# Patient Record
Sex: MALE | Race: WHITE | Employment: OTHER | URBAN - METROPOLITAN AREA
[De-identification: names, ages, dates, MRNs, and addresses within clinical notes are randomized per-mention and may not be internally consistent; named-entity substitution may affect disease eponyms.]

---

## 2017-08-02 LAB
LEFT VENTRICULAR EJECTION FRACTION MODE: NORMAL
LV EF: 35 %

## 2017-11-30 PROBLEM — I48.91 NEW ONSET ATRIAL FIBRILLATION (HCC): Status: ACTIVE | Noted: 2017-11-30

## 2018-03-09 PROBLEM — I07.1 TRICUSPID REGURGITATION: Status: ACTIVE | Noted: 2018-03-09

## 2018-03-09 PROBLEM — N18.9 CHRONIC KIDNEY DISEASE: Status: ACTIVE | Noted: 2018-03-09

## 2018-03-09 PROBLEM — I50.22 CHRONIC SYSTOLIC HEART FAILURE (HCC): Status: ACTIVE | Noted: 2018-03-09

## 2018-03-12 ENCOUNTER — HOSPITAL ENCOUNTER (OUTPATIENT)
Age: 73
Discharge: HOME | End: 2018-03-12
Payer: COMMERCIAL

## 2018-03-12 DIAGNOSIS — I25.5 ISCHEMIC CARDIOMYOPATHY: ICD-10-CM

## 2018-03-12 DIAGNOSIS — Z95.810 DUAL IMPLANTABLE CARDIOVERTER-DEFIBRILLATOR IN SITU: ICD-10-CM

## 2018-03-12 DIAGNOSIS — I34.0 NON-RHEUMATIC MITRAL REGURGITATION: ICD-10-CM

## 2018-03-12 DIAGNOSIS — I50.22 CHRONIC SYSTOLIC HEART FAILURE (HCC): ICD-10-CM

## 2018-03-12 DIAGNOSIS — I48.0 PAF (PAROXYSMAL ATRIAL FIBRILLATION) (HCC): ICD-10-CM

## 2018-03-12 LAB
ALBUMIN SERPL-MCNC: 4.2 G/DL (ref 3.5–5.2)
ALP BLD-CCNC: 99 U/L (ref 40–129)
ALT SERPL-CCNC: 19 U/L (ref 0–40)
ANION GAP SERPL CALCULATED.3IONS-SCNC: 12 MMOL/L (ref 7–16)
ANISOCYTOSIS: ABNORMAL
AST SERPL-CCNC: 32 U/L (ref 0–39)
BASOPHILS ABSOLUTE: 0.1 E9/L (ref 0–0.2)
BASOPHILS RELATIVE PERCENT: 1.2 % (ref 0–2)
BILIRUB SERPL-MCNC: 0.7 MG/DL (ref 0–1.2)
BUN BLDV-MCNC: 16 MG/DL (ref 8–23)
BURR CELLS: ABNORMAL
CALCIUM SERPL-MCNC: 9.3 MG/DL (ref 8.6–10.2)
CHLORIDE BLD-SCNC: 100 MMOL/L (ref 98–107)
CO2: 29 MMOL/L (ref 22–29)
CREAT SERPL-MCNC: 1.5 MG/DL (ref 0.7–1.2)
EOSINOPHILS ABSOLUTE: 0.46 E9/L (ref 0.05–0.5)
EOSINOPHILS RELATIVE PERCENT: 5.4 % (ref 0–6)
FERRITIN: 140 NG/ML
FOLATE: >20 NG/ML (ref 4.8–24.2)
GFR AFRICAN AMERICAN: 56
GFR NON-AFRICAN AMERICAN: 46 ML/MIN/1.73
GLUCOSE BLD-MCNC: 153 MG/DL (ref 74–109)
HBA1C MFR BLD: 6.7 % (ref 4.8–5.9)
HCT VFR BLD CALC: 42.7 % (ref 37–54)
HEMOGLOBIN: 13.1 G/DL (ref 12.5–16.5)
HYPOCHROMIA: ABNORMAL
IMMATURE GRANULOCYTES #: 0.03 E9/L
IMMATURE GRANULOCYTES %: 0.4 % (ref 0–5)
IRON SATURATION: 40 % (ref 20–55)
IRON: 126 MCG/DL (ref 59–158)
LYMPHOCYTES ABSOLUTE: 1.77 E9/L (ref 1.5–4)
LYMPHOCYTES RELATIVE PERCENT: 20.7 % (ref 20–42)
MCH RBC QN AUTO: 25.8 PG (ref 26–35)
MCHC RBC AUTO-ENTMCNC: 30.7 % (ref 32–34.5)
MCV RBC AUTO: 84.2 FL (ref 80–99.9)
MONOCYTES ABSOLUTE: 0.69 E9/L (ref 0.1–0.95)
MONOCYTES RELATIVE PERCENT: 8.1 % (ref 2–12)
NEUTROPHILS ABSOLUTE: 5.5 E9/L (ref 1.8–7.3)
NEUTROPHILS RELATIVE PERCENT: 64.2 % (ref 43–80)
OVALOCYTES: ABNORMAL
PDW BLD-RTO: 21.4 FL (ref 11.5–15)
PLATELET # BLD: 193 E9/L (ref 130–450)
PMV BLD AUTO: 11.3 FL (ref 7–12)
POIKILOCYTES: ABNORMAL
POTASSIUM SERPL-SCNC: 4.2 MMOL/L (ref 3.5–5)
PRO-BNP: 807 PG/ML (ref 0–125)
RBC # BLD: 5.07 E12/L (ref 3.8–5.8)
SODIUM BLD-SCNC: 141 MMOL/L (ref 132–146)
TARGET CELLS: ABNORMAL
TOTAL IRON BINDING CAPACITY: 315 MCG/DL (ref 250–450)
TOTAL PROTEIN: 7.8 G/DL (ref 6.4–8.3)
TSH SERPL DL<=0.05 MIU/L-ACNC: 4.17 UIU/ML (ref 0.27–4.2)
URIC ACID, SERUM: 6 MG/DL (ref 3.4–7)
WBC # BLD: 8.6 E9/L (ref 4.5–11.5)

## 2018-03-12 PROCEDURE — 85025 COMPLETE CBC W/AUTO DIFF WBC: CPT

## 2018-03-12 PROCEDURE — 83550 IRON BINDING TEST: CPT

## 2018-03-12 PROCEDURE — 82746 ASSAY OF FOLIC ACID SERUM: CPT

## 2018-03-12 PROCEDURE — 80053 COMPREHEN METABOLIC PANEL: CPT

## 2018-03-12 PROCEDURE — 84443 ASSAY THYROID STIM HORMONE: CPT

## 2018-03-12 PROCEDURE — 83880 ASSAY OF NATRIURETIC PEPTIDE: CPT

## 2018-03-12 PROCEDURE — 36415 COLL VENOUS BLD VENIPUNCTURE: CPT

## 2018-03-12 PROCEDURE — 83540 ASSAY OF IRON: CPT

## 2018-03-12 PROCEDURE — 82728 ASSAY OF FERRITIN: CPT

## 2018-03-12 PROCEDURE — 83036 HEMOGLOBIN GLYCOSYLATED A1C: CPT

## 2018-03-12 PROCEDURE — 84550 ASSAY OF BLOOD/URIC ACID: CPT

## 2018-03-15 ENCOUNTER — TELEPHONE (OUTPATIENT)
Dept: CARDIOLOGY CLINIC | Age: 73
End: 2018-03-15

## 2018-03-15 NOTE — TELEPHONE ENCOUNTER
Last office visit:  Dr Peck Loose: 1. Discontinue valsartan  2. Decrease furosemide to 20 mg daily  3. Tomorrow start Entresto 49-51 mg for 1 week. If in one week you feel ok, then increase to twice daily. 4. Lab work today or tomorrow and repeat lab work in 2 weeks from now (on the higher dosing of Entesto)  4. Return visit in 2 months    For 3 days take increase lasix 40mg daily, if you are doing well on the 40mg lasix daily, continue to take 40mg daily lasix. If you gain weight or have increase trouble breathing or increase in swelling please call the office Monday for further instructions. Change position slowly, space out entresto from lasix 1-2 hours. Continue daily weights. If you would like your blood pressure checked please call our office, I'll check it for you. He will call me Monday with an update on how he is feeling. He will not increase entresto to twice daily Saturday as he is going to increase his lasix dose to 40mg daily and will advise on Monday if ok to increase entresto to twice daily. Cardiologist in [place] saw today for follow up visit. He says his bp's run on  The low side but he feels ok. Cant recall his bp at cardiologist office today.

## 2018-03-20 ENCOUNTER — TELEPHONE (OUTPATIENT)
Dept: CARDIOLOGY CLINIC | Age: 73
End: 2018-03-20

## 2018-03-20 NOTE — TELEPHONE ENCOUNTER
Noa Hobson 1945 [phone] (home)     left voice message for client to call me back to report how lasix 40mg daily was working for him. To report his weights, any breathing issues and swelling to me . Asked he report if he has been changing position slowly and spacing lasix and entresto out at least 1-2 hours. Looking for an update on how he is feeling. Goal will be if doing ok, per Dr Emma Peralta, to increase Entresto 49-51mg twice daily. He currently is on 49-51 in am. (will need 2 week lab recheck once titrated entresto to twice daily). Pending patient call back.    [address] [facility]     3/20/2018 8:39 AM

## 2018-03-21 ENCOUNTER — TELEPHONE (OUTPATIENT)
Dept: CARDIOLOGY CLINIC | Age: 73
End: 2018-03-21

## 2018-03-21 DIAGNOSIS — I50.22 CHRONIC SYSTOLIC HEART FAILURE (HCC): Primary | ICD-10-CM

## 2018-03-22 ENCOUNTER — TELEPHONE (OUTPATIENT)
Dept: CARDIOLOGY CLINIC | Age: 73
End: 2018-03-22

## 2018-03-22 NOTE — TELEPHONE ENCOUNTER
3/21/18 at 357pm 3 page fax obtained from [address] [place] [address].   Phone [phone]  Member ID [id number]  Clinical prior Toby Jacobo is not required on the drug requested  \"entresto\" for client    Juan Sam 1945 [phone] (home)

## 2018-04-11 ENCOUNTER — HOSPITAL ENCOUNTER (OUTPATIENT)
Age: 73
Discharge: HOME | End: 2018-04-11
Payer: COMMERCIAL

## 2018-04-11 DIAGNOSIS — I25.5 ISCHEMIC CARDIOMYOPATHY: ICD-10-CM

## 2018-04-11 DIAGNOSIS — Z95.810 DUAL IMPLANTABLE CARDIOVERTER-DEFIBRILLATOR IN SITU: ICD-10-CM

## 2018-04-11 LAB
ANION GAP SERPL CALCULATED.3IONS-SCNC: 12 MMOL/L (ref 7–16)
BUN BLDV-MCNC: 21 MG/DL (ref 8–23)
CALCIUM SERPL-MCNC: 9.8 MG/DL (ref 8.6–10.2)
CHLORIDE BLD-SCNC: 98 MMOL/L (ref 98–107)
CO2: 28 MMOL/L (ref 22–29)
CREAT SERPL-MCNC: 1.8 MG/DL (ref 0.7–1.2)
GFR AFRICAN AMERICAN: 45
GFR NON-AFRICAN AMERICAN: 37 ML/MIN/1.73
GLUCOSE BLD-MCNC: 142 MG/DL (ref 74–109)
POTASSIUM SERPL-SCNC: 4.3 MMOL/L (ref 3.5–5)
SODIUM BLD-SCNC: 138 MMOL/L (ref 132–146)

## 2018-04-11 PROCEDURE — 36415 COLL VENOUS BLD VENIPUNCTURE: CPT

## 2018-04-11 PROCEDURE — 80048 BASIC METABOLIC PNL TOTAL CA: CPT

## 2018-04-16 ENCOUNTER — TELEPHONE (OUTPATIENT)
Dept: CARDIOLOGY CLINIC | Age: 73
End: 2018-04-16

## 2018-04-23 ENCOUNTER — TELEPHONE (OUTPATIENT)
Dept: CARDIOLOGY CLINIC | Age: 73
End: 2018-04-23

## 2018-04-24 ENCOUNTER — TELEPHONE (OUTPATIENT)
Dept: CARDIOLOGY CLINIC | Age: 73
End: 2018-04-24

## 2018-04-25 ENCOUNTER — TELEPHONE (OUTPATIENT)
Dept: CARDIOLOGY CLINIC | Age: 73
End: 2018-04-25

## 2018-04-25 DIAGNOSIS — I50.22 CHRONIC SYSTOLIC HEART FAILURE (HCC): Primary | ICD-10-CM

## 2018-04-30 ENCOUNTER — HOSPITAL ENCOUNTER (OUTPATIENT)
Age: 73
Discharge: HOME | End: 2018-04-30
Payer: COMMERCIAL

## 2018-04-30 DIAGNOSIS — I50.22 CHRONIC SYSTOLIC HEART FAILURE (HCC): ICD-10-CM

## 2018-04-30 LAB
ANION GAP SERPL CALCULATED.3IONS-SCNC: 13 MMOL/L (ref 7–16)
BUN BLDV-MCNC: 21 MG/DL (ref 8–23)
CALCIUM SERPL-MCNC: 9.2 MG/DL (ref 8.6–10.2)
CHLORIDE BLD-SCNC: 98 MMOL/L (ref 98–107)
CO2: 28 MMOL/L (ref 22–29)
CREAT SERPL-MCNC: 1.8 MG/DL (ref 0.7–1.2)
GFR AFRICAN AMERICAN: 45
GFR NON-AFRICAN AMERICAN: 37 ML/MIN/1.73
GLUCOSE BLD-MCNC: 164 MG/DL (ref 74–109)
POTASSIUM SERPL-SCNC: 4.3 MMOL/L (ref 3.5–5)
PRO-BNP: 757 PG/ML (ref 0–125)
SODIUM BLD-SCNC: 139 MMOL/L (ref 132–146)

## 2018-04-30 PROCEDURE — 36415 COLL VENOUS BLD VENIPUNCTURE: CPT

## 2018-04-30 PROCEDURE — 83880 ASSAY OF NATRIURETIC PEPTIDE: CPT

## 2018-04-30 PROCEDURE — 80048 BASIC METABOLIC PNL TOTAL CA: CPT

## 2018-05-01 ENCOUNTER — OFFICE VISIT (OUTPATIENT)
Dept: CARDIOLOGY CLINIC | Age: 73
End: 2018-05-01
Payer: COMMERCIAL

## 2018-05-01 VITALS
BODY MASS INDEX: 25.15 KG/M2 | WEIGHT: 196 LBS | HEART RATE: 90 BPM | DIASTOLIC BLOOD PRESSURE: 62 MMHG | SYSTOLIC BLOOD PRESSURE: 100 MMHG | HEIGHT: 74 IN | OXYGEN SATURATION: 93 % | RESPIRATION RATE: 18 BRPM

## 2018-05-01 DIAGNOSIS — I48.0 PAF (PAROXYSMAL ATRIAL FIBRILLATION) (HCC): ICD-10-CM

## 2018-05-01 DIAGNOSIS — I36.1 NON-RHEUMATIC TRICUSPID VALVE INSUFFICIENCY: ICD-10-CM

## 2018-05-01 DIAGNOSIS — E11.9 TYPE 2 DIABETES MELLITUS WITHOUT COMPLICATION, WITH LONG-TERM CURRENT USE OF INSULIN (HCC): ICD-10-CM

## 2018-05-01 DIAGNOSIS — I25.5 ISCHEMIC CARDIOMYOPATHY: ICD-10-CM

## 2018-05-01 DIAGNOSIS — I50.22 CHRONIC SYSTOLIC HEART FAILURE (HCC): Primary | ICD-10-CM

## 2018-05-01 DIAGNOSIS — I35.1 NONRHEUMATIC AORTIC VALVE INSUFFICIENCY: ICD-10-CM

## 2018-05-01 DIAGNOSIS — N18.30 STAGE 3 CHRONIC KIDNEY DISEASE (HCC): ICD-10-CM

## 2018-05-01 DIAGNOSIS — Z79.4 TYPE 2 DIABETES MELLITUS WITHOUT COMPLICATION, WITH LONG-TERM CURRENT USE OF INSULIN (HCC): ICD-10-CM

## 2018-05-01 DIAGNOSIS — I34.0 NON-RHEUMATIC MITRAL REGURGITATION: ICD-10-CM

## 2018-05-01 PROCEDURE — 99214 OFFICE O/P EST MOD 30 MIN: CPT | Performed by: NURSE PRACTITIONER

## 2018-05-07 ENCOUNTER — OFFICE VISIT (OUTPATIENT)
Dept: CARDIOLOGY CLINIC | Age: 73
End: 2018-05-07
Payer: COMMERCIAL

## 2018-05-07 VITALS
OXYGEN SATURATION: 96 % | BODY MASS INDEX: 25.15 KG/M2 | DIASTOLIC BLOOD PRESSURE: 60 MMHG | HEART RATE: 95 BPM | SYSTOLIC BLOOD PRESSURE: 110 MMHG | WEIGHT: 196 LBS | HEIGHT: 74 IN | RESPIRATION RATE: 18 BRPM

## 2018-05-07 DIAGNOSIS — I25.5 ISCHEMIC CARDIOMYOPATHY: ICD-10-CM

## 2018-05-07 DIAGNOSIS — I25.119 ATHEROSCLEROSIS OF NATIVE CORONARY ARTERY OF NATIVE HEART WITH ANGINA PECTORIS (HCC): ICD-10-CM

## 2018-05-07 DIAGNOSIS — I35.1 NONRHEUMATIC AORTIC VALVE INSUFFICIENCY: ICD-10-CM

## 2018-05-07 DIAGNOSIS — I50.22 CHRONIC SYSTOLIC HEART FAILURE (HCC): Primary | ICD-10-CM

## 2018-05-07 DIAGNOSIS — E11.9 TYPE 2 DIABETES MELLITUS WITHOUT COMPLICATION, WITHOUT LONG-TERM CURRENT USE OF INSULIN (HCC): Chronic | ICD-10-CM

## 2018-05-07 DIAGNOSIS — I36.1 NON-RHEUMATIC TRICUSPID VALVE INSUFFICIENCY: ICD-10-CM

## 2018-05-07 DIAGNOSIS — Z95.810 DUAL IMPLANTABLE CARDIOVERTER-DEFIBRILLATOR IN SITU: ICD-10-CM

## 2018-05-07 PROCEDURE — 99215 OFFICE O/P EST HI 40 MIN: CPT | Performed by: INTERNAL MEDICINE

## 2018-05-07 RX ORDER — METOPROLOL SUCCINATE 25 MG
25 TABLET, EXTENDED RELEASE 24 HR ORAL DAILY
Qty: 90 TABLET | Refills: 3
Start: 2018-05-07 | End: 2018-05-07 | Stop reason: SDUPTHER

## 2018-05-08 RX ORDER — METOPROLOL SUCCINATE 25 MG/1
25 TABLET, EXTENDED RELEASE ORAL DAILY
Qty: 30 TABLET | Refills: 11 | Status: ON HOLD | OUTPATIENT
Start: 2018-05-08 | End: 2019-01-13 | Stop reason: HOSPADM

## 2018-05-11 ENCOUNTER — TELEPHONE (OUTPATIENT)
Dept: CARDIOLOGY CLINIC | Age: 73
End: 2018-05-11

## 2018-07-20 ENCOUNTER — TELEPHONE (OUTPATIENT)
Dept: NON INVASIVE DIAGNOSTICS | Age: 73
End: 2018-07-20

## 2018-07-20 NOTE — TELEPHONE ENCOUNTER
Left message to call back to schedule a New pt appt with Dr Bettina Young next week. Patient was seen in the past by Dr Bettina Young but its been over 3 years so it will be a New Patient.    Patient has a [employer] ICD

## 2018-07-23 NOTE — TELEPHONE ENCOUNTER
Sent patient reminder paper of his appt and what he should bring to the appt. New pt paper work is not needed update papers are in the chart.

## 2018-07-23 NOTE — TELEPHONE ENCOUNTER
Pt scheduled with Dr. Aiden Calzada - he is unable to make the early times this week for a new pt apt. He is scheduled for an apt 8/15/18 @ 12:15.

## 2018-08-13 ENCOUNTER — OFFICE VISIT (OUTPATIENT)
Dept: CARDIOLOGY CLINIC | Age: 73
End: 2018-08-13
Payer: COMMERCIAL

## 2018-08-13 VITALS
SYSTOLIC BLOOD PRESSURE: 90 MMHG | OXYGEN SATURATION: 95 % | BODY MASS INDEX: 25.26 KG/M2 | WEIGHT: 196.8 LBS | RESPIRATION RATE: 20 BRPM | HEIGHT: 74 IN | DIASTOLIC BLOOD PRESSURE: 62 MMHG | HEART RATE: 80 BPM

## 2018-08-13 DIAGNOSIS — R53.82 CHRONIC FATIGUE: ICD-10-CM

## 2018-08-13 DIAGNOSIS — I48.0 PAF (PAROXYSMAL ATRIAL FIBRILLATION) (HCC): ICD-10-CM

## 2018-08-13 DIAGNOSIS — I50.22 CHRONIC SYSTOLIC HEART FAILURE (HCC): Primary | ICD-10-CM

## 2018-08-13 PROCEDURE — 93000 ELECTROCARDIOGRAM COMPLETE: CPT | Performed by: INTERNAL MEDICINE

## 2018-08-13 PROCEDURE — 99214 OFFICE O/P EST MOD 30 MIN: CPT | Performed by: INTERNAL MEDICINE

## 2018-08-13 RX ORDER — LANOLIN ALCOHOL/MO/W.PET/CERES
3 CREAM (GRAM) TOPICAL NIGHTLY PRN
COMMUNITY
End: 2019-01-07 | Stop reason: ALTCHOICE

## 2018-08-20 NOTE — PROGRESS NOTES
Active Problem List    Diagnosis Date Noted    AI (aortic insufficiency) 04/12/2013     Priority: High     -Moderate AI  -S/p AVR with size #23 Trifecta valve (11/07/2017, [facility])  -Echo (12/2017, [facility]) There is trivial aortic valve regurgitation. The peak gradient is 9 mmHg, the mean gradient is 5 mmHg and the dimensionless valve index is 0.96. prior gradients were 15/8 mmHg.  Ischemic cardiomyopathy 04/11/2013     Priority: High     Left ventricular systolic dysfunction. Ejection fraction 30-35%. Ejection fraction 30% to 35% on cardiac catheterization, 01/09/04. Ejection fraction 44% on Gated SPECT Study 7/16/04    Echocardiogram(6/2013): LVEF 25 +/- 5%. Severe LV dilatation. EF = 19 ± 5% (2D biplane), 12/18/2017 ([facility])      Coronary atherosclerosis of native coronary artery 04/09/2013     Priority: High     -Acute posterior myocardial infarction (1/25/97). -[facility] (01/27/97) Critical lesion mid left circumflex, significant lesion mid-LAD and first diagonal.    -PTCA (01/29/97) mid left circumflex. -[facility] (11/16/00) Insignificant coronary atherosclerosis. -Acute inferior myocardial infarction (01/08/04). -[facility] (01/08/04) patent angioplasty site of the left circumflex, total occlusion mid right coronary artery with otherwise unremarkable coronary artery disease. -PTCA and stent of right coronary artery (01/08/04). -Repeat [facility] (01/09/04) Good angioplasty result of right coronary artery with minimal residual thrombus. -CABG x 3 (04/2013) LIMA-LAD, SVG-OM, SVG-PAD  -[facility] (10/2017, [facility]) Severe disease of the SVG-PDA. Patent LIMA-LAD and SVG-LCx. Trivial to mild AI with normal sized aorta. -Redo CABG (11/07/2017) SVG- to the PDA concomitant with valvular repairs.             Dual implantable cardioverter-defibrillator in situ 09/24/2013     Priority: Medium     Upgrade of PPM to dual chamber ICD 9/12/13: Evera XT DR model [id number]; SN [id number]        DM (diabetes mellitus) ([facility] [address] 04/09/2013 sternotomy, CABG x1 (SVG - PDA), MVR (#29 Biocor), AVR (#23 Trifecta), TV repair (#30 CE ring)  Airway Difficulty: Grade I - No special instrumentation  OR Course: Coagulopathy/bleeding (2plt, 10cryo, 2FFP, 1pRBC) , Hypotension, Cardiac insuffiencey          Pacing wires: No - PPM/ICD  [facility]: Severe LV/Moderate RV dysfunction. CI 1.8 post CPB, IABP placed at that time. Arrived on Levo, Vaso, Epi, Milrinone and inhaled epoprostenol (RV support) for hemodynamic support. Target maps 65-75. Maintain ETT overnight. Ensure BG and pain control. A/P:  No TPM wires - ICD - Check completed 11/10- underlying rhythm- AFIb PVCs  - s/p CABG - ASA, BB, statin  - s/p MVR, AVR, TVr - ECHO 11/10: EF 28%, RV mod, Triv MR (grad 18/5), 1+TR (mn grad 3), Triv AR (grad16/8); Triv pericardial effusion  - [facility] - Cards on consult- increase Diovan, Torsemide  - h/o Afib - BB and Coumadin. [facility]   - DM2 - Endo following  - COPD/smoker   - Chronic Back pain- sherwin enamorado, heating pad, encourage ambulation. - post op Delirium- memory issues per sister. distrust, family issues/concerns. Mood improved 11/17/2017. declined psych consult. - CKD (preop baseline SCr 1.69- 2.19). SCr 1.59 - improved    Dispo - from [place], [place]. PT recs Home PT.  OPD appt scheduled. [facility] Cards - req. - Plans to stay with sister after dc to recover.  Ulnar nerve neuropathy 06/27/2013    PAF (paroxysmal atrial fibrillation) ([facility] [address] 05/13/2013     Anticoagulated with coumadin   ROBER (2013)      Mitral regurgitation 04/12/2013     -s/p MVrp (2013) 30mm Future ring.   -s/p MVR (10/2017, [facility]) #29 Biocor   - Echo (12/18/2017, [facility]) Biocor prosthetic mitral valve (size #29). There is trivial mitral valve regurgitation. The peak gradient is 22 mmHg and the mean gradient is 8 mmHg. Prior gradients were 22/9 mmHg.       Hyperlipidemia 04/09/2013    GERD (gastroesophageal reflux disease) 04/09/2013    Depression 04/09/2013    Hypothyroid taking differently: Take 1,000 mg by mouth daily ) 30 tablet 3    levothyroxine (SYNTHROID) 75 MCG tablet Take 1 tablet by mouth Daily 30 tablet 3    Multiple Vitamins-Minerals (THERAPEUTIC MULTIVITAMIN-MINERALS) tablet Take 1 tablet by mouth daily 60 tablet 2    insulin glargine (LANTUS) 100 UNIT/ML injection vial Inject 20 Units into the skin every morning      atorvastatin (LIPITOR) 80 MG tablet Take 80 mg by mouth daily      linagliptin (TRADJENTA) 5 MG tablet Take 5 mg by mouth daily      albuterol-ipratropium (COMBIVENT)  MCG/ACT inhaler Inhale 2 puffs into the lungs every 12 hours. (Patient taking differently: Inhale 2 puffs into the lungs every 12 hours To use & bring) 1 Inhaler 1    aspirin 81 MG EC tablet Take 81 mg by mouth daily Prescribed per Dr Ernestina De La Torre      omeprazole (PRILOSEC) 20 MG capsule Take 20 mg by mouth daily Take am dos 08/02           Review of Systems:   Cardiac: As per HPI  General: No fever, chills, rigors  Pulmonary: As per HPI  HEENT: No visual disturbances, difficult swallowing  GI: No nausea, vomiting, abdominal pain  : No dysuria or hematuria  Endocrine: No thyroid disease or diabetes  Musculoskeletal: FUNEZ x 4, no focal motor deficits  Skin: Intact, no rashes  Neuro/Psych: No headache or seizures        Weights: Wt Readings from Last 3 Encounters:   08/13/18 196 lb 12.8 oz (89.3 kg)   05/07/18 196 lb (88.9 kg)   05/01/18 196 lb (88.9 kg)     Physical Examination:   BP 90/62   Pulse 80   Resp 20   Ht 6' 2\" (1.88 m)   Wt 196 lb 12.8 oz (89.3 kg)   SpO2 95%   BMI 25.27 kg/m²   CONSTITUTIONAL: Alert and oriented times 3, no acute distress and cooperative to examination with proper mood and affect. SKIN: Skin color, texture, turgor normal. No rashes or lesions. LYMPH: no cervical nodes, no inguinal nodes  HEENT: Head is normocephalic, atraumatic. EOMI, PERRLA.   NECK: Supple, symmetrical, trachea midline, no adenopathy, thyroid symmetric, not enlarged and no infection. No arm swelling, syncope, pre-syncope or device related pocket stimulation. OTHER DIAGNOSTICS: RV pacing 6%. PROGRAMMING CHANGES MADE TODAY: None        ECG(03/09/2018): NSR, OH prolongation, low voltage, non specific st t wave changes        Assessment:  1. Chronic HFrEF  2. Valvular cardiomyopathy; status post AVR for AI  3. Ischemic heart disease status post CABG 2013, 2017  4. Preserved RV function  5. ACC/AHA stage C  6. NYHA FC 3  7. Clinically euvolemic, warm, well perfused  8. Coronary artery disease, CABG 2013, 2017  9. Fatigue, slowly improving. 10. Systemic hypertension history  11. Insulin dependent diabetes mellitus  12. PAF, currently maintaining sinus rhythm. 13. Elevated chadsvasc, anticoagulated with coumadin. Plan:   1. Continue current medications. Administer Entresto samples. 4. Repeat TTE pending, then +/- CPET based on results. 5. Diet should sodium restricted to 2 grams. Again watch your daily weight trends and if you gain water   weight please follow above instructions. If you gain 3-5 pounds in 2-3 days OR notice that you are retaining fluid in anyway just like you did before then take an extra dose of your water pill (furosemide/Lasix OR bumetanide/Bumex) every day until you lose the weight or feel better. If you notice that you have taken more than 3 extra doses in 1 week then please call and let us know. If at any time you feel that you are retaining fluid, your medications are not working, or you feel ill in anyway, then please call us for either same day appointment or the next day, and for instructions. Our goal is to keep you out of the emergency room and the hospital and we have ways to do it. You just need to call us in a timely manner.      If you become sick for other reasons, and notice that you are not urinating as much, the urine is very dark, you have significant diarrhea or vomiting, then please DO NOT take your water pill and

## 2018-08-24 NOTE — PROGRESS NOTES
Cardiac Electrophysiology Consultation Note    Regina Hays  1945  Date of Service: 8/28/2018  PCP: Jolyn Aschoff, MD  Cardiologist:  Orville Pollack MD  Electrophysiologist: Christiano Moreland DO      SUBJECTIVE: Regina Hays is a 73 yo male who I was asked to see in Cardiac Electrophysiology consultation for re-establishment of EP care. He was last evaluated in our device clinic 7/28/17. His last office visit with Dr Von Fernandez was 2/21/15. He has an extensive medical history as noted below. He now follows with Dr Ulysses Oddi with recent follow-up 8/20/18. He is feeling well and offers no complaints. His OptiVol fluid index is stable at baseline and he denies any heart failure symptoms. He appears euvolemic today. We discussed remote follow-up. A new home monitor will be ordered. He denies angina, syncope, orthopnea and PND. Since his last device follow-up in August 2017 he has had 10 NSVT episodes lasting 1-4 seconds all self-terminating. He denies ICD shock. He remains on coumadin with no reported bleeding issues. Patient Active Problem List    Diagnosis Date Noted    Ischemic cardiomyopathy 04/11/2013     Priority: High     Overview Note:     Left ventricular systolic dysfunction. Ejection fraction 30-35%. Ejection fraction 30% to 35% on cardiac catheterization, 01/09/04. Ejection fraction 44% on Gated SPECT Study 7/16/04    Echocardiogram(6/2013): LVEF 25 +/- 5%. Severe LV dilatation. EF = 19 ± 5% (2D biplane), 12/18/2017 ([facility])      Tricuspid regurgitation 03/09/2018     Overview Note:     -Moderate TR  -s/p tricuspid valve repair with a size #30 CE ring. (11/07/2017, [facility])  - Echo (12/2017, [facility]) There is mild (1+ - 2+) tricuspid valve regurgitation. The peak gradient is 14 mmHg and the mean gradient is 5 mmHg.                 Chronic kidney disease 03/09/2018     Overview Note:     Stage 3   Baseline Cr - 1.7, GFR - 40                Chronic systolic heart failure ([address] 03/09/2018 Overview Note:     RHC (10/2017, [facility]) BP: 122 / 76. BP Mean: 91. HR: 82 min. FiO2: 21.00%. Pulse Ox: 94.00%. CVP: 8 cm. Thermo CO: 3.97 l/min. Hg: 10.8. H2O = 6.2 mmHg. Thermo CI: 1.84 l/min/m2. TP. RA Mean: 8. Assumed Chan CO: 4.21 l/min. PVR: 3.0. RV: 76 / 12. Assumed Chan CI: 1.95 l/min/m2. SVR: 1679.0. PA: 72 / 34. PA Mean: 49.00. PCWP Mean: 36.0. SV: 48.41 cc/stroke. TS. SVI: 22.46 cc/stroke/m2. PCWP V Wave: 50.00. PAO2: 50.40%. LVSWI: 16.90 gm-m/m2/beat. RVSWI: 12.52 gm-m/m2/beat. RVSWI: 920.74 mmHg-ml/m2/beat. Impression: Pulmonary venous hypertension with prominent V waves borderline/mildly subnormal cardiac index       Transition of care performed with sharing of clinical summary 11/15/2017     Overview Note:     Overview:   Indication for Surgery: Severe MR, AI, TR. CAD. LVEF: 29%  RVF: Mod, RVSP 76 mmHg, 2-3+MR/TR, 2+ AR   EKG: ICD - underlying SR with episodes of NSVT and Afib   Cors: Severe CAD  [facility] Cards: Argenis Daugherty   RHC: PA 72/34   PMH/PSH: mod MR s/p MV repair (#30 future ring), moderate AI and moderate TR; CAD s/p CABGX3 (LIMA-LAD, SVG to OM, PDA);  ICM (LVEF 35% by NNEKA) s/p  ICD (); paroxysmal AFib (on Coumadin), COPD, HTN, HLD, DM, hypothyroidism, current smoker, OA, GERD, gout. sys/scott HF; CKD     Preop: 71yo male with PMH of prior CABG and MV repair in  at [facility], ICD placement , atrial fibrillation on Coumadin, gout, DM2, active smoker with increased fatigue and SOB.     Surgery: 2017 Redo sternotomy, CABG x1 (SVG - PDA), MVR (#29 Biocor), AVR (#23 Trifecta), TV repair (#30 CE ring)  Airway Difficulty: Grade I - No special instrumentation  OR Course: Coagulopathy/bleeding (2plt, 10cryo, 2FFP, 1pRBC) , Hypotension, Cardiac insuffiencey          Pacing wires: No - PPM/ICD  CVICU: Severe LV/Moderate RV dysfunction. CI 1.8 post CPB, IABP placed at that time.  Arrived on Levo, Vaso, Epi, Milrinone and inhaled epoprostenol (RV support) for hemodynamic support. Target maps 65-75. Maintain ETT overnight. Ensure BG and pain control. A/P:  No TPM wires - ICD - Check completed 11/10- underlying rhythm- AFIb PVCs  - s/p CABG - ASA, BB, statin  - s/p MVR, AVR, TVr - ECHO 11/10: EF 28%, RV mod, Triv MR (grad 18/5), 1+TR (mn grad 3), Triv AR (grad16/8); Triv pericardial effusion  - [facility] - Cards on consult- increase Diovan, Torsemide  - h/o Afib - BB and Coumadin. [facility]   - DM2 - Endo following  - COPD/smoker   - Chronic Back pain- sherwin enamorado, heating pad, encourage ambulation. - post op Delirium- memory issues per sister. distrust, family issues/concerns. Mood improved 11/17/2017. declined psych consult. - CKD (preop baseline SCr 1.69- 2.19). SCr 1.59 - improved    Dispo - from [facility]. PT recs Home PT.  [facility]D appt scheduled. [facility] Cards - req. - Plans to stay with sister after dc to recover.  Dual implantable cardioverter-defibrillator in situ 09/24/2013     Overview Note:     Upgrade of PPM to dual chamber ICD 9/12/13: Shawn WESLEY DR model [id number];  [id number]        Ulnar nerve neuropathy 06/27/2013    PAF (paroxysmal atrial fibrillation) ([facility] [address] 05/13/2013     Overview Note:     Anticoagulated with coumadin   ROBER (2013)      Mitral regurgitation 04/12/2013     Overview Note:     -s/p MVrp (2013) 30mm Future ring.   -s/p MVR (10/2017, [facility]) #29 Biocor   - Echo (12/18/2017, [facility]) Biocor prosthetic mitral valve (size #29). There is trivial mitral valve regurgitation. The peak gradient is 22 mmHg and the mean gradient is 8 mmHg. Prior gradients were 22/9 mmHg.  AI (aortic insufficiency) 04/12/2013     Overview Note:     -Moderate AI  -S/p AVR with size #23 Trifecta valve (11/07/2017, [facility])  -Echo (12/2017, [facility]) There is trivial aortic valve regurgitation. The peak gradient is 9 mmHg, the mean gradient is 5 mmHg and the dimensionless valve index is 0.96. prior gradients were 15/8 mmHg.               Hyperlipidemia CARDIAC CATH LAB PROCEDURE  01/27/97,11/00    Critical lesion mid left circumflex,significant lesion mid-LAD and first diagonal    ECHO COMPL W DOP COLOR FLOW  4/11/2013         ECHO COMPL W DOP COLOR FLOW  4/21/2013         MITRAL VALVE SURGERY      TRANSESOPHAGEAL ECHOCARDIOGRAM  4/12/2013    Dr. Marcia Juárez TRANSESOPHAGEAL ECHOCARDIOGRAM  08/02/2017       Family History   Problem Relation Age of Onset    Hypertension Mother     Hypertension Father     Heart Surgery Father     High Cholesterol Father        Social History   Substance Use Topics    Smoking status: Former Smoker     Packs/day: 0.50     Years: 50.00     Types: Cigarettes     Quit date: 11/7/2017    Smokeless tobacco: Never Used      Comment: occ cigarette    Alcohol use Yes      Comment: quit since surgery in november. was about a 5 drink a week prior to this       Current Outpatient Prescriptions   Medication Sig Dispense Refill    zolpidem (AMBIEN) 10 MG tablet Take 5 mg by mouth nightly as needed.  Arlin Kruger metoprolol succinate (TOPROL XL) 25 MG extended release tablet Take 1 tablet by mouth daily 30 tablet 11    sacubitril-valsartan (ENTRESTO) 49-51 MG per tablet Take 1 tablet by mouth 2 times daily 60 tablet 11    colchicine (COLCRYS) 0.6 MG tablet Take 0.6 mg by mouth as needed       furosemide (LASIX) 40 MG tablet Take 0.5 tablets by mouth daily (Patient taking differently: Take 40 mg by mouth daily ) 60 tablet 3    warfarin (COUMADIN) 2.5 MG tablet AT 6:00   PM (Patient taking differently: AT 6:00   PM) 30 tablet 3    allopurinol (ZYLOPRIM) 100 MG tablet Take 1 tablet by mouth daily 30 tablet 3    ascorbic acid (VITAMIN C) 500 MG tablet Take 1 tablet by mouth daily (Patient taking differently: Take 1,000 mg by mouth daily ) 30 tablet 3    levothyroxine (SYNTHROID) 75 MCG tablet Take 1 tablet by mouth Daily 30 tablet 3    Multiple Vitamins-Minerals (THERAPEUTIC MULTIVITAMIN-MINERALS) tablet Take 1 tablet by mouth daily 60 tablet 2    insulin glargine (LANTUS) 100 UNIT/ML injection vial Inject 20 Units into the skin every morning      atorvastatin (LIPITOR) 80 MG tablet Take 80 mg by mouth daily      linagliptin (TRADJENTA) 5 MG tablet Take 5 mg by mouth daily      albuterol-ipratropium (COMBIVENT)  MCG/ACT inhaler Inhale 2 puffs into the lungs every 12 hours. (Patient taking differently: Inhale 2 puffs into the lungs every 12 hours To use & bring) 1 Inhaler 1    aspirin 81 MG EC tablet Take 81 mg by mouth daily Prescribed per Dr Abdon Rucker      omeprazole (PRILOSEC) 20 MG capsule Take 20 mg by mouth daily Take am dos 08/02      melatonin 3 MG TABS tablet Take 3 mg by mouth nightly as needed       No current facility-administered medications for this visit. No Known Allergies    Review of Systems   Respiratory: Negative. Cardiovascular: Negative. All other systems reviewed and are negative. PHYSICAL EXAM:  Vitals:    08/28/18 1124   BP: 92/60   Resp: 16   Weight: 198 lb (89.8 kg)   Height: 6' 2\" (1.88 m)     Constitutional: Oriented to person, place, and time. Well-developed and cooperative. Head: Normocephalic and atraumatic. Eyes: Conjunctivae are normal.   Neck: No hepatojugular reflux and no JVD present. Cardiovascular: S1 normal, S2 normal and intact distal pulses. Normal rate and rhythm. PMI is not displaced. Pulmonary/Chest: Effort normal and breath sounds normal. No respiratory distress. Abdominal: Soft. Normal appearance. No tenderness. Musculoskeletal: Normal range of motion of all extremities, no muscle weakness. Neurological: Alert and oriented to person, place, and time. Gait normal.   Skin: Skin is warm and dry. No bruising, no ecchymosis and no rash noted. Extremity: No clubbing or cyanosis. No edema. Psychiatric: Normal mood and affect.  Thought content normal.   ICD site: stable, well healed, no evidence of erosion       TSH:   Lab Results   Component Value Date    TSH 4.170 03/12/2018      Lipid Profile:   Lab Results   Component Value Date    TRIG 87 12/02/2017    HDL 20 12/02/2017    LDLCALC 48 12/02/2017    CHOL 85 12/02/2017            Pertinent Cardiac Testing:     ICD Interrogation:   WILLIAM WESLEY DR, DDD  ppm. DOI: 9/12/2013. P wave: 1 mV  Impedance: 342 ohms   Threshold: 0.75 V @ 0.4 ms  RV R wave: 18 mV  Impedance: 399 ohms   Threshold: 0.75 V @ 0.5 ms  Pacing: A: 61.9%  RV: 1.1%    Battery Voltage/Longevity: 5.6 years  Charge time: 3.8 seconds    Arrhythmias: Since last interrogation 8/28/17                       - 10 NSVT 1-4 seconds  OptiVol: At baseline; elevated 11/11/17-5/2/18  Programming: lowered rated from 80 back to 60 bpm                           carelink alerts turned on        2D echocardiogram 7/20/17  Reading Physician Reading Date Result Priority   Aubrie Veliz MD 7/22/2017    Narrative     Transthoracic Echocardiography Report (TTE)     Demographics      Patient Name       Steven Favre Gender             [facility]      Medical Record     [id number]      Room Number         Yady Goldman      Account #         [de-identified]    Procedure Date      07/20/2017      Corporate ID                     Ordering Physician Giles Modi MD      Accession Number   [id number]     Referring Physician Paddy Woods MD      Date of Birth      1945    Sonographer         Merlinda Jabs                                                        [facility]      Age                71 year(s)    Interpreting       Daniel Stevens MD                                    Physician                                       Any Other     Procedure    Type of Study      TTE procedure:Echo Complete W/ Dop & Color Flow.     Procedure Date  Date: 07/20/2017 Start: 01:54 PM    Study Location: Echo Lab  Technical Quality: Adequate visualization    Indications:Cardiomyopathy and Mitral Valve Repair.     Patient Status: Routine    Height: 74 inches Weight: 198 pounds BSA: 2.16 m^2 BMI: 25.42 kg/m^2    BP: 110/80 mmHg    Allergies    - No known allergies.     Findings      Left Ventricle   Mildly dilated left ventricle   LV diffuse hypokinesis   Ejection fraction Kelly biplane = 38%.      Right Ventricle   Normal right ventricular size and function.   Electrode in right ventricle.      Left Atrium   Normal sized left atrium.   Atrial fibrillation      Right Atrium   Mildly enlarged right atrium size.   Electrode in RA cavity. .      Mitral Valve   Increased E point septal separation noted,suggesting decreased LV cardiac   output   MV leaflets tented   Moderate primarily central directed mitral regurgitation   S/P MVr with ring      Tricuspid Valve   The tricuspid valve appears structurally normal.   Moderate tricuspid regurgitation.    RVSP is 46 mmHg.   TR velocity = 3.27 m/s   Pulmonary hypertension is mild .      Aortic Valve   Structurally normal aortic valve.   Aortic valve opens well.   Moderate aortic regurgitation   AR MPK=067 ms   AR VC=0.7      Pulmonic Valve   Pulmonic valve is structurally normal.   Moderate-to-severe pulmonic regurgitation   S/P MVr with ring      Pericardial Effusion   No pericardial effusion.      Aorta   Aortic root dimension within normal limits.      Conclusions      Summary   Moderate aortic regurgitation   Ejection fraction Kelly biplane = 38%   Atrial fibrillation   S/P MVr with ring   Moderate primarily central directed mitral regurgitation   Moderate aortic regurgitation   AR QDE=440 ms   Pulmonary hypertension is mild .      Signature      ----------------------------------------------------------------   Electronically signed by Inge Conn MD(Interpreting   physician) on 07/22/2017 11:02 PM   ----------------------------------------------------------------     M-Mode/2D Measurements & Calculations      LV Diastolic     LV Systolic Dimension: 5 cm     AV Cusp Separation: 2   Dimension: 5.8   LV Volume Diastolic: 178.9 ml   cmAO Root Dimension: 3.4   cm               LV Volume Systolic: 894.0 ml    cm   LV FS:13.8 %     LV EDV/LV EDV Index: 618.0   LV PW Diastolic: QO/22 CJ/V^5HD ESV/LV ESV   0.9 cm           Index: 115.9 ml/54ml/ m^2   LV PW Systolic:  EF Calculated: 29.8 %           RV Diastolic Dimension:   6.0 cm           LV Mass Index: 115 l/min*m^2    4.4 cm   Septum   Diastolic: 1.2                                   LA/Aorta: 1.44   cm               LVOT: 2.4 cm                    Ascending Aorta: 3.6 cm   Septum Systolic:                                 LA volume/Index: 65.3 ml   1.8 cm                                           /30ml/m^2                                                    RA Area: 17.8 cm^2   LV Mass: 248.48   g     Doppler Measurements & Calculations                          AV Peak Velocity: 1.54 LVOT Peak Velocity: 0.83 m/s   MV Peak Gradient:   m/s                    LVOT Mean Velocity: 0.56 m/s   15.4 mmHg           AV Peak Gradient: 9.45 LVOT Peak Gradient: 2.8   MV Mean Gradient:   mmHg                   mmHgLVOT Mean Gradient: 1.4   5.2 mmHg            AV Mean Velocity: 0.98 mmHg   MV Mean Velocity:   m/s                    Estimated RVSP: 45.69 mmHg   0.97 m/s            AV Mean Gradient: 4.4  Estimated RAP:3 mmHg   MV Deceleration     mmHg   Time: 215.2 msec    AV VTI: 28.6 cm   MV P1/2t: 50 msec   AV Area                TR Velocity:3.27 m/s   MVA by PHT:4.4 cm^2 (Continuity):2.61 cm^2 TR Gradient:42.69 mmHg   MV Area             AV Deceleration Time:  PV Peak Velocity: 1.08 m/s   (continuity): 2.4   1742.9 msec            PV Peak Gradient: 4.7 mmHg   cm^2                LVOT VTI: 16.5 cm      PV Mean Velocity: 0.68 m/s                                              PV Mean Gradient: 2.1 mmHg      MR Velocity: 4.28   Pulm. Vein D           CO ED Velocity: 1.32 m/s   m/s                 Velocity:0.86 m/s   MR VTI: 130.6 cm    Pulm.  Vein S Velocity:                   post mitral annular ring.   Mean mitral valve gradient 5.9 mmHg.   Moderate mitral regurgitation.      Tricuspid Valve   Mild tricuspid regurgitation.   PASP is estimated at 37 mmHg.      Aortic Valve   Moderate aortic regurgitation.   No evidence of hemodynamically significant aortic stenosis.       Pulmonic Valve   Mild pulmonic regurgitation.      Pericardial Effusion   No evidence of a hemodynamically significant pericardial effusion.      Aorta   Aortic root dimension within normal limits.   The inferior vena cava is normal in size with normal respiratory   variation.      Conclusions      Summary   Ejection fraction is visually estimated at 35%.   The inferior and inferolateral walls are severely hypokinetic/akinetic.  Lana Camel dilated right ventricle with normal right ventricular function.   Indeterminate diastolic function.   Moderately dilated left atrium.   Pulmonary vein doppler suggestive of elevated left atrial pressure.   Status post mitral annular ring.   Mean mitral valve gradient 5.9 mmHg.   Moderate mitral regurgitation.   Moderate aortic regurgitation.   Mild tricuspid regurgitation.   PASP is estimated at 37 mmHg.   Mild pulmonic regurgitation.      Signature      ----------------------------------------------------------------   Electronically signed by Erlin Galvez MD(Interpreting   physician) on 07/15/2015 07:56 AM   ----------------------------------------------------------------     M-Mode/2D Measurements & Calculations      LV Diastolic     LV Systolic Dimension: 5.6 cm   AV Cusp Separation: 1.8   Dimension: 6.6   LV Volume Diastolic: 559.9 ml   cmAO Root Dimension: 3.4   cm               LV Volume Systolic: 213.2 ml    cm   LV FS:15.2 %     LV EDV/LV EDV Index: 034.0   LV PW Diastolic: DO/67 UQ/F^0IM ESV/LV ESV   0.5 cm           Index: 114.9 ml/52ml/ m^2   LV PW Systolic:  EF Calculated: 46.5 %           RV Diastolic Dimension:   9.9 cm           LV Mass Index: 115 l/min*m^2    2.6 cm   Septum   Diastolic: 1.3                                   LA/Aorta: 1.47   cm               LVOT: 2.4 cm                    Ascending Aorta: 3.2 cm   Septum Systolic:                                 LA volume/Index: 78 ml   1.6 cm                                           /35ml/m^2   CO: 6.01 l/min                                   RA Area: 19 cm^2   CI: 2.71 l/m*m^2   LV Mass: 254.53                                  IVC Expiration: 1.8 cm   g     Doppler Measurements & Calculations                                  AV Peak Velocity: 1.6 LVOT Peak Velocity: 0.84   MV Mean Gradient: 56 mmHg   m/s                   m/s   MV Mean Velocity: 1.07 m/s  AV Peak Gradient:     LVOT Mean Velocity: 0.53   MV Deceleration Time: 258.5 10.25 mmHg            m/s   msec                        AV Mean Velocity: 1.1 LVOT Peak Gradient: 2.8   MV P1/2t: 57.6 msec         m/s                   mmHgLVOT Mean Gradient:   MVA by PHT:3.82 cm^2        AV Mean Gradient: 5.5 1.4 mmHg   MV Area (continuity): 1.9   mmHg                  Estimated RVSP: 37 mmHg   cm^2                        AV VTI: 33.9 cm       Estimated RAP:3 mmHg                               AV Area                               (Continuity):2.43   MR Velocity: 4.57 m/s       cm^2   MV SALVADOR PISA: 0.15 cm^2      AV Deceleration Time:   MR VTI: 147.6 cm            1451.5 msec           PV Peak Velocity: 1.2   Alias Velocity: 0.31        LVOT VTI: 18.2 cm     m/s   m/sPISA Radius: 0.6 cm                            PV Peak Gradient: 5.74                                                     mmHg   PISA area: 2.26 cm^2MR flow Pulm. Vein D          PV Mean Velocity: 0.8   rate: 70.06 ml/sMR          Velocity:0.73 m/s     m/s   volume:22.14 ml             Pulm. Vein S          PV Mean Gradient: 3 mmHg                               Velocity: 0.33 m/s     [email or web address]     I have independently reviewed rhythm strips per above.    I have personally reviewed the laboratory, cardiac diagnostic and radiographic testing as outlined above. I have reviewed previous records noted in [address]. Impression:    1. CAD   -Acute posterior myocardial infarction (97). -LHC (97) Critical lesion mid left circumflex, significant lesion mid-LAD and first diagonal.    -PTCA (97) mid left circumflex. -LHC (00) Insignificant coronary atherosclerosis. -Acute inferior myocardial infarction (04). -LHC (04) patent angioplasty site of the left circumflex, total occlusion mid right coronary artery with otherwise unremarkable coronary artery disease. -PTCA and stent of right coronary artery (04). -Repeat LHC (04) Good angioplasty result of right coronary artery with minimal residual thrombus. -CABG x 3 (2013) LIMA-LAD, SVG-OM, SVG-PAD  -[facility] (10/2017, [facility]) Severe disease of the SVG-PDA. Patent LIMA-LAD and SVG-LCx. Trivial to mild AI with normal sized aorta. -Redo CABG (2017) SVG- to the PDA concomitant with valvular repairs. 2. Chronic systolic HF  - RHC (, [facility]) BP: 122 / 76. BP Mean: 91. HR: 82 min. FiO2: 21.00%. Pulse Ox: 94.00%. CVP: 8 cm. Thermo CO: 3.97 l/min. Hg: 10.8. H2O = 6.2 mmHg. Thermo CI: 1.84 l/min/m2. TP. RA Mean: 8. Assumed Chan CO: 4.21 l/min. PVR: 3.0. RV: 76 / 12. Assumed Chan CI: 1.95 l/min/m2. SVR: 1679.0. PA: 72 / 34. PA Mean: 49.00. PCWP Mean: 36.0. SV: 48.41 cc/stroke. TS. SVI: 22.46 cc/stroke/m2. PCWP V Wave: 50.00. PAO2: 50.40%. LVSWI: 16.90 gm-m/m2/beat. RVSWI: 12.52 gm-m/m2/beat. RVSWI: 920.74 mmHg-ml/m2/beat. Impression: Pulmonary venous hypertension with prominent V waves borderline/mildly subnormal cardiac index  - follows with Dr Sun Certain  - GDMT: Toprol XL 25 mg QD, Entresto 49-51 BID, Lasix 40 mg QD,    3.  Dual chamber ICD in situ  - Upgrade of PPM to dual chamber ICD 13: Evera MARYJANE PRICE model [id number]; SN

## 2018-08-28 ENCOUNTER — OFFICE VISIT (OUTPATIENT)
Dept: NON INVASIVE DIAGNOSTICS | Age: 73
End: 2018-08-28
Payer: COMMERCIAL

## 2018-08-28 VITALS
SYSTOLIC BLOOD PRESSURE: 92 MMHG | WEIGHT: 198 LBS | DIASTOLIC BLOOD PRESSURE: 60 MMHG | BODY MASS INDEX: 25.41 KG/M2 | RESPIRATION RATE: 16 BRPM | HEART RATE: 81 BPM | HEIGHT: 74 IN

## 2018-08-28 DIAGNOSIS — Z95.810 DUAL IMPLANTABLE CARDIOVERTER-DEFIBRILLATOR IN SITU: Primary | ICD-10-CM

## 2018-08-28 PROCEDURE — 99203 OFFICE O/P NEW LOW 30 MIN: CPT | Performed by: NURSE PRACTITIONER

## 2018-08-28 PROCEDURE — 93283 PRGRMG EVAL IMPLANTABLE DFB: CPT | Performed by: NURSE PRACTITIONER

## 2018-08-28 PROCEDURE — 93290 INTERROG DEV EVAL ICPMS IP: CPT | Performed by: NURSE PRACTITIONER

## 2018-08-28 RX ORDER — ZOLPIDEM TARTRATE 10 MG/1
10 TABLET ORAL NIGHTLY
COMMUNITY
End: 2019-08-20 | Stop reason: ALTCHOICE

## 2018-08-28 ASSESSMENT — ENCOUNTER SYMPTOMS: RESPIRATORY NEGATIVE: 1

## 2018-09-07 ENCOUNTER — TELEPHONE (OUTPATIENT)
Dept: NON INVASIVE DIAGNOSTICS | Age: 73
End: 2018-09-07

## 2018-09-19 ENCOUNTER — TELEPHONE (OUTPATIENT)
Dept: NON INVASIVE DIAGNOSTICS | Age: 73
End: 2018-09-19

## 2018-10-04 ENCOUNTER — NURSE ONLY (OUTPATIENT)
Dept: NON INVASIVE DIAGNOSTICS | Age: 73
End: 2018-10-04
Payer: COMMERCIAL

## 2018-10-04 DIAGNOSIS — I25.5 ISCHEMIC CARDIOMYOPATHY: ICD-10-CM

## 2018-10-04 DIAGNOSIS — I48.0 PAF (PAROXYSMAL ATRIAL FIBRILLATION) (HCC): ICD-10-CM

## 2018-10-04 DIAGNOSIS — Z95.810 DUAL IMPLANTABLE CARDIOVERTER-DEFIBRILLATOR IN SITU: Primary | ICD-10-CM

## 2018-10-04 PROCEDURE — 93297 REM INTERROG DEV EVAL ICPMS: CPT | Performed by: INTERNAL MEDICINE

## 2018-10-04 PROCEDURE — 93295 DEV INTERROG REMOTE 1/2/MLT: CPT | Performed by: INTERNAL MEDICINE

## 2018-10-04 PROCEDURE — 93296 REM INTERROG EVL PM/IDS: CPT | Performed by: INTERNAL MEDICINE

## 2018-10-12 ENCOUNTER — TELEPHONE (OUTPATIENT)
Dept: NON INVASIVE DIAGNOSTICS | Age: 73
End: 2018-10-12

## 2019-01-01 ENCOUNTER — TELEPHONE (OUTPATIENT)
Dept: CARDIOLOGY CLINIC | Age: 74
End: 2019-01-01

## 2019-01-03 ENCOUNTER — NURSE ONLY (OUTPATIENT)
Dept: NON INVASIVE DIAGNOSTICS | Age: 74
End: 2019-01-03
Payer: MEDICARE

## 2019-01-03 DIAGNOSIS — Z95.810 DUAL IMPLANTABLE CARDIOVERTER-DEFIBRILLATOR IN SITU: Primary | ICD-10-CM

## 2019-01-03 DIAGNOSIS — I48.0 PAF (PAROXYSMAL ATRIAL FIBRILLATION) (HCC): ICD-10-CM

## 2019-01-03 DIAGNOSIS — I25.5 ISCHEMIC CARDIOMYOPATHY: ICD-10-CM

## 2019-01-03 PROCEDURE — 93297 REM INTERROG DEV EVAL ICPMS: CPT | Performed by: INTERNAL MEDICINE

## 2019-01-03 PROCEDURE — 93296 REM INTERROG EVL PM/IDS: CPT | Performed by: INTERNAL MEDICINE

## 2019-01-03 PROCEDURE — 93295 DEV INTERROG REMOTE 1/2/MLT: CPT | Performed by: INTERNAL MEDICINE

## 2019-01-07 ENCOUNTER — OFFICE VISIT (OUTPATIENT)
Dept: CARDIOLOGY CLINIC | Age: 74
End: 2019-01-07
Payer: MEDICARE

## 2019-01-07 VITALS
RESPIRATION RATE: 16 BRPM | HEIGHT: 74 IN | WEIGHT: 193.6 LBS | HEART RATE: 80 BPM | DIASTOLIC BLOOD PRESSURE: 50 MMHG | BODY MASS INDEX: 24.85 KG/M2 | OXYGEN SATURATION: 95 % | SYSTOLIC BLOOD PRESSURE: 92 MMHG

## 2019-01-07 DIAGNOSIS — I50.22 CHRONIC SYSTOLIC HEART FAILURE (HCC): ICD-10-CM

## 2019-01-07 DIAGNOSIS — I25.10 CORONARY ARTERY DISEASE INVOLVING NATIVE CORONARY ARTERY OF NATIVE HEART WITHOUT ANGINA PECTORIS: Primary | ICD-10-CM

## 2019-01-07 DIAGNOSIS — I48.0 PAF (PAROXYSMAL ATRIAL FIBRILLATION) (HCC): ICD-10-CM

## 2019-01-07 PROCEDURE — 99215 OFFICE O/P EST HI 40 MIN: CPT | Performed by: INTERNAL MEDICINE

## 2019-01-07 PROCEDURE — 93000 ELECTROCARDIOGRAM COMPLETE: CPT | Performed by: INTERNAL MEDICINE

## 2019-01-12 ENCOUNTER — HOSPITAL ENCOUNTER (INPATIENT)
Age: 74
LOS: 1 days | Discharge: HOME | DRG: 314 | End: 2019-01-13
Attending: EMERGENCY MEDICINE | Admitting: INTERNAL MEDICINE
Payer: MEDICARE

## 2019-01-12 ENCOUNTER — APPOINTMENT (OUTPATIENT)
Dept: GENERAL RADIOLOGY | Age: 74
DRG: 314 | End: 2019-01-12
Payer: MEDICARE

## 2019-01-12 DIAGNOSIS — N18.9 CHRONIC KIDNEY DISEASE, UNSPECIFIED CKD STAGE: ICD-10-CM

## 2019-01-12 DIAGNOSIS — Z79.01 ANTICOAGULATED ON COUMADIN: ICD-10-CM

## 2019-01-12 DIAGNOSIS — E83.42 HYPOMAGNESEMIA: ICD-10-CM

## 2019-01-12 DIAGNOSIS — Z45.02 AICD DISCHARGE: Primary | ICD-10-CM

## 2019-01-12 DIAGNOSIS — J18.9 PNEUMONIA DUE TO ORGANISM: ICD-10-CM

## 2019-01-12 LAB
ALBUMIN SERPL-MCNC: 4 G/DL (ref 3.5–5.2)
ALP BLD-CCNC: 103 U/L (ref 40–129)
ALT SERPL-CCNC: 20 U/L (ref 0–40)
ANION GAP SERPL CALCULATED.3IONS-SCNC: 14 MMOL/L (ref 7–16)
AST SERPL-CCNC: 31 U/L (ref 0–39)
BILIRUB SERPL-MCNC: 0.8 MG/DL (ref 0–1.2)
BUN BLDV-MCNC: 34 MG/DL (ref 8–23)
CALCIUM SERPL-MCNC: 8.9 MG/DL (ref 8.6–10.2)
CHLORIDE BLD-SCNC: 102 MMOL/L (ref 98–107)
CO2: 24 MMOL/L (ref 22–29)
CREAT SERPL-MCNC: 1.8 MG/DL (ref 0.7–1.2)
EKG ATRIAL RATE: 110 BPM
EKG Q-T INTERVAL: 388 MS
EKG QRS DURATION: 110 MS
EKG QTC CALCULATION (BAZETT): 477 MS
EKG R AXIS: -5 DEGREES
EKG T AXIS: 94 DEGREES
EKG VENTRICULAR RATE: 91 BPM
GFR AFRICAN AMERICAN: 45
GFR NON-AFRICAN AMERICAN: 37 ML/MIN/1.73
GLUCOSE BLD-MCNC: 111 MG/DL (ref 74–99)
HCT VFR BLD CALC: 38.9 % (ref 37–54)
HEMOGLOBIN: 12.5 G/DL (ref 12.5–16.5)
INR BLD: 2.8
MAGNESIUM: 1.3 MG/DL (ref 1.6–2.6)
MCH RBC QN AUTO: 29.6 PG (ref 26–35)
MCHC RBC AUTO-ENTMCNC: 32.1 % (ref 32–34.5)
MCV RBC AUTO: 92 FL (ref 80–99.9)
PDW BLD-RTO: 15.9 FL (ref 11.5–15)
PLATELET # BLD: 196 E9/L (ref 130–450)
PMV BLD AUTO: 11.1 FL (ref 7–12)
POTASSIUM SERPL-SCNC: 3.9 MMOL/L (ref 3.5–5)
PROTHROMBIN TIME: 31.4 SEC (ref 9.3–12.4)
RBC # BLD: 4.23 E12/L (ref 3.8–5.8)
SODIUM BLD-SCNC: 140 MMOL/L (ref 132–146)
TOTAL PROTEIN: 7.9 G/DL (ref 6.4–8.3)
TROPONIN: 0.08 NG/ML (ref 0–0.03)
WBC # BLD: 10.8 E9/L (ref 4.5–11.5)

## 2019-01-12 PROCEDURE — 87633 RESP VIRUS 12-25 TARGETS: CPT

## 2019-01-12 PROCEDURE — 71045 X-RAY EXAM CHEST 1 VIEW: CPT

## 2019-01-12 PROCEDURE — 93005 ELECTROCARDIOGRAM TRACING: CPT | Performed by: NURSE PRACTITIONER

## 2019-01-12 PROCEDURE — 87486 CHLMYD PNEUM DNA AMP PROBE: CPT

## 2019-01-12 PROCEDURE — 87581 M.PNEUMON DNA AMP PROBE: CPT

## 2019-01-12 PROCEDURE — 99285 EMERGENCY DEPT VISIT HI MDM: CPT

## 2019-01-12 PROCEDURE — 85610 PROTHROMBIN TIME: CPT

## 2019-01-12 PROCEDURE — 96367 TX/PROPH/DG ADDL SEQ IV INF: CPT

## 2019-01-12 PROCEDURE — 83735 ASSAY OF MAGNESIUM: CPT

## 2019-01-12 PROCEDURE — 96365 THER/PROPH/DIAG IV INF INIT: CPT

## 2019-01-12 PROCEDURE — 2580000003 HC RX 258: Performed by: EMERGENCY MEDICINE

## 2019-01-12 PROCEDURE — 85027 COMPLETE CBC AUTOMATED: CPT

## 2019-01-12 PROCEDURE — 80053 COMPREHEN METABOLIC PANEL: CPT

## 2019-01-12 PROCEDURE — 87798 DETECT AGENT NOS DNA AMP: CPT

## 2019-01-12 PROCEDURE — 87040 BLOOD CULTURE FOR BACTERIA: CPT

## 2019-01-12 PROCEDURE — 83036 HEMOGLOBIN GLYCOSYLATED A1C: CPT

## 2019-01-12 PROCEDURE — 84484 ASSAY OF TROPONIN QUANT: CPT

## 2019-01-12 PROCEDURE — 6360000002 HC RX W HCPCS: Performed by: EMERGENCY MEDICINE

## 2019-01-12 PROCEDURE — 36415 COLL VENOUS BLD VENIPUNCTURE: CPT

## 2019-01-12 RX ORDER — MAGNESIUM SULFATE IN WATER 40 MG/ML
2 INJECTION, SOLUTION INTRAVENOUS ONCE
Status: COMPLETED | OUTPATIENT
Start: 2019-01-12 | End: 2019-01-12

## 2019-01-12 RX ADMIN — CEFTRIAXONE SODIUM 1 G: 1 INJECTION, POWDER, FOR SOLUTION INTRAMUSCULAR; INTRAVENOUS at 22:52

## 2019-01-12 RX ADMIN — AZITHROMYCIN MONOHYDRATE 500 MG: 500 INJECTION, POWDER, LYOPHILIZED, FOR SOLUTION INTRAVENOUS at 23:46

## 2019-01-12 RX ADMIN — MAGNESIUM SULFATE HEPTAHYDRATE 2 G: 40 INJECTION, SOLUTION INTRAVENOUS at 22:14

## 2019-01-12 ASSESSMENT — ENCOUNTER SYMPTOMS
COUGH: 0
NAUSEA: 0
SORE THROAT: 0
EYE DISCHARGE: 0
EYE REDNESS: 0
EYE PAIN: 0
DIARRHEA: 0
VOMITING: 0
BACK PAIN: 0
RHINORRHEA: 0
SHORTNESS OF BREATH: 0
WHEEZING: 0
ABDOMINAL PAIN: 0

## 2019-01-12 ASSESSMENT — PAIN SCALES - GENERAL: PAINLEVEL_OUTOF10: 4

## 2019-01-12 ASSESSMENT — PAIN DESCRIPTION - LOCATION: LOCATION: CHEST

## 2019-01-12 ASSESSMENT — PAIN DESCRIPTION - PAIN TYPE: TYPE: ACUTE PAIN

## 2019-01-13 VITALS
BODY MASS INDEX: 24.9 KG/M2 | HEIGHT: 74 IN | OXYGEN SATURATION: 96 % | WEIGHT: 194 LBS | SYSTOLIC BLOOD PRESSURE: 110 MMHG | RESPIRATION RATE: 16 BRPM | HEART RATE: 80 BPM | TEMPERATURE: 98 F | DIASTOLIC BLOOD PRESSURE: 70 MMHG

## 2019-01-13 PROBLEM — I42.9 CARDIOMYOPATHY (HCC): Status: ACTIVE | Noted: 2019-01-13

## 2019-01-13 LAB
FILM ARRAY ADENOVIRUS: NORMAL
FILM ARRAY BORDETELLA PERTUSSIS: NORMAL
FILM ARRAY CHLAMYDOPHILIA PNEUMONIAE: NORMAL
FILM ARRAY CORONAVIRUS 229E: NORMAL
FILM ARRAY CORONAVIRUS HKU1: NORMAL
FILM ARRAY CORONAVIRUS NL63: NORMAL
FILM ARRAY CORONAVIRUS OC43: NORMAL
FILM ARRAY INFLUENZA A VIRUS 09H1: NORMAL
FILM ARRAY INFLUENZA A VIRUS H1: NORMAL
FILM ARRAY INFLUENZA A VIRUS H3: NORMAL
FILM ARRAY INFLUENZA A VIRUS: NORMAL
FILM ARRAY INFLUENZA B: NORMAL
FILM ARRAY METAPNEUMOVIRUS: NORMAL
FILM ARRAY MYCOPLASMA PNEUMONIAE: NORMAL
FILM ARRAY PARAINFLUENZA VIRUS 1: NORMAL
FILM ARRAY PARAINFLUENZA VIRUS 2: NORMAL
FILM ARRAY PARAINFLUENZA VIRUS 3: NORMAL
FILM ARRAY PARAINFLUENZA VIRUS 4: NORMAL
FILM ARRAY RESPIRATORY SYNCITIAL VIRUS: NORMAL
FILM ARRAY RHINOVIRUS/ENTEROVIRUS: NORMAL
HBA1C MFR BLD: 6.6 % (ref 4–5.6)
INR BLD: 2.6
METER GLUCOSE: 98 MG/DL (ref 74–99)
PROTHROMBIN TIME: 29.2 SEC (ref 9.3–12.4)

## 2019-01-13 PROCEDURE — 85610 PROTHROMBIN TIME: CPT

## 2019-01-13 PROCEDURE — 2580000003 HC RX 258: Performed by: INTERNAL MEDICINE

## 2019-01-13 PROCEDURE — G0378 HOSPITAL OBSERVATION PER HR: HCPCS

## 2019-01-13 PROCEDURE — 2140000000 HC CCU INTERMEDIATE R&B

## 2019-01-13 PROCEDURE — 2500000003 HC RX 250 WO HCPCS: Performed by: INTERNAL MEDICINE

## 2019-01-13 PROCEDURE — 36415 COLL VENOUS BLD VENIPUNCTURE: CPT

## 2019-01-13 PROCEDURE — 82962 GLUCOSE BLOOD TEST: CPT

## 2019-01-13 PROCEDURE — 99223 1ST HOSP IP/OBS HIGH 75: CPT | Performed by: INTERNAL MEDICINE

## 2019-01-13 PROCEDURE — 6370000000 HC RX 637 (ALT 250 FOR IP): Performed by: INTERNAL MEDICINE

## 2019-01-13 PROCEDURE — APPSS45 APP SPLIT SHARED TIME 31-45 MINUTES: Performed by: PHYSICIAN ASSISTANT

## 2019-01-13 PROCEDURE — 96375 TX/PRO/DX INJ NEW DRUG ADDON: CPT

## 2019-01-13 RX ORDER — ALLOPURINOL 100 MG/1
100 TABLET ORAL DAILY
Status: DISCONTINUED | OUTPATIENT
Start: 2019-01-13 | End: 2019-01-13 | Stop reason: HOSPADM

## 2019-01-13 RX ORDER — METOPROLOL SUCCINATE 50 MG/1
50 TABLET, EXTENDED RELEASE ORAL 2 TIMES DAILY
Status: DISCONTINUED | OUTPATIENT
Start: 2019-01-14 | End: 2019-01-13

## 2019-01-13 RX ORDER — DEXTROSE MONOHYDRATE 50 MG/ML
100 INJECTION, SOLUTION INTRAVENOUS PRN
Status: DISCONTINUED | OUTPATIENT
Start: 2019-01-13 | End: 2019-01-13 | Stop reason: HOSPADM

## 2019-01-13 RX ORDER — FUROSEMIDE 40 MG/1
40 TABLET ORAL DAILY
Status: DISCONTINUED | OUTPATIENT
Start: 2019-01-13 | End: 2019-01-13 | Stop reason: HOSPADM

## 2019-01-13 RX ORDER — DEXTROSE MONOHYDRATE 25 G/50ML
12.5 INJECTION, SOLUTION INTRAVENOUS PRN
Status: DISCONTINUED | OUTPATIENT
Start: 2019-01-13 | End: 2019-01-13 | Stop reason: HOSPADM

## 2019-01-13 RX ORDER — NICOTINE POLACRILEX 4 MG
15 LOZENGE BUCCAL PRN
Status: DISCONTINUED | OUTPATIENT
Start: 2019-01-13 | End: 2019-01-13 | Stop reason: HOSPADM

## 2019-01-13 RX ORDER — FUROSEMIDE 40 MG/1
40 TABLET ORAL DAILY
Qty: 60 TABLET | Refills: 3 | Status: SHIPPED | OUTPATIENT
Start: 2019-01-14 | End: 2019-01-17

## 2019-01-13 RX ORDER — PANTOPRAZOLE SODIUM 40 MG/1
40 TABLET, DELAYED RELEASE ORAL DAILY
Status: DISCONTINUED | OUTPATIENT
Start: 2019-01-13 | End: 2019-01-13 | Stop reason: HOSPADM

## 2019-01-13 RX ORDER — WARFARIN SODIUM 5 MG/1
5 TABLET ORAL
Status: DISCONTINUED | OUTPATIENT
Start: 2019-01-13 | End: 2019-01-13 | Stop reason: HOSPADM

## 2019-01-13 RX ORDER — ONDANSETRON 2 MG/ML
4 INJECTION INTRAMUSCULAR; INTRAVENOUS EVERY 6 HOURS PRN
Status: DISCONTINUED | OUTPATIENT
Start: 2019-01-13 | End: 2019-01-13 | Stop reason: HOSPADM

## 2019-01-13 RX ORDER — SODIUM CHLORIDE 0.9 % (FLUSH) 0.9 %
10 SYRINGE (ML) INJECTION PRN
Status: DISCONTINUED | OUTPATIENT
Start: 2019-01-13 | End: 2019-01-13 | Stop reason: HOSPADM

## 2019-01-13 RX ORDER — METOPROLOL SUCCINATE 25 MG/1
25 TABLET, EXTENDED RELEASE ORAL DAILY
Qty: 30 TABLET | Refills: 3 | Status: SHIPPED | OUTPATIENT
Start: 2019-01-14 | End: 2019-01-14 | Stop reason: DRUGHIGH

## 2019-01-13 RX ORDER — ASPIRIN 81 MG/1
81 TABLET ORAL DAILY
Status: DISCONTINUED | OUTPATIENT
Start: 2019-01-13 | End: 2019-01-13 | Stop reason: HOSPADM

## 2019-01-13 RX ORDER — METOPROLOL SUCCINATE 25 MG/1
25 TABLET, EXTENDED RELEASE ORAL DAILY
Status: DISCONTINUED | OUTPATIENT
Start: 2019-01-13 | End: 2019-01-13 | Stop reason: HOSPADM

## 2019-01-13 RX ORDER — ATORVASTATIN CALCIUM 40 MG/1
80 TABLET, FILM COATED ORAL DAILY
Status: DISCONTINUED | OUTPATIENT
Start: 2019-01-13 | End: 2019-01-13 | Stop reason: HOSPADM

## 2019-01-13 RX ORDER — INSULIN GLARGINE 100 [IU]/ML
20 INJECTION, SOLUTION SUBCUTANEOUS EVERY MORNING
Status: DISCONTINUED | OUTPATIENT
Start: 2019-01-13 | End: 2019-01-13 | Stop reason: HOSPADM

## 2019-01-13 RX ORDER — ZOLPIDEM TARTRATE 5 MG/1
5 TABLET ORAL NIGHTLY PRN
Status: DISCONTINUED | OUTPATIENT
Start: 2019-01-13 | End: 2019-01-13 | Stop reason: HOSPADM

## 2019-01-13 RX ORDER — BUMETANIDE 0.25 MG/ML
1 INJECTION, SOLUTION INTRAMUSCULAR; INTRAVENOUS ONCE
Status: COMPLETED | OUTPATIENT
Start: 2019-01-13 | End: 2019-01-13

## 2019-01-13 RX ORDER — LEVOTHYROXINE SODIUM 0.07 MG/1
75 TABLET ORAL DAILY
Status: DISCONTINUED | OUTPATIENT
Start: 2019-01-13 | End: 2019-01-13 | Stop reason: HOSPADM

## 2019-01-13 RX ORDER — SODIUM CHLORIDE 0.9 % (FLUSH) 0.9 %
10 SYRINGE (ML) INJECTION EVERY 12 HOURS SCHEDULED
Status: DISCONTINUED | OUTPATIENT
Start: 2019-01-13 | End: 2019-01-13 | Stop reason: HOSPADM

## 2019-01-13 RX ORDER — METOPROLOL SUCCINATE 25 MG/1
25 TABLET, EXTENDED RELEASE ORAL DAILY
Status: DISCONTINUED | OUTPATIENT
Start: 2019-01-13 | End: 2019-01-13

## 2019-01-13 RX ADMIN — ASPIRIN 81 MG: 81 TABLET ORAL at 10:14

## 2019-01-13 RX ADMIN — Medication 10 ML: at 10:17

## 2019-01-13 RX ADMIN — INSULIN GLARGINE 20 UNITS: 100 INJECTION, SOLUTION SUBCUTANEOUS at 12:58

## 2019-01-13 RX ADMIN — LINAGLIPTIN 5 MG: 5 TABLET, FILM COATED ORAL at 12:57

## 2019-01-13 RX ADMIN — SACUBITRIL AND VALSARTAN 1 TABLET: 49; 51 TABLET, FILM COATED ORAL at 10:16

## 2019-01-13 RX ADMIN — Medication 10 ML: at 12:44

## 2019-01-13 RX ADMIN — FUROSEMIDE 40 MG: 40 TABLET ORAL at 10:14

## 2019-01-13 RX ADMIN — PANTOPRAZOLE SODIUM 40 MG: 40 TABLET, DELAYED RELEASE ORAL at 10:16

## 2019-01-13 RX ADMIN — METOPROLOL SUCCINATE 25 MG: 25 TABLET, FILM COATED, EXTENDED RELEASE ORAL at 12:58

## 2019-01-13 RX ADMIN — BUMETANIDE 1 MG: 0.25 INJECTION INTRAMUSCULAR; INTRAVENOUS at 12:43

## 2019-01-13 ASSESSMENT — PAIN SCALES - GENERAL
PAINLEVEL_OUTOF10: 0

## 2019-01-14 ENCOUNTER — TELEPHONE (OUTPATIENT)
Dept: NON INVASIVE DIAGNOSTICS | Age: 74
End: 2019-01-14

## 2019-01-14 PROCEDURE — 93000 ELECTROCARDIOGRAM COMPLETE: CPT | Performed by: INTERNAL MEDICINE

## 2019-01-14 RX ORDER — METOPROLOL SUCCINATE 50 MG/1
50 TABLET, EXTENDED RELEASE ORAL DAILY
Qty: 30 TABLET | Refills: 3 | Status: SHIPPED | OUTPATIENT
Start: 2019-01-14 | End: 2019-01-17

## 2019-01-15 ENCOUNTER — TELEPHONE (OUTPATIENT)
Dept: PHARMACY | Facility: CLINIC | Age: 74
End: 2019-01-15

## 2019-01-15 DIAGNOSIS — Z79.899 ENCOUNTER FOR MEDICATION REVIEW: Primary | ICD-10-CM

## 2019-01-15 PROCEDURE — 1111F DSCHRG MED/CURRENT MED MERGE: CPT

## 2019-01-15 RX ORDER — MULTIVIT WITH MINERALS/LUTEIN
2000 TABLET ORAL DAILY
COMMUNITY

## 2019-01-15 RX ORDER — CALCIUM CARBONATE 300MG(750)
400 TABLET,CHEWABLE ORAL DAILY
COMMUNITY

## 2019-01-15 RX ORDER — WARFARIN SODIUM 5 MG/1
5 TABLET ORAL
Status: ON HOLD | COMMUNITY
End: 2019-03-12 | Stop reason: HOSPADM

## 2019-01-15 RX ORDER — M-VIT,TX,IRON,MINS/CALC/FOLIC 27MG-0.4MG
1 TABLET ORAL DAILY
COMMUNITY

## 2019-01-15 RX ORDER — WARFARIN SODIUM 2.5 MG/1
2.5 TABLET ORAL
Status: ON HOLD | COMMUNITY
End: 2019-03-12 | Stop reason: HOSPADM

## 2019-01-17 ENCOUNTER — HOSPITAL ENCOUNTER (OUTPATIENT)
Dept: CARDIAC CATH/INVASIVE PROCEDURES | Age: 74
Setting detail: OUTPATIENT SURGERY
Discharge: HOME | End: 2019-01-17
Payer: MEDICARE

## 2019-01-17 VITALS
HEART RATE: 85 BPM | TEMPERATURE: 97.7 F | BODY MASS INDEX: 24.77 KG/M2 | DIASTOLIC BLOOD PRESSURE: 66 MMHG | WEIGHT: 193 LBS | OXYGEN SATURATION: 97 % | HEIGHT: 74 IN | SYSTOLIC BLOOD PRESSURE: 98 MMHG

## 2019-01-17 DIAGNOSIS — I50.22 CHRONIC SYSTOLIC HEART FAILURE (HCC): ICD-10-CM

## 2019-01-17 LAB
B.E.: 4.2 MMOL/L (ref -3–0)
DEVICE: ABNORMAL
HCO3 ARTERIAL: 29.4 MMOL/L (ref 22–26)
HCT,ARTERIAL: 36 % (ref 37–54)
HGB, ARTERIAL: 12.1 G/DL (ref 12.5–15.5)
INR BLD: 1.7
O2 SATURATION: 44.3 % (ref 92–98.5)
OPERATOR ID: 3054
PCO2 ARTERIAL: 45.4 MMHG (ref 35–45)
PH BLOOD GAS: 7.42 (ref 7.35–7.45)
PO2 ARTERIAL: 24.4 MMHG (ref 60–80)
PROTHROMBIN TIME: 19.3 SEC (ref 9.3–12.4)
SOURCE, BLOOD GAS: ABNORMAL

## 2019-01-17 PROCEDURE — 36415 COLL VENOUS BLD VENIPUNCTURE: CPT

## 2019-01-17 PROCEDURE — 93451 RIGHT HEART CATH: CPT | Performed by: INTERNAL MEDICINE

## 2019-01-17 PROCEDURE — 85610 PROTHROMBIN TIME: CPT

## 2019-01-17 PROCEDURE — C1894 INTRO/SHEATH, NON-LASER: HCPCS

## 2019-01-17 PROCEDURE — 82803 BLOOD GASES ANY COMBINATION: CPT

## 2019-01-17 PROCEDURE — 2580000003 HC RX 258: Performed by: INTERNAL MEDICINE

## 2019-01-17 PROCEDURE — C1751 CATH, INF, PER/CENT/MIDLINE: HCPCS

## 2019-01-17 PROCEDURE — 2709999900 HC NON-CHARGEABLE SUPPLY

## 2019-01-17 PROCEDURE — 2500000003 HC RX 250 WO HCPCS

## 2019-01-17 RX ORDER — SODIUM CHLORIDE 9 MG/ML
INJECTION, SOLUTION INTRAVENOUS CONTINUOUS
Status: DISCONTINUED | OUTPATIENT
Start: 2019-01-17 | End: 2019-01-18 | Stop reason: HOSPADM

## 2019-01-17 RX ORDER — METOPROLOL SUCCINATE 50 MG/1
50 TABLET, EXTENDED RELEASE ORAL DAILY
Qty: 30 TABLET | Refills: 3 | Status: SHIPPED | OUTPATIENT
Start: 2019-01-17 | End: 2019-03-15

## 2019-01-17 RX ORDER — POTASSIUM CHLORIDE 20 MEQ/1
20 TABLET, EXTENDED RELEASE ORAL 2 TIMES DAILY
Qty: 180 TABLET | Refills: 3 | Status: ON HOLD
Start: 2019-01-17 | End: 2020-01-01 | Stop reason: HOSPADM

## 2019-01-17 RX ORDER — FUROSEMIDE 40 MG/1
40 TABLET ORAL 2 TIMES DAILY
Qty: 60 TABLET | Refills: 3 | Status: SHIPPED | OUTPATIENT
Start: 2019-01-17 | End: 2019-01-31

## 2019-01-17 RX ORDER — HYDRALAZINE HYDROCHLORIDE 25 MG/1
50 TABLET, FILM COATED ORAL 2 TIMES DAILY
Qty: 90 TABLET | Refills: 3 | Status: SHIPPED | OUTPATIENT
Start: 2019-01-17 | End: 2019-01-31

## 2019-01-17 RX ADMIN — SODIUM CHLORIDE: 9 INJECTION, SOLUTION INTRAVENOUS at 07:48

## 2019-01-18 LAB
BLOOD CULTURE, ROUTINE: NORMAL
CULTURE, BLOOD 2: NORMAL

## 2019-01-22 ENCOUNTER — TELEPHONE (OUTPATIENT)
Dept: NON INVASIVE DIAGNOSTICS | Age: 74
End: 2019-01-22

## 2019-01-28 ENCOUNTER — TELEPHONE (OUTPATIENT)
Dept: CARDIOLOGY CLINIC | Age: 74
End: 2019-01-28

## 2019-01-28 DIAGNOSIS — I25.5 ISCHEMIC CARDIOMYOPATHY: Primary | ICD-10-CM

## 2019-01-28 DIAGNOSIS — I50.22 CHRONIC SYSTOLIC HEART FAILURE (HCC): ICD-10-CM

## 2019-01-31 ENCOUNTER — TELEPHONE (OUTPATIENT)
Dept: OTHER | Facility: CLINIC | Age: 74
End: 2019-01-31

## 2019-01-31 ENCOUNTER — TELEPHONE (OUTPATIENT)
Dept: CARDIOLOGY CLINIC | Age: 74
End: 2019-01-31

## 2019-01-31 ENCOUNTER — HOSPITAL ENCOUNTER (EMERGENCY)
Age: 74
Discharge: HOME | End: 2019-01-31
Attending: EMERGENCY MEDICINE
Payer: MEDICARE

## 2019-01-31 ENCOUNTER — APPOINTMENT (OUTPATIENT)
Dept: GENERAL RADIOLOGY | Age: 74
End: 2019-01-31
Payer: MEDICARE

## 2019-01-31 VITALS
SYSTOLIC BLOOD PRESSURE: 102 MMHG | DIASTOLIC BLOOD PRESSURE: 66 MMHG | HEART RATE: 78 BPM | WEIGHT: 196 LBS | TEMPERATURE: 97.9 F | OXYGEN SATURATION: 98 % | RESPIRATION RATE: 14 BRPM | HEIGHT: 74 IN | BODY MASS INDEX: 25.15 KG/M2

## 2019-01-31 DIAGNOSIS — I50.9 ACUTE ON CHRONIC CONGESTIVE HEART FAILURE, UNSPECIFIED HEART FAILURE TYPE (HCC): Primary | ICD-10-CM

## 2019-01-31 DIAGNOSIS — E87.70 HYPERVOLEMIA, UNSPECIFIED HYPERVOLEMIA TYPE: ICD-10-CM

## 2019-01-31 DIAGNOSIS — R77.8 ELEVATED TROPONIN: ICD-10-CM

## 2019-01-31 LAB
ALBUMIN SERPL-MCNC: 3.9 G/DL (ref 3.5–5.2)
ALP BLD-CCNC: 88 U/L (ref 40–129)
ALT SERPL-CCNC: 17 U/L (ref 0–40)
ANION GAP SERPL CALCULATED.3IONS-SCNC: 14 MMOL/L (ref 7–16)
AST SERPL-CCNC: 46 U/L (ref 0–39)
BASOPHILS ABSOLUTE: 0.06 E9/L (ref 0–0.2)
BASOPHILS RELATIVE PERCENT: 0.6 % (ref 0–2)
BILIRUB SERPL-MCNC: 1.3 MG/DL (ref 0–1.2)
BUN BLDV-MCNC: 39 MG/DL (ref 8–23)
CALCIUM SERPL-MCNC: 8.9 MG/DL (ref 8.6–10.2)
CHLORIDE BLD-SCNC: 100 MMOL/L (ref 98–107)
CO2: 24 MMOL/L (ref 22–29)
CREAT SERPL-MCNC: 1.8 MG/DL (ref 0.7–1.2)
EKG ATRIAL RATE: 83 BPM
EKG Q-T INTERVAL: 402 MS
EKG QRS DURATION: 110 MS
EKG QTC CALCULATION (BAZETT): 454 MS
EKG R AXIS: -69 DEGREES
EKG T AXIS: 97 DEGREES
EKG VENTRICULAR RATE: 77 BPM
EOSINOPHILS ABSOLUTE: 0.19 E9/L (ref 0.05–0.5)
EOSINOPHILS RELATIVE PERCENT: 1.8 % (ref 0–6)
GFR AFRICAN AMERICAN: 45
GFR NON-AFRICAN AMERICAN: 37 ML/MIN/1.73
GLUCOSE BLD-MCNC: 102 MG/DL (ref 74–99)
HCT VFR BLD CALC: 36.3 % (ref 37–54)
HEMOGLOBIN: 11.6 G/DL (ref 12.5–16.5)
IMMATURE GRANULOCYTES #: 0.03 E9/L
IMMATURE GRANULOCYTES %: 0.3 % (ref 0–5)
INFLUENZA A BY PCR: NOT DETECTED
INFLUENZA B BY PCR: NOT DETECTED
INR BLD: 2.3
LACTIC ACID: 1.7 MMOL/L (ref 0.5–2.2)
LYMPHOCYTES ABSOLUTE: 0.88 E9/L (ref 1.5–4)
LYMPHOCYTES RELATIVE PERCENT: 8.6 % (ref 20–42)
MAGNESIUM: 1.6 MG/DL (ref 1.6–2.6)
MCH RBC QN AUTO: 29.5 PG (ref 26–35)
MCHC RBC AUTO-ENTMCNC: 32 % (ref 32–34.5)
MCV RBC AUTO: 92.4 FL (ref 80–99.9)
MONOCYTES ABSOLUTE: 1.19 E9/L (ref 0.1–0.95)
MONOCYTES RELATIVE PERCENT: 11.6 % (ref 2–12)
NEUTROPHILS ABSOLUTE: 7.93 E9/L (ref 1.8–7.3)
NEUTROPHILS RELATIVE PERCENT: 77.1 % (ref 43–80)
PDW BLD-RTO: 15.5 FL (ref 11.5–15)
PLATELET # BLD: 189 E9/L (ref 130–450)
PMV BLD AUTO: 10.9 FL (ref 7–12)
POTASSIUM SERPL-SCNC: 4.5 MMOL/L (ref 3.5–5)
PRO-BNP: 4605 PG/ML (ref 0–125)
PROTHROMBIN TIME: 26.2 SEC (ref 9.3–12.4)
RBC # BLD: 3.93 E12/L (ref 3.8–5.8)
SODIUM BLD-SCNC: 138 MMOL/L (ref 132–146)
TOTAL PROTEIN: 7.3 G/DL (ref 6.4–8.3)
TROPONIN: 0.11 NG/ML (ref 0–0.03)
WBC # BLD: 10.3 E9/L (ref 4.5–11.5)

## 2019-01-31 PROCEDURE — 84484 ASSAY OF TROPONIN QUANT: CPT

## 2019-01-31 PROCEDURE — 36415 COLL VENOUS BLD VENIPUNCTURE: CPT

## 2019-01-31 PROCEDURE — 83880 ASSAY OF NATRIURETIC PEPTIDE: CPT

## 2019-01-31 PROCEDURE — 85025 COMPLETE CBC W/AUTO DIFF WBC: CPT

## 2019-01-31 PROCEDURE — 85610 PROTHROMBIN TIME: CPT

## 2019-01-31 PROCEDURE — 99285 EMERGENCY DEPT VISIT HI MDM: CPT

## 2019-01-31 PROCEDURE — 83605 ASSAY OF LACTIC ACID: CPT

## 2019-01-31 PROCEDURE — 2500000003 HC RX 250 WO HCPCS: Performed by: INTERNAL MEDICINE

## 2019-01-31 PROCEDURE — 83735 ASSAY OF MAGNESIUM: CPT

## 2019-01-31 PROCEDURE — 87502 INFLUENZA DNA AMP PROBE: CPT

## 2019-01-31 PROCEDURE — 80053 COMPREHEN METABOLIC PANEL: CPT

## 2019-01-31 PROCEDURE — 93005 ELECTROCARDIOGRAM TRACING: CPT | Performed by: EMERGENCY MEDICINE

## 2019-01-31 PROCEDURE — 71045 X-RAY EXAM CHEST 1 VIEW: CPT

## 2019-01-31 PROCEDURE — 96374 THER/PROPH/DIAG INJ IV PUSH: CPT

## 2019-01-31 RX ORDER — HYDRALAZINE HYDROCHLORIDE 100 MG/1
100 TABLET, FILM COATED ORAL 2 TIMES DAILY
Qty: 180 TABLET | Refills: 3 | Status: SHIPPED | OUTPATIENT
Start: 2019-01-31 | End: 2019-02-28 | Stop reason: SDUPTHER

## 2019-01-31 RX ORDER — BUMETANIDE 0.25 MG/ML
2 INJECTION, SOLUTION INTRAMUSCULAR; INTRAVENOUS ONCE
Status: COMPLETED | OUTPATIENT
Start: 2019-01-31 | End: 2019-01-31

## 2019-01-31 RX ORDER — 0.9 % SODIUM CHLORIDE 0.9 %
250 INTRAVENOUS SOLUTION INTRAVENOUS ONCE
Status: DISCONTINUED | OUTPATIENT
Start: 2019-01-31 | End: 2019-01-31

## 2019-01-31 RX ORDER — FUROSEMIDE 40 MG/1
80 TABLET ORAL DAILY
Qty: 180 TABLET | Refills: 3 | Status: SHIPPED | OUTPATIENT
Start: 2019-01-31 | End: 2019-02-06

## 2019-01-31 RX ADMIN — BUMETANIDE 2 MG: 0.25 INJECTION INTRAMUSCULAR; INTRAVENOUS at 13:50

## 2019-01-31 ASSESSMENT — ENCOUNTER SYMPTOMS
SORE THROAT: 0
SHORTNESS OF BREATH: 1
NAUSEA: 0
EYE REDNESS: 0
DIARRHEA: 0
BACK PAIN: 0
WHEEZING: 0
EYE DISCHARGE: 0
ABDOMINAL PAIN: 0
EYE PAIN: 0
VOMITING: 0
SINUS PRESSURE: 0
COUGH: 1

## 2019-01-31 ASSESSMENT — EJECTION FRACTION
EF_SOURCE: 2D ECHO
EF_VALUE: 35%

## 2019-02-01 ENCOUNTER — HOSPITAL ENCOUNTER (OUTPATIENT)
Dept: OTHER | Age: 74
Setting detail: THERAPIES SERIES
Discharge: HOME | End: 2019-02-01
Payer: MEDICARE

## 2019-02-01 VITALS
RESPIRATION RATE: 18 BRPM | BODY MASS INDEX: 24.46 KG/M2 | SYSTOLIC BLOOD PRESSURE: 97 MMHG | OXYGEN SATURATION: 96 % | HEART RATE: 80 BPM | DIASTOLIC BLOOD PRESSURE: 64 MMHG | WEIGHT: 190.5 LBS

## 2019-02-01 LAB
ANION GAP SERPL CALCULATED.3IONS-SCNC: 18 MMOL/L (ref 7–16)
BUN BLDV-MCNC: 40 MG/DL (ref 8–23)
CALCIUM SERPL-MCNC: 9.1 MG/DL (ref 8.6–10.2)
CHLORIDE BLD-SCNC: 96 MMOL/L (ref 98–107)
CO2: 25 MMOL/L (ref 22–29)
CREAT SERPL-MCNC: 1.9 MG/DL (ref 0.7–1.2)
GFR AFRICAN AMERICAN: 42
GFR NON-AFRICAN AMERICAN: 35 ML/MIN/1.73
GLUCOSE BLD-MCNC: 93 MG/DL (ref 74–99)
POTASSIUM SERPL-SCNC: 4.3 MMOL/L (ref 3.5–5)
PRO-BNP: 5027 PG/ML (ref 0–125)
SODIUM BLD-SCNC: 139 MMOL/L (ref 132–146)

## 2019-02-01 PROCEDURE — 80048 BASIC METABOLIC PNL TOTAL CA: CPT

## 2019-02-01 PROCEDURE — 2500000003 HC RX 250 WO HCPCS: Performed by: INTERNAL MEDICINE

## 2019-02-01 PROCEDURE — 36415 COLL VENOUS BLD VENIPUNCTURE: CPT

## 2019-02-01 PROCEDURE — 2580000003 HC RX 258: Performed by: INTERNAL MEDICINE

## 2019-02-01 PROCEDURE — 83880 ASSAY OF NATRIURETIC PEPTIDE: CPT

## 2019-02-01 PROCEDURE — 99204 OFFICE O/P NEW MOD 45 MIN: CPT

## 2019-02-01 PROCEDURE — 96374 THER/PROPH/DIAG INJ IV PUSH: CPT

## 2019-02-01 RX ORDER — SODIUM CHLORIDE 0.9 % (FLUSH) 0.9 %
10 SYRINGE (ML) INJECTION 2 TIMES DAILY
Status: DISCONTINUED | OUTPATIENT
Start: 2019-02-01 | End: 2019-02-02 | Stop reason: HOSPADM

## 2019-02-01 RX ORDER — BUMETANIDE 0.25 MG/ML
2 INJECTION, SOLUTION INTRAMUSCULAR; INTRAVENOUS ONCE
Status: COMPLETED | OUTPATIENT
Start: 2019-02-01 | End: 2019-02-01

## 2019-02-01 RX ADMIN — BUMETANIDE 2 MG: 0.25 INJECTION INTRAMUSCULAR; INTRAVENOUS at 09:19

## 2019-02-01 RX ADMIN — Medication 10 ML: at 09:20

## 2019-02-06 ENCOUNTER — OFFICE VISIT (OUTPATIENT)
Dept: CARDIOLOGY CLINIC | Age: 74
End: 2019-02-06
Payer: MEDICARE

## 2019-02-06 ENCOUNTER — NURSE ONLY (OUTPATIENT)
Dept: CARDIOLOGY CLINIC | Age: 74
End: 2019-02-06

## 2019-02-06 VITALS
RESPIRATION RATE: 16 BRPM | BODY MASS INDEX: 25.54 KG/M2 | HEART RATE: 74 BPM | OXYGEN SATURATION: 96 % | WEIGHT: 199 LBS | DIASTOLIC BLOOD PRESSURE: 58 MMHG | SYSTOLIC BLOOD PRESSURE: 98 MMHG | HEIGHT: 74 IN

## 2019-02-06 DIAGNOSIS — I48.0 PAF (PAROXYSMAL ATRIAL FIBRILLATION) (HCC): Primary | ICD-10-CM

## 2019-02-06 PROCEDURE — 99999 PR OFFICE/OUTPT VISIT,PROCEDURE ONLY: CPT | Performed by: INTERNAL MEDICINE

## 2019-02-06 PROCEDURE — 99215 OFFICE O/P EST HI 40 MIN: CPT | Performed by: INTERNAL MEDICINE

## 2019-02-06 PROCEDURE — 93000 ELECTROCARDIOGRAM COMPLETE: CPT | Performed by: INTERNAL MEDICINE

## 2019-02-06 RX ORDER — BUMETANIDE 2 MG/1
2 TABLET ORAL 2 TIMES DAILY
Qty: 90 TABLET | Refills: 3 | Status: SHIPPED | OUTPATIENT
Start: 2019-02-06 | End: 2019-02-28 | Stop reason: SDUPTHER

## 2019-02-06 RX ORDER — AMOXICILLIN 250 MG/1
250 CAPSULE ORAL 2 TIMES DAILY
Qty: 14 CAPSULE | Refills: 0 | Status: SHIPPED | OUTPATIENT
Start: 2019-02-06 | End: 2019-02-13

## 2019-02-08 ENCOUNTER — HOSPITAL ENCOUNTER (OUTPATIENT)
Dept: OTHER | Age: 74
Setting detail: THERAPIES SERIES
Discharge: HOME | End: 2019-02-08
Payer: MEDICARE

## 2019-02-08 VITALS
DIASTOLIC BLOOD PRESSURE: 50 MMHG | RESPIRATION RATE: 18 BRPM | BODY MASS INDEX: 25.04 KG/M2 | WEIGHT: 195 LBS | SYSTOLIC BLOOD PRESSURE: 91 MMHG | HEART RATE: 85 BPM

## 2019-02-08 LAB
ANION GAP SERPL CALCULATED.3IONS-SCNC: 13 MMOL/L (ref 7–16)
BUN BLDV-MCNC: 39 MG/DL (ref 8–23)
CALCIUM SERPL-MCNC: 8.6 MG/DL (ref 8.6–10.2)
CHLORIDE BLD-SCNC: 104 MMOL/L (ref 98–107)
CO2: 24 MMOL/L (ref 22–29)
CREAT SERPL-MCNC: 1.8 MG/DL (ref 0.7–1.2)
GFR AFRICAN AMERICAN: 45
GFR NON-AFRICAN AMERICAN: 37 ML/MIN/1.73
GLUCOSE BLD-MCNC: 191 MG/DL (ref 74–99)
POTASSIUM SERPL-SCNC: 4.2 MMOL/L (ref 3.5–5)
PRO-BNP: 2620 PG/ML (ref 0–125)
SODIUM BLD-SCNC: 141 MMOL/L (ref 132–146)

## 2019-02-08 PROCEDURE — 83880 ASSAY OF NATRIURETIC PEPTIDE: CPT

## 2019-02-08 PROCEDURE — 36415 COLL VENOUS BLD VENIPUNCTURE: CPT

## 2019-02-08 PROCEDURE — 2580000003 HC RX 258: Performed by: INTERNAL MEDICINE

## 2019-02-08 PROCEDURE — 99214 OFFICE O/P EST MOD 30 MIN: CPT

## 2019-02-08 PROCEDURE — 2500000003 HC RX 250 WO HCPCS: Performed by: INTERNAL MEDICINE

## 2019-02-08 PROCEDURE — 96374 THER/PROPH/DIAG INJ IV PUSH: CPT

## 2019-02-08 PROCEDURE — 80048 BASIC METABOLIC PNL TOTAL CA: CPT

## 2019-02-08 RX ORDER — SODIUM CHLORIDE 0.9 % (FLUSH) 0.9 %
10 SYRINGE (ML) INJECTION 2 TIMES DAILY
Status: DISCONTINUED | OUTPATIENT
Start: 2019-02-08 | End: 2019-02-09 | Stop reason: HOSPADM

## 2019-02-08 RX ORDER — BUMETANIDE 0.25 MG/ML
2 INJECTION, SOLUTION INTRAMUSCULAR; INTRAVENOUS ONCE
Status: COMPLETED | OUTPATIENT
Start: 2019-02-08 | End: 2019-02-08

## 2019-02-08 RX ADMIN — BUMETANIDE 2 MG: 0.25 INJECTION INTRAMUSCULAR; INTRAVENOUS at 12:03

## 2019-02-08 RX ADMIN — Medication 10 ML: at 12:03

## 2019-02-13 ENCOUNTER — TELEPHONE (OUTPATIENT)
Dept: CARDIOLOGY CLINIC | Age: 74
End: 2019-02-13

## 2019-02-19 ENCOUNTER — HOSPITAL ENCOUNTER (OUTPATIENT)
Dept: OTHER | Age: 74
Setting detail: THERAPIES SERIES
Discharge: HOME | End: 2019-02-19
Payer: MEDICARE

## 2019-02-19 VITALS
DIASTOLIC BLOOD PRESSURE: 53 MMHG | SYSTOLIC BLOOD PRESSURE: 92 MMHG | HEART RATE: 71 BPM | BODY MASS INDEX: 24.82 KG/M2 | RESPIRATION RATE: 18 BRPM | WEIGHT: 193.3 LBS

## 2019-02-19 LAB
ANION GAP SERPL CALCULATED.3IONS-SCNC: 13 MMOL/L (ref 7–16)
BUN BLDV-MCNC: 46 MG/DL (ref 8–23)
CALCIUM SERPL-MCNC: 8.9 MG/DL (ref 8.6–10.2)
CHLORIDE BLD-SCNC: 96 MMOL/L (ref 98–107)
CO2: 28 MMOL/L (ref 22–29)
CREAT SERPL-MCNC: 1.8 MG/DL (ref 0.7–1.2)
GFR AFRICAN AMERICAN: 45
GFR NON-AFRICAN AMERICAN: 37 ML/MIN/1.73
GLUCOSE BLD-MCNC: 107 MG/DL (ref 74–99)
POTASSIUM SERPL-SCNC: 4 MMOL/L (ref 3.5–5)
PRO-BNP: 3005 PG/ML (ref 0–125)
SODIUM BLD-SCNC: 137 MMOL/L (ref 132–146)

## 2019-02-19 PROCEDURE — 99214 OFFICE O/P EST MOD 30 MIN: CPT

## 2019-02-19 PROCEDURE — 2500000003 HC RX 250 WO HCPCS: Performed by: INTERNAL MEDICINE

## 2019-02-19 PROCEDURE — 83880 ASSAY OF NATRIURETIC PEPTIDE: CPT

## 2019-02-19 PROCEDURE — 2580000003 HC RX 258: Performed by: INTERNAL MEDICINE

## 2019-02-19 PROCEDURE — 36415 COLL VENOUS BLD VENIPUNCTURE: CPT

## 2019-02-19 PROCEDURE — 80048 BASIC METABOLIC PNL TOTAL CA: CPT

## 2019-02-19 PROCEDURE — 96374 THER/PROPH/DIAG INJ IV PUSH: CPT

## 2019-02-19 RX ORDER — SODIUM CHLORIDE 0.9 % (FLUSH) 0.9 %
10 SYRINGE (ML) INJECTION 2 TIMES DAILY
Status: DISCONTINUED | OUTPATIENT
Start: 2019-02-19 | End: 2019-02-20 | Stop reason: HOSPADM

## 2019-02-19 RX ORDER — BUMETANIDE 0.25 MG/ML
2 INJECTION, SOLUTION INTRAMUSCULAR; INTRAVENOUS ONCE
Status: COMPLETED | OUTPATIENT
Start: 2019-02-19 | End: 2019-02-19

## 2019-02-19 RX ADMIN — Medication 10 ML: at 12:36

## 2019-02-19 RX ADMIN — BUMETANIDE 2 MG: 0.25 INJECTION INTRAMUSCULAR; INTRAVENOUS at 12:36

## 2019-02-25 ENCOUNTER — TELEPHONE (OUTPATIENT)
Dept: CARDIOLOGY CLINIC | Age: 74
End: 2019-02-25

## 2019-02-28 RX ORDER — BUMETANIDE 2 MG/1
2 TABLET ORAL SEE ADMIN INSTRUCTIONS
Qty: 45 TABLET | Refills: 5 | Status: ON HOLD | OUTPATIENT
Start: 2019-02-28 | End: 2020-01-01 | Stop reason: SDUPTHER

## 2019-02-28 RX ORDER — HYDRALAZINE HYDROCHLORIDE 100 MG/1
100 TABLET, FILM COATED ORAL 2 TIMES DAILY
Qty: 60 TABLET | Refills: 5 | Status: ON HOLD | OUTPATIENT
Start: 2019-02-28 | End: 2019-03-12 | Stop reason: HOSPADM

## 2019-03-05 ENCOUNTER — HOSPITAL ENCOUNTER (OUTPATIENT)
Dept: OTHER | Age: 74
Setting detail: THERAPIES SERIES
Discharge: HOME | DRG: 193 | End: 2019-03-05
Payer: MEDICARE

## 2019-03-05 VITALS
SYSTOLIC BLOOD PRESSURE: 92 MMHG | DIASTOLIC BLOOD PRESSURE: 56 MMHG | HEART RATE: 82 BPM | WEIGHT: 196 LBS | BODY MASS INDEX: 25.16 KG/M2 | RESPIRATION RATE: 18 BRPM

## 2019-03-05 LAB
ANION GAP SERPL CALCULATED.3IONS-SCNC: 13 MMOL/L (ref 7–16)
BUN BLDV-MCNC: 44 MG/DL (ref 8–23)
CALCIUM SERPL-MCNC: 9 MG/DL (ref 8.6–10.2)
CHLORIDE BLD-SCNC: 100 MMOL/L (ref 98–107)
CO2: 27 MMOL/L (ref 22–29)
CREAT SERPL-MCNC: 2.3 MG/DL (ref 0.7–1.2)
GFR AFRICAN AMERICAN: 34
GFR NON-AFRICAN AMERICAN: 28 ML/MIN/1.73
GLUCOSE BLD-MCNC: 136 MG/DL (ref 74–99)
POTASSIUM SERPL-SCNC: 4.7 MMOL/L (ref 3.5–5)
PRO-BNP: 3562 PG/ML (ref 0–125)
SODIUM BLD-SCNC: 140 MMOL/L (ref 132–146)

## 2019-03-05 PROCEDURE — 80048 BASIC METABOLIC PNL TOTAL CA: CPT

## 2019-03-05 PROCEDURE — 99214 OFFICE O/P EST MOD 30 MIN: CPT

## 2019-03-05 PROCEDURE — 36415 COLL VENOUS BLD VENIPUNCTURE: CPT

## 2019-03-05 PROCEDURE — 96374 THER/PROPH/DIAG INJ IV PUSH: CPT

## 2019-03-05 PROCEDURE — 2500000003 HC RX 250 WO HCPCS: Performed by: INTERNAL MEDICINE

## 2019-03-05 PROCEDURE — 83880 ASSAY OF NATRIURETIC PEPTIDE: CPT

## 2019-03-05 PROCEDURE — 2580000003 HC RX 258: Performed by: INTERNAL MEDICINE

## 2019-03-05 RX ORDER — BUMETANIDE 0.25 MG/ML
2 INJECTION, SOLUTION INTRAMUSCULAR; INTRAVENOUS ONCE
Status: COMPLETED | OUTPATIENT
Start: 2019-03-05 | End: 2019-03-05

## 2019-03-05 RX ORDER — SODIUM CHLORIDE 0.9 % (FLUSH) 0.9 %
10 SYRINGE (ML) INJECTION 2 TIMES DAILY
Status: DISCONTINUED | OUTPATIENT
Start: 2019-03-05 | End: 2019-03-06 | Stop reason: HOSPADM

## 2019-03-05 RX ADMIN — Medication 10 ML: at 13:18

## 2019-03-05 RX ADMIN — BUMETANIDE 2 MG: 0.25 INJECTION INTRAMUSCULAR; INTRAVENOUS at 13:18

## 2019-03-07 ENCOUNTER — TELEPHONE (OUTPATIENT)
Dept: CARDIOLOGY CLINIC | Age: 74
End: 2019-03-07

## 2019-03-08 ENCOUNTER — HOSPITAL ENCOUNTER (INPATIENT)
Age: 74
LOS: 4 days | Discharge: HOME | DRG: 193 | End: 2019-03-12
Attending: EMERGENCY MEDICINE | Admitting: INTERNAL MEDICINE
Payer: MEDICARE

## 2019-03-08 ENCOUNTER — HOSPITAL ENCOUNTER (OUTPATIENT)
Dept: OTHER | Age: 74
Setting detail: THERAPIES SERIES
Discharge: HOME | DRG: 193 | End: 2019-03-08
Payer: MEDICARE

## 2019-03-08 ENCOUNTER — APPOINTMENT (OUTPATIENT)
Dept: GENERAL RADIOLOGY | Age: 74
DRG: 193 | End: 2019-03-08
Payer: MEDICARE

## 2019-03-08 VITALS
BODY MASS INDEX: 24.48 KG/M2 | DIASTOLIC BLOOD PRESSURE: 56 MMHG | WEIGHT: 190.7 LBS | HEART RATE: 86 BPM | OXYGEN SATURATION: 93 % | SYSTOLIC BLOOD PRESSURE: 92 MMHG | RESPIRATION RATE: 18 BRPM

## 2019-03-08 DIAGNOSIS — J44.1 COPD WITH ACUTE EXACERBATION (HCC): ICD-10-CM

## 2019-03-08 DIAGNOSIS — J96.01 ACUTE RESPIRATORY FAILURE WITH HYPOXIA (HCC): Primary | ICD-10-CM

## 2019-03-08 LAB
ALBUMIN SERPL-MCNC: 3.9 G/DL (ref 3.5–5.2)
ALP BLD-CCNC: 160 U/L (ref 40–129)
ALT SERPL-CCNC: 21 U/L (ref 0–40)
ANION GAP SERPL CALCULATED.3IONS-SCNC: 14 MMOL/L (ref 7–16)
APTT: 32.9 SEC (ref 24.5–35.1)
AST SERPL-CCNC: 37 U/L (ref 0–39)
BASOPHILS ABSOLUTE: 0.05 E9/L (ref 0–0.2)
BASOPHILS RELATIVE PERCENT: 0.7 % (ref 0–2)
BILIRUB SERPL-MCNC: 0.7 MG/DL (ref 0–1.2)
BUN BLDV-MCNC: 48 MG/DL (ref 8–23)
CALCIUM SERPL-MCNC: 9.1 MG/DL (ref 8.6–10.2)
CHLORIDE BLD-SCNC: 95 MMOL/L (ref 98–107)
CO2: 26 MMOL/L (ref 22–29)
CREAT SERPL-MCNC: 2.1 MG/DL (ref 0.7–1.2)
EOSINOPHILS ABSOLUTE: 0.2 E9/L (ref 0.05–0.5)
EOSINOPHILS RELATIVE PERCENT: 2.7 % (ref 0–6)
GFR AFRICAN AMERICAN: 38
GFR NON-AFRICAN AMERICAN: 31 ML/MIN/1.73
GLUCOSE BLD-MCNC: 91 MG/DL (ref 74–99)
HCT VFR BLD CALC: 37.4 % (ref 37–54)
HEMOGLOBIN: 11.8 G/DL (ref 12.5–16.5)
IMMATURE GRANULOCYTES #: 0.02 E9/L
IMMATURE GRANULOCYTES %: 0.3 % (ref 0–5)
INFLUENZA A BY PCR: NOT DETECTED
INFLUENZA B BY PCR: NOT DETECTED
INR BLD: 2.2
LACTIC ACID: 2 MMOL/L (ref 0.5–2.2)
LYMPHOCYTES ABSOLUTE: 1.18 E9/L (ref 1.5–4)
LYMPHOCYTES RELATIVE PERCENT: 16 % (ref 20–42)
MCH RBC QN AUTO: 28.7 PG (ref 26–35)
MCHC RBC AUTO-ENTMCNC: 31.6 % (ref 32–34.5)
MCV RBC AUTO: 91 FL (ref 80–99.9)
MONOCYTES ABSOLUTE: 0.86 E9/L (ref 0.1–0.95)
MONOCYTES RELATIVE PERCENT: 11.7 % (ref 2–12)
NEUTROPHILS ABSOLUTE: 5.07 E9/L (ref 1.8–7.3)
NEUTROPHILS RELATIVE PERCENT: 68.6 % (ref 43–80)
PDW BLD-RTO: 15.3 FL (ref 11.5–15)
PLATELET # BLD: 216 E9/L (ref 130–450)
PMV BLD AUTO: 11 FL (ref 7–12)
POTASSIUM SERPL-SCNC: 4.3 MMOL/L (ref 3.5–5)
PRO-BNP: 8879 PG/ML (ref 0–125)
PROTHROMBIN TIME: 25.4 SEC (ref 9.3–12.4)
RBC # BLD: 4.11 E12/L (ref 3.8–5.8)
SODIUM BLD-SCNC: 135 MMOL/L (ref 132–146)
TOTAL PROTEIN: 8.5 G/DL (ref 6.4–8.3)
TROPONIN: 0.11 NG/ML (ref 0–0.03)
WBC # BLD: 7.4 E9/L (ref 4.5–11.5)

## 2019-03-08 PROCEDURE — 80053 COMPREHEN METABOLIC PANEL: CPT

## 2019-03-08 PROCEDURE — 71045 X-RAY EXAM CHEST 1 VIEW: CPT

## 2019-03-08 PROCEDURE — 2580000003 HC RX 258: Performed by: INTERNAL MEDICINE

## 2019-03-08 PROCEDURE — 94664 DEMO&/EVAL PT USE INHALER: CPT

## 2019-03-08 PROCEDURE — 6360000002 HC RX W HCPCS: Performed by: EMERGENCY MEDICINE

## 2019-03-08 PROCEDURE — 99285 EMERGENCY DEPT VISIT HI MDM: CPT

## 2019-03-08 PROCEDURE — 85730 THROMBOPLASTIN TIME PARTIAL: CPT

## 2019-03-08 PROCEDURE — 94640 AIRWAY INHALATION TREATMENT: CPT

## 2019-03-08 PROCEDURE — 6370000000 HC RX 637 (ALT 250 FOR IP): Performed by: EMERGENCY MEDICINE

## 2019-03-08 PROCEDURE — 87040 BLOOD CULTURE FOR BACTERIA: CPT

## 2019-03-08 PROCEDURE — 2580000003 HC RX 258: Performed by: EMERGENCY MEDICINE

## 2019-03-08 PROCEDURE — 6370000000 HC RX 637 (ALT 250 FOR IP): Performed by: INTERNAL MEDICINE

## 2019-03-08 PROCEDURE — 99214 OFFICE O/P EST MOD 30 MIN: CPT

## 2019-03-08 PROCEDURE — 83605 ASSAY OF LACTIC ACID: CPT

## 2019-03-08 PROCEDURE — 2500000003 HC RX 250 WO HCPCS: Performed by: EMERGENCY MEDICINE

## 2019-03-08 PROCEDURE — 87502 INFLUENZA DNA AMP PROBE: CPT

## 2019-03-08 PROCEDURE — 2060000000 HC ICU INTERMEDIATE R&B

## 2019-03-08 PROCEDURE — 85025 COMPLETE CBC W/AUTO DIFF WBC: CPT

## 2019-03-08 PROCEDURE — 83880 ASSAY OF NATRIURETIC PEPTIDE: CPT

## 2019-03-08 PROCEDURE — 36415 COLL VENOUS BLD VENIPUNCTURE: CPT

## 2019-03-08 PROCEDURE — 85610 PROTHROMBIN TIME: CPT

## 2019-03-08 PROCEDURE — 96374 THER/PROPH/DIAG INJ IV PUSH: CPT

## 2019-03-08 PROCEDURE — 84484 ASSAY OF TROPONIN QUANT: CPT

## 2019-03-08 RX ORDER — ZOLPIDEM TARTRATE 5 MG/1
10 TABLET ORAL NIGHTLY
Status: DISCONTINUED | OUTPATIENT
Start: 2019-03-08 | End: 2019-03-12 | Stop reason: HOSPADM

## 2019-03-08 RX ORDER — ONDANSETRON 2 MG/ML
4 INJECTION INTRAMUSCULAR; INTRAVENOUS EVERY 6 HOURS PRN
Status: DISCONTINUED | OUTPATIENT
Start: 2019-03-08 | End: 2019-03-12 | Stop reason: HOSPADM

## 2019-03-08 RX ORDER — NICOTINE POLACRILEX 4 MG
15 LOZENGE BUCCAL PRN
Status: DISCONTINUED | OUTPATIENT
Start: 2019-03-08 | End: 2019-03-12 | Stop reason: HOSPADM

## 2019-03-08 RX ORDER — HYDRALAZINE HYDROCHLORIDE 50 MG/1
100 TABLET, FILM COATED ORAL 2 TIMES DAILY
Status: DISCONTINUED | OUTPATIENT
Start: 2019-03-08 | End: 2019-03-10

## 2019-03-08 RX ORDER — METHYLPREDNISOLONE SODIUM SUCCINATE 125 MG/2ML
125 INJECTION, POWDER, LYOPHILIZED, FOR SOLUTION INTRAMUSCULAR; INTRAVENOUS ONCE
Status: COMPLETED | OUTPATIENT
Start: 2019-03-08 | End: 2019-03-08

## 2019-03-08 RX ORDER — METHYLPREDNISOLONE SODIUM SUCCINATE 125 MG/2ML
80 INJECTION, POWDER, LYOPHILIZED, FOR SOLUTION INTRAMUSCULAR; INTRAVENOUS DAILY
Status: DISCONTINUED | OUTPATIENT
Start: 2019-03-09 | End: 2019-03-10

## 2019-03-08 RX ORDER — LEVOTHYROXINE SODIUM 0.07 MG/1
75 TABLET ORAL DAILY
Status: DISCONTINUED | OUTPATIENT
Start: 2019-03-09 | End: 2019-03-12 | Stop reason: HOSPADM

## 2019-03-08 RX ORDER — POTASSIUM CHLORIDE 20 MEQ/1
20 TABLET, EXTENDED RELEASE ORAL 2 TIMES DAILY
Status: DISCONTINUED | OUTPATIENT
Start: 2019-03-08 | End: 2019-03-12 | Stop reason: HOSPADM

## 2019-03-08 RX ORDER — WARFARIN SODIUM 5 MG/1
2.5 TABLET ORAL
Status: DISCONTINUED | OUTPATIENT
Start: 2019-03-08 | End: 2019-03-09 | Stop reason: DRUGHIGH

## 2019-03-08 RX ORDER — SODIUM CHLORIDE 0.9 % (FLUSH) 0.9 %
10 SYRINGE (ML) INJECTION EVERY 12 HOURS SCHEDULED
Status: DISCONTINUED | OUTPATIENT
Start: 2019-03-08 | End: 2019-03-12 | Stop reason: HOSPADM

## 2019-03-08 RX ORDER — 0.9 % SODIUM CHLORIDE 0.9 %
500 INTRAVENOUS SOLUTION INTRAVENOUS ONCE
Status: COMPLETED | OUTPATIENT
Start: 2019-03-08 | End: 2019-03-08

## 2019-03-08 RX ORDER — INSULIN GLARGINE 100 [IU]/ML
20 INJECTION, SOLUTION SUBCUTANEOUS EVERY MORNING
Status: DISCONTINUED | OUTPATIENT
Start: 2019-03-09 | End: 2019-03-12 | Stop reason: HOSPADM

## 2019-03-08 RX ORDER — PANTOPRAZOLE SODIUM 40 MG/1
40 TABLET, DELAYED RELEASE ORAL
Status: DISCONTINUED | OUTPATIENT
Start: 2019-03-09 | End: 2019-03-12 | Stop reason: HOSPADM

## 2019-03-08 RX ORDER — WARFARIN SODIUM 5 MG/1
5 TABLET ORAL
Status: DISCONTINUED | OUTPATIENT
Start: 2019-03-09 | End: 2019-03-09 | Stop reason: DRUGHIGH

## 2019-03-08 RX ORDER — COLCHICINE 0.6 MG/1
0.6 TABLET ORAL 2 TIMES DAILY PRN
Status: DISCONTINUED | OUTPATIENT
Start: 2019-03-08 | End: 2019-03-12 | Stop reason: HOSPADM

## 2019-03-08 RX ORDER — METOPROLOL SUCCINATE 50 MG/1
50 TABLET, EXTENDED RELEASE ORAL DAILY
Status: DISCONTINUED | OUTPATIENT
Start: 2019-03-09 | End: 2019-03-12 | Stop reason: HOSPADM

## 2019-03-08 RX ORDER — IPRATROPIUM BROMIDE AND ALBUTEROL SULFATE 2.5; .5 MG/3ML; MG/3ML
1 SOLUTION RESPIRATORY (INHALATION)
Status: DISCONTINUED | OUTPATIENT
Start: 2019-03-08 | End: 2019-03-12 | Stop reason: HOSPADM

## 2019-03-08 RX ORDER — SODIUM CHLORIDE 0.9 % (FLUSH) 0.9 %
10 SYRINGE (ML) INJECTION PRN
Status: DISCONTINUED | OUTPATIENT
Start: 2019-03-08 | End: 2019-03-12 | Stop reason: HOSPADM

## 2019-03-08 RX ORDER — BUMETANIDE 1 MG/1
2 TABLET ORAL EVERY MORNING
Status: DISCONTINUED | OUTPATIENT
Start: 2019-03-09 | End: 2019-03-12 | Stop reason: HOSPADM

## 2019-03-08 RX ORDER — IPRATROPIUM BROMIDE AND ALBUTEROL SULFATE 2.5; .5 MG/3ML; MG/3ML
3 SOLUTION RESPIRATORY (INHALATION) ONCE
Status: COMPLETED | OUTPATIENT
Start: 2019-03-08 | End: 2019-03-08

## 2019-03-08 RX ORDER — ALLOPURINOL 100 MG/1
100 TABLET ORAL DAILY
Status: DISCONTINUED | OUTPATIENT
Start: 2019-03-09 | End: 2019-03-12 | Stop reason: HOSPADM

## 2019-03-08 RX ORDER — DEXTROSE MONOHYDRATE 50 MG/ML
100 INJECTION, SOLUTION INTRAVENOUS PRN
Status: DISCONTINUED | OUTPATIENT
Start: 2019-03-08 | End: 2019-03-12 | Stop reason: HOSPADM

## 2019-03-08 RX ORDER — DEXTROSE MONOHYDRATE 25 G/50ML
12.5 INJECTION, SOLUTION INTRAVENOUS PRN
Status: DISCONTINUED | OUTPATIENT
Start: 2019-03-08 | End: 2019-03-12 | Stop reason: HOSPADM

## 2019-03-08 RX ORDER — LANOLIN ALCOHOL/MO/W.PET/CERES
400 CREAM (GRAM) TOPICAL DAILY
Status: DISCONTINUED | OUTPATIENT
Start: 2019-03-09 | End: 2019-03-12 | Stop reason: HOSPADM

## 2019-03-08 RX ORDER — ATORVASTATIN CALCIUM 40 MG/1
80 TABLET, FILM COATED ORAL DAILY
Status: DISCONTINUED | OUTPATIENT
Start: 2019-03-08 | End: 2019-03-12 | Stop reason: HOSPADM

## 2019-03-08 RX ORDER — ASPIRIN 81 MG/1
81 TABLET ORAL DAILY
Status: DISCONTINUED | OUTPATIENT
Start: 2019-03-08 | End: 2019-03-12 | Stop reason: HOSPADM

## 2019-03-08 RX ORDER — BUMETANIDE 1 MG/1
1 TABLET ORAL EVERY EVENING
Status: DISCONTINUED | OUTPATIENT
Start: 2019-03-08 | End: 2019-03-12 | Stop reason: HOSPADM

## 2019-03-08 RX ADMIN — SODIUM CHLORIDE 500 ML: 9 INJECTION, SOLUTION INTRAVENOUS at 14:31

## 2019-03-08 RX ADMIN — METHYLPREDNISOLONE SODIUM SUCCINATE 125 MG: 125 INJECTION, POWDER, FOR SOLUTION INTRAMUSCULAR; INTRAVENOUS at 15:33

## 2019-03-08 RX ADMIN — IPRATROPIUM BROMIDE AND ALBUTEROL SULFATE 3 AMPULE: .5; 3 SOLUTION RESPIRATORY (INHALATION) at 14:36

## 2019-03-08 RX ADMIN — ATORVASTATIN CALCIUM 80 MG: 40 TABLET, FILM COATED ORAL at 22:03

## 2019-03-08 RX ADMIN — POTASSIUM CHLORIDE 20 MEQ: 20 TABLET, EXTENDED RELEASE ORAL at 22:02

## 2019-03-08 RX ADMIN — ZOLPIDEM TARTRATE 10 MG: 5 TABLET ORAL at 22:02

## 2019-03-08 RX ADMIN — ASPIRIN 81 MG: 81 TABLET ORAL at 22:03

## 2019-03-08 RX ADMIN — WARFARIN SODIUM 2.5 MG: 5 TABLET ORAL at 22:03

## 2019-03-08 RX ADMIN — BUMETANIDE 1 MG: 1 TABLET ORAL at 22:02

## 2019-03-08 RX ADMIN — DOXYCYCLINE 100 MG: 100 INJECTION, POWDER, LYOPHILIZED, FOR SOLUTION INTRAVENOUS at 16:55

## 2019-03-08 RX ADMIN — Medication 10 ML: at 22:03

## 2019-03-08 ASSESSMENT — ENCOUNTER SYMPTOMS
BACK PAIN: 0
COUGH: 1
ABDOMINAL PAIN: 0
SHORTNESS OF BREATH: 1

## 2019-03-08 ASSESSMENT — PAIN SCALES - GENERAL: PAINLEVEL_OUTOF10: 0

## 2019-03-08 NOTE — ED PROVIDER NOTES
This is a 72-year-old male with a past medical history of coronary artery disease, CHF and COPD who presents to the ED for evaluation shortness of breath. The patient remarks that since Tuesday has been having a productive cough, body aches and shortness of breath. Patient states the shortness of breath is worse when he exerts himself. The patient states that he has lost weight over the past week proximally 6 pounds. He states that he has had a productive cough which is yellow in color. He states that he has no leg pain or leg swelling. The patient states that he did get his influenza shot this year. He states that he was seen at his CHF clinic earlier today and got a shot of bumex. The history is provided by the patient. No  was used. Review of Systems   Constitutional: Positive for unexpected weight change. Negative for fever. HENT: Positive for congestion. Eyes: Negative for visual disturbance. Respiratory: Positive for cough and shortness of breath. Cardiovascular: Negative for chest pain and leg swelling. Gastrointestinal: Negative for abdominal pain. Endocrine: Negative for polyuria. Genitourinary: Negative for dysuria. Musculoskeletal: Negative for back pain. Skin: Negative for rash. Allergic/Immunologic: Negative for immunocompromised state. Neurological: Negative for headaches. Hematological: Does not bruise/bleed easily. Psychiatric/Behavioral: Negative for confusion. Physical Exam   Constitutional: He appears well-developed and well-nourished. No distress. HENT:   Head: Normocephalic and atraumatic. Mouth/Throat: Oropharynx is clear and moist.   Eyes: Pupils are equal, round, and reactive to light. EOM are normal.   Neck: Normal range of motion. Neck supple. Cardiovascular: Normal rate and regular rhythm. Pulmonary/Chest:   Inspiratory and Expiratory wheezing   Abdominal: Soft. He exhibits no mass. There is no tenderness.  There is no guarding. Musculoskeletal: He exhibits no edema. Neurological: He is alert. Skin: Skin is warm and dry. Nursing note and vitals reviewed. Procedures    MDM  Number of Diagnoses or Management Options  Acute respiratory failure with hypoxia [facility]):   COPD with acute exacerbation [facility]):   Diagnosis management comments: This is a 72year old male with a PMH of CHF and CAD who presented to the ED with a complaint of shortness of breath and cough for the past several days. The patient was hypoxic here in the ED and was placed on supplemental oxygen. He did have significant wheezing and was treated with Duonebs and steroids. The patient was also clinically dehydrated and hypotensive and was treated with IV fluids with improvement in his symptoms. The patient had no new findings on CXR. I discussed the case with Dr. Francisco Kraus who agreed to admit and recommended starting antibiotics. Patient was given Doxycycline. He remained hemodynamically stable throughout the course of his stay.           --------------------------------------------- PAST HISTORY ---------------------------------------------  Past Medical History:  has a past medical history of Acid reflux, Acute MI ([facility] [address] Anxiety, CAD (coronary artery disease), [facility] Fluid retention, Hyperlipidemia, Hypertension, Ischemic cardiomyopathy, Osteoarthritis, Post PTCA, Systolic dysfunction, left ventricle, and Thyroid disease. Past Surgical History:  has a past surgical history that includes Diagnostic Cardiac Cath Lab Procedure (01/27/97,11/00); ECHO Compl W Dop Color Flow (4/11/2013); transesophageal echocardiogram (4/12/2013); ECHO Compl W Dop Color Flow (4/21/2013); Coronary artery bypass graft; Mitral valve surgery; transesophageal echocardiogram (08/02/2017); Coronary angioplasty; and Cardiac defibrillator placement (Left, 2013). Social History:  reports that he quit smoking about 15 months ago. His smoking use included cigarettes.  He has a 25.00 pack-year smoking history. He has never used smokeless tobacco. He reports that he drinks alcohol. He reports that he does not use drugs. Family History: family history includes Heart Surgery in his father; High Cholesterol in his father; Hypertension in his father and mother. The patients home medications have been reviewed. Allergies: Patient has no known allergies.     -------------------------------------------------- RESULTS -------------------------------------------------    LABS:  Results for orders placed or performed during the hospital encounter of 03/08/19   Rapid influenza A/B antigens   Result Value Ref Range    Influenza A by PCR Not Detected Not Detected    Influenza B by PCR Not Detected Not Detected   CBC Auto Differential   Result Value Ref Range    WBC 7.4 4.5 - 11.5 E9/L    RBC 4.11 3.80 - 5.80 E12/L    Hemoglobin 11.8 (L) 12.5 - 16.5 g/dL    Hematocrit 37.4 37.0 - 54.0 %    MCV 91.0 80.0 - 99.9 fL    MCH 28.7 26.0 - 35.0 pg    MCHC 31.6 (L) 32.0 - 34.5 %    RDW 15.3 (H) 11.5 - 15.0 fL    Platelets 290 817 - 704 E9/L    MPV 11.0 7.0 - 12.0 fL    Neutrophils % 68.6 43.0 - 80.0 %    Immature Granulocytes % 0.3 0.0 - 5.0 %    Lymphocytes % 16.0 (L) 20.0 - 42.0 %    Monocytes % 11.7 2.0 - 12.0 %    Eosinophils % 2.7 0.0 - 6.0 %    Basophils % 0.7 0.0 - 2.0 %    Neutrophils # 5.07 1.80 - 7.30 E9/L    Immature Granulocytes # 0.02 E9/L    Lymphocytes # 1.18 (L) 1.50 - 4.00 E9/L    Monocytes # 0.86 0.10 - 0.95 E9/L    Eosinophils # 0.20 0.05 - 0.50 E9/L    Basophils # 0.05 0.00 - 0.20 E9/L   Comprehensive Metabolic Panel   Result Value Ref Range    Sodium 135 132 - 146 mmol/L    Potassium 4.3 3.5 - 5.0 mmol/L    Chloride 95 (L) 98 - 107 mmol/L    CO2 26 22 - 29 mmol/L    Anion Gap 14 7 - 16 mmol/L    Glucose 91 74 - 99 mg/dL    BUN 48 (H) 8 - 23 mg/dL    CREATININE 2.1 (H) 0.7 - 1.2 mg/dL    GFR Non-African American 31 >=60 mL/min/1.73    GFR African American 38     Calcium 9.1 8.6 improved    Counseling:  I have spoken with the patient and discussed todays results, in addition to providing specific details for the plan of care and counseling regarding the diagnosis and prognosis. Their questions are answered at this time and they are agreeable with the plan of admission.    --------------------------------- ADDITIONAL PROVIDER NOTES ---------------------------------  Consultations:  Spoke with Dr. Gonzalo Quan  Discussed case. They will admit the patient. This patient's ED course included: a personal history and physicial examination, re-evaluation prior to disposition, multiple bedside re-evaluations, IV medications, cardiac monitoring, continuous pulse oximetry and complex medical decision making and emergency management    This patient has remained hemodynamically stable during their ED course. Diagnosis:  1. Acute respiratory failure with hypoxia ([address]    2. COPD with acute exacerbation ([facility] [address]        Disposition:  Patient's disposition: Admit to telemetry  Patient's condition is stable. EKG Interpretation. EKG: This EKG is signed and interpreted by me.     Rate: 80  Rhythm: Accelerated junctional rhythm  Interpretation: no acute changes  Comparison: changes compared to previous EKG          Corinne Cape, DO  Resident  03/08/19 2015

## 2019-03-08 NOTE — ED NOTES
FIRST PROVIDER CONTACT ASSESSMENT NOTE      Department of Emergency Medicine   3/8/19  1:27 PM    Chief Complaint: Congestive Heart Failure (with CP and SOB, gained 6lbs in one week)      History of Present Illness:    Cari Delgado is a 72 y.o. male who presents to the ED by private car for chest pain and shortness of breath since Tuesday. At the CHF clinic today. States 6 pound weight loss since then. Placed on O2 prior to triage. Focused Screening Exam:  Constitutional:  Alert, appears stated age and is in no distress. *ALLERGIES*     Patient has no known allergies.      ED Triage Vitals [03/08/19 1318]   BP Temp Temp src Pulse Resp SpO2 Height Weight   -- -- -- 83 -- (!) 89 % -- --        Initial Plan of Care:  Initiate Treatment-Testing, Proceed toTreatment Area When Bed Available for ED Attending/MLP to Continue Care    -----------------END OF FIRST PROVIDER CONTACT ASSESSMENT NOTE--------------  Electronically signed by ANDREW Araujo   DD: 3/8/19  0     Ángela Loving [place]  03/08/19 1664

## 2019-03-09 LAB
ANION GAP SERPL CALCULATED.3IONS-SCNC: 13 MMOL/L (ref 7–16)
BUN BLDV-MCNC: 53 MG/DL (ref 8–23)
CALCIUM SERPL-MCNC: 8.1 MG/DL (ref 8.6–10.2)
CHLORIDE BLD-SCNC: 96 MMOL/L (ref 98–107)
CO2: 25 MMOL/L (ref 22–29)
CREAT SERPL-MCNC: 2 MG/DL (ref 0.7–1.2)
FILM ARRAY ADENOVIRUS: ABNORMAL
FILM ARRAY BORDETELLA PERTUSSIS: ABNORMAL
FILM ARRAY CHLAMYDOPHILIA PNEUMONIAE: ABNORMAL
FILM ARRAY CORONAVIRUS 229E: ABNORMAL
FILM ARRAY CORONAVIRUS HKU1: ABNORMAL
FILM ARRAY CORONAVIRUS NL63: ABNORMAL
FILM ARRAY CORONAVIRUS OC43: ABNORMAL
FILM ARRAY INFLUENZA A VIRUS 09H1: ABNORMAL
FILM ARRAY INFLUENZA A VIRUS H1: ABNORMAL
FILM ARRAY INFLUENZA A VIRUS H3: ABNORMAL
FILM ARRAY INFLUENZA A VIRUS: ABNORMAL
FILM ARRAY INFLUENZA B: ABNORMAL
FILM ARRAY MYCOPLASMA PNEUMONIAE: ABNORMAL
FILM ARRAY PARAINFLUENZA VIRUS 1: ABNORMAL
FILM ARRAY PARAINFLUENZA VIRUS 2: ABNORMAL
FILM ARRAY PARAINFLUENZA VIRUS 3: ABNORMAL
FILM ARRAY PARAINFLUENZA VIRUS 4: ABNORMAL
FILM ARRAY RESPIRATORY SYNCITIAL VIRUS: ABNORMAL
FILM ARRAY RHINOVIRUS/ENTEROVIRUS: ABNORMAL
GFR AFRICAN AMERICAN: 40
GFR NON-AFRICAN AMERICAN: 33 ML/MIN/1.73
GLUCOSE BLD-MCNC: 300 MG/DL (ref 74–99)
HBA1C MFR BLD: 6.3 % (ref 4–5.6)
INR BLD: 3
METER GLUCOSE: 232 MG/DL (ref 74–99)
METER GLUCOSE: 302 MG/DL (ref 74–99)
ORGANISM: ABNORMAL
POTASSIUM REFLEX MAGNESIUM: 4.5 MMOL/L (ref 3.5–5)
PROCALCITONIN: 0.4 NG/ML (ref 0–0.08)
PROTHROMBIN TIME: 33.8 SEC (ref 9.3–12.4)
SODIUM BLD-SCNC: 134 MMOL/L (ref 132–146)

## 2019-03-09 PROCEDURE — 2580000003 HC RX 258: Performed by: INTERNAL MEDICINE

## 2019-03-09 PROCEDURE — 87633 RESP VIRUS 12-25 TARGETS: CPT

## 2019-03-09 PROCEDURE — 99223 1ST HOSP IP/OBS HIGH 75: CPT | Performed by: INTERNAL MEDICINE

## 2019-03-09 PROCEDURE — 94640 AIRWAY INHALATION TREATMENT: CPT

## 2019-03-09 PROCEDURE — 6370000000 HC RX 637 (ALT 250 FOR IP): Performed by: INTERNAL MEDICINE

## 2019-03-09 PROCEDURE — 83036 HEMOGLOBIN GLYCOSYLATED A1C: CPT

## 2019-03-09 PROCEDURE — 82962 GLUCOSE BLOOD TEST: CPT

## 2019-03-09 PROCEDURE — 2060000000 HC ICU INTERMEDIATE R&B

## 2019-03-09 PROCEDURE — 80048 BASIC METABOLIC PNL TOTAL CA: CPT

## 2019-03-09 PROCEDURE — 84145 PROCALCITONIN (PCT): CPT

## 2019-03-09 PROCEDURE — 85610 PROTHROMBIN TIME: CPT

## 2019-03-09 PROCEDURE — 2700000000 HC OXYGEN THERAPY PER DAY

## 2019-03-09 PROCEDURE — 6360000002 HC RX W HCPCS: Performed by: INTERNAL MEDICINE

## 2019-03-09 PROCEDURE — 2500000003 HC RX 250 WO HCPCS: Performed by: INTERNAL MEDICINE

## 2019-03-09 PROCEDURE — APPSS60 APP SPLIT SHARED TIME 46-60 MINUTES: Performed by: NURSE PRACTITIONER

## 2019-03-09 PROCEDURE — 87581 M.PNEUMON DNA AMP PROBE: CPT

## 2019-03-09 PROCEDURE — 87486 CHLMYD PNEUM DNA AMP PROBE: CPT

## 2019-03-09 PROCEDURE — 36415 COLL VENOUS BLD VENIPUNCTURE: CPT

## 2019-03-09 PROCEDURE — 94760 N-INVAS EAR/PLS OXIMETRY 1: CPT

## 2019-03-09 PROCEDURE — 87798 DETECT AGENT NOS DNA AMP: CPT

## 2019-03-09 RX ORDER — WARFARIN SODIUM 5 MG/1
2.5 TABLET ORAL
Status: COMPLETED | OUTPATIENT
Start: 2019-03-09 | End: 2019-03-09

## 2019-03-09 RX ADMIN — DOXYCYCLINE 100 MG: 100 INJECTION, POWDER, LYOPHILIZED, FOR SOLUTION INTRAVENOUS at 18:11

## 2019-03-09 RX ADMIN — INSULIN LISPRO 6 UNITS: 100 INJECTION, SOLUTION INTRAVENOUS; SUBCUTANEOUS at 10:12

## 2019-03-09 RX ADMIN — ASPIRIN 81 MG: 81 TABLET ORAL at 10:10

## 2019-03-09 RX ADMIN — SACUBITRIL AND VALSARTAN 1 TABLET: 49; 51 TABLET, FILM COATED ORAL at 20:36

## 2019-03-09 RX ADMIN — POTASSIUM CHLORIDE 20 MEQ: 20 TABLET, EXTENDED RELEASE ORAL at 20:36

## 2019-03-09 RX ADMIN — METHYLPREDNISOLONE SODIUM SUCCINATE 80 MG: 125 INJECTION, POWDER, FOR SOLUTION INTRAMUSCULAR; INTRAVENOUS at 10:13

## 2019-03-09 RX ADMIN — ATORVASTATIN CALCIUM 80 MG: 40 TABLET, FILM COATED ORAL at 10:15

## 2019-03-09 RX ADMIN — ALLOPURINOL 100 MG: 100 TABLET ORAL at 10:10

## 2019-03-09 RX ADMIN — HYDRALAZINE HYDROCHLORIDE 100 MG: 50 TABLET, FILM COATED ORAL at 20:36

## 2019-03-09 RX ADMIN — Medication 10 ML: at 05:53

## 2019-03-09 RX ADMIN — BUMETANIDE 1 MG: 1 TABLET ORAL at 18:11

## 2019-03-09 RX ADMIN — METOPROLOL SUCCINATE 50 MG: 50 TABLET, FILM COATED, EXTENDED RELEASE ORAL at 10:10

## 2019-03-09 RX ADMIN — IPRATROPIUM BROMIDE AND ALBUTEROL SULFATE 1 AMPULE: .5; 3 SOLUTION RESPIRATORY (INHALATION) at 08:32

## 2019-03-09 RX ADMIN — WARFARIN SODIUM 2.5 MG: 5 TABLET ORAL at 18:11

## 2019-03-09 RX ADMIN — INSULIN GLARGINE 20 UNITS: 100 INJECTION, SOLUTION SUBCUTANEOUS at 10:12

## 2019-03-09 RX ADMIN — ZOLPIDEM TARTRATE 10 MG: 5 TABLET ORAL at 20:36

## 2019-03-09 RX ADMIN — BUMETANIDE 2 MG: 1 TABLET ORAL at 10:11

## 2019-03-09 RX ADMIN — INSULIN LISPRO 4 UNITS: 100 INJECTION, SOLUTION INTRAVENOUS; SUBCUTANEOUS at 20:37

## 2019-03-09 RX ADMIN — INSULIN LISPRO 4 UNITS: 100 INJECTION, SOLUTION INTRAVENOUS; SUBCUTANEOUS at 18:10

## 2019-03-09 RX ADMIN — LINAGLIPTIN 5 MG: 5 TABLET, FILM COATED ORAL at 10:10

## 2019-03-09 RX ADMIN — IPRATROPIUM BROMIDE AND ALBUTEROL SULFATE 1 AMPULE: .5; 3 SOLUTION RESPIRATORY (INHALATION) at 21:35

## 2019-03-09 RX ADMIN — POTASSIUM CHLORIDE 20 MEQ: 20 TABLET, EXTENDED RELEASE ORAL at 10:15

## 2019-03-09 RX ADMIN — PANTOPRAZOLE SODIUM 40 MG: 40 TABLET, DELAYED RELEASE ORAL at 05:52

## 2019-03-09 RX ADMIN — DOXYCYCLINE 100 MG: 100 INJECTION, POWDER, LYOPHILIZED, FOR SOLUTION INTRAVENOUS at 05:52

## 2019-03-09 RX ADMIN — IPRATROPIUM BROMIDE AND ALBUTEROL SULFATE 1 AMPULE: .5; 3 SOLUTION RESPIRATORY (INHALATION) at 12:03

## 2019-03-09 RX ADMIN — Medication 10 ML: at 21:35

## 2019-03-09 RX ADMIN — MAGNESIUM GLUCONATE 500 MG ORAL TABLET 400 MG: 500 TABLET ORAL at 10:10

## 2019-03-09 RX ADMIN — SACUBITRIL AND VALSARTAN 1 TABLET: 49; 51 TABLET, FILM COATED ORAL at 10:11

## 2019-03-09 ASSESSMENT — PAIN SCALES - GENERAL
PAINLEVEL_OUTOF10: 0

## 2019-03-09 NOTE — H&P
History and Physical                                                                                    Isabelle Elise MD, FACP                   Patient Name: Danae Jaramillo                   Age:  72 y.o. Gender:   male    CC: AICD discharge    HPI: PER ER:  This is a 19-year-old male with a past medical history of coronary artery disease, CHF and COPD who presents to the ED for evaluation shortness of breath. The patient remarks that since Tuesday has been having a productive cough, body aches and shortness of breath. Patient states the shortness of breath is worse when he exerts himself. The patient states that he has lost weight over the past week proximally 6 pounds. He states that he has had a productive cough which is yellow in color. He states that he has no leg pain or leg swelling. The patient states that he did get his influenza shot this year. He states that he was seen at his CHF clinic earlier today and got a shot of bumex.     I interviewed this patient and verified this history  He is currently quite comfortable  System review was completed as well    He confirms that his presentation is mainly cough and SOB  He states he has noted a long and enduring non-productive cough-that actually hs made his chest and abdomen sore          Past Medical History:   Diagnosis Date    Acid reflux     Acute MI ([facility] [address] 01/25/97,01/04    Anxiety     CAD (coronary artery disease)     follows w Dr. Jolene Birmingham Diabetes mellitus ([facility] [address]     Fluid retention     history of    Hyperlipidemia     Hypertension     Ischemic cardiomyopathy     Osteoarthritis     Post PTCA 51/37/45    Systolic dysfunction, left ventricle     follows with Dr. Jolene Birmingham Thyroid disease        Past Surgical History:   Procedure Laterality Date    CARDIAC DEFIBRILLATOR PLACEMENT Left 2013    with pacemaker ([employer])    CORONARY ANGIOPLASTY      CORONARY ARTERY BYPASS GRAFT      DIAGNOSTIC CARDIAC CATH LAB PROCEDURE  97,    Critical lesion mid left circumflex,significant lesion mid-LAD and first diagonal    ECHO COMPL W DOP COLOR FLOW  2013         ECHO COMPL W DOP COLOR FLOW  2013         MITRAL VALVE SURGERY      TRANSESOPHAGEAL ECHOCARDIOGRAM  2013    Dr. Haley Ariza TRANSESOPHAGEAL ECHOCARDIOGRAM  2017       Family Status   Relation Name Status    Mother      Father      MGM      MGF      PGM      PGF          Prior to Admission medications    Medication Sig Start Date End Date Taking? Authorizing Provider   hydrALAZINE (APRESOLINE) 100 MG tablet Take 1 tablet by mouth 2 times daily 19   Martha Tarango MD   bumetanide (BUMEX) 2 MG tablet Take 1 tablet by mouth See Admin Instructions 2mg in am and 1mg in afternoon. Dr Kleber Valderrama reduced dose 19. 19   Martha Tarango MD   metoprolol succinate (TOPROL XL) 50 MG extended release tablet Take 1 tablet by mouth daily 19   Martha Tarango MD   potassium chloride (KLOR-CON M) 20 MEQ extended release tablet Take 1 tablet by mouth 2 times daily 19   Martha Tarango MD   Magnesium 400 MG TABS Take 400 mg by mouth daily    Historical Provider, MD   Ascorbic Acid (VITAMIN C) 1000 MG tablet Take 2,000 mg by mouth daily    Historical Provider, MD   Multiple Vitamins-Minerals (THERAPEUTIC MULTIVITAMIN-MINERALS) tablet Take 1 tablet by mouth daily    Historical Provider, MD   warfarin (COUMADIN) 5 MG tablet Take 5 mg by mouth four times a week Mon, Wed, Fri, Sun    Historical Provider, MD   warfarin (COUMADIN) 2.5 MG tablet Take 2.5 mg by mouth three times a week Tues, Thurs, Sat    Historical Provider, MD   zolpidem (AMBIEN) 10 MG tablet Take 10 mg by mouth nightly. Coby Ramon     Historical Provider, MD   sacubitril-valsartan (ENTRESTO) 49-51 MG per tablet Take 1 tablet by mouth 2 times daily 3/21/18 3/21/19  Martha Tarango MD   colchicine (COLCRYS) 0.6 MG tablet Take 0.6 mg by mouth 2 times daily as needed     Historical Provider, MD   allopurinol (ZYLOPRIM) 100 MG tablet Take 1 tablet by mouth daily 12/3/17   Pino Pulido MD   levothyroxine (SYNTHROID) 75 MCG tablet Take 1 tablet by mouth Daily 17   Pino Pulido MD   insulin glargine (LANTUS) 100 UNIT/ML injection vial Inject 20 Units into the skin every morning    Historical Provider, MD   atorvastatin (LIPITOR) 80 MG tablet Take 80 mg by mouth daily    Historical Provider, MD   linagliptin (TRADJENTA) 5 MG tablet Take 5 mg by mouth daily    Historical Provider, MD   albuterol-ipratropium (COMBIVENT)  MCG/ACT inhaler Inhale 2 puffs into the lungs every 12 hours.  13   Micaela Crooks MD   aspirin 81 MG EC tablet Take 81 mg by mouth daily Prescribed per Dr Valiente Rand Provider, MD   omeprazole (PRILOSEC) 20 MG capsule Take 20 mg by mouth daily     Historical Provider, MD        Social History     Socioeconomic History    Marital status:      Spouse name: Not on file    Number of children: Not on file    Years of education: Not on file    Highest education level: Not on file   Occupational History    Not on file   Social Needs    Financial resource strain: Not on file    Food insecurity:     Worry: Not on file     Inability: Not on file    Transportation needs:     Medical: Not on file     Non-medical: Not on file   Tobacco Use    Smoking status: Former Smoker     Packs/day: 0.50     Years: 50.00     Pack years: 25.00     Types: Cigarettes     Last attempt to quit: 2017     Years since quittin.3    Smokeless tobacco: Never Used    Tobacco comment: smokes an occ cigarette when stressed   Substance and Sexual Activity    Alcohol use: Yes     Comment: occ     Drug use: No    Sexual activity: Not on file   Lifestyle    Physical activity:     Days per week: Not on file     Minutes per session: Not on file    Stress: Not on file   Relationships    Social connections:     Talks on phone: Not on file     Gets together: Not on file     Attends Evangelical service: Not on file     Active member of club or organization: Not on file     Attends meetings of clubs or organizations: Not on file     Relationship status: Not on file    Intimate partner violence:     Fear of current or ex partner: Not on file     Emotionally abused: Not on file     Physically abused: Not on file     Forced sexual activity: Not on file   Other Topics Concern    Not on file   Social History Narrative    Drinks 1 cup of coffee daily. No Known Allergies    The patient's medical records have been reviewed. Review of Systems:   · General: Denies malaise or weakness. Denies fever or chills. · Eyes: No visual changes or diplopia. No swelling or pain. · ENT: No Headaches, tinnitus or vertigo. No mouth sores or sore throat. · Cardiovascular: PER HPI  · Respiratory: PER HPI  · Gastrointestinal: No abdominal pain, anorexia, hematochezia, melena, hematemesis or change in bowels. · Genitourinary: No dysuria, trouble voiding, or hematuria. No change in urination. · Musculoskeletal:  No joint pain or inflammation. No limb weakness. · Integumentary: No rash or pruritis. No abnormal pigmentation,  masses, hair or nail changes  · Neurological: No unusual headaches, weakness, numbness or tingling. No change in gait, balance, coordination, memory, mentation, behavior. · Psychiatric: No anxiety, or depression. Mood and affect reported as normal  · Endocrine: No temperature intolerance. No polydipsia or polyuria. · Hematologic: No abnormal bruising or bleeding, blood clots or swollen lymph nodes. no anemia, abnormal bleeding/bruising, fever,chills, night sweats, swollen glands. · Allergic/Immunologic: No nasal congestion or hives.       Physical Examination:      Wt Readings from Last 3 Encounters: 03/08/19 189 lb (85.7 kg)   03/08/19 190 lb 11.2 oz (86.5 kg)   03/05/19 196 lb (88.9 kg)     Temp Readings from Last 3 Encounters:   03/09/19 98 °F (36.7 °C) (Oral)   01/31/19 97.9 °F (36.6 °C) (Temporal)   01/17/19 97.7 °F (36.5 °C)     BP Readings from Last 3 Encounters:   03/09/19 (!) 92/58   03/08/19 (!) 92/56   03/05/19 (!) 92/56     Pulse Readings from Last 3 Encounters:   03/09/19 78   03/08/19 86   03/05/19 82       General appearance: Normal, awake, alert no distress. Skin: Color, texture, turgor normal. No rashes or lesions. Head: Normocephalic. No masses, lesions, tenderness or abnormalities   Face: Symmetric no visible lesions  Eyes: Conjunctivae/cornea clear. Casandra Linda. Sclera non icteric. Ears: External appearance normal.  Hearing grossly normal  Nose/Sinuses: Nares normal. No paranasal sinus tenderness. Mouth: Lips and tongue appear normal. Dentition noted  Neck:  Symmetric. No adenopathy. Thyroid symmetric, normal size, without nodules. Trachea is midline. Carotids palpable-and assessed. Chest: Even excursion although quite reduced  Lungs: he has bibasilar crackles and some large airway rhonchi  Heart: S1 > S2. Regular rate and rhythm. No gallop rub or murmur. Abdomen: Soft, mildly protuberant, non-tender. BS normal. No masses, organomegaly. Anatomic contours appear normal.   Extremities: No deformities, edema, or skin discoloration. Peripheral perfusion assessed in all exremities. No cyanosis  Musculoskeletal: No unusual pain or swelling. Muscular strength intact. Neuro:   · Cranial nerves grossly intact. · Motor: Strength grossly normal. No focal weakness. · Sensory: grossly normal to touch. · No cerebellar signs---Coordination intact. Mental status: Awake, alert, cognizant and interactive. Patient appears capable of directing self care   Mood: Normal and appropriate affect  Gait & balance: not assessed:     Labs     CBC:   Lab Results   Component Value Date    WBC 7.4 03/08/2019    RBC 4.11 03/08/2019    HGB 11.8 03/08/2019    HCT 37.4 03/08/2019     03/08/2019    MCV 91.0 03/08/2019     BMP:    Lab Results   Component Value Date     03/09/2019    K 4.5 03/09/2019    CL 96 03/09/2019    CO2 25 03/09/2019    BUN 53 03/09/2019    CREATININE 2.0 03/09/2019    GLUCOSE 300 03/09/2019    CALCIUM 8.1 03/09/2019     Hepatic Function Panel:    Lab Results   Component Value Date    ALKPHOS 160 03/08/2019    AST 37 03/08/2019    ALT 21 03/08/2019    PROT 8.5 03/08/2019    LABALBU 3.9 03/08/2019    BILITOT 0.7 03/08/2019     Magnesium:    Lab Results   Component Value Date    MG 1.6 01/31/2019     Cardiac Enzymes:   Lab Results   Component Value Date    CKTOTAL 25 (L) 05/14/2013    CKTOTAL 27 (L) 05/13/2013    CKTOTAL 43 05/13/2013    CKMB 0.5 05/14/2013    CKMB 0.6 05/13/2013    CKMB 0.9 05/13/2013    TROPONINI 0.11 (H) 03/08/2019    TROPONINI 0.11 (H) 01/31/2019    TROPONINI 0.08 (H) 01/12/2019     LDH:  No results found for: LDH  PT/INR:    Lab Results   Component Value Date    PROTIME 25.4 03/08/2019    INR 2.2 03/08/2019     BNP: No results for input(s): BNP in the last 72 hours.    TSH:   Lab Results   Component Value Date    TSH 4.170 03/12/2018      Cardiac Injury Profile:   Recent Labs     03/08/19  1351   TROPONINI 0.11*      Lipid Profile:   Lab Results   Component Value Date    TRIG 87 12/02/2017    HDL 20 12/02/2017    LDLCALC 48 12/02/2017    CHOL 85 12/02/2017      Hemoglobin A1C: No components found for: HGBA1C   U/A:   Lab Results   Component Value Date    LEUKOCYTESUR NEGATIVE 05/14/2013    PHUR 6.0 05/14/2013    WBCUA 0-1 05/14/2013    RBCUA NONE 05/14/2013    BACTERIA RARE 05/14/2013    SPECGRAV 1.025 05/14/2013    BLOODU NEGATIVE 05/14/2013    GLUCOSEU NEGATIVE 05/14/2013         ADMISSION SCHEDULED MEDS:   Current Facility-Administered Medications   Medication Dose Route Frequency Provider Last Rate Last Dose    allopurinol (ZYLOPRIM) tablet 100 mg  100 mg Oral Daily Viola Pan MD        aspirin EC tablet 81 mg  81 mg Oral Daily Viola Pan MD   81 mg at 03/08/19 2203    atorvastatin (LIPITOR) tablet 80 mg  80 mg Oral Daily Viola Pan MD   80 mg at 03/08/19 2203    bumetanide (BUMEX) tablet 2 mg  2 mg Oral QAM Viola Pan MD        colchicine (COLCRYS) tablet 0.6 mg  0.6 mg Oral BID PRN Viola Pan MD        hydrALAZINE (APRESOLINE) tablet 100 mg  100 mg Oral BID Viola Pan MD        insulin glargine (LANTUS) injection vial 20 Units  20 Units Subcutaneous QAM Viola Pan MD        levothyroxine (SYNTHROID) tablet 75 mcg  75 mcg Oral Daily Viola Pan MD        linagliptin (TRADJENTA) tablet 5 mg  5 mg Oral Daily Viola Pan MD        magnesium oxide (MAG-OX) tablet 400 mg  400 mg Oral Daily Viola Pan MD        metoprolol succinate (TOPROL XL) extended release tablet 50 mg  50 mg Oral Daily Viola Pan MD        pantoprazole (PROTONIX) tablet 40 mg  40 mg Oral QAM AC Viola Pan MD   40 mg at 03/09/19 6055    potassium chloride (KLOR-CON M) extended release tablet 20 mEq  20 mEq Oral BID Viola Pan MD   20 mEq at 03/08/19 2202    sacubitril-valsartan (ENTRESTO) 49-51 MG per tablet 1 tablet  1 tablet Oral BID Viola Pan MD   Stopped at 03/08/19 2202    warfarin (COUMADIN) tablet 2.5 mg  2.5 mg Oral Once per day on Mon Wed Fri Viola Pan MD   2.5 mg at 03/08/19 2203    warfarin (COUMADIN) tablet 5 mg  5 mg Oral Once per day on Sun Tue Thu Sat Viola Pan MD        zolpidem [facility]) tablet 10 mg  10 mg Oral Nightly Viola Pan MD   10 mg at 03/08/19 2202    sodium chloride flush 0.9 % injection 10 mL  10 mL Intravenous 2 times per day Viola Pan MD   10 mL at 03/08/19 2203    sodium chloride flush 0.9 % injection 10 mL  10 mL Intravenous PRN Viola Pan MD   10 mL at 03/09/19 0553    magnesium hydroxide (MILK OF MAGNESIA) 400 MG/5ML suspension 30 mL  30 mL Oral Daily PRN Crystal Harrington MD        ondansetron [facility]) injection 4 mg  4 mg Intravenous Q6H PRN Crystal Harrington MD        glucose (GLUTOSE) 40 % oral gel 15 g  15 g Oral PRN Crystal Harrington MD        dextrose 50 % solution 12.5 g  12.5 g Intravenous PRN Crystal Harrington MD        glucagon (rDNA) injection 1 mg  1 mg Intramuscular PRN Crystal Harrington MD        dextrose 5 % solution  100 mL/hr Intravenous PRN Crystal Harrington MD        insulin lispro (HUMALOG) injection vial 0-12 Units  0-12 Units Subcutaneous TID WC Crystal Harrington MD        insulin lispro (HUMALOG) injection vial 0-6 Units  0-6 Units Subcutaneous Nightly Crystal Harrington MD        ipratropium-albuterol (DUONEB) nebulizer solution 1 ampule  1 ampule Inhalation Q4H WA Crystal Harrington MD   1 ampule at 03/09/19 1493    doxycycline (VIBRAMYCIN) 100 mg in dextrose 5 % 100 mL IVPB  100 mg Intravenous Q12H Crystal Harrington MD   Stopped at 03/09/19 0751    methylPREDNISolone sodium (SOLU-MEDROL) injection 80 mg  80 mg Intravenous Daily Crystal Harrington MD        [facility]) tablet 1 mg  1 mg Oral QPM Crystal Harrington MD   1 mg at 03/08/19 2202       Current  Infusions   dextrose         Prn Meds  colchicine, sodium chloride flush, magnesium hydroxide, ondansetron, glucose, dextrose, glucagon (rDNA), dextrose    Radiology Review:  XR CHEST PORTABLE   Final Result   No interval change                     ASSESSMENT:  RLL Pneumonia-per CXR  I suspect viral    Patient Active Problem List   Diagnosis    Hyperlipidemia    DM (diabetes mellitus) ([facility] [address]    Coronary atherosclerosis of native coronary artery    GERD (gastroesophageal reflux disease)    Depression    Hypothyroid    Ischemic cardiomyopathy    Mitral regurgitation    AI (aortic insufficiency)    [facility]    Ulnar nerve neuropathy    Dual implantable cardioverter-defibrillator in situ    Tricuspid regurgitation    Chronic kidney disease    Chronic systolic heart failure (HCC)    Transition of care performed with sharing of clinical summary    AICD discharge    Cardiomyopathy ([facility] [address]    Hypomagnesemia    Anticoagulated on Coumadin    ICD (implantable cardioverter-defibrillator) in place    Acute respiratory failure with hypoxia ([facility] [address]    Decompensated COPD with exacerbation (chronic obstructive pulmonary disease) ([facility] [address]       PLAN:  Respiratory support  Antibiotics  Monitor for any sign of CHL  Film array and pro-calcitonin        See  Orders  Tona Motta MD, Jose Spicer, American Board of Internal Medicine  Diplomate, [facility] [employer]iatric [employer]  9:20 AM  3/9/2019

## 2019-03-09 NOTE — PROGRESS NOTES
Pharmacy Consultation Note  (Anticoagulant Dosing and Monitoring)    Initial consult date: 3/8/18  Consulting physician: Dr. Orville Wayne    Allergies:  Patient has no known allergies. 72 y.o. male      Ht Readings from Last 1 Encounters:   03/08/19 6' 2\" (1.88 m)     Wt Readings from Last 1 Encounters:   03/08/19 189 lb (85.7 kg)         Warfarin Indication Target   INR Range Home   Dose  (if applicable) Diet/Feeding Tube   Afib with AICD placement 2-3 5 mg daily, except for 2.5 mg on Tues, Thurs, and Sat Carb control (3/8)       Vitamin K or Blood product  Administration Date                 Warfarin drug-drug interactions  Start  Stop Home Med? Comments                                 TSH:    Lab Results   Component Value Date    TSH 4.170 03/12/2018        Hepatic Function Panel:                            Lab Results   Component Value Date    ALKPHOS 160 03/08/2019    ALT 21 03/08/2019    AST 37 03/08/2019    PROT 8.5 03/08/2019    BILITOT 0.7 03/08/2019    LABALBU 3.9 03/08/2019       Date Warfarin Dose INR Heparin or LMWH HBG/  HCT PLT Comment   3/8 2.5 mg 2.2 -- 11.8/37.4 216    3/9 2.5 mg  3 -- -- --                                 Assessment and Plan:  · 72 yom with PMH of afib with AICD placement. Patient is on Warfarin 5 mg daily except for Warfarin 2.5 mg on Tues, Thurs, and Sat. INR goal is 2-3.   · 3/9: INR 3; will continue lower home dose of warfarin    Plan:  · Warfarin 2.5 mg tonight  · Daily PT/INR until the INR is stable within the therapeutic range  · Pharmacist will follow and monitor/adjust dosing as necessary    Mirian Astudillo, PharmD PGY1 Resident 3/9/2019 8:48 AM  Pager: [phone]

## 2019-03-09 NOTE — CONSULTS
Inpatient Cardiology Consultation      Reason for Consult:  CHF     Consulting Physician: Dr. Louie Ricks     Requesting Physician:  Dr. Bashir Valentino     Date of Consultation: 3/9/2019    HISTORY OF PRESENT ILLNESS:     This is a 73 yo male with a past medical history that includes ischemic CMP/ Chronic systolic HF s/p ICD, VHD , paroxysmal Afib, DM, HLD and hypothyroidism. He follows with Dr Vicky Wolf with tentative plans for [facility] in the near future. Pt presented to CHF clinic yesterday. Concerns of volume overload, + wheezing sputum production;  given IV bumex and sent to ED. He was given fluids for concerns of hypotension on presentation to ED. Placed on O2 d/t hypoxemia on presentation. Influenza panel negative. Troponin 0.11, proBNP 8879. Blood cultures times two obtained. Respiratory panel pending. CXR as read by radiology No interval change    Admitted for COPD exacerbation. Started on IV doxycycline and solumedrol. Home GDMT for CHF and CAD both resumed. He is currently not on tele. No events overnight. He states he immediately felt better with breathing treatment and continues to feel better. His GDMT for CHF have been resumed. Please note: past medical records were reviewed per electronic medical record (EMR) - see detailed reports under Past Medical/ Surgical History. Past Medical History:      1. Ischemic CMP/ HFrEF, chronic systolic HF. Home GDMT- Toprol 50 mg daily, Entresto 49-51 mg BID, bumex 2 mg daily in am, 1 mg in evening. 2. S/p dual chamber ICD   3. VHD- Moderate AI s/p AVR 2017- [facility] , moderate TR a/p TVr 11/2017 [facility], s/p MVr 2013 [facility]; s/p MVR 10/2017  [facility]   4. CAD with multiple interventions ( PTCA 1997- mid left circumflex, PTCA and stent of right coronary artery 2004. S/p CABG x 3 (04/2013) LIMA-LAD, SVG-OM, SVG-PAD. LHC (10/2017, [facility]) Severe disease of the SVG-PDA. Patent LIMA-LAD and SVG-LCx. Trivial to mild AI with normal sized aorta.  S/p Redo CABG (11/07/2017) SVG- to the PDA concomitant with valvular repairs. Home GDMT- asa, BB, statin   5. DM   6. CKD  7. HLD   8. Hypothyroidism- receiving replacement therapy   9. Paroxysmal Afib, receiving coumadin  10. Gout- colchicine, allopurinol        Past Surgical History:    Past Surgical History:   Procedure Laterality Date    CARDIAC DEFIBRILLATOR PLACEMENT Left 2013    with pacemaker ([employer]tr[employer])    CORONARY ANGIOPLASTY      CORONARY ARTERY BYPASS GRAFT      DIAGNOSTIC CARDIAC CATH LAB PROCEDURE  01/27/97,11/00    Critical lesion mid left circumflex,significant lesion mid-LAD and first diagonal    ECHO COMPL W DOP COLOR FLOW  4/11/2013         ECHO COMPL W DOP COLOR FLOW  4/21/2013         MITRAL VALVE SURGERY      TRANSESOPHAGEAL ECHOCARDIOGRAM  4/12/2013    Dr. Stewart Thakkar TRANSESOPHAGEAL ECHOCARDIOGRAM  08/02/2017       Medications Prior to admit:  Prior to Admission medications    Medication Sig Start Date End Date Taking? Authorizing Provider   hydrALAZINE (APRESOLINE) 100 MG tablet Take 1 tablet by mouth 2 times daily 2/28/19   Eduardo Martinez MD   bumetanide (BUMEX) 2 MG tablet Take 1 tablet by mouth See Admin Instructions 2mg in am and 1mg in afternoon.  Dr Bismark Gibbs reduced dose 2/13/19. 2/28/19   Eduardo Martinez MD   metoprolol succinate (TOPROL XL) 50 MG extended release tablet Take 1 tablet by mouth daily 1/17/19   Eduardo Martinez MD   potassium chloride (KLOR-CON M) 20 MEQ extended release tablet Take 1 tablet by mouth 2 times daily 1/17/19   Eduardo Martinez MD   Magnesium 400 MG TABS Take 400 mg by mouth daily    Historical Provider, MD   Ascorbic Acid (VITAMIN C) 1000 MG tablet Take 2,000 mg by mouth daily    Historical Provider, MD   Multiple Vitamins-Minerals (THERAPEUTIC MULTIVITAMIN-MINERALS) tablet Take 1 tablet by mouth daily    Historical Provider, MD   warfarin (COUMADIN) 5 MG tablet Take 5 mg by mouth four times a week Mon, Wed, Fri, Sun    Historical Provider, MD   warfarin (COUMADIN) 2.5 MG tablet Take 2.5 mg by mouth three times a week Tues, Thurs, Sat    Historical Provider, MD   zolpidem (AMBIEN) 10 MG tablet Take 10 mg by mouth nightly. Stacia Fabian Historical Provider, MD   sacubitril-valsartan (ENTRESTO) 49-51 MG per tablet Take 1 tablet by mouth 2 times daily 3/21/18 3/21/19  Mary Andino MD   colchicine (COLCRYS) 0.6 MG tablet Take 0.6 mg by mouth 2 times daily as needed     Historical Provider, MD   allopurinol (ZYLOPRIM) 100 MG tablet Take 1 tablet by mouth daily 12/3/17   Steffi Mauro MD   levothyroxine (SYNTHROID) 75 MCG tablet Take 1 tablet by mouth Daily 12/4/17   Steffi Mauro MD   insulin glargine (LANTUS) 100 UNIT/ML injection vial Inject 20 Units into the skin every morning    Historical Provider, MD   atorvastatin (LIPITOR) 80 MG tablet Take 80 mg by mouth daily    Historical Provider, MD   linagliptin (TRADJENTA) 5 MG tablet Take 5 mg by mouth daily    Historical Provider, MD   albuterol-ipratropium (COMBIVENT)  MCG/ACT inhaler Inhale 2 puffs into the lungs every 12 hours.  4/23/13   Rodrick Mendez MD   aspirin 81 MG EC tablet Take 81 mg by mouth daily Prescribed per Dr Josee Coates Provider, MD   omeprazole (PRILOSEC) 20 MG capsule Take 20 mg by mouth daily     Historical Provider, MD       Current Medications:    Current Facility-Administered Medications: allopurinol (ZYLOPRIM) tablet 100 mg, 100 mg, Oral, Daily  aspirin EC tablet 81 mg, 81 mg, Oral, Daily  atorvastatin (LIPITOR) tablet 80 mg, 80 mg, Oral, Daily  bumetanide (BUMEX) tablet 2 mg, 2 mg, Oral, QAM  colchicine (COLCRYS) tablet 0.6 mg, 0.6 mg, Oral, BID PRN  hydrALAZINE (APRESOLINE) tablet 100 mg, 100 mg, Oral, BID  insulin glargine (LANTUS) injection vial 20 Units, 20 Units, Subcutaneous, QAM  levothyroxine (SYNTHROID) tablet 75 mcg, 75 mcg, Oral, Daily  linagliptin (TRADJENTA) tablet 5 mg, 5 mg, Oral, Daily  magnesium oxide (MAG-OX) tablet 400 mg, 400 mg, Oral, Daily  metoprolol succinate (TOPROL XL) extended release tablet 50 mg, 50 mg, Oral, Daily  pantoprazole (PROTONIX) tablet 40 mg, 40 mg, Oral, QAM AC  potassium chloride (KLOR-CON M) extended release tablet 20 mEq, 20 mEq, Oral, BID  sacubitril-valsartan (ENTRESTO) 49-51 MG per tablet 1 tablet, 1 tablet, Oral, BID  warfarin (COUMADIN) tablet 2.5 mg, 2.5 mg, Oral, Once per day on Mon Wed Fri  warfarin (COUMADIN) tablet 5 mg, 5 mg, Oral, Once per day on Sun Tue Thu Sat  zolpidem (AMBIEN) tablet 10 mg, 10 mg, Oral, Nightly  sodium chloride flush 0.9 % injection 10 mL, 10 mL, Intravenous, 2 times per day  sodium chloride flush 0.9 % injection 10 mL, 10 mL, Intravenous, PRN  magnesium hydroxide (MILK OF MAGNESIA) 400 MG/5ML suspension 30 mL, 30 mL, Oral, Daily PRN  ondansetron (ZOFRAN) injection 4 mg, 4 mg, Intravenous, Q6H PRN  glucose (GLUTOSE) 40 % oral gel 15 g, 15 g, Oral, PRN  dextrose 50 % solution 12.5 g, 12.5 g, Intravenous, PRN  glucagon (rDNA) injection 1 mg, 1 mg, Intramuscular, PRN  dextrose 5 % solution, 100 mL/hr, Intravenous, PRN  insulin lispro (HUMALOG) injection vial 0-12 Units, 0-12 Units, Subcutaneous, TID WC  insulin lispro (HUMALOG) injection vial 0-6 Units, 0-6 Units, Subcutaneous, Nightly  ipratropium-albuterol (DUONEB) nebulizer solution 1 ampule, 1 ampule, Inhalation, Q4H WA  doxycycline (VIBRAMYCIN) 100 mg in dextrose 5 % 100 mL IVPB, 100 mg, Intravenous, Q12H  methylPREDNISolone sodium (SOLU-MEDROL) injection 80 mg, 80 mg, Intravenous, Daily  bumetanide (BUMEX) tablet 1 mg, 1 mg, Oral, QPM    Allergies:  Patient has no known allergies. Social History:    ETOH- Occasionally   Tobacco- denies, stopped 11/2017.  Half a pack for the past 50 years   Illicit- denies    Family History:   Family History   Problem Relation Age of Onset    Hypertension Mother     Hypertension Father     Heart Surgery Father     High Cholesterol Father        REVIEW OF SYSTEMS:     · Constitutional: Denies fatigue, fevers  or night sweats + chills however he states he has been experiencing chills for \"a long time\"  · Eyes: Denies visual changes or drainage  · ENT: Denies headaches . No mouth sores or sore throat. No epistaxis. Recently experienced hearing loss- follows with PCP   · Cardiovascular: See HPI   · Respiratory: Denies SWIFT, orthopnea or PND. No hemoptysis + cough with yellow sputum production   · Gastrointestinal: Denies hematemesis or anorexia. No hematochezia or melena    · Genitourinary: Denies urgency, dysuria or hematuria. · Musculoskeletal: Denies gait disturbance, weakness or joint complaints  · Integumentary: Denies rash, hives or pruritis   · Neurological: Denies dizziness, headaches or seizures. No numbness or tingling  · Psychiatric: Denies anxiety or depression. · Endocrine: Denies temperature intolerance. No recent weight change. .  · Hematologic/Lymphatic: Denies abnormal bruising or bleeding. No swollen lymph nodes    PHYSICAL EXAM:   BP (!) 92/58   Pulse 78   Temp 98 °F (36.7 °C) (Oral)   Resp 16   Ht 6' 2\" (1.88 m)   Wt 189 lb (85.7 kg)   SpO2 95%   BMI 24.27 kg/m²   CONST:  Well developed, well nourished male who appears of stated age. Awake, alert and cooperative. No apparent distress. CHEST: Chest symmetrical and non-tender to palpation. No accessory muscle use or intercostal retractions  RESPIRATORY: Lung sounds - clear throughout fields   CARDIOVASCULAR:  Heart Inspection- shows no noted pulsations  Heart Palpation- no heaves or thrills; PMI is non-displaced   Heart Ausculation- Regular rate and rhythm,No murmur noted   PV: No lower extremity edema. No varicosities. Feet appear to be well perfused   ABDOMEN: Soft, non-tender to light palpation. Bowel sounds present. MS: Good muscle strength and tone. No atrophy or abnormal movements. : Deferred  SKIN: Warm and dry no statis dermatitis or ulcers   NEURO / PSYCH: Oriented to person, place and time. Speech clear and appropriate. Follows all commands. Pleasant affect     DATA:      12 lead EKG:  3/8/2019: Appears to be SR     Tele: currently not on tele , awaiting nursing to reconnect       Intake/Output Summary (Last 24 hours) at 3/9/2019 1133  Last data filed at 3/9/2019 0552  Gross per 24 hour   Intake 630 ml   Output 650 ml   Net -20 ml     Labs:   CBC:   Recent Labs     19  1351   WBC 7.4   HGB 11.8*   HCT 37.4        BMP:   Recent Labs     19  1351 19  0515    134   K 4.3 4.5   CO2 26 25   BUN 48* 53*   CREATININE 2.1* 2.0*   LABGLOM 31 33   CALCIUM 9.1 8.1*     HgA1c:   Lab Results   Component Value Date    LABA1C 6.6 (H) 2019     No results found for: EAG  proBNP:   Recent Labs     19  1351   PROBNP 8,879*     PT/INR:   Recent Labs     19  1351   PROTIME 25.4*   INR 2.2     APTT:  Recent Labs     19  1351   APTT 32.9     CARDIAC ENZYMES:  Recent Labs     19  1351   TROPONINI 0.11*     FASTING LIPID PANEL:  Lab Results   Component Value Date    CHOL 85 2017    HDL 20 2017    LDLCALC 48 2017    TRIG 87 2017     LIVER PROFILE:  Recent Labs     19  1351   AST 37   ALT 21   LABALBU 3.9       Most recent TTE 2018 [facility]   Echocardiography Report: Transthoracic Echo  Deborah Mukherjee 1527  Date of service: 2018 1:23:45 PM  Accession #: [id number]    Ordering physician: Asael Hudson  Indication: Initial postoperative evaluation of prosthetic valve (baseline)    Technologist: Zenobia Dupree  Interpreting physician: Oly Chopra MD    PATIENT:  Name: Kisha Kelly  MRN: [id number]  : 1945  Age: 81 years  Gender: M    History of valvular heart disease, arrhythmia, coronary artery disease and   cardiomyopathy. Previous cardiovascular interventions: Aortic valve replacement (11/10/2017)  Mitral valve replacement (11/10/2017)  Tricuspid valve repair (11/10/2017)  CABG x 1 (11/10/2017)    Primary rhythm: sinus.   Height: 187.96 cm BSA: 2.12 m²  Weight: 86.18 kg  BMI: 24.4 kg/m²      Heart rate     80 bpm  Blood pressure 101/63 mmHg    Color Doppler was utilized to interrogate the cardiac valves assessed and spectral   Doppler was utilized to determine the flow velocities and pressure gradients   reported in this exam. Myocardial strain analysis was performed in this exam to   aid in the assessment of cardiac function. MEASUREMENTS:                           Value               Indexed    Normal  Max aortic dimension     3.6 cm              1.70 cm/m²  Left atrium diameter     5.0 cm (M-Mode)  Left atrial volume       110 ml (4ch A-L)    52 ml/m²   Belinda <= 34  LV ID (diastole)         7.0 cm (M-Mode)  LV ID (systole)          6.1 cm (M-Mode)  IVS, leaflet tips        1.0 cm (M-Mode)  Posterior wall thickness 1.1 cm (M-Mode)  Left ventricular mass    343 g (M-Mode)      162 g/m²  Global peak long strain  -9.6 %  LV stroke volume         42 ml (2D biplane)  LV end diastolic MOPUKW  001 ml (2D biplane) 91.2 ml/m² 35<=XBIQ<81  LV end systolic volume   593 ml (2D biplane) 71.4 ml/m²  Ejection Fraction        22 % (2D biplane)              EF > 52      FINDINGS:    LEFT VENTRICLE  The left ventricle is moderately dilated. Left ventricular systolic function is severely decreased. Global LV myocardial   strain is abnormal.  Left ventricular diastolic function was not evaluated due to prosthetic valve. Mitral annular lateral E/e': 69.4. Mitral annular septal E/e': 76.8. Wall Motion: The basal inferoseptal segment is akinetic. The entire lateral wall, entire   anterior septum, entire inferior wall, mid inferoseptal segment, and apex are   severely hypokinetic. The entire anterior wall is mildly hypokinetic. RIGHT VENTRICLE  The right ventricle is dilated. Pacer wires are noted in the right ventricle. Right ventricular systolic function is normal. RV systolic tissue Doppler velocity   is 6.5 cm/s. Tricuspid annular displacement is 1.7 cm.   Estimated right repair and and CABG x1. CONCLUSIONS:  - Exam indication: Initial postoperative evaluation of prosthetic valve (baseline)  - The left ventricle is moderately dilated. Left ventricular systolic function is   severely decreased. EF = 22 ± 5% (2D biplane) Left ventricular diastolic function   was not evaluated due to prosthetic valve. - The right ventricle is dilated. Right ventricular systolic function is normal.  - The left atrial cavity is severely dilated. - The right atrial cavity is dilated. - Post mitral valve replacement. Biocor prosthetic mitral valve (size #29). There   is trivial mitral valve regurgitation. The peak gradient is 23 mmHg and the mean   gradient is 8 mmHg. MV gradients obtained at a HR of 80 bpm. Prior peak/mean   gradients of 22/8 mmHg. - Post tricuspid valve repair. tricuspid valve annuloplasty ring (size #30). There   is mild (1+ - 2+) tricuspid valve regurgitation. The peak gradient is 4 mmHg and   the mean gradient is 2 mmHg. TV gradients obtained at a HR of 80 bpm. Prior mean   gradient of 3 mmHg. - Trifecta prosthetic aortic valve (size #23). The peak gradient is 10 mmHg, the   mean gradient is 5 mmHg and the dimensionless valve index is 0.57. Prior peak/mean   gradients of 9/5 mmHg. - Exam was compared with the prior [facility] echocardiographic exam performed on   2017. There is no significant change. Electronically signed by Jesus Saenz MD on 2018 at 4:55:41 PM      [address] [facility] (10/2017, [facility]) BP: 122 / 76. BP Mean: 91. HR: 82 min. FiO2: 21.00%. Pulse Ox: 94.00%. CVP: 8 cm. Thermo CO: 3.97 l/min. Hg: 10.8. H2O = 6.2 mmHg. Thermo CI: 1.84 l/min/m2. TP. RA Mean: 8. Assumed Chan CO: 4.21 l/min. PVR: 3.0. RV: 76 / 12. Assumed Chan CI: 1.95 l/min/m2. SVR: 1679.0. PA: 72 / 34. PA Mean: 49.00. PCWP Mean: 36.0. SV: 48.41 cc/stroke. TS. SVI: 22.46 cc/stroke/m2. PCWP V Wave: 50.00. PAO2: 50.40%. LVSWI: 16.90 gm-m/m2/beat. RVSWI: 12.52 gm-m/m2/beat.  RVSWI: 920.74 mmHg-ml/m2/beat. Impression: Pulmonary venous hypertension with prominent V waves borderline/mildly subnormal cardiac index     [facility] 1/17/2019:  Hemodynamic Data:   RA (a/v/m): 10/11/10  RV (a/v/m): 50/3/12  PA (a/v/m): 55/29/38  PCWP (PAOP) (a/v/m): 25/35/27  PCWP (PAOP) (a/v/m): 26/35/28  PCWP (PAOP) (a/v/m): 36/36/29    Cuff blood pressure: 110/72/87    PA O2 saturation: 44 %   SA O2 saturation: 94 %  Hemoglobin: 12.5 g/dL  BSA: 2.14 m2     JG THERMODILUTION   Stroke volume (mls) 42.32 66.56   Cardiac Output (l/min) 2.92 4.64   Cardiac Output Index (l/min/m2) 1.4 2.2     Calculations using Jg CO [facility]):   Cardiac power output () = 0.57  Pulmonary artery pulsatility index (Jennifer) = 2.6  DPG 2  TPG 11  PVR 3.66 WIGGINS  PVR Index  SVR 2053  SVR Index        Impression:  1. Low Pa sat  2. Low cardiac indices  3. Elevated biventricular filling pressures  4. Severely elevated systemic vascular resistance  5. Ambulatory cardiogenic shock, NYHA FC IV  6. Low       Recommendations:   1. Oral afterload reducing agents  2. Close FU. Jelena Hudson M.D. Advanced Heart Failure and Pulmonary Hypertension  [place] [phone]      Assessment/ Plan:     1. COPD exacerbation - Per primary/ pulmonary   2. Ischemic CMP/ HFrEF/ chronic systolic HF. Home GDMT resumed. He received IV bumex yesterday. He does not appear to be volume overloaded today. Continue home medications for now   3. S/p dual chamber ICD   4. VHD- s/p multiple interventions at [facility]- stable - see TTE above   5. CAD with multiple interventions; S/p CABG x 3 (2013); S/p Redo CABG (2017) Home GDMT resumed - asa, BB, statin. Stable   6. DM   7. CKD--  today's  Estimated Creatinine Clearance: 39 mL/min (A) (based on SCr of 2 mg/dL (H)). - continue to monitor   8. HLD- receiving statin therapy   9. Hypothyroidism- receiving replacement therapy  10. Paroxysmal Afib, receiving coumadin. INR therapeutic today.  In SR   11.  Gout- colchicine, allopurinol Further assessment and plan per Dr Kiko Velez     Electronically signed by ANTONY Hare CNP on 3/9/2019 at 11:33 AM     Addendum:  Amelia Hoffman MD    Reason for consult: CHF    Patient previously known to: Dr Ahmet Resendiz     History of Present illness: Admitted for cough with sputum, low BP, CHF    Review of systems:  Review of 10 systems negative except as mentioned in the HPI    Medical History: Reviewed    Surgical history: Reviewed    FamilyHistory: Reviewed    Allergies:  Reviewed    Social Hx: Reviewed. Medications: reviewed. Labs/imaging studies: Reviewed. Above CHARMAINE exam, assessment reviewed and reflect my work. I personally saw, examined, and evaluated the patient today. I personally reviewed the medications, rhythm strips, pertinent labs and test reports. I directly participated in the medical-decision making, ordering pertinent tests and medication adjustment. Physical exam:     Vitals:    03/09/19 0900   BP: (!) 92/58   Pulse: 78   Resp: 16   Temp: 98 °F (36.7 °C)   SpO2:        In general, this is a well developed, well nourished who appears stated age. awake, alert, cooperative, no apparent distress    HEENT: eyes -conjunctivae pink,   Neck-  no stridor, no carotid bruit. no jugular venous distention   RESPIRATORY: Chest symmetrical and non-tender to palpation. No accessory muscles use. Lung auscultation - clear to auscultation except few rhonchi  CARDIOVASCULAR:     Heart Inspection shows no noted pulsations ICD in place -Left  Heart Palpation - no palpable thrills  Heart Ausculation - Regular rate and rhythm, 2/6 systolic murmur, No s3 or rub. Trace  lower extremity edema, no varicosities. Distal pulses palpable, no clubbing or cyanosis   ABDOMEN: Soft, nontender,  Bowel sounds present. MS: n/a.   : Deferred  Rectal Exam: Deferred  SKIN: warm and dry  NEURO / PSYCH: oriented to person, place        Impression/Recommendations:    Chronic HFrEF - Compensated; He received IV bumex yesterday. He does not appear to be volume overloaded today. [place] Pneumovirus infection    Ischemic CMP: Home GDMT resumed. Continue home medications for now     Low BP - Monitor BP, adjust BP meds    S/p dual chamber ICD     VHD- Stable      CAD with multiple interventions; S/p CABG x 3 (2013); S/p Redo CABG (2017) Home GDMT resumed - ASA, BB, Statins. Stable     Paroxysmal Afib, On Coumadin. INR therapeutic today. In SR    COPD exacerbation - Per primary/ pulmonary     CKD-- monitor renal Fn         Continue his current meds  No family at bed side    Thank you for the consult.         Annmarie Gary MD  3/9/2019  1:48 PM  [facility]) Cardiology

## 2019-03-10 LAB
INR BLD: 4.9
METER GLUCOSE: 155 MG/DL (ref 74–99)
METER GLUCOSE: 169 MG/DL (ref 74–99)
METER GLUCOSE: 254 MG/DL (ref 74–99)
METER GLUCOSE: 274 MG/DL (ref 74–99)
PROTHROMBIN TIME: 55.2 SEC (ref 9.3–12.4)

## 2019-03-10 PROCEDURE — 94760 N-INVAS EAR/PLS OXIMETRY 1: CPT

## 2019-03-10 PROCEDURE — 2500000003 HC RX 250 WO HCPCS: Performed by: INTERNAL MEDICINE

## 2019-03-10 PROCEDURE — 6360000002 HC RX W HCPCS: Performed by: INTERNAL MEDICINE

## 2019-03-10 PROCEDURE — 36415 COLL VENOUS BLD VENIPUNCTURE: CPT

## 2019-03-10 PROCEDURE — 94640 AIRWAY INHALATION TREATMENT: CPT

## 2019-03-10 PROCEDURE — 6370000000 HC RX 637 (ALT 250 FOR IP): Performed by: INTERNAL MEDICINE

## 2019-03-10 PROCEDURE — 2700000000 HC OXYGEN THERAPY PER DAY

## 2019-03-10 PROCEDURE — 85610 PROTHROMBIN TIME: CPT

## 2019-03-10 PROCEDURE — 1200000000 HC SEMI PRIVATE

## 2019-03-10 PROCEDURE — 82962 GLUCOSE BLOOD TEST: CPT

## 2019-03-10 PROCEDURE — 99233 SBSQ HOSP IP/OBS HIGH 50: CPT | Performed by: INTERNAL MEDICINE

## 2019-03-10 PROCEDURE — 2580000003 HC RX 258: Performed by: INTERNAL MEDICINE

## 2019-03-10 RX ORDER — DOXYCYCLINE HYCLATE 100 MG/1
100 CAPSULE ORAL EVERY 12 HOURS SCHEDULED
Status: DISCONTINUED | OUTPATIENT
Start: 2019-03-10 | End: 2019-03-12 | Stop reason: HOSPADM

## 2019-03-10 RX ORDER — METHYLPREDNISOLONE SODIUM SUCCINATE 40 MG/ML
40 INJECTION, POWDER, LYOPHILIZED, FOR SOLUTION INTRAMUSCULAR; INTRAVENOUS DAILY
Status: DISCONTINUED | OUTPATIENT
Start: 2019-03-11 | End: 2019-03-11

## 2019-03-10 RX ORDER — HYDRALAZINE HYDROCHLORIDE 25 MG/1
25 TABLET, FILM COATED ORAL 2 TIMES DAILY
Status: DISCONTINUED | OUTPATIENT
Start: 2019-03-10 | End: 2019-03-12 | Stop reason: HOSPADM

## 2019-03-10 RX ADMIN — POTASSIUM CHLORIDE 20 MEQ: 20 TABLET, EXTENDED RELEASE ORAL at 08:27

## 2019-03-10 RX ADMIN — PANTOPRAZOLE SODIUM 40 MG: 40 TABLET, DELAYED RELEASE ORAL at 05:23

## 2019-03-10 RX ADMIN — METHYLPREDNISOLONE SODIUM SUCCINATE 80 MG: 125 INJECTION, POWDER, FOR SOLUTION INTRAMUSCULAR; INTRAVENOUS at 08:28

## 2019-03-10 RX ADMIN — LINAGLIPTIN 5 MG: 5 TABLET, FILM COATED ORAL at 08:26

## 2019-03-10 RX ADMIN — INSULIN LISPRO 6 UNITS: 100 INJECTION, SOLUTION INTRAVENOUS; SUBCUTANEOUS at 17:09

## 2019-03-10 RX ADMIN — BUMETANIDE 1 MG: 1 TABLET ORAL at 19:06

## 2019-03-10 RX ADMIN — Medication 10 ML: at 08:30

## 2019-03-10 RX ADMIN — IPRATROPIUM BROMIDE AND ALBUTEROL SULFATE 1 AMPULE: .5; 3 SOLUTION RESPIRATORY (INHALATION) at 12:05

## 2019-03-10 RX ADMIN — ALLOPURINOL 100 MG: 100 TABLET ORAL at 08:26

## 2019-03-10 RX ADMIN — INSULIN LISPRO 3 UNITS: 100 INJECTION, SOLUTION INTRAVENOUS; SUBCUTANEOUS at 21:14

## 2019-03-10 RX ADMIN — ATORVASTATIN CALCIUM 80 MG: 40 TABLET, FILM COATED ORAL at 08:27

## 2019-03-10 RX ADMIN — DOXYCYCLINE HYCLATE 100 MG: 100 CAPSULE ORAL at 21:13

## 2019-03-10 RX ADMIN — INSULIN LISPRO 2 UNITS: 100 INJECTION, SOLUTION INTRAVENOUS; SUBCUTANEOUS at 08:28

## 2019-03-10 RX ADMIN — POTASSIUM CHLORIDE 20 MEQ: 20 TABLET, EXTENDED RELEASE ORAL at 21:13

## 2019-03-10 RX ADMIN — DOXYCYCLINE 100 MG: 100 INJECTION, POWDER, LYOPHILIZED, FOR SOLUTION INTRAVENOUS at 05:23

## 2019-03-10 RX ADMIN — IPRATROPIUM BROMIDE AND ALBUTEROL SULFATE 1 AMPULE: .5; 3 SOLUTION RESPIRATORY (INHALATION) at 08:39

## 2019-03-10 RX ADMIN — MAGNESIUM GLUCONATE 500 MG ORAL TABLET 400 MG: 500 TABLET ORAL at 08:26

## 2019-03-10 RX ADMIN — INSULIN LISPRO 2 UNITS: 100 INJECTION, SOLUTION INTRAVENOUS; SUBCUTANEOUS at 12:12

## 2019-03-10 RX ADMIN — Medication 10 ML: at 21:17

## 2019-03-10 RX ADMIN — INSULIN GLARGINE 20 UNITS: 100 INJECTION, SOLUTION SUBCUTANEOUS at 08:27

## 2019-03-10 RX ADMIN — IPRATROPIUM BROMIDE AND ALBUTEROL SULFATE 1 AMPULE: .5; 3 SOLUTION RESPIRATORY (INHALATION) at 20:26

## 2019-03-10 RX ADMIN — IPRATROPIUM BROMIDE AND ALBUTEROL SULFATE 1 AMPULE: .5; 3 SOLUTION RESPIRATORY (INHALATION) at 16:54

## 2019-03-10 RX ADMIN — LEVOTHYROXINE SODIUM 75 MCG: 75 TABLET ORAL at 05:23

## 2019-03-10 RX ADMIN — ZOLPIDEM TARTRATE 10 MG: 5 TABLET ORAL at 21:13

## 2019-03-10 RX ADMIN — METOPROLOL SUCCINATE 50 MG: 50 TABLET, FILM COATED, EXTENDED RELEASE ORAL at 08:27

## 2019-03-10 RX ADMIN — BUMETANIDE 2 MG: 1 TABLET ORAL at 08:26

## 2019-03-10 RX ADMIN — SACUBITRIL AND VALSARTAN 1 TABLET: 49; 51 TABLET, FILM COATED ORAL at 08:26

## 2019-03-10 RX ADMIN — HYDRALAZINE HYDROCHLORIDE 25 MG: 25 TABLET, FILM COATED ORAL at 21:13

## 2019-03-10 RX ADMIN — ASPIRIN 81 MG: 81 TABLET ORAL at 08:26

## 2019-03-10 RX ADMIN — SACUBITRIL AND VALSARTAN 1 TABLET: 49; 51 TABLET, FILM COATED ORAL at 21:13

## 2019-03-10 ASSESSMENT — PAIN SCALES - GENERAL
PAINLEVEL_OUTOF10: 0

## 2019-03-10 NOTE — PROGRESS NOTES
Pharmacy Consultation Note  (Anticoagulant Dosing and Monitoring)    Initial consult date: 3/8/18  Consulting physician: Dr. Gloria Griffiths    Allergies:  Patient has no known allergies. 72 y.o. male      Ht Readings from Last 1 Encounters:   03/08/19 6' 2\" (1.88 m)     Wt Readings from Last 1 Encounters:   03/08/19 189 lb (85.7 kg)         Warfarin Indication Target   INR Range Home   Dose  (if applicable) Diet/Feeding Tube   Afib with AICD placement 2-3 5 mg daily, except for 2.5 mg on Tues, Thurs, and Sat Carb control (3/8)       Vitamin K or Blood product  Administration Date                 Warfarin drug-drug interactions  Start  Stop Home Med? Comments   Doxycycline 100 mg IV q 12hr  3/9  N Increases the anticoagulant effects of warfarin                         TSH:    Lab Results   Component Value Date    TSH 4.170 03/12/2018        Hepatic Function Panel:                            Lab Results   Component Value Date    ALKPHOS 160 03/08/2019    ALT 21 03/08/2019    AST 37 03/08/2019    PROT 8.5 03/08/2019    BILITOT 0.7 03/08/2019    LABALBU 3.9 03/08/2019       Date Warfarin Dose INR Heparin or LMWH HBG/  HCT PLT Comment   3/8 2.5 mg 2.2 -- 11.8/37.4 216    3/9 2.5 mg  3 -- -- --    3/10 HOLD 4.9 -- -- --                        Assessment and Plan:  · 72 yom with PMH of afib with AICD placement. Patient is on Warfarin 5 mg daily except for Warfarin 2.5 mg on Tues, Thurs, and Sat. INR goal is 2-3. · 3/9: INR 3; will continue lower home dose of warfarin  · 3/10: INR 4.9; patient started on doxycycline 3/8 which has a drug drug ix with warfarin (increases the anticoagulant effects of warfarin). Plan:  · Hold warfarin tonight, will resume dosing at a lower dose when INR is 3 or less.    · Daily PT/INR until the INR is stable within the therapeutic range  · Pharmacist will follow and monitor/adjust dosing as necessary    Theresa Ornelas, PharmD PGY1 Resident 3/10/2019 10:42 AM  Pager: [phone]

## 2019-03-10 NOTE — PROGRESS NOTES
Rei Brown MD, FACP                   Patient Name: Christiano Sparks                   Age:  72 y.o. Gender:   male    CC: AICD discharge    HPI: PER ER:  This is a 68-year-old male with a past medical history of coronary artery disease, CHF and COPD who presents to the ED for evaluation shortness of breath. The patient remarks that since Tuesday has been having a productive cough, body aches and shortness of breath. Patient states the shortness of breath is worse when he exerts himself. The patient states that he has lost weight over the past week proximally 6 pounds. He states that he has had a productive cough which is yellow in color. He states that he has no leg pain or leg swelling. The patient states that he did get his influenza shot this year. He states that he was seen at his CHF clinic earlier today and got a shot of bumex.     I interviewed this patient and verified this history  He is currently quite comfortable  System review was completed as well    He confirms that his presentation is mainly cough and SOB  He states he has noted a long and enduring non-productive cough-that actually hs made his chest and abdomen sore    3/10  Now at 97% with 3 liters  Doing better  Clinically better  willambulate  He feels better still coughs          Past Medical History:   Diagnosis Date    Acid reflux     Acute MI ([facility] [address] 01/25/97,01/04    Anxiety     CAD (coronary artery disease)     follows w Dr. Joce Quiles Diabetes mellitus ([facility] [address]     Fluid retention     history of    Hyperlipidemia     Hypertension     Ischemic cardiomyopathy     Osteoarthritis     Post PTCA 16/43/84    Systolic dysfunction, left ventricle     follows with Dr. Joce Quiles Thyroid disease        Past Surgical History:   Procedure Laterality Date    CARDIAC DEFIBRILLATOR PLACEMENT Left 2013    with pacemaker ([employer])    CORONARY ANGIOPLASTY      CORONARY ARTERY BYPASS GRAFT      DIAGNOSTIC CARDIAC CATH LAB PROCEDURE  01/27/97,11/00    Critical lesion mid left circumflex,significant lesion mid-LAD and first diagonal    ECHO COMPL W DOP COLOR FLOW  4/11/2013         ECHO COMPL W DOP COLOR FLOW  4/21/2013         MITRAL VALVE SURGERY      TRANSESOPHAGEAL ECHOCARDIOGRAM  4/12/2013    Dr. Karissa Daniels TRANSESOPHAGEAL ECHOCARDIOGRAM  08/02/2017       No Known Allergies    The patient's medical records have been reviewed. Review of Systems:   · General: Denies malaise or weakness. Denies fever or chills. · Eyes: No visual changes or diplopia. No swelling or pain. · ENT: No Headaches, tinnitus or vertigo. No mouth sores or sore throat. · Cardiovascular: no symptoms  · Respiratory: PER HPI        Physical Examination:      Wt Readings from Last 3 Encounters:   03/08/19 189 lb (85.7 kg)   03/08/19 190 lb 11.2 oz (86.5 kg)   03/05/19 196 lb (88.9 kg)     Temp Readings from Last 3 Encounters:   03/10/19 97.6 °F (36.4 °C) (Oral)   01/31/19 97.9 °F (36.6 °C) (Temporal)   01/17/19 97.7 °F (36.5 °C)     BP Readings from Last 3 Encounters:   03/10/19 (!) 98/58   03/08/19 (!) 92/56   03/05/19 (!) 92/56     Pulse Readings from Last 3 Encounters:   03/10/19 97   03/08/19 86   03/05/19 82       General appearance: Normal, awake, alert no distress. Eyes: Conjunctivae/cornea clear. Marilee Marika. Sclera non icteric. Neck:  Symmetric. No adenopathy. en excursion although quite reduced  Lungs: lungs are now clear-with some sibilance noted in both bases-no focal abnormality   Heart: S1 > S2. Regular rate and rhythm. No gallop rub or murmur. Neuro:   · Normal  Mental status: Awake, alert, cognizant and interactive. Patient appears capable of directing self care   Mood: Normal and appropriate affect  Gait & balance: nnormal     Labs     CBC:   Lab Results   Component Value Date    WBC mg Oral BID Mary Cormier MD        warfarin (COUMADIN) daily dosing (placeholder)   Other RX Placeholder Anton Hargrove MD        allopurinol (ZYLOPRIM) tablet 100 mg  100 mg Oral Daily Anton Hargrove MD   100 mg at 03/10/19 0826    aspirin EC tablet 81 mg  81 mg Oral Daily Anton Hargrove MD   81 mg at 03/10/19 5022    atorvastatin (LIPITOR) tablet 80 mg  80 mg Oral Daily Anton Hargrove MD   80 mg at 03/10/19 0827    bumetanide (BUMEX) tablet 2 mg  2 mg Oral QAM Anton Hargrove MD   2 mg at 03/10/19 4426    colchicine (COLCRYS) tablet 0.6 mg  0.6 mg Oral BID PRN Anton Hargrove MD        insulin glargine (LANTUS) injection vial 20 Units  20 Units Subcutaneous QAM Anton Hargrove MD   20 Units at 03/10/19 0827    levothyroxine (SYNTHROID) tablet 75 mcg  75 mcg Oral Daily Anton Hargrove MD   75 mcg at 03/10/19 0523    linagliptin (TRADJENTA) tablet 5 mg  5 mg Oral Daily Anton Hargrove MD   5 mg at 03/10/19 0826    magnesium oxide (MAG-OX) tablet 400 mg  400 mg Oral Daily Anton Hargrove MD   400 mg at 03/10/19 9183    metoprolol succinate (TOPROL XL) extended release tablet 50 mg  50 mg Oral Daily Anton Hargrove MD   50 mg at 03/10/19 0827    pantoprazole (PROTONIX) tablet 40 mg  40 mg Oral QAM AC Anton Hargrove MD   40 mg at 03/10/19 0523    potassium chloride (KLOR-CON M) extended release tablet 20 mEq  20 mEq Oral BID Anton Hargrove MD   20 mEq at 03/10/19 0827    sacubitril-valsartan (ENTRESTO) 49-51 MG per tablet 1 tablet  1 tablet Oral BID Anton Hargrove MD   1 tablet at 03/10/19 0826    zolpidem (AMBIEN) tablet 10 mg  10 mg Oral Nightly Anton Hargrove MD   10 mg at 03/09/19 2036    sodium chloride flush 0.9 % injection 10 mL  10 mL Intravenous 2 times per day Anton Hargrove MD   10 mL at 03/10/19 0830    sodium chloride flush 0.9 % injection 10 mL  10 mL Intravenous PRN Anton Hargrove MD   10 mL at 03/09/19 0553    magnesium hydroxide (MILK fibrillation) ([facility] [address]    Ulnar nerve neuropathy    Dual implantable cardioverter-defibrillator in situ    Tricuspid regurgitation    Chronic kidney disease    [facility] failure[facility]    Transition of care performed with sharing of clinical summary    AICD discharge    Cardiomyopathy ([facility] [address]    Hypomagnesemia    Anticoagulated on Coumadin    ICD (implantable cardioverter-defibrillator) in place    Acute respiratory failure with hypoxia ([facility] [address]    Decompensated COPD with exacerbation (chronic obstructive pulmonary disease) ([facility] [address]       PLAN:  Respiratory support  Antibiotics-oral  General medical floor  Wean steroids          See  Orders  Maddie Mckenna MD, Yolette Boudreaux, American Board of Internal Medicine  Diplomate, American Board of Geriatric Medicine  12:25 PM  3/10/2019

## 2019-03-10 NOTE — PROGRESS NOTES
[facility]) Physicians        CARDIOLOGY                 INPATIENT PROGRESS NOTE          PATIENT SEEN IN FOLLOW UP FOR: CHF    Hospital Day: 3     Huey Ragland is a  year old patient known to Dr. Jen Bryant: Denies any CP,breathing better, No PND or orthopnea +ve cough    ROS: Review of rest of 10 systems negative except as mentioned above    OBJECTIVE: No acute distress. See Assessment     Diagnostics:       Telemetry: A Fib        Intake/Output Summary (Last 24 hours) at 3/10/2019 1159  Last data filed at 3/10/2019 0920  Gross per 24 hour   Intake 960 ml   Output 600 ml   Net 360 ml       Labs:   CBC:   Recent Labs     03/08/19  1351   WBC 7.4   HGB 11.8*   HCT 37.4        BMP:   Recent Labs     03/08/19  1351 03/09/19  0515    134   K 4.3 4.5   CO2 26 25   BUN 48* 53*   CREATININE 2.1* 2.0*   LABGLOM 31 33   CALCIUM 9.1 8.1*     Mag: No results for input(s): MG in the last 72 hours. Phos: No results for input(s): PHOS in the last 72 hours. TSH: No results for input(s): TSH in the last 72 hours. HgA1c:     BNP: No results for input(s): BNP in the last 72 hours.   PT/INR:   Recent Labs     03/09/19  1232 03/10/19  0454   PROTIME 33.8* 55.2*   INR 3.0 4.9     APTT:  Recent Labs     03/08/19  1351   APTT 32.9     CARDIAC ENZYMES:  Recent Labs     03/08/19  1351   TROPONINI 0.11*     FASTING LIPID PANEL:  Lab Results   Component Value Date    CHOL 85 12/02/2017    HDL 20 12/02/2017    LDLCALC 48 12/02/2017    TRIG 87 12/02/2017     LIVER PROFILE:  Recent Labs     03/08/19  1351   AST 37   ALT 21   LABALBU 3.9       Current Inpatient Medications:   warfarin (COUMADIN) daily dosing (placeholder)   Other RX Placeholder    allopurinol  100 mg Oral Daily    aspirin  81 mg Oral Daily    atorvastatin  80 mg Oral Daily    bumetanide  2 mg Oral QAM    hydrALAZINE  100 mg Oral BID    insulin glargine  20 Units Subcutaneous QAM    levothyroxine  75 mcg Oral Daily    linagliptin  5 mg Oral Daily    magnesium oxide  400 mg Oral Daily    metoprolol succinate  50 mg Oral Daily    pantoprazole  40 mg Oral QAM AC    potassium chloride  20 mEq Oral BID    sacubitril-valsartan  1 tablet Oral BID    zolpidem  10 mg Oral Nightly    sodium chloride flush  10 mL Intravenous 2 times per day    insulin lispro  0-12 Units Subcutaneous TID WC    insulin lispro  0-6 Units Subcutaneous Nightly    ipratropium-albuterol  1 ampule Inhalation Q4H WA    doxycycline (VIBRAMYCIN) IV  100 mg Intravenous Q12H    methylPREDNISolone  80 mg Intravenous Daily    bumetanide  1 mg Oral QPM       IV Infusions (if any):   dextrose           PHYSICAL EXAM:     CONSTITUTIONAL:   BP (!) 98/58   Pulse 97   Temp 97.6 °F (36.4 °C) (Oral)   Resp 18   Ht 6' 2\" (1.88 m)   Wt 189 lb (85.7 kg)   SpO2 97%   BMI 24.27 kg/m²   Pulse  Av.4  Min: 80  Max: 97  Systolic (22OYN), ECD:65 , Min:86 , RAD:15    Diastolic (67LWE), PUF:98, Min:52, Max:62    In general, this is a well developed, well nourished who appears stated age. awake, alert, cooperative, no apparent distress  HEENT: eyes -conjunctivae pink,  Throat - Oral mucosa pink and moist.   Neck-  no stridor, no noted enlargement of the thyroid, no carotid bruit. , no jugular venous distention   RESPIRATORY: Chest symmetrical and non-tender to palpation. No accessory muscle use. Lung auscultation - clear to auscultation except few rhonchi  CARDIOVASCULAR:     Heart Inspection shows no noted pulsations  Heart Palpation - no palpable thrills - PMI in 5th ICS,  near left midclavicular line   Heart Ausculation - Regular rate and rhythm, 1/6 systolic murmur, No s3 or rub.   + lower extremity edema, no varicosities. Distal pulses palpable, no clubbing or cyanosis   ABDOMEN: Soft, nontender, nondistended. Bowel sounds present.    : Deferred  Rectal Exam: Deferred  SKIN: warm and dry no statis dermatitis or ulcers   NEURO / PSYCH: oriented to person, place          Impression/Recommendations:     Acute on Chronic HFrEF HCC) - Compensated; He received IV bumex yesterday. He does not appear to be volume overloaded today. Low BP - Monitor BP, decrease Hydralazine    Acute respiratory failure with hypoxia (HCC)    Decompensated COPD with exacerbation (chronic obstructive pulmonary disease) (HCC)     [place] Pneumovirus infection     Ischemic CMP: Continue home GDMT medications for now      S/p dual chamber ICD      VHD- Stable       CAD with multiple interventions; S/p CABG x 3 (2013); S/p Redo CABG (2017) Home GDMT resumed - ASA, BB, Statins. Stable      Paroxysmal Afib, On Coumadin. INR therapeutic today.  In SR     CKD-- monitor renal Fn                 Continue his current meds  No family at bed side      Home tomorrow    Electronically signed by Toya Ndiaye MD on 3/10/2019 at [address] [place] [address] Cardiology

## 2019-03-11 ENCOUNTER — APPOINTMENT (OUTPATIENT)
Dept: GENERAL RADIOLOGY | Age: 74
DRG: 193 | End: 2019-03-11
Payer: MEDICARE

## 2019-03-11 LAB
ANION GAP SERPL CALCULATED.3IONS-SCNC: 16 MMOL/L (ref 7–16)
BUN BLDV-MCNC: 53 MG/DL (ref 8–23)
CALCIUM SERPL-MCNC: 8 MG/DL (ref 8.6–10.2)
CHLORIDE BLD-SCNC: 97 MMOL/L (ref 98–107)
CO2: 27 MMOL/L (ref 22–29)
CREAT SERPL-MCNC: 1.5 MG/DL (ref 0.7–1.2)
GFR AFRICAN AMERICAN: 56
GFR NON-AFRICAN AMERICAN: 46 ML/MIN/1.73
GLUCOSE BLD-MCNC: 156 MG/DL (ref 74–99)
INR BLD: 7
METER GLUCOSE: 107 MG/DL (ref 74–99)
METER GLUCOSE: 121 MG/DL (ref 74–99)
METER GLUCOSE: 161 MG/DL (ref 74–99)
METER GLUCOSE: 203 MG/DL (ref 74–99)
POTASSIUM SERPL-SCNC: 3.7 MMOL/L (ref 3.5–5)
PROTHROMBIN TIME: 78.2 SEC (ref 9.3–12.4)
SODIUM BLD-SCNC: 140 MMOL/L (ref 132–146)

## 2019-03-11 PROCEDURE — 6370000000 HC RX 637 (ALT 250 FOR IP): Performed by: INTERNAL MEDICINE

## 2019-03-11 PROCEDURE — 71045 X-RAY EXAM CHEST 1 VIEW: CPT

## 2019-03-11 PROCEDURE — 80048 BASIC METABOLIC PNL TOTAL CA: CPT

## 2019-03-11 PROCEDURE — 6360000002 HC RX W HCPCS: Performed by: INTERNAL MEDICINE

## 2019-03-11 PROCEDURE — 99232 SBSQ HOSP IP/OBS MODERATE 35: CPT | Performed by: INTERNAL MEDICINE

## 2019-03-11 PROCEDURE — 82962 GLUCOSE BLOOD TEST: CPT

## 2019-03-11 PROCEDURE — 99233 SBSQ HOSP IP/OBS HIGH 50: CPT | Performed by: INTERNAL MEDICINE

## 2019-03-11 PROCEDURE — 36415 COLL VENOUS BLD VENIPUNCTURE: CPT

## 2019-03-11 PROCEDURE — 2580000003 HC RX 258: Performed by: INTERNAL MEDICINE

## 2019-03-11 PROCEDURE — 85610 PROTHROMBIN TIME: CPT

## 2019-03-11 PROCEDURE — 94640 AIRWAY INHALATION TREATMENT: CPT

## 2019-03-11 PROCEDURE — 2700000000 HC OXYGEN THERAPY PER DAY

## 2019-03-11 PROCEDURE — 99222 1ST HOSP IP/OBS MODERATE 55: CPT | Performed by: INTERNAL MEDICINE

## 2019-03-11 PROCEDURE — 1200000000 HC SEMI PRIVATE

## 2019-03-11 PROCEDURE — APPSS45 APP SPLIT SHARED TIME 31-45 MINUTES: Performed by: NURSE PRACTITIONER

## 2019-03-11 RX ORDER — PREDNISONE 10 MG/1
10 TABLET ORAL 2 TIMES DAILY
Status: DISCONTINUED | OUTPATIENT
Start: 2019-03-11 | End: 2019-03-12 | Stop reason: HOSPADM

## 2019-03-11 RX ORDER — BUDESONIDE 0.25 MG/2ML
0.25 INHALANT ORAL 2 TIMES DAILY
Status: DISCONTINUED | OUTPATIENT
Start: 2019-03-11 | End: 2019-03-12 | Stop reason: HOSPADM

## 2019-03-11 RX ADMIN — LEVOTHYROXINE SODIUM 75 MCG: 75 TABLET ORAL at 05:05

## 2019-03-11 RX ADMIN — LINAGLIPTIN 5 MG: 5 TABLET, FILM COATED ORAL at 09:48

## 2019-03-11 RX ADMIN — METOPROLOL SUCCINATE 50 MG: 50 TABLET, FILM COATED, EXTENDED RELEASE ORAL at 09:48

## 2019-03-11 RX ADMIN — PANTOPRAZOLE SODIUM 40 MG: 40 TABLET, DELAYED RELEASE ORAL at 05:05

## 2019-03-11 RX ADMIN — IPRATROPIUM BROMIDE AND ALBUTEROL SULFATE 1 AMPULE: .5; 3 SOLUTION RESPIRATORY (INHALATION) at 21:15

## 2019-03-11 RX ADMIN — INSULIN LISPRO 4 UNITS: 100 INJECTION, SOLUTION INTRAVENOUS; SUBCUTANEOUS at 09:46

## 2019-03-11 RX ADMIN — MAGNESIUM GLUCONATE 500 MG ORAL TABLET 400 MG: 500 TABLET ORAL at 09:49

## 2019-03-11 RX ADMIN — HYDRALAZINE HYDROCHLORIDE 25 MG: 25 TABLET, FILM COATED ORAL at 09:49

## 2019-03-11 RX ADMIN — IPRATROPIUM BROMIDE AND ALBUTEROL SULFATE 1 AMPULE: .5; 3 SOLUTION RESPIRATORY (INHALATION) at 08:48

## 2019-03-11 RX ADMIN — POTASSIUM CHLORIDE 20 MEQ: 20 TABLET, EXTENDED RELEASE ORAL at 22:14

## 2019-03-11 RX ADMIN — BUMETANIDE 2 MG: 1 TABLET ORAL at 09:49

## 2019-03-11 RX ADMIN — PREDNISONE 10 MG: 10 TABLET ORAL at 22:13

## 2019-03-11 RX ADMIN — BUDESONIDE 250 MCG: 0.25 SUSPENSION RESPIRATORY (INHALATION) at 21:15

## 2019-03-11 RX ADMIN — Medication 10 ML: at 09:50

## 2019-03-11 RX ADMIN — COLCHICINE 0.6 MG: 0.6 TABLET, FILM COATED ORAL at 09:48

## 2019-03-11 RX ADMIN — INSULIN LISPRO 1 UNITS: 100 INJECTION, SOLUTION INTRAVENOUS; SUBCUTANEOUS at 22:29

## 2019-03-11 RX ADMIN — BUMETANIDE 1 MG: 1 TABLET ORAL at 19:39

## 2019-03-11 RX ADMIN — BUDESONIDE 250 MCG: 0.25 SUSPENSION RESPIRATORY (INHALATION) at 11:58

## 2019-03-11 RX ADMIN — IPRATROPIUM BROMIDE AND ALBUTEROL SULFATE 1 AMPULE: .5; 3 SOLUTION RESPIRATORY (INHALATION) at 16:04

## 2019-03-11 RX ADMIN — INSULIN GLARGINE 20 UNITS: 100 INJECTION, SOLUTION SUBCUTANEOUS at 09:46

## 2019-03-11 RX ADMIN — Medication 10 ML: at 22:12

## 2019-03-11 RX ADMIN — ASPIRIN 81 MG: 81 TABLET ORAL at 09:49

## 2019-03-11 RX ADMIN — DOXYCYCLINE HYCLATE 100 MG: 100 CAPSULE ORAL at 22:13

## 2019-03-11 RX ADMIN — SACUBITRIL AND VALSARTAN 1 TABLET: 49; 51 TABLET, FILM COATED ORAL at 22:14

## 2019-03-11 RX ADMIN — DOXYCYCLINE HYCLATE 100 MG: 100 CAPSULE ORAL at 09:49

## 2019-03-11 RX ADMIN — IPRATROPIUM BROMIDE AND ALBUTEROL SULFATE 1 AMPULE: .5; 3 SOLUTION RESPIRATORY (INHALATION) at 11:58

## 2019-03-11 RX ADMIN — ATORVASTATIN CALCIUM 80 MG: 40 TABLET, FILM COATED ORAL at 09:48

## 2019-03-11 RX ADMIN — PREDNISONE 10 MG: 10 TABLET ORAL at 09:47

## 2019-03-11 RX ADMIN — POTASSIUM CHLORIDE 20 MEQ: 20 TABLET, EXTENDED RELEASE ORAL at 09:49

## 2019-03-11 RX ADMIN — SACUBITRIL AND VALSARTAN 1 TABLET: 49; 51 TABLET, FILM COATED ORAL at 09:49

## 2019-03-11 RX ADMIN — ALLOPURINOL 100 MG: 100 TABLET ORAL at 09:48

## 2019-03-11 RX ADMIN — ZOLPIDEM TARTRATE 10 MG: 5 TABLET ORAL at 22:13

## 2019-03-11 ASSESSMENT — PAIN SCALES - GENERAL
PAINLEVEL_OUTOF10: 0

## 2019-03-11 NOTE — CARE COORDINATION
Met with patient at bedside to discuss transition of care. Pt lives at home alone in a first floor living with a basement. He stated he was independent with ADL's and ambulation pta and his sister helped him. He did not use assistive devices. He is currently discussing cardiac rehab with . I discussed hhc and he declines at this time. Sister will transport home when discharged. RN walked pt on RA and he did well maintaining 91-93%, recovery 95% he denied sob, dyspnea. His plan at discharge is to return home with no needs. Awaiting pharmacy to dose for INR of 7.0 today. Will continue to follow for any needs that arise.

## 2019-03-11 NOTE — PROGRESS NOTES
Patient stated \"I will not leave this room and transfer to another floor or room, I plan on being discharged tomorrow! I will not move into another room! \"

## 2019-03-11 NOTE — CONSULTS
[place]  Division of Pulmonary, Critical Care Medicine  Pulmonary [address] [facility]       Pulmonary Consult Roosevelt Segalo Note         Patient: Douglas Almodovar  MRN: [id number]  : 1945      Date of Admission: .3/8/2019  1:44 PM    Consulting Physician:Dr Marilee Lindo         Reason for Consultation:COPD exacerbation   CC : SOB . HPI:   Douglas Almodovar is a 72y.o. year old ho was admitted on 3/9 2019 and has PMH of CHF and COPD and he start smoking at age 23 and increase gradually to 1 pack daily and continue to smoke ,he also has worked in [employer] in old days. he presented with worsening SOB that has been going on for few days before admission and also cough productive if yellow sputum with SWIFT and some tightness with wheezing as well and there was no legs welling .     He was placed on NC 3L /m and he sat was 95 % today     He is on coumadin for A fib       He ahs CKD        PAST MEDICAL HISTORY:   Past Medical History:   Diagnosis Date    Acid reflux     Acute MI ([facility] [address] 97,    Anxiety     CAD (coronary artery disease)     follows w Dr. Morena Hamilton Diabetes mellitus ([facility] [address]     Fluid retention     history of    Hyperlipidemia     Hypertension     Ischemic cardiomyopathy     Osteoarthritis     Post PTCA     Systolic dysfunction, left ventricle     follows with Dr. Morena Hamilton Thyroid disease        PAST SURGICAL HISTORY:   Past Surgical History:   Procedure Laterality Date    CARDIAC DEFIBRILLATOR PLACEMENT Left     with pacemaker (Medtronic)    CORONARY ANGIOPLASTY      CORONARY ARTERY BYPASS GRAFT      DIAGNOSTIC CARDIAC CATH LAB PROCEDURE  97,    Critical lesion mid left circumflex,significant lesion mid-LAD and first diagonal    ECHO COMPL W DOP COLOR FLOW  2013         ECHO COMPL W DOP COLOR FLOW  2013         MITRAL VALVE SURGERY      TRANSESOPHAGEAL ECHOCARDIOGRAM  2013 Dr. Vishal Mehta TRANSESOPHAGEAL ECHOCARDIOGRAM  2017       FAMILY HISTORY:   Family History   Problem Relation Age of Onset    Hypertension Mother     Hypertension Father     Heart Surgery Father     High Cholesterol Father        SOCIAL HISTORY:   Social History     Socioeconomic History    Marital status:      Spouse name: Not on file    Number of children: Not on file    Years of education: Not on file    Highest education level: Not on file   Occupational History    Not on file   Social Needs    Financial resource strain: Not on file    Food insecurity:     Worry: Not on file     Inability: Not on file    Transportation needs:     Medical: Not on file     Non-medical: Not on file   Tobacco Use    Smoking status: Former Smoker     Packs/day: 0.50     Years: 50.00     Pack years: 25.00     Types: Cigarettes     Last attempt to quit: 2017     Years since quittin.3    Smokeless tobacco: Never Used    Tobacco comment: smokes an occ cigarette when stressed   Substance and Sexual Activity    Alcohol use: Yes     Comment: occ     Drug use: No    Sexual activity: Not on file   Lifestyle    Physical activity:     Days per week: Not on file     Minutes per session: Not on file    Stress: Not on file   Relationships    Social connections:     Talks on phone: Not on file     Gets together: Not on file     Attends Zoroastrian service: Not on file     Active member of club or organization: Not on file     Attends meetings of clubs or organizations: Not on file     Relationship status: Not on file    Intimate partner violence:     Fear of current or ex partner: Not on file     Emotionally abused: Not on file     Physically abused: Not on file     Forced sexual activity: Not on file   Other Topics Concern    Not on file   Social History Narrative    Drinks 1 cup of coffee daily.       Social History     Tobacco Use   Smoking Status Former Smoker    Packs/day: 0.50    Years: 50.00    Provider, MD   allopurinol (ZYLOPRIM) 100 MG tablet Take 1 tablet by mouth daily 12/3/17   Ermelinda Saleh MD   levothyroxine (SYNTHROID) 75 MCG tablet Take 1 tablet by mouth Daily 12/4/17   Ermelinda Saleh MD   insulin glargine (LANTUS) 100 UNIT/ML injection vial Inject 20 Units into the skin every morning    Historical Provider, MD   atorvastatin (LIPITOR) 80 MG tablet Take 80 mg by mouth daily    Historical Provider, MD   linagliptin (TRADJENTA) 5 MG tablet Take 5 mg by mouth daily    Historical Provider, MD   albuterol-ipratropium (COMBIVENT)  MCG/ACT inhaler Inhale 2 puffs into the lungs every 12 hours.  4/23/13   Stella Stewart MD   aspirin 81 MG EC tablet Take 81 mg by mouth daily Prescribed per Dr Ennis Collet Provider, MD   omeprazole (PRILOSEC) 20 MG capsule Take 20 mg by mouth daily     Historical Provider, MD       CURRENT MEDICATIONS:  Current Facility-Administered Medications: predniSONE (DELTASONE) tablet 10 mg, 10 mg, Oral, BID  hydrALAZINE (APRESOLINE) tablet 25 mg, 25 mg, Oral, BID  doxycycline hyclate (VIBRAMYCIN) capsule 100 mg, 100 mg, Oral, 2 times per day  warfarin (COUMADIN) daily dosing (placeholder), , Other, RX Placeholder  allopurinol (ZYLOPRIM) tablet 100 mg, 100 mg, Oral, Daily  aspirin EC tablet 81 mg, 81 mg, Oral, Daily  atorvastatin (LIPITOR) tablet 80 mg, 80 mg, Oral, Daily  bumetanide (BUMEX) tablet 2 mg, 2 mg, Oral, QAM  colchicine (COLCRYS) tablet 0.6 mg, 0.6 mg, Oral, BID PRN  insulin glargine (LANTUS) injection vial 20 Units, 20 Units, Subcutaneous, QAM  levothyroxine (SYNTHROID) tablet 75 mcg, 75 mcg, Oral, Daily  linagliptin (TRADJENTA) tablet 5 mg, 5 mg, Oral, Daily  magnesium oxide (MAG-OX) tablet 400 mg, 400 mg, Oral, Daily  metoprolol succinate (TOPROL XL) extended release tablet 50 mg, 50 mg, Oral, Daily  pantoprazole (PROTONIX) tablet 40 mg, 40 mg, Oral, QAM AC  potassium chloride (KLOR-CON M) extended release tablet 20 mEq, 20 mEq, Oral, BID  sacubitril-valsartan (ENTRESTO) 49-51 MG per tablet 1 tablet, 1 tablet, Oral, BID  zolpidem (AMBIEN) tablet 10 mg, 10 mg, Oral, Nightly  sodium chloride flush 0.9 % injection 10 mL, 10 mL, Intravenous, 2 times per day  sodium chloride flush 0.9 % injection 10 mL, 10 mL, Intravenous, PRN  magnesium hydroxide (MILK OF MAGNESIA) 400 MG/5ML suspension 30 mL, 30 mL, Oral, Daily PRN  ondansetron (ZOFRAN) injection 4 mg, 4 mg, Intravenous, Q6H PRN  glucose (GLUTOSE) 40 % oral gel 15 g, 15 g, Oral, PRN  dextrose 50 % solution 12.5 g, 12.5 g, Intravenous, PRN  glucagon (rDNA) injection 1 mg, 1 mg, Intramuscular, PRN  dextrose 5 % solution, 100 mL/hr, Intravenous, PRN  insulin lispro (HUMALOG) injection vial 0-12 Units, 0-12 Units, Subcutaneous, TID WC  insulin lispro (HUMALOG) injection vial 0-6 Units, 0-6 Units, Subcutaneous, Nightly  ipratropium-albuterol (DUONEB) nebulizer solution 1 ampule, 1 ampule, Inhalation, Q4H WA  bumetanide (BUMEX) tablet 1 mg, 1 mg, Oral, QPM    IV MEDICATIONS:   dextrose         ALLERGIES:  No Known Allergies    REVIEW OF SYSTEMS:  General ROS:  No weight loss ,no fatigue     ENT ROS:   No Sore throat ,no lymphoadenopathy,no nasal stuffiness     Hematological and Lymphatic ROS:   No ecchymosis ,no tendency to bleed  Respiratory ROS:   SOB >Cough . hypoxia  Cardiovascular ROS:   No CP,No Palpitation   Gastrointestinal ROS:   No Gi bleed,no nausea or vomiting      - Musculoskeletal ROS:      - no joint swelling ,no joint pain   Neurological ROS:     -no weakness or numbness    Dermatological ROS:   No skin rash ,no urticaria     PHYSICAL EXAMINATION:     VITAL SIGNS:  /68   Pulse 68   Temp 97.3 °F (36.3 °C) (Oral)   Resp 20   Ht 6' 2\" (1.88 m)   Wt 189 lb (85.7 kg)   SpO2 94%   BMI 24.27 kg/m²   Wt Readings from Last 3 Encounters:   03/08/19 189 lb (85.7 kg)   03/08/19 190 lb 11.2 oz (86.5 kg)   03/05/19 196 lb (88.9 kg)     Temp Readings from Last 3 Encounters:   03/11/19 97.3 °F (36.3 °C) (Oral)   01/31/19 97.9 °F (36.6 °C) (Temporal)   01/17/19 97.7 °F (36.5 °C)     TMAX:  BP Readings from Last 3 Encounters:   03/11/19 110/68   03/08/19 (!) 92/56   03/05/19 (!) 92/56     Pulse Readings from Last 3 Encounters:   03/11/19 68   03/08/19 86   03/05/19 82           INTAKE/OUTPUTS:  I/O last 3 completed shifts: In: 2115 [P.O.:1640;  I.V.:475]  Out: 1000 [Urine:1000]    Intake/Output Summary (Last 24 hours) at 3/11/2019 0911  Last data filed at 3/11/2019 0800  Gross per 24 hour   Intake 2475 ml   Output 1000 ml   Net 1475 ml       General Appearance: alert and oriented to person, place and time, well-developed and   well-nourished, in no acute distress   Eyes: pupils equal, round, and reactive to light, extraocular eye movements intact, conjunctivae normal and sclera anicteric   Neck: neck supple and non tender without mass, no thyromegaly, no thyroid nodules and no cervical adenopathy   Pulmonary/Chest:wheeizng and rhonchi   Cardiovascular: normal rate, regular rhythm, normal S1 and S2, no murmurs, rubs, clicks or gallops, distal pulses intact, no carotid bruits, no murmurs, no gallops, no carotid bruits and no JVD   Abdomen: obese, soft, non-tender, non-distended, normal bowel sounds, no masses or organomegaly   Extremities:mild edema   Musculoskeletal: normal range of motion, no joint swelling, deformity or tenderness   Neurologic: reflexes normal and symmetric, no cranial nerve deficit noted    LABS/IMAGING:    CBC:  Lab Results   Component Value Date    WBC 7.4 03/08/2019    HGB 11.8 (L) 03/08/2019    HCT 37.4 03/08/2019    MCV 91.0 03/08/2019     03/08/2019    LYMPHOPCT 16.0 (L) 03/08/2019    RBC 4.11 03/08/2019    MCH 28.7 03/08/2019    MCHC 31.6 (L) 03/08/2019    RDW 15.3 (H) 03/08/2019    NEUTOPHILPCT 68.6 03/08/2019    MONOPCT 11.7 03/08/2019    BASOPCT 0.7 03/08/2019    NEUTROABS 5.07 03/08/2019    LYMPHSABS 1.18 (L) 03/08/2019    MONOSABS 0.86 03/08/2019    EOSABS 0.20 03/08/2019    BASOSABS 0.05 03/08/2019       Recent Labs     03/08/19  1351   WBC 7.4   HGB 11.8*   HCT 37.4   MCV 91.0          BMP:   Recent Labs     03/08/19  1351 03/09/19  0515    134   K 4.3 4.5   CL 95* 96*   CO2 26 25   BUN 48* 53*   CREATININE 2.1* 2.0*       MG:   Lab Results   Component Value Date    MG 1.6 01/31/2019     Ca/Phos:   Lab Results   Component Value Date    CALCIUM 8.1 (L) 03/09/2019    PHOS 3.0 04/10/2013     Amylase: No results found for: AMYLASE  Lipase: No results found for: LIPASE  LIVER PROFILE:   Recent Labs     03/08/19  1351   AST 37   ALT 21   BILITOT 0.7   ALKPHOS 160*       PT/INR:   Recent Labs     03/09/19  1232 03/10/19  0454 03/11/19  0453   PROTIME 33.8* 55.2* 78.2*   INR 3.0 4.9 7.0*     APTT:   Recent Labs     03/08/19  1351   APTT 32.9       Cardiac Enzymes:  Lab Results   Component Value Date    CKTOTAL 25 (L) 05/14/2013    CKMB 0.5 05/14/2013    TROPONINI 0.11 (H) 03/08/2019       Hgb A1C:   Lab Results   Component Value Date    LABA1C 6.3 (H) 03/09/2019     No results found for: EAG  GINNA: No results found for: GINNA  ESR: No results found for: SEDRATE  CRP: No results found for: CRP  D Dimer:   Lab Results   Component Value Date    DDIMER 351 04/09/2013       Thyroid Studies:  Lab Results   Component Value Date    TSH 4.170 03/12/2018    T2TGJCZ 6.1 05/13/2013               PROBLEM LIST:  Patient Active Problem List   Diagnosis    Hyperlipidemia    DM (diabetes mellitus) ([facility] [address]    Coronary atherosclerosis of native coronary artery    GERD (gastroesophageal reflux disease)    Depression    Hypothyroid    Ischemic cardiomyopathy    Mitral regurgitation    AI (aortic insufficiency)    [facility]    Ulnar nerve neuropathy    Dual implantable cardioverter-defibrillator in situ    Tricuspid regurgitation    Chronic kidney disease    Chronic systolic HF (heart failure) ([facility] [address]    Transition of care performed with sharing

## 2019-03-11 NOTE — PROGRESS NOTES
Caitlyn Fitzpatrick MD, FACP                   Patient Name: Dulce Sommers                   Age:  72 y.o. Gender:   male    CC: AICD discharge    HPI: PER ER:  This is a 68-year-old male with a past medical history of coronary artery disease, CHF and COPD who presents to the ED for evaluation shortness of breath. The patient remarks that since Tuesday has been having a productive cough, body aches and shortness of breath. Patient states the shortness of breath is worse when he exerts himself. The patient states that he has lost weight over the past week proximally 6 pounds. He states that he has had a productive cough which is yellow in color. He states that he has no leg pain or leg swelling. The patient states that he did get his influenza shot this year. He states that he was seen at his CHF clinic earlier today and got a shot of bumex.     I interviewed this patient and verified this history  He is currently quite comfortable  System review was completed as well    He confirms that his presentation is mainly cough and SOB  He states he has noted a long and enduring non-productive cough-that actually hs made his chest and abdomen sore    3/10  Now at 97% with 3 liters  Doing better  Clinically better  Will ambulate  He feels better still coughs    3/11:  He is fully ambulated  Still on 3 liters of oxygen  Still has not seen Pulmonary service  Cardiology note reviewed  BP running low          Past Medical History:   Diagnosis Date    Acid reflux     Acute MI ([address] 01/25/97,01/04    Anxiety     CAD (coronary artery disease)     follows w Dr. Ori Diaz [facility] mellitus ([facility] [address]     Fluid retention     history of    Hyperlipidemia     Hypertension     Ischemic cardiomyopathy     Osteoarthritis     Post PTCA 65/32/23    Systolic dysfunction, left ventricle follows with Dr. Norah Cohen Thyroid disease        Past Surgical History:   Procedure Laterality Date    CARDIAC DEFIBRILLATOR PLACEMENT Left 2013    with pacemaker (Medtronic)    CORONARY ANGIOPLASTY      CORONARY ARTERY BYPASS GRAFT      DIAGNOSTIC CARDIAC CATH LAB PROCEDURE  01/27/97,11/00    Critical lesion mid left circumflex,significant lesion mid-LAD and first diagonal    ECHO COMPL W DOP COLOR FLOW  4/11/2013         ECHO COMPL W DOP COLOR FLOW  4/21/2013         MITRAL VALVE SURGERY      TRANSESOPHAGEAL ECHOCARDIOGRAM  4/12/2013    Dr. Sina Teixeira TRANSESOPHAGEAL ECHOCARDIOGRAM  08/02/2017       No Known Allergies    The patient's medical records have been reviewed. Review of Systems:   · General: Denies malaise or weakness. Denies fever or chills. · Eyes: No visual changes or diplopia. No swelling or pain. · ENT: No Headaches, tinnitus or vertigo. No mouth sores or sore throat. · Cardiovascular: no symptoms  · Respiratory: PER HPI        Physical Examination:      Wt Readings from Last 3 Encounters:   03/08/19 189 lb (85.7 kg)   03/08/19 190 lb 11.2 oz (86.5 kg)   03/05/19 196 lb (88.9 kg)     Temp Readings from Last 3 Encounters:   03/11/19 97.1 °F (36.2 °C) (Oral)   01/31/19 97.9 °F (36.6 °C) (Temporal)   01/17/19 97.7 °F (36.5 °C)     BP Readings from Last 3 Encounters:   03/11/19 (!) 102/52   03/08/19 (!) 92/56   03/05/19 (!) 92/56     Pulse Readings from Last 3 Encounters:   03/11/19 72   03/08/19 86   03/05/19 82       General appearance: Normal, awake, alert no distress. Eyes: Conjunctivae/cornea clear. Irwin Guise. Sclera non icteric. Neck:  Symmetric. No adenopathy. en excursion although quite reduced  Lungs: lungs are now clear-but mild sibilance noted in the right lung field mid to lower only  Heart: S1 > S2. Regular rate and rhythm. No gallop rub or murmur. Neuro:   · Normal  Mental status: Awake, alert, cognizant and interactive. Patient appears capable of directing self care   Mood: Normal and appropriate affect  Gait & balance: nnormal     Labs     CBC:   Lab Results   Component Value Date    WBC 7.4 03/08/2019    RBC 4.11 03/08/2019    HGB 11.8 03/08/2019    HCT 37.4 03/08/2019     03/08/2019    MCV 91.0 03/08/2019     BMP:    Lab Results   Component Value Date     03/09/2019    K 4.5 03/09/2019    CL 96 03/09/2019    CO2 25 03/09/2019    BUN 53 03/09/2019    CREATININE 2.0 03/09/2019    GLUCOSE 300 03/09/2019    CALCIUM 8.1 03/09/2019     Hepatic Function Panel:    Lab Results   Component Value Date    ALKPHOS 160 03/08/2019    AST 37 03/08/2019    ALT 21 03/08/2019    PROT 8.5 03/08/2019    LABALBU 3.9 03/08/2019    BILITOT 0.7 03/08/2019     Magnesium:    Lab Results   Component Value Date    MG 1.6 01/31/2019     Cardiac Enzymes:   Lab Results   Component Value Date    CKTOTAL 25 (L) 05/14/2013    CKTOTAL 27 (L) 05/13/2013    CKTOTAL 43 05/13/2013    CKMB 0.5 05/14/2013    CKMB 0.6 05/13/2013    CKMB 0.9 05/13/2013    TROPONINI 0.11 (H) 03/08/2019    TROPONINI 0.11 (H) 01/31/2019    TROPONINI 0.08 (H) 01/12/2019     LDH:  No results found for: LDH  PT/INR:    Lab Results   Component Value Date    PROTIME 78.2 03/11/2019    INR 7.0 03/11/2019     BNP: No results for input(s): BNP in the last 72 hours.    TSH:   Lab Results   Component Value Date    TSH 4.170 03/12/2018      Cardiac Injury Profile:   Recent Labs     03/08/19  1351   TROPONINI 0.11*      Lipid Profile:   Lab Results   Component Value Date    TRIG 87 12/02/2017    HDL 20 12/02/2017    LDLCALC 48 12/02/2017    CHOL 85 12/02/2017      Hemoglobin A1C: No components found for: HGBA1C   U/A:   Lab Results   Component Value Date    LEUKOCYTESUR NEGATIVE 05/14/2013    PHUR 6.0 05/14/2013    WBCUA 0-1 05/14/2013    RBCUA NONE 05/14/2013    BACTERIA RARE 05/14/2013    SPECGRAV 1.025 05/14/2013    BLOODU NEGATIVE 05/14/2013    GLUCOSEU NEGATIVE 05/14/2013         ADMISSION SCHEDULED MEDS: Current Facility-Administered Medications   Medication Dose Route Frequency Provider Last Rate Last Dose    hydrALAZINE (APRESOLINE) tablet 25 mg  25 mg Oral BID Libby Degroot MD   25 mg at 03/10/19 2113    doxycycline hyclate (VIBRAMYCIN) capsule 100 mg  100 mg Oral 2 times per day Dominique Denney MD   100 mg at 03/10/19 2113    methylPREDNISolone sodium (SOLU-MEDROL) injection 40 mg  40 mg Intravenous Daily Dominique Denney MD        warfarin (COUMADIN) daily dosing (placeholder)   Other RX Placeholder Dominique Denney MD        allopurinol (ZYLOPRIM) tablet 100 mg  100 mg Oral Daily Dominique Denney MD   100 mg at 03/10/19 0826    aspirin EC tablet 81 mg  81 mg Oral Daily Dominique Denney MD   81 mg at 03/10/19 5840    atorvastatin (LIPITOR) tablet 80 mg  80 mg Oral Daily Dominique Denney MD   80 mg at 03/10/19 0827    bumetanide (BUMEX) tablet 2 mg  2 mg Oral QAM Dominique Denney MD   2 mg at 03/10/19 6513    colchicine (COLCRYS) tablet 0.6 mg  0.6 mg Oral BID PRN Dominique Denney MD        insulin glargine (LANTUS) injection vial 20 Units  20 Units Subcutaneous QAM Dominique Denney MD   20 Units at 03/10/19 0827    levothyroxine (SYNTHROID) tablet 75 mcg  75 mcg Oral Daily Dominique Denney MD   75 mcg at 03/11/19 0505    linagliptin (TRADJENTA) tablet 5 mg  5 mg Oral Daily Dominique Denney MD   5 mg at 03/10/19 0826    magnesium oxide (MAG-OX) tablet 400 mg  400 mg Oral Daily Dominique Denney MD   400 mg at 03/10/19 7785    metoprolol succinate (TOPROL XL) extended release tablet 50 mg  50 mg Oral Daily Dominique Denney MD   50 mg at 03/10/19 0827    pantoprazole (PROTONIX) tablet 40 mg  40 mg Oral QAM AC Dominique Denney MD   40 mg at 03/11/19 0505    potassium chloride (KLOR-CON M) extended release tablet 20 mEq  20 mEq Oral BID Dominique Denney MD   20 mEq at 03/10/19 2113    sacubitril-valsartan (ENTRESTO) 49-51 MG per tablet 1 tablet  1 tablet Oral BID [place] Orville Wayne MD   1 tablet at 03/10/19 2113    zolpidem (AMBIEN) tablet 10 mg  10 mg Oral Nightly Georgie York MD   10 mg at 03/10/19 2113    sodium chloride flush 0.9 % injection 10 mL  10 mL Intravenous 2 times per day Georgie York MD   10 mL at 03/10/19 2117    sodium chloride flush 0.9 % injection 10 mL  10 mL Intravenous PRN Georgie York MD   10 mL at 03/09/19 0553    magnesium hydroxide (MILK OF MAGNESIA) 400 MG/5ML suspension 30 mL  30 mL Oral Daily PRN Georgie York MD        ondansetron [facility]) injection 4 mg  4 mg Intravenous Q6H PRN Georgie York MD        glucose (GLUTOSE) 40 % oral gel 15 g  15 g Oral PRN Georgie York MD        dextrose 50 % solution 12.5 g  12.5 g Intravenous PRN Georgie York MD        glucagon (rDNA) injection 1 mg  1 mg Intramuscular PRN Georgie York MD        dextrose 5 % solution  100 mL/hr Intravenous PRN Georgie York MD        insulin lispro (HUMALOG) injection vial 0-12 Units  0-12 Units Subcutaneous TID WC Georgie York MD   6 Units at 03/10/19 1709    insulin lispro (HUMALOG) injection vial 0-6 Units  0-6 Units Subcutaneous Nightly Georgie York MD   3 Units at 03/10/19 2114    ipratropium-albuterol (DUONEB) nebulizer solution 1 ampule  1 ampule Inhalation Q4H WA Georgie York MD   1 ampule at 03/10/19 2026    bumetanide (BUMEX) tablet 1 mg  1 mg Oral QPM Georgie York MD   1 mg at 03/10/19 1906       Current  Infusions   dextrose         Prn Meds  colchicine, sodium chloride flush, magnesium hydroxide, ondansetron, glucose, dextrose, glucagon (rDNA), dextrose    Radiology Review:  XR CHEST PORTABLE   Final Result   No interval change                     ASSESSMENT:  RLL Pneumonia-per CXR  Viral pneumonia Human Metapneumovirus    Patient Active Problem List   Diagnosis    Hyperlipidemia    DM [facility] mellitus) ([facility] [address]    Coronary atherosclerosis of native coronary artery    GERD (gastroesophageal reflux disease)    Depression    Hypothyroid    Ischemic cardiomyopathy    Mitral regurgitation    AI (aortic insufficiency)    [facility]    Ulnar nerve neuropathy    Dual implantable cardioverter-defibrillator in situ    Tricuspid regurgitation    Chronic kidney disease    Chronic systolic HF (heart failure) ([facility]    Transition of care performed with sharing of clinical summary    AICD discharge    Cardiomyopathy ([facility] [address]    Hypomagnesemia    Anticoagulated on Coumadin    ICD (implantable cardioverter-defibrillator) in place    Acute respiratory failure with hypoxia ([facility] [address]    Decompensated COPD with exacerbation (chronic obstructive pulmonary disease) ([facility] [address]       PLAN:  Respiratory support  Antibiotics-oral  General medical floor  Wean steroids-transition to oral  Assess exertional oxygen demand-may need O2 at home          See  Orders  Boris Ko MD, Atul Mckinnon [employer]  [facility] [address] [facility] [employer] of Geriatric [employer]  7:15 AM  3/11/2019

## 2019-03-11 NOTE — PROGRESS NOTES
Patient's pulse ox on room air at rest 94-95%. Patient ambulated 162 feet, pulse ox on room air whiling ambulating 91-93%. No shortness of breath or respiratory distress noted. Patient able to carry on a normal conversation during ambulation. Recovery pulse ox on room air 94-95%.

## 2019-03-11 NOTE — PROGRESS NOTES
 budesonide (PULMICORT) nebulizer suspension 250 mcg  0.25 mg Nebulization BID Isela Mclain MD   250 mcg at 03/11/19 1158    hydrALAZINE (APRESOLINE) tablet 25 mg  25 mg Oral BID Taye Siu MD   25 mg at 03/11/19 0949    doxycycline hyclate (VIBRAMYCIN) capsule 100 mg  100 mg Oral 2 times per day Alysia Adamson MD   100 mg at 03/11/19 1836    warfarin (COUMADIN) daily dosing (placeholder)   Other RX Placeholder Alysia Adamson MD        allopurinol (ZYLOPRIM) tablet 100 mg  100 mg Oral Daily Alysia Adamson MD   100 mg at 03/11/19 0948    aspirin EC tablet 81 mg  81 mg Oral Daily Alysia Adamson MD   81 mg at 03/11/19 0949    atorvastatin (LIPITOR) tablet 80 mg  80 mg Oral Daily Alysia Adamson MD   80 mg at 03/11/19 0948    bumetanide (BUMEX) tablet 2 mg  2 mg Oral QAM Alysia Adamson MD   2 mg at 03/11/19 0949    colchicine (COLCRYS) tablet 0.6 mg  0.6 mg Oral BID PRN Alysia Adamson MD   0.6 mg at 03/11/19 0948    insulin glargine (LANTUS) injection vial 20 Units  20 Units Subcutaneous QATALYA Adamson MD   20 Units at 03/11/19 0946    levothyroxine (SYNTHROID) tablet 75 mcg  75 mcg Oral Daily Alysia Adamson MD   75 mcg at 03/11/19 0505    linagliptin (TRADJENTA) tablet 5 mg  5 mg Oral Daily Alysia Adamson MD   5 mg at 03/11/19 0948    magnesium oxide (MAG-OX) tablet 400 mg  400 mg Oral Daily Alysia Adamson MD   400 mg at 03/11/19 0949    metoprolol succinate (TOPROL XL) extended release tablet 50 mg  50 mg Oral Daily Alysia Adamson MD   50 mg at 03/11/19 0948    pantoprazole (PROTONIX) tablet 40 mg  40 mg Oral QAM AC Alysia Adamson MD   40 mg at 03/11/19 0505    potassium chloride (KLOR-CON M) extended release tablet 20 mEq  20 mEq Oral BID Alysia Adamson MD   20 mEq at 03/11/19 0949    sacubitril-valsartan (ENTRESTO) 49-51 MG per tablet 1 tablet  1 tablet Oral BID Alysia Adamson MD   1 tablet at 03/11/19 0949    zolpidem (AMBIEN) tablet 10 bilaterally. No wheezes, rales, or rhonchi. Cardiac: Regular rate and rhythm, +S1S2, no murmurs apparent  Abdomen: Soft, nontender, +bowel sounds  Extremities: Moves all extremities x 4, no lower extremity edema  Neurologic: No focal motor deficits apparent, normal mood and affect  Peripheral Pulses: Intact posterior tibial pulses bilaterally    Intake/Output:    Intake/Output Summary (Last 24 hours) at 3/11/2019 1856  Last data filed at 3/11/2019 1430  Gross per 24 hour   Intake 2375 ml   Output 700 ml   Net 1675 ml     No intake/output data recorded. Laboratory Tests:  Recent Labs     03/09/19  0515 03/11/19  1055    140   K 4.5 3.7   CL 96* 97*   CO2 25 27   BUN 53* 53*   CREATININE 2.0* 1.5*   GLUCOSE 300* 156*   CALCIUM 8.1* 8.0*     Lab Results   Component Value Date    MG 1.6 01/31/2019     No results for input(s): ALKPHOS, ALT, AST, PROT, BILITOT, BILIDIR, LABALBU in the last 72 hours. No results for input(s): WBC, RBC, HGB, HCT, MCV, MCH, MCHC, RDW, PLT, MPV in the last 72 hours.   Lab Results   Component Value Date    CKTOTAL 25 (L) 05/14/2013    CKMB 0.5 05/14/2013    TROPONINI 0.11 (H) 03/08/2019    TROPONINI 0.11 (H) 01/31/2019    TROPONINI 0.08 (H) 01/12/2019     Lab Results   Component Value Date    INR 7.0 (HH) 03/11/2019    INR 4.9 03/10/2019    INR 3.0 03/09/2019    PROTIME 78.2 (H) 03/11/2019    PROTIME 55.2 (H) 03/10/2019    PROTIME 33.8 (H) 03/09/2019     Lab Results   Component Value Date    TSH 4.170 03/12/2018     Lab Results   Component Value Date    LABA1C 6.3 (H) 03/09/2019     No results found for: EAG  Lab Results   Component Value Date    CHOL 85 12/02/2017    CHOL 215 (H) 04/09/2013     Lab Results   Component Value Date    TRIG 87 12/02/2017    TRIG 202 (H) 04/09/2013     Lab Results   Component Value Date    HDL 20 12/02/2017    HDL 32.4 (A) 04/09/2013     Lab Results   Component Value Date    LDLCALC 48 12/02/2017    LDLCALC 142 (H) 04/09/2013     Lab Results   Component Value Date    LABVLDL 17 12/02/2017     No results found for: CHOLHDLRATIO    Cardiac Tests:        Telemetry findings reviewed: not on the monitor      Echocardiogram:       Stress test:        Cardiac catheterization:     Labs reviewed: BUN/Creat 53/1.5, IVR 3.0-->4.9-->7.0      ASSESSMENT:  · Decompensated heart failure - resolved  · HFrEF - compensated   · COPD with acute exacerbation from [place] Pneumovirus infection  · SSS. S/p  Dual chamber ICD  · VHD: S/p bioprosthetic AVR and MVR  · CAD with multiple interventions; S/p CABG x 3 (2013); S/p Redo CABG (2017) Home GDMT resumed - ASA, BB, Statins. Stable   · Paroxysmal Afib, On Coumadin. INR 7.0 today  · CKD stage 3- stable renal functions    Plan:   · Hold warfarin and has been on hold since yesterday. Repeat INR in am  · Continue rest of the current medications  · He stable from the cardiology point of view for discharge once the INR is corrected. · Follow up with Dr. Claude Lopez in one month. Follow up in the heart failure clinic in one weeK. Will sign off and call us if you have further questions or concerns. Cheryl Harding MD., [facility].   [facility]) Cardiology

## 2019-03-11 NOTE — PROGRESS NOTES
Sherly [facility], notified of today's INR 7.0.  She stated \" I will notify Clinical Pharmacist!\"

## 2019-03-11 NOTE — PROGRESS NOTES
Pharmacy Consultation Note  (Anticoagulant Dosing and Monitoring)    Initial consult date: 3/8/18  Consulting physician: Dr. Bashir Valentino    Allergies:  Patient has no known allergies. 72 y.o. male      Ht Readings from Last 1 Encounters:   03/08/19 6' 2\" (1.88 m)     Wt Readings from Last 1 Encounters:   03/08/19 189 lb (85.7 kg)         Warfarin Indication Target   INR Range Home   Dose  (if applicable) Diet/Feeding Tube   Afib with AICD placement 2-3 5 mg on MWF and Sun  AND   2.5 mg on Tues, Thurs, and Sat Carb control (3/8)       Vitamin K or Blood product  Administration Date                 Warfarin drug-drug interactions  Start  Stop Home Med? Comments   Doxycycline 100 mg IV q 12hr  3/8  N Increases INR   Levothyroxine 75 mcg daily   Y TSH was normal in March   Methylprednisolone/prednisone 3/8  N May increase INR           TSH:    Lab Results   Component Value Date    TSH 4.170 03/12/2018        Hepatic Function Panel:                            Lab Results   Component Value Date    ALKPHOS 160 03/08/2019    ALT 21 03/08/2019    AST 37 03/08/2019    PROT 8.5 03/08/2019    BILITOT 0.7 03/08/2019    LABALBU 3.9 03/08/2019       Date Warfarin Dose INR Heparin or LMWH HBG/  HCT PLT Comment   3/8  (Fri) 2.5 mg 2.2 -- 11.8/37.4 216    3/9  (Sat) 2.5 mg  3.0 -- -- --    3/10  (Sun) HOLD 4.9 -- -- --    3/11  (Mon) HOLD 7.0 -- -- --               Assessment and Plan:  · 72 yom with PMH of afib with AICD placement. Patient is on Warfarin 5 mg on MWF and Sunday AND Warfarin 2.5 mg on Tues, Thurs, and Sat. INR goal is 2-3. Admission INR was 2.2. · Patient was admitted on 3/8 after CHF clinic visit where he received a dose of IV Bumex for volume overload - per current Cardiology progress notes, patient is now euvolemic.   · Received a lower dose (compared to home dose) of warfarin on Friday (3/8) and the warfarin dose was then held on Sunday (3/10), however INR continues to increase   · Increase in INR may be due to the following: initial volume overload on 3/8 per progress notes (euvolemic now); new drug interactions with doxycycline and corticosteroids both started on 3/8 (can increase INR)  · INR today has increased to 7     Plan:  · Continue to hold warfarin tonight (and most likely the dose will also need to be held on Tuesday). · If patient is discharged today, recommend checking an INR by Wednesday at the latest.  Per progress notes, patient also has an appointment with Dr. Taran Umana on Friday 3/14 - recommend to check INR at this appointment also. · If patient will continue doxycycline and corticosteroids after discharge, will need to initiate warfarin at a lower dose compared to home dose (difficult to predict dosing at this time, but a dose less than 2.5 mg daily may be needed, based on current INR response) when it is ok to restart warfarin (when INR is 3 or less). · Daily PT/INR until the INR is stable within the therapeutic range while patient remains at [facility]. · Pharmacist will follow and monitor/adjust dosing as necessary while patient remains at [facility].     Mya Guillen, PharmD, BCPS 3/11/2019 10:59 AM

## 2019-03-12 VITALS
WEIGHT: 189 LBS | HEIGHT: 74 IN | OXYGEN SATURATION: 96 % | DIASTOLIC BLOOD PRESSURE: 63 MMHG | TEMPERATURE: 98.2 F | HEART RATE: 83 BPM | SYSTOLIC BLOOD PRESSURE: 91 MMHG | BODY MASS INDEX: 24.26 KG/M2 | RESPIRATION RATE: 18 BRPM

## 2019-03-12 LAB
INR BLD: 5
METER GLUCOSE: 143 MG/DL (ref 74–99)
PROTHROMBIN TIME: 55 SEC (ref 9.3–12.4)

## 2019-03-12 PROCEDURE — 99232 SBSQ HOSP IP/OBS MODERATE 35: CPT | Performed by: NURSE PRACTITIONER

## 2019-03-12 PROCEDURE — 6370000000 HC RX 637 (ALT 250 FOR IP): Performed by: INTERNAL MEDICINE

## 2019-03-12 PROCEDURE — 36415 COLL VENOUS BLD VENIPUNCTURE: CPT

## 2019-03-12 PROCEDURE — 94640 AIRWAY INHALATION TREATMENT: CPT

## 2019-03-12 PROCEDURE — 6360000002 HC RX W HCPCS: Performed by: INTERNAL MEDICINE

## 2019-03-12 PROCEDURE — 82962 GLUCOSE BLOOD TEST: CPT

## 2019-03-12 PROCEDURE — 85610 PROTHROMBIN TIME: CPT

## 2019-03-12 PROCEDURE — 99232 SBSQ HOSP IP/OBS MODERATE 35: CPT | Performed by: INTERNAL MEDICINE

## 2019-03-12 PROCEDURE — 2580000003 HC RX 258: Performed by: INTERNAL MEDICINE

## 2019-03-12 RX ORDER — BUDESONIDE 0.25 MG/2ML
250 INHALANT ORAL 2 TIMES DAILY
Qty: 60 AMPULE | Refills: 3 | Status: SHIPPED | OUTPATIENT
Start: 2019-03-12 | End: 2019-03-12 | Stop reason: HOSPADM

## 2019-03-12 RX ORDER — DOXYCYCLINE HYCLATE 100 MG
100 TABLET ORAL 2 TIMES DAILY
Qty: 14 TABLET | Refills: 0 | Status: SHIPPED | OUTPATIENT
Start: 2019-03-12 | End: 2019-03-19

## 2019-03-12 RX ORDER — ALBUTEROL SULFATE 90 UG/1
2 AEROSOL, METERED RESPIRATORY (INHALATION) EVERY 4 HOURS PRN
Qty: 1 INHALER | Refills: 1 | Status: SHIPPED | OUTPATIENT
Start: 2019-03-12 | End: 2020-01-01

## 2019-03-12 RX ORDER — HYDRALAZINE HYDROCHLORIDE 25 MG/1
25 TABLET, FILM COATED ORAL 2 TIMES DAILY
Qty: 90 TABLET | Refills: 3 | Status: SHIPPED | OUTPATIENT
Start: 2019-03-12 | End: 2019-03-15

## 2019-03-12 RX ORDER — PREDNISONE 10 MG/1
10 TABLET ORAL 2 TIMES DAILY
Qty: 20 TABLET | Refills: 0 | Status: SHIPPED | OUTPATIENT
Start: 2019-03-12 | End: 2019-03-22

## 2019-03-12 RX ADMIN — SACUBITRIL AND VALSARTAN 1 TABLET: 49; 51 TABLET, FILM COATED ORAL at 09:37

## 2019-03-12 RX ADMIN — ATORVASTATIN CALCIUM 80 MG: 40 TABLET, FILM COATED ORAL at 09:37

## 2019-03-12 RX ADMIN — INSULIN LISPRO 2 UNITS: 100 INJECTION, SOLUTION INTRAVENOUS; SUBCUTANEOUS at 09:42

## 2019-03-12 RX ADMIN — PANTOPRAZOLE SODIUM 40 MG: 40 TABLET, DELAYED RELEASE ORAL at 06:47

## 2019-03-12 RX ADMIN — METOPROLOL SUCCINATE 50 MG: 50 TABLET, FILM COATED, EXTENDED RELEASE ORAL at 09:37

## 2019-03-12 RX ADMIN — MAGNESIUM GLUCONATE 500 MG ORAL TABLET 400 MG: 500 TABLET ORAL at 09:37

## 2019-03-12 RX ADMIN — DOXYCYCLINE HYCLATE 100 MG: 100 CAPSULE ORAL at 09:37

## 2019-03-12 RX ADMIN — PREDNISONE 10 MG: 10 TABLET ORAL at 09:37

## 2019-03-12 RX ADMIN — POTASSIUM CHLORIDE 20 MEQ: 20 TABLET, EXTENDED RELEASE ORAL at 09:37

## 2019-03-12 RX ADMIN — BUDESONIDE 250 MCG: 0.25 SUSPENSION RESPIRATORY (INHALATION) at 08:26

## 2019-03-12 RX ADMIN — IPRATROPIUM BROMIDE AND ALBUTEROL SULFATE 1 AMPULE: .5; 3 SOLUTION RESPIRATORY (INHALATION) at 08:26

## 2019-03-12 RX ADMIN — LEVOTHYROXINE SODIUM 75 MCG: 75 TABLET ORAL at 06:47

## 2019-03-12 RX ADMIN — ALLOPURINOL 100 MG: 100 TABLET ORAL at 09:37

## 2019-03-12 RX ADMIN — Medication 10 ML: at 09:37

## 2019-03-12 RX ADMIN — ASPIRIN 81 MG: 81 TABLET ORAL at 09:37

## 2019-03-12 RX ADMIN — HYDRALAZINE HYDROCHLORIDE 25 MG: 25 TABLET, FILM COATED ORAL at 09:36

## 2019-03-12 RX ADMIN — INSULIN GLARGINE 20 UNITS: 100 INJECTION, SOLUTION SUBCUTANEOUS at 09:43

## 2019-03-12 RX ADMIN — BUMETANIDE 2 MG: 1 TABLET ORAL at 09:37

## 2019-03-12 NOTE — DISCHARGE SUMMARY
Discharge Summary    Dulce Sommers  :    MRN:  [id number]    Admit date:  3/8/2019  Discharge date:  3/12/2019 8:27 AM    Admitting Physician:  Christy Moore MD    Discharge Diagnoses:    Patient Active Problem List    Diagnosis Date Noted    Ischemic cardiomyopathy 2013     Priority: High    Acute respiratory failure with hypoxia ([facility] 2019    Decompensated COPD with exacerbation (chronic obstructive pulmonary disease) ([facility] 2019    ICD (implantable cardioverter-defibrillator) in place     Cardiomyopathy ([facility] 2019    Hypomagnesemia     Anticoagulated on Coumadin     AICD discharge 2019    Tricuspid regurgitation 2018    Chronic kidney disease 2018    Chronic systolic HF (heart failure) ([facility] 2018    Transition of care performed with sharing of clinical summary 11/15/2017    Dual implantable cardioverter-defibrillator in situ 2013    Ulnar nerve neuropathy 2013    PAF (paroxysmal atrial fibrillation) ([facility] 2013    Mitral regurgitation 2013    AI (aortic insufficiency) 2013    Hyperlipidemia 2013    DM (diabetes mellitus) ([facility] 2013    Coronary atherosclerosis of native coronary artery 2013    GERD (gastroesophageal reflux disease) 2013    Depression 2013    Hypothyroid 2013       Past Medical Hx :   Past Medical History:   Diagnosis Date    Acid reflux     Acute MI ([facility] 97,    Anxiety     CAD (coronary artery disease)     follows w Dr. Ori Diaz Diabetes mellitus ([facility]     Fluid retention     history of    Hyperlipidemia     Hypertension     Ischemic cardiomyopathy     Osteoarthritis     Post PTCA     Systolic dysfunction, left ventricle     follows with Dr. Ori Diaz Thyroid disease        Past Surgical Hx :   Past Surgical History:   Procedure Laterality Date    CARDIAC DEFIBRILLATOR PLACEMENT Left     with pacemaker ([employer])    CORONARY ANGIOPLASTY      CORONARY ARTERY BYPASS GRAFT      DIAGNOSTIC CARDIAC CATH LAB PROCEDURE  97,    Critical lesion mid left circumflex,significant lesion mid-LAD and first diagonal    ECHO COMPL W DOP COLOR FLOW  2013         ECHO COMPL W DOP COLOR FLOW  2013         MITRAL VALVE SURGERY      TRANSESOPHAGEAL ECHOCARDIOGRAM  2013    Dr. Isaias Ron    TRANSESOPHAGEAL ECHOCARDIOGRAM  2017       Admission Condition:  fair    Discharged Condition:  good    Labs:  CBC:   Lab Results   Component Value Date    WBC 7.4 2019    RBC 4.11 2019    HGB 11.8 2019    HCT 37.4 2019    MCV 91.0 2019    MCH 28.7 2019    MCHC 31.6 2019    RDW 15.3 2019     2019    MPV 11.0 2019     Hemoglobin/Hematocrit:    Lab Results   Component Value Date    HGB 11.8 2019    HCT 37.4 2019     CMP:    Lab Results   Component Value Date     2019    K 3.7 2019    K 4.5 2019    CL 97 2019    CO2 27 2019    BUN 53 2019    CREATININE 1.5 2019    GFRAA 56 2019    LABGLOM 46 2019    GLUCOSE 156 2019    PROT 8.5 2019    LABALBU 3.9 2019    CALCIUM 8.0 2019    BILITOT 0.7 2019    ALKPHOS 160 2019    AST 37 2019    ALT 21 2019        Radiology Results: Xr Chest Portable    Result Date: 3/8/2019  Patient MRN:  [id number] : 1945 Age: 72 years Gender: Male Order Date:  3/8/2019 2:00 PM EXAM: XR CHEST PORTABLE INDICATION:  SOB SOB COMPARISON: 2019 FINDINGS:  Heart size mildly enlarged. There is been sternotomy and left-sided pacer. Right lower lobe infiltrate, small right effusion and elevation right hemidiaphragm are unchanged. There are no left-sided infiltrates.      No interval change       Hospital Course:     Admitted with pneumonia- and acute hypoxic respiratory failure  He was found to had human meta-pneumovirus  He was followed by cardiology as well as pulmonary medicine  He became independent of oxygen support this AM  He is ambulated  He was warfarin toxic he will need to restart when INR is appropriate    Discharge Medications:    Cinthia Ackerman   Home Medication Instructions UIA:[id number]    Printed on:03/12/19 0863   Medication Information                      albuterol-ipratropium (COMBIVENT)  MCG/ACT inhaler  Inhale 2 puffs into the lungs every 12 hours. allopurinol (ZYLOPRIM) 100 MG tablet  Take 1 tablet by mouth daily             Ascorbic Acid (VITAMIN C) 1000 MG tablet  Take 2,000 mg by mouth daily             aspirin 81 MG EC tablet  Take 81 mg by mouth daily Prescribed per Dr Sanchez Diaz             atorvastatin (LIPITOR) 80 MG tablet  Take 80 mg by mouth daily             budesonide (PULMICORT) 0.25 MG/2ML nebulizer suspension  Take 2 mLs by nebulization 2 times daily             bumetanide (BUMEX) 2 MG tablet  Take 1 tablet by mouth See Admin Instructions 2mg in am and 1mg in afternoon. Dr Ricardo Liu reduced dose 2/13/19.              colchicine (COLCRYS) 0.6 MG tablet  Take 0.6 mg by mouth 2 times daily as needed              hydrALAZINE (APRESOLINE) 25 MG tablet  Take 1 tablet by mouth 2 times daily             insulin glargine (LANTUS) 100 UNIT/ML injection vial  Inject 20 Units into the skin every morning             levothyroxine (SYNTHROID) 75 MCG tablet  Take 1 tablet by mouth Daily             linagliptin (TRADJENTA) 5 MG tablet  Take 5 mg by mouth daily             Magnesium 400 MG TABS  Take 400 mg by mouth daily             metoprolol succinate (TOPROL XL) 50 MG extended release tablet  Take 1 tablet by mouth daily             Multiple Vitamins-Minerals (THERAPEUTIC MULTIVITAMIN-MINERALS) tablet  Take 1 tablet by mouth daily             omeprazole (PRILOSEC) 20 MG capsule  Take 20 mg by mouth daily              potassium chloride (KLOR-CON M) 20 MEQ extended release

## 2019-03-12 NOTE — PROGRESS NOTES
Advanced Heart Failure and Pulmonary Hypertension  Inpatient Progress Note          CC/ID: Mr. Caitlin Manzano is a 72year old male with a PMHx ischemic CM, ICD in place, acute on chronic HFrEF, frequent dietary indiscretion. Presented from CHF infusion clinic with shortness of breath, cough and congestion. 24-hour events/subjective:  -no acute events overnight  -ongoing subjective improvement  -warm and well perfused.   -good appetite      Telemetry:  Sinus        Inotropes, Pressors, and Intravenous Therapy:  None           Physical Exam:    BP 91/63   Pulse 83   Temp 98.2 °F (36.8 °C) (Temporal)   Resp 18   Ht 6' 2\" (1.88 m)   Wt 189 lb (85.7 kg)   SpO2 96%   BMI 24.27 kg/m²   Wt Readings from Last 3 Encounters:   03/08/19 189 lb (85.7 kg)   03/08/19 190 lb 11.2 oz (86.5 kg)   03/05/19 196 lb (88.9 kg)       Appearance: Awake, alert, no acute respiratory distress  Skin: Intact, no rash  Head: Normocephalic, atraumatic  Eyes: EOMI, no conjunctival erythema, anicteric sclerae  ENMT: No pharyngeal erythema, MMM, no rhinorrhea  Neck: Soft, supple, no JVD  Lungs: diminished with scattered end expiratory wheezes to auscultation bilaterally. No wheezes, rales, or rhonchi. Cardiac: Regular rate and rhythm, +S1S2, no murmurs apparent  Abdomen: Soft, nontender, +bowel sounds  Extremities: Moves all extremities x 4, no lower extremity edema. Warm and well perfused.    Neurologic: No focal motor deficits apparent, normal mood and affect  Peripheral Pulses: Intact posterior tibial pulses bilaterally            Laboratory Tests:    Lab Results   Component Value Date    CREATININE 1.5 (H) 03/11/2019    BUN 53 (H) 03/11/2019     03/11/2019    K 3.7 03/11/2019    CL 97 (L) 03/11/2019    CO2 27 03/11/2019     Lab Results   Component Value Date    MG 1.6 01/31/2019     Lab Results   Component Value Date    WBC 7.4 03/08/2019    HGB 11.8 (L) 03/08/2019    HCT 37.4 03/08/2019    MCV 91.0 03/08/2019     03/08/2019     Lab Results   Component Value Date    CKTOTAL 25 (L) 05/14/2013    CKMB 0.5 05/14/2013    TROPONINI 0.11 (H) 03/08/2019     Lab Results   Component Value Date    INR 5.0 03/12/2019    INR 7.0 (HH) 03/11/2019    INR 4.9 03/10/2019    PROTIME 55.0 (H) 03/12/2019    PROTIME 78.2 (H) 03/11/2019    PROTIME 55.2 (H) 03/10/2019     Lab Results   Component Value Date    ALT 21 03/08/2019    AST 37 03/08/2019    ALKPHOS 160 (H) 03/08/2019    BILITOT 0.7 03/08/2019     Lab Results   Component Value Date    TSH 4.170 03/12/2018     Lab Results   Component Value Date    LABA1C 6.3 (H) 03/09/2019     Lab Results   Component Value Date    CHOL 85 12/02/2017    CHOL 215 (H) 04/09/2013     Lab Results   Component Value Date    TRIG 87 12/02/2017    TRIG 202 (H) 04/09/2013     Lab Results   Component Value Date    HDL 20 12/02/2017    HDL 32.4 (A) 04/09/2013     Lab Results   Component Value Date    LDLCALC 48 12/02/2017    LDLCALC 142 (H) 04/09/2013     Lab Results   Component Value Date    LABVLDL 17 12/02/2017       Current Medications:  Current Facility-Administered Medications   Medication Dose Route Frequency Provider Last Rate Last Dose    predniSONE (DELTASONE) tablet 10 mg  10 mg Oral BID Ras Singh MD   10 mg at 03/11/19 2213    budesonide (PULMICORT) nebulizer suspension 250 mcg  0.25 mg Nebulization BID Kady Jorgensen MD   250 mcg at 03/12/19 0826    hydrALAZINE (APRESOLINE) tablet 25 mg  25 mg Oral BID Hildegard Carrel, MD   25 mg at 03/11/19 0949    doxycycline hyclate (VIBRAMYCIN) capsule 100 mg  100 mg Oral 2 times per day Ras Singh MD   100 mg at 03/11/19 2213    warfarin (COUMADIN) daily dosing (placeholder)   Other RX Placeholder Ras Singh MD        allopurinol (ZYLOPRIM) tablet 100 mg  100 mg Oral Daily Ras Singh MD   100 mg at 03/11/19 0948    aspirin EC tablet 81 mg  81 mg Oral Daily Ras Singh MD   81 mg at 03/11/19 0949    atorvastatin (LIPITOR)  glucagon (rDNA) injection 1 mg  1 mg Intramuscular PRN Jayson Healy MD        dextrose 5 % solution  100 mL/hr Intravenous PRN Jayson Healy MD        insulin lispro (HUMALOG) injection vial 0-12 Units  0-12 Units Subcutaneous TID WC Jayson Healy MD   Stopped at 03/11/19 1200    insulin lispro (HUMALOG) injection vial 0-6 Units  0-6 Units Subcutaneous Nightly Jayson Healy MD   1 Units at 03/11/19 2229    ipratropium-albuterol (DUONEB) nebulizer solution 1 ampule  1 ampule Inhalation Q4H WA Jayson Healy MD   1 ampule at 03/12/19 0826    bumetanide (BUMEX) tablet 1 mg  1 mg Oral QPM Jayson Healy MD   1 mg at 03/11/19 1939      dextrose       DIAGNOSTICS:      CXR (1/31/2019)  1. Stable, enlarged cardiomediastinal silhouette with underlying  pulmonary edema and thoracic aortic vascular calcifications. 2. Bibasilar airspace disease could be seen in pneumonia and/or  atelectasis with right pleural effusion. CARDIAC TESTING:    TTE (7/22/2017, Dr. Rob Joseph)  [facility] aortic regurgitation   Ejection fraction Kelly biplane = 38%   Atrial fibrillation   S/P MVr with ring   Moderate primarily central directed mitral regurgitation   Moderate aortic regurgitation   AR SZE=540 ms   Pulmonary hypertension is mild. NNEKA (8/2/2017, Dr. Lorna Groves)  [address] fraction is visually estimated at 35%.   Inferolateral/inferior hypokinesis.   Rakel right ventricle size with low normal right ventricular function.   Moderately dilated left atrium.   History of oversewing of ROBER. There is color flow from the LA into the   ROBER. No evidence of a left atrial appendage thrombus.   No evidence of interatrial shunting on bubble study.   History of mitral annular ring (30mm Future Ring)   MV mean gradient 3.4 mmHg.   Poor coaptation of MV leaflets.   Severe mitral regurgitation.   Moderate aortic regurgitation.   Moderate tricuspid regurgitation.    RV-RA gradient is estimated at 35 mmHg.  [place] pulmonic regurgitation. RHC (1/17/2019)  Hemodynamic Data:   RA (a/v/m): 10/11/10  RV (a/v/m): 50/3/12  PA (a/v/m): 55/29/38  PCWP (PAOP) (a/v/m): 25/35/27  PCWP (PAOP) (a/v/m): 26/35/28  PCWP (PAOP) (a/v/m): 36/36/29  Cuff blood pressure: 110/72/87  PA O2 saturation: 44 %   SA O2 saturation: 94 %  Hemoglobin: 12.5 g/dL  BSA: 2.14 m2  JG THERMODILUTION   Stroke volume (mls) 42.32 66.56   Cardiac Output (l/min) 2.92 4.64   Cardiac Output Index (l/min/m2) 1.4 2.2   Calculations using Jg CO [facility]):   Cardiac power output () = 0.57  Pulmonary artery pulsatility index (Jennifer) = 2.6  DPG 2  TPG 11  PVR 3.66 WIGGINS  PVR Index  SVR 2053  SVR Index  Impression:  1. Low Pa sat  2. Low cardiac indices  3. Elevated biventricular filling pressures  4. Severely elevated systemic vascular resistance  5. Ambulatory cardiogenic shock, NYHA FC IV  6. Low   Recommendations:   1. Oral afterload reducing agents  2. Close FU. ASSESSMENT:  1. Acute on chronic respiratory failure in the setting of meta pneumovirus  2. COPD exacerbation  3. Chronic HFrEF - clinically euvolemic   4. ACC stage C / NYHA class III  5. Ischemic cardiomyopathy  6. LVEF 35%, LVEDD 58  7. CAD s/p CABG  8. ICD in situ  8. PAF - on coumadin - supra therapeutic INR  10. CKD  11. Known dietary indiscretion  12. Depression / anxiety       PLAN:  1. Hydralazine was added this admission, can consider stopping to allow for room in titration of entresto. 2. Continue oral bumex  3. CardioMEM submitted but is currently on hold during anemia work up at [place]  4. Holding coumadin with close follow up at [facility] (manages coumadin)  5. Follow up closely in the ADHF clinic, as previously schedule on friday          Tamy HARDY-CNP  Advanced Heart Failure and Pulmonary Hypertension  [facility] Cardiology.

## 2019-03-12 NOTE — PROGRESS NOTES
Pharmacy Consultation Note  (Anticoagulant Dosing and Monitoring)    Initial consult date: 3/8/18  Consulting physician: Dr. Scott Lorenzo    Allergies:  Patient has no known allergies. Ht Readings from Last 1 Encounters:   03/08/19 6' 2\" (1.88 m)     Wt Readings from Last 1 Encounters:   03/08/19 189 lb (85.7 kg)         Warfarin Indication Target   INR Range Home   Dose  (if applicable) Diet/Feeding Tube   Afib with AICD placement 2-3 5 mg on MWF and Sun  AND   2.5 mg on Tues, Thurs, and Sat Carb control (3/8)       Vitamin K or Blood product  Administration Date                 Warfarin drug-drug interactions  Start  Stop Home Med? Comments   Doxycycline 100 mg IV q 12hr  3/8  N Increases INR   Levothyroxine 75 mcg daily   Y TSH was normal in March   Methylprednisolone/prednisone 3/8  N May increase INR           TSH:    Lab Results   Component Value Date    TSH 4.170 03/12/2018        Hepatic Function Panel:                            Lab Results   Component Value Date    ALKPHOS 160 03/08/2019    ALT 21 03/08/2019    AST 37 03/08/2019    PROT 8.5 03/08/2019    BILITOT 0.7 03/08/2019    LABALBU 3.9 03/08/2019       Date Warfarin Dose INR Heparin or LMWH HBG/  HCT PLT Comment   3/8  (Fri) 2.5 mg 2.2 -- 11.8/37.4 216    3/9  (Sat) 2.5 mg  3 -- -- --    3/10  (Sun) HOLD 4.9 -- -- --    3/11  (Mon) HOLD 7 -- -- --    3/12 HOLD 5 -- -- --      Assessment and Plan:  · 72 yom with PMH of afib with AICD placement. Patient is on Warfarin 5 mg on MWF and Sunday AND Warfarin 2.5 mg on Tues, Thurs, and Sat. INR goal is 2-3. Admission INR was 2.2. · Patient was admitted on 3/8 after CHF clinic visit where he received a dose of IV Bumex for volume overload - per current Cardiology progress notes, patient is now euvolemic.   · Received a lower dose (compared to home dose) of warfarin on Friday (3/8) and the warfarin dose was then held on Sunday (3/10), however INR continues to increase   · Increase in INR may be due to the following: initial volume overload on 3/8 per progress notes (euvolemic now); new drug interactions with doxycycline and corticosteroids both started on 3/8 (can increase INR)  · INR today 5    Plan:  · Continue to hold warfarin tonight   · If patient is discharged today, recommend checking an INR by Wednesday at the latest.  Per progress notes, patient also has an appointment with Dr. Isaiah Spangler on Friday 3/14 - recommend to check INR at this appointment also. · If patient will continue doxycycline and corticosteroids after discharge, will need to initiate warfarin at a lower dose compared to home dose (difficult to predict dosing at this time, but a dose less than 2.5 mg daily may be needed, based on current INR response) when it is ok to restart warfarin (when INR is 3 or less). · Daily PT/INR until the INR is stable within the therapeutic range while patient remains at [facility]. · Pharmacist will follow and monitor/adjust dosing as necessary while patient remains at [facility].     Norman Alanis, PharmD-PGY1 3/12/2019 11:24 AM   Pager: [phone]

## 2019-03-12 NOTE — PROGRESS NOTES
[facility]  Division of Pulmonary, Critical Care Medicine  Pulmonary [address] [facility]       Pulmonary Progress Note         Patient: Bison Loghill  MRN: [id number]  : 1945      Date of Admission: .3/8/2019  1:44 PM    Consulting Physician:Dr Tyson Lopez         Reason for Consultation:COPD exacerbation   CC : SOB . HPI:   Doing much better  Denies any fever or chills  No chest Pain   O2 remain above 90 on ambulation    PHYSICAL EXAMINATION:     VITAL SIGNS:  BP 91/63   Pulse 83   Temp 98.2 °F (36.8 °C) (Temporal)   Resp 18   Ht 6' 2\" (1.88 m)   Wt 189 lb (85.7 kg)   SpO2 96%   BMI 24.27 kg/m²   Wt Readings from Last 3 Encounters:   19 189 lb (85.7 kg)   19 190 lb 11.2 oz (86.5 kg)   19 196 lb (88.9 kg)     Temp Readings from Last 3 Encounters:   19 98.2 °F (36.8 °C) (Temporal)   19 97.9 °F (36.6 °C) (Temporal)   19 97.7 °F (36.5 °C)     TMAX:  BP Readings from Last 3 Encounters:   19 91/63   19 (!) 92/56   19 (!) 92/56     Pulse Readings from Last 3 Encounters:   19 83   19 86   19 82           INTAKE/OUTPUTS:  I/O last 3 completed shifts:   In: 1860 [P.O.:1860]  Out: 0     Intake/Output Summary (Last 24 hours) at 3/12/2019 1102  Last data filed at 3/11/2019 2200  Gross per 24 hour   Intake 1380 ml   Output 0 ml   Net 1380 ml       General Appearance: alert and oriented to person, place and time, well-developed and   well-nourished, in no acute distress   Eyes: pupils equal, round, and reactive to light, extraocular eye movements intact, conjunctivae normal and sclera anicteric   Neck: neck supple and non tender without mass, no thyromegaly, no thyroid nodules and no cervical adenopathy   Pulmonary/Chest:wheeizng and rhonchi   Cardiovascular: normal rate, regular rhythm, normal S1 and S2, no murmurs, rubs, clicks or gallops, distal pulses intact, no carotid bruits, no murmurs, no gallops, no carotid bruits and no JVD   Abdomen: obese, soft, non-tender, non-distended, normal bowel sounds, no masses or organomegaly   Extremities:mild edema   Musculoskeletal: normal range of motion, no joint swelling, deformity or tenderness   Neurologic: reflexes normal and symmetric, no cranial nerve deficit noted    LABS/IMAGING:    CBC:  Lab Results   Component Value Date    WBC 7.4 03/08/2019    HGB 11.8 (L) 03/08/2019    HCT 37.4 03/08/2019    MCV 91.0 03/08/2019     03/08/2019    LYMPHOPCT 16.0 (L) 03/08/2019    RBC 4.11 03/08/2019    MCH 28.7 03/08/2019    MCHC 31.6 (L) 03/08/2019    RDW 15.3 (H) 03/08/2019    NEUTOPHILPCT 68.6 03/08/2019    MONOPCT 11.7 03/08/2019    BASOPCT 0.7 03/08/2019    NEUTROABS 5.07 03/08/2019    LYMPHSABS 1.18 (L) 03/08/2019    MONOSABS 0.86 03/08/2019    EOSABS 0.20 03/08/2019    BASOSABS 0.05 03/08/2019       Recent Labs     03/08/19  1351   WBC 7.4   HGB 11.8*   HCT 37.4   MCV 91.0          BMP:   Recent Labs     03/11/19  1055      K 3.7   CL 97*   CO2 27   BUN 53*   CREATININE 1.5*       MG:   Lab Results   Component Value Date    MG 1.6 01/31/2019     Ca/Phos:   Lab Results   Component Value Date    CALCIUM 8.0 (L) 03/11/2019    PHOS 3.0 04/10/2013     Amylase: No results found for: AMYLASE  Lipase: No results found for: LIPASE  LIVER PROFILE:   No results for input(s): AST, ALT, LIPASE, BILIDIR, BILITOT, ALKPHOS in the last 72 hours. Invalid input(s): AMYLASE,  ALB    PT/INR:   Recent Labs     03/10/19  0454 03/11/19  0453 03/12/19  0434   PROTIME 55.2* 78.2* 55.0*   INR 4.9 7.0* 5.0     APTT:   No results for input(s): APTT in the last 72 hours.     Cardiac Enzymes:  Lab Results   Component Value Date    CKTOTAL 25 (L) 05/14/2013    CKMB 0.5 05/14/2013    TROPONINI 0.11 (H) 03/08/2019       Hgb A1C:   Lab Results   Component Value Date    LABA1C 6.3 (H) 03/09/2019     No results found for: EAG  GINNA: No results found for: GINNA  ESR: No results found for: SEDRATE  CRP: No results found for: CRP  D Dimer:   Lab Results   Component Value Date    DDIMER 351 04/09/2013       Thyroid Studies:  Lab Results   Component Value Date    TSH 4.170 03/12/2018    Y7NSIFG 6.1 05/13/2013               PROBLEM LIST:  Patient Active Problem List   Diagnosis    Hyperlipidemia    DM [facility]di[facility] mellitus[facility]    Coronary atherosclerosis of native coronary artery    GERD (gastroesophageal reflux disease)    Depression    Hypothyroid    Ischemic cardiomyopathy    Mitral regurgitation    AI (aortic insufficiency)    [facility]ox[facility] fibrillation[facility]    Ulnar nerve neuropathy    Dual implantable cardioverter-defibrillator in situ    Tricuspid regurgitation    Chronic kidney disease    Chronic systolic HF (heart failure) ([facility]    Transition of care performed with sharing of clinical summary    AICD discharge    [facility]    Hypomagnesemia    Anticoagulated on Coumadin    ICD (implantable cardioverter-defibrillator) in place    Acute respiratory failure with hypoxia ([facility]    Decompensated COPD with exacerbation (chronic obstructive pulmonary disease) ([facility])               ASSESSMENT:  1.) Acute Hypoxic respiratory failure   2.)A fib   3. )HMTP tracheobronchitis   4. )Chronic HFrEF with EF 35% and LVEDD 58   5. )COPD exacerbation       PLAN:  *-On Bumex as per card  *-Agree with Prednisone 20 mg PO daily   *- Doxy X 7 days  *-wean Fio2  *_BD     *_Incentive Spirometery   *- BD  *- ICS   Need work up as OP for COPD ,inlude CT chest and PFT   *- will follow along  Coagulopathy as Per Primary and cardiology ,same for ACS work up      Thank you Dr Jesus Bradshaw  for allowing me to participate in the care of this pleasant patient , should you have any questions ,please do not hesitate to contact me    Tati Iraheta MD  Pulmonary/Critical care [address]         NOTE: This report was transcribed using voice recognition software. Every effort was made to ensure accuracy; however, inadvertent computerized transcription errors may be present.

## 2019-03-13 ENCOUNTER — TELEPHONE (OUTPATIENT)
Dept: CARDIOLOGY CLINIC | Age: 74
End: 2019-03-13

## 2019-03-13 LAB
BLOOD CULTURE, ROUTINE: NORMAL
CULTURE, BLOOD 2: NORMAL
EKG ATRIAL RATE: 90 BPM
EKG Q-T INTERVAL: 418 MS
EKG QRS DURATION: 112 MS
EKG QTC CALCULATION (BAZETT): 482 MS
EKG R AXIS: -66 DEGREES
EKG T AXIS: 102 DEGREES
EKG VENTRICULAR RATE: 80 BPM

## 2019-03-14 ENCOUNTER — APPOINTMENT (OUTPATIENT)
Dept: OTHER | Age: 74
DRG: 193 | End: 2019-03-14
Payer: MEDICARE

## 2019-03-15 ENCOUNTER — OFFICE VISIT (OUTPATIENT)
Dept: CARDIOLOGY CLINIC | Age: 74
End: 2019-03-15
Payer: MEDICARE

## 2019-03-15 ENCOUNTER — ANTI-COAG VISIT (OUTPATIENT)
Dept: CARDIOLOGY CLINIC | Age: 74
End: 2019-03-15
Payer: MEDICARE

## 2019-03-15 VITALS
HEIGHT: 74 IN | TEMPERATURE: 97.6 F | SYSTOLIC BLOOD PRESSURE: 100 MMHG | BODY MASS INDEX: 24.05 KG/M2 | OXYGEN SATURATION: 97 % | DIASTOLIC BLOOD PRESSURE: 52 MMHG | WEIGHT: 187.4 LBS | HEART RATE: 86 BPM | RESPIRATION RATE: 16 BRPM

## 2019-03-15 DIAGNOSIS — I48.0 PAF (PAROXYSMAL ATRIAL FIBRILLATION) (HCC): Primary | ICD-10-CM

## 2019-03-15 DIAGNOSIS — I48.91 ATRIAL FIBRILLATION, UNSPECIFIED TYPE (HCC): Primary | ICD-10-CM

## 2019-03-15 DIAGNOSIS — I10 ESSENTIAL HYPERTENSION: ICD-10-CM

## 2019-03-15 LAB
INTERNATIONAL NORMALIZATION RATIO, POC: 1.6
PROTHROMBIN TIME, POC: NORMAL

## 2019-03-15 PROCEDURE — 93000 ELECTROCARDIOGRAM COMPLETE: CPT | Performed by: INTERNAL MEDICINE

## 2019-03-15 PROCEDURE — 85610 PROTHROMBIN TIME: CPT | Performed by: INTERNAL MEDICINE

## 2019-03-15 PROCEDURE — 99213 OFFICE O/P EST LOW 20 MIN: CPT | Performed by: INTERNAL MEDICINE

## 2019-03-15 PROCEDURE — 93793 ANTICOAG MGMT PT WARFARIN: CPT | Performed by: INTERNAL MEDICINE

## 2019-03-15 RX ORDER — ALBUTEROL SULFATE 0.63 MG/3ML
1 SOLUTION RESPIRATORY (INHALATION) 2 TIMES DAILY
COMMUNITY
End: 2019-08-20

## 2019-03-15 RX ORDER — METOPROLOL SUCCINATE 50 MG/1
50 TABLET, EXTENDED RELEASE ORAL DAILY
Qty: 90 TABLET | Refills: 3 | Status: ON HOLD
Start: 2019-03-15 | End: 2020-01-01 | Stop reason: SDUPTHER

## 2019-03-15 RX ORDER — WARFARIN SODIUM 5 MG/1
TABLET ORAL
Status: ON HOLD | COMMUNITY
End: 2020-01-01 | Stop reason: SDUPTHER

## 2019-03-15 RX ORDER — HYDRALAZINE HYDROCHLORIDE 25 MG/1
25 TABLET, FILM COATED ORAL 2 TIMES DAILY
Qty: 60 TABLET | Refills: 11 | Status: SHIPPED | OUTPATIENT
Start: 2019-03-15 | End: 2019-06-19

## 2019-03-15 SDOH — HEALTH STABILITY: MENTAL HEALTH: HOW OFTEN DO YOU HAVE A DRINK CONTAINING ALCOHOL?: MONTHLY OR LESS

## 2019-03-15 SDOH — HEALTH STABILITY: MENTAL HEALTH: HOW MANY STANDARD DRINKS CONTAINING ALCOHOL DO YOU HAVE ON A TYPICAL DAY?: 1 OR 2

## 2019-03-18 ENCOUNTER — TELEPHONE (OUTPATIENT)
Dept: CARDIOLOGY CLINIC | Age: 74
End: 2019-03-18

## 2019-03-19 ENCOUNTER — HOSPITAL ENCOUNTER (OUTPATIENT)
Dept: OTHER | Age: 74
Setting detail: THERAPIES SERIES
Discharge: HOME | End: 2019-03-19
Payer: MEDICARE

## 2019-03-19 VITALS
RESPIRATION RATE: 18 BRPM | HEART RATE: 96 BPM | WEIGHT: 189 LBS | SYSTOLIC BLOOD PRESSURE: 108 MMHG | BODY MASS INDEX: 24.27 KG/M2 | DIASTOLIC BLOOD PRESSURE: 62 MMHG

## 2019-03-19 LAB
ANION GAP SERPL CALCULATED.3IONS-SCNC: 15 MMOL/L (ref 7–16)
BUN BLDV-MCNC: 50 MG/DL (ref 8–23)
CALCIUM SERPL-MCNC: 9.2 MG/DL (ref 8.6–10.2)
CHLORIDE BLD-SCNC: 97 MMOL/L (ref 98–107)
CO2: 25 MMOL/L (ref 22–29)
CREAT SERPL-MCNC: 1.3 MG/DL (ref 0.7–1.2)
GFR AFRICAN AMERICAN: >60
GFR NON-AFRICAN AMERICAN: 54 ML/MIN/1.73
GLUCOSE BLD-MCNC: 207 MG/DL (ref 74–99)
POTASSIUM SERPL-SCNC: 4 MMOL/L (ref 3.5–5)
PRO-BNP: 2059 PG/ML (ref 0–125)
SODIUM BLD-SCNC: 137 MMOL/L (ref 132–146)

## 2019-03-19 PROCEDURE — 83880 ASSAY OF NATRIURETIC PEPTIDE: CPT

## 2019-03-19 PROCEDURE — 99214 OFFICE O/P EST MOD 30 MIN: CPT

## 2019-03-19 PROCEDURE — 36415 COLL VENOUS BLD VENIPUNCTURE: CPT

## 2019-03-19 PROCEDURE — 80048 BASIC METABOLIC PNL TOTAL CA: CPT

## 2019-04-04 ENCOUNTER — NURSE ONLY (OUTPATIENT)
Dept: NON INVASIVE DIAGNOSTICS | Age: 74
End: 2019-04-04
Payer: MEDICARE

## 2019-04-04 DIAGNOSIS — I48.0 PAF (PAROXYSMAL ATRIAL FIBRILLATION) (HCC): ICD-10-CM

## 2019-04-04 DIAGNOSIS — Z95.810 DUAL IMPLANTABLE CARDIOVERTER-DEFIBRILLATOR IN SITU: Primary | ICD-10-CM

## 2019-04-04 DIAGNOSIS — I25.5 ISCHEMIC CARDIOMYOPATHY: ICD-10-CM

## 2019-04-04 PROCEDURE — 93297 REM INTERROG DEV EVAL ICPMS: CPT | Performed by: INTERNAL MEDICINE

## 2019-04-04 PROCEDURE — 93296 REM INTERROG EVL PM/IDS: CPT | Performed by: INTERNAL MEDICINE

## 2019-04-04 PROCEDURE — 93295 DEV INTERROG REMOTE 1/2/MLT: CPT | Performed by: INTERNAL MEDICINE

## 2019-04-09 ENCOUNTER — HOSPITAL ENCOUNTER (OUTPATIENT)
Dept: OTHER | Age: 74
Setting detail: THERAPIES SERIES
Discharge: HOME | End: 2019-04-09
Payer: MEDICARE

## 2019-04-10 ENCOUNTER — HOSPITAL ENCOUNTER (OUTPATIENT)
Dept: OTHER | Age: 74
Setting detail: THERAPIES SERIES
Discharge: HOME | End: 2019-04-10
Payer: MEDICARE

## 2019-04-11 ENCOUNTER — HOSPITAL ENCOUNTER (OUTPATIENT)
Dept: OTHER | Age: 74
Setting detail: THERAPIES SERIES
Discharge: HOME | End: 2019-04-11
Payer: MEDICARE

## 2019-04-12 ENCOUNTER — HOSPITAL ENCOUNTER (OUTPATIENT)
Dept: OTHER | Age: 74
Setting detail: THERAPIES SERIES
Discharge: HOME | End: 2019-04-12
Payer: MEDICARE

## 2019-04-12 VITALS
RESPIRATION RATE: 18 BRPM | HEART RATE: 83 BPM | BODY MASS INDEX: 24.84 KG/M2 | DIASTOLIC BLOOD PRESSURE: 65 MMHG | WEIGHT: 193.5 LBS | SYSTOLIC BLOOD PRESSURE: 105 MMHG

## 2019-04-12 LAB
ANION GAP SERPL CALCULATED.3IONS-SCNC: 12 MMOL/L (ref 7–16)
BUN BLDV-MCNC: 25 MG/DL (ref 8–23)
CALCIUM SERPL-MCNC: 9.2 MG/DL (ref 8.6–10.2)
CHLORIDE BLD-SCNC: 101 MMOL/L (ref 98–107)
CO2: 28 MMOL/L (ref 22–29)
CREAT SERPL-MCNC: 1.8 MG/DL (ref 0.7–1.2)
GFR AFRICAN AMERICAN: 45
GFR NON-AFRICAN AMERICAN: 37 ML/MIN/1.73
GLUCOSE BLD-MCNC: 169 MG/DL (ref 74–99)
POTASSIUM SERPL-SCNC: 3.9 MMOL/L (ref 3.5–5)
PRO-BNP: 4744 PG/ML (ref 0–125)
SODIUM BLD-SCNC: 141 MMOL/L (ref 132–146)

## 2019-04-12 PROCEDURE — 36415 COLL VENOUS BLD VENIPUNCTURE: CPT

## 2019-04-12 PROCEDURE — 80048 BASIC METABOLIC PNL TOTAL CA: CPT

## 2019-04-12 PROCEDURE — 83880 ASSAY OF NATRIURETIC PEPTIDE: CPT

## 2019-04-12 PROCEDURE — 2500000003 HC RX 250 WO HCPCS: Performed by: INTERNAL MEDICINE

## 2019-04-12 PROCEDURE — 96374 THER/PROPH/DIAG INJ IV PUSH: CPT

## 2019-04-12 PROCEDURE — 99214 OFFICE O/P EST MOD 30 MIN: CPT

## 2019-04-12 RX ORDER — BUMETANIDE 0.25 MG/ML
2 INJECTION, SOLUTION INTRAMUSCULAR; INTRAVENOUS ONCE
Status: COMPLETED | OUTPATIENT
Start: 2019-04-12 | End: 2019-04-12

## 2019-04-12 RX ADMIN — BUMETANIDE 2 MG: 0.25 INJECTION INTRAMUSCULAR; INTRAVENOUS at 13:48

## 2019-04-12 NOTE — PROGRESS NOTES
Weight 189lb, pt c/o increased , trace BLE edema noted, Fine crackles noted to LLL,, labs drawn and IV Bumex 2 mg given. Follow up appointment made.

## 2019-04-15 ENCOUNTER — TELEPHONE (OUTPATIENT)
Dept: NON INVASIVE DIAGNOSTICS | Age: 74
End: 2019-04-15

## 2019-04-15 NOTE — PROGRESS NOTES
See [employer] [place] report. Remote monitoring reviewed over a 90 day period. End of 90 day monitoring period date of service 4-4-19.

## 2019-04-15 NOTE — TELEPHONE ENCOUNTER
We have received your remote transmission. Our staff will contact you if there is anything that needs to be discussed. Your next appointment is 07/05/2019 remote transmission from home    Pt is wireless.

## 2019-04-23 ENCOUNTER — HOSPITAL ENCOUNTER (OUTPATIENT)
Dept: OTHER | Age: 74
Setting detail: THERAPIES SERIES
Discharge: HOME | End: 2019-04-23
Payer: MEDICARE

## 2019-04-23 VITALS
SYSTOLIC BLOOD PRESSURE: 102 MMHG | DIASTOLIC BLOOD PRESSURE: 60 MMHG | BODY MASS INDEX: 25.2 KG/M2 | WEIGHT: 196.3 LBS | HEART RATE: 83 BPM | RESPIRATION RATE: 18 BRPM

## 2019-04-23 LAB
ANION GAP SERPL CALCULATED.3IONS-SCNC: 10 MMOL/L (ref 7–16)
BUN BLDV-MCNC: 41 MG/DL (ref 8–23)
CALCIUM SERPL-MCNC: 9.4 MG/DL (ref 8.6–10.2)
CHLORIDE BLD-SCNC: 101 MMOL/L (ref 98–107)
CO2: 27 MMOL/L (ref 22–29)
CREAT SERPL-MCNC: 2.2 MG/DL (ref 0.7–1.2)
GFR AFRICAN AMERICAN: 36
GFR NON-AFRICAN AMERICAN: 30 ML/MIN/1.73
GLUCOSE BLD-MCNC: 128 MG/DL (ref 74–99)
POTASSIUM SERPL-SCNC: 4.6 MMOL/L (ref 3.5–5)
PRO-BNP: 1819 PG/ML (ref 0–125)
SODIUM BLD-SCNC: 138 MMOL/L (ref 132–146)

## 2019-04-23 PROCEDURE — 36415 COLL VENOUS BLD VENIPUNCTURE: CPT

## 2019-04-23 PROCEDURE — 2580000003 HC RX 258: Performed by: INTERNAL MEDICINE

## 2019-04-23 PROCEDURE — 96374 THER/PROPH/DIAG INJ IV PUSH: CPT

## 2019-04-23 PROCEDURE — 80048 BASIC METABOLIC PNL TOTAL CA: CPT

## 2019-04-23 PROCEDURE — 83880 ASSAY OF NATRIURETIC PEPTIDE: CPT

## 2019-04-23 PROCEDURE — 99214 OFFICE O/P EST MOD 30 MIN: CPT

## 2019-04-23 PROCEDURE — 2500000003 HC RX 250 WO HCPCS: Performed by: INTERNAL MEDICINE

## 2019-04-23 RX ORDER — SODIUM CHLORIDE 0.9 % (FLUSH) 0.9 %
10 SYRINGE (ML) INJECTION 2 TIMES DAILY
Status: DISCONTINUED | OUTPATIENT
Start: 2019-04-23 | End: 2019-04-24 | Stop reason: HOSPADM

## 2019-04-23 RX ORDER — BUMETANIDE 0.25 MG/ML
2 INJECTION, SOLUTION INTRAMUSCULAR; INTRAVENOUS ONCE
Status: COMPLETED | OUTPATIENT
Start: 2019-04-23 | End: 2019-04-23

## 2019-04-23 RX ADMIN — BUMETANIDE 2 MG: 0.25 INJECTION INTRAMUSCULAR; INTRAVENOUS at 13:22

## 2019-04-23 RX ADMIN — Medication 10 ML: at 13:22

## 2019-04-23 NOTE — PROGRESS NOTES
Weight today 196lb, breath sounds diminished bilat, trace amt of edema noted BLE, Pt c/o feeling bloated, abd softly distended with bowel sounds  Present all quads. Labs drawn and iv Bumex given. Follow up appt. Made.

## 2019-04-24 DIAGNOSIS — I50.22 CHRONIC SYSTOLIC HEART FAILURE (HCC): Primary | ICD-10-CM

## 2019-05-08 ENCOUNTER — HOSPITAL ENCOUNTER (OUTPATIENT)
Age: 74
Discharge: HOME | End: 2019-05-08
Payer: MEDICARE

## 2019-05-08 DIAGNOSIS — I50.22 CHRONIC SYSTOLIC HEART FAILURE (HCC): ICD-10-CM

## 2019-05-08 LAB
ANION GAP SERPL CALCULATED.3IONS-SCNC: 12 MMOL/L (ref 7–16)
BUN BLDV-MCNC: 36 MG/DL (ref 8–23)
CALCIUM SERPL-MCNC: 9.4 MG/DL (ref 8.6–10.2)
CHLORIDE BLD-SCNC: 99 MMOL/L (ref 98–107)
CO2: 27 MMOL/L (ref 22–29)
CREAT SERPL-MCNC: 1.9 MG/DL (ref 0.7–1.2)
GFR AFRICAN AMERICAN: 42
GFR NON-AFRICAN AMERICAN: 35 ML/MIN/1.73
GLUCOSE BLD-MCNC: 169 MG/DL (ref 74–99)
POTASSIUM SERPL-SCNC: 4.3 MMOL/L (ref 3.5–5)
PRO-BNP: 1912 PG/ML (ref 0–125)
SODIUM BLD-SCNC: 138 MMOL/L (ref 132–146)

## 2019-05-08 PROCEDURE — 36415 COLL VENOUS BLD VENIPUNCTURE: CPT

## 2019-05-08 PROCEDURE — 80048 BASIC METABOLIC PNL TOTAL CA: CPT

## 2019-05-08 PROCEDURE — 83880 ASSAY OF NATRIURETIC PEPTIDE: CPT

## 2019-05-17 ENCOUNTER — HOSPITAL ENCOUNTER (OUTPATIENT)
Dept: OTHER | Age: 74
Setting detail: THERAPIES SERIES
Discharge: HOME | End: 2019-05-17
Payer: MEDICARE

## 2019-05-17 ENCOUNTER — OFFICE VISIT (OUTPATIENT)
Dept: CARDIOLOGY CLINIC | Age: 74
End: 2019-05-17
Payer: MEDICARE

## 2019-05-17 VITALS
DIASTOLIC BLOOD PRESSURE: 58 MMHG | HEIGHT: 74 IN | SYSTOLIC BLOOD PRESSURE: 100 MMHG | HEART RATE: 73 BPM | RESPIRATION RATE: 12 BRPM | WEIGHT: 198 LBS | BODY MASS INDEX: 25.41 KG/M2 | OXYGEN SATURATION: 97 %

## 2019-05-17 VITALS
DIASTOLIC BLOOD PRESSURE: 55 MMHG | BODY MASS INDEX: 25.4 KG/M2 | WEIGHT: 197.8 LBS | HEART RATE: 74 BPM | SYSTOLIC BLOOD PRESSURE: 94 MMHG | RESPIRATION RATE: 18 BRPM

## 2019-05-17 DIAGNOSIS — I25.5 ISCHEMIC CARDIOMYOPATHY: ICD-10-CM

## 2019-05-17 DIAGNOSIS — I51.9 RIGHT VENTRICULAR DYSFUNCTION: ICD-10-CM

## 2019-05-17 DIAGNOSIS — I50.9 CONGESTIVE HEART FAILURE, UNSPECIFIED HF CHRONICITY, UNSPECIFIED HEART FAILURE TYPE (HCC): Primary | ICD-10-CM

## 2019-05-17 LAB
ANION GAP SERPL CALCULATED.3IONS-SCNC: 13 MMOL/L (ref 7–16)
BUN BLDV-MCNC: 31 MG/DL (ref 8–23)
CALCIUM SERPL-MCNC: 9.4 MG/DL (ref 8.6–10.2)
CHLORIDE BLD-SCNC: 99 MMOL/L (ref 98–107)
CO2: 26 MMOL/L (ref 22–29)
CREAT SERPL-MCNC: 1.8 MG/DL (ref 0.7–1.2)
GFR AFRICAN AMERICAN: 45
GFR NON-AFRICAN AMERICAN: 37 ML/MIN/1.73
GLUCOSE BLD-MCNC: 131 MG/DL (ref 74–99)
POTASSIUM SERPL-SCNC: 4.6 MMOL/L (ref 3.5–5)
PRO-BNP: 1862 PG/ML (ref 0–125)
SODIUM BLD-SCNC: 138 MMOL/L (ref 132–146)

## 2019-05-17 PROCEDURE — 94621 CARDIOPULM EXERCISE TESTING: CPT | Performed by: INTERNAL MEDICINE

## 2019-05-17 PROCEDURE — 2500000003 HC RX 250 WO HCPCS: Performed by: INTERNAL MEDICINE

## 2019-05-17 PROCEDURE — 83880 ASSAY OF NATRIURETIC PEPTIDE: CPT

## 2019-05-17 PROCEDURE — 80048 BASIC METABOLIC PNL TOTAL CA: CPT

## 2019-05-17 PROCEDURE — 36415 COLL VENOUS BLD VENIPUNCTURE: CPT

## 2019-05-17 PROCEDURE — 96374 THER/PROPH/DIAG INJ IV PUSH: CPT

## 2019-05-17 PROCEDURE — 99215 OFFICE O/P EST HI 40 MIN: CPT | Performed by: INTERNAL MEDICINE

## 2019-05-17 PROCEDURE — 2580000003 HC RX 258: Performed by: INTERNAL MEDICINE

## 2019-05-17 PROCEDURE — 93000 ELECTROCARDIOGRAM COMPLETE: CPT | Performed by: INTERNAL MEDICINE

## 2019-05-17 PROCEDURE — 99214 OFFICE O/P EST MOD 30 MIN: CPT

## 2019-05-17 RX ORDER — BUMETANIDE 0.25 MG/ML
2 INJECTION, SOLUTION INTRAMUSCULAR; INTRAVENOUS ONCE
Status: COMPLETED | OUTPATIENT
Start: 2019-05-17 | End: 2019-05-17

## 2019-05-17 RX ORDER — SODIUM CHLORIDE 0.9 % (FLUSH) 0.9 %
10 SYRINGE (ML) INJECTION PRN
Status: DISCONTINUED | OUTPATIENT
Start: 2019-05-17 | End: 2019-05-18 | Stop reason: HOSPADM

## 2019-05-17 RX ORDER — VARENICLINE TARTRATE 1 MG/1
1 TABLET, FILM COATED ORAL 2 TIMES DAILY
COMMUNITY
End: 2019-11-08

## 2019-05-17 RX ADMIN — BUMETANIDE 2 MG: 0.25 INJECTION INTRAMUSCULAR; INTRAVENOUS at 10:31

## 2019-05-17 RX ADMIN — Medication 10 ML: at 10:32

## 2019-05-17 NOTE — PATIENT INSTRUCTIONS
1. Echocardiogram      2. Cardiopulmonary stress testing      3. Referral to cardiac rehabilitation      4.  Return visit in August 2019

## 2019-05-19 PROBLEM — I51.9 RIGHT VENTRICULAR DYSFUNCTION: Status: ACTIVE | Noted: 2019-05-19

## 2019-05-19 PROBLEM — J96.01 ACUTE RESPIRATORY FAILURE WITH HYPOXIA (HCC): Status: RESOLVED | Noted: 2019-03-08 | Resolved: 2019-05-19

## 2019-05-19 PROBLEM — Z45.02 AICD DISCHARGE: Status: RESOLVED | Noted: 2019-01-12 | Resolved: 2019-05-19

## 2019-05-19 PROBLEM — J44.1 DECOMPENSATED COPD WITH EXACERBATION (CHRONIC OBSTRUCTIVE PULMONARY DISEASE) (HCC): Status: RESOLVED | Noted: 2019-03-08 | Resolved: 2019-05-19

## 2019-05-19 NOTE — PROGRESS NOTES
Advanced Heart Failure and Pulmonary Hypertension Clinic  Follow up Visit         Reason for Visit: follow up for heart failure        Primary Care Physician: Irina Caro M.D. Primary Cardiologist: Raphael Dexter M.D.  HF cardiology: Tobi Wild M.D. History of Present Illness:   Tio Carreon is a 72year old, ischemic CM, CAD status post CABG x 2 times in 2013 and again 2017, AVR, MVR, ICD in place, chronic HFrEF, here in follow up. Please see detailed problem list below for details regarding above. I last saw him a couple months ago. Since then he denies any hospitalizations or ED visits. He continues to admit to dietary indiscretion, eating fast food at least 2-3 times a week. He is seen regularly in the CHF infusion clinic, most recently earlier today. His weight increased 2 lbs with increased SOB, he was given 2 mg IV bumetanide and has urinated since that time which was only a couple hours ago. With regular visits in the CHF infusion clinic and monitoring diet more so than before, with still above indiscretions, he has had ongoing down trend in proBNP with blood work. He also has stable BUN:Cr. He reports ongoing chronic but stable dyspnea with exertion, shortness of breath, or decline in overall functional capacity. He denies orthopnea, PND, nocturnal cough or hemoptysis. He denies abdominal distention or bloating, early satiety, anorexia/change in appetite, unintentional weight loss. He does lower extremity edema. He denies exertional lightheadedness. She denies palpitations, syncope or near syncope. Review of systems is negative for chest pain, pressure, discomfort. When ambulating on an incline, he/she reports/denies leg claudication. History is negative for neurological symptoms including transient loss of vision, asymmetric weakness, aphasia, dysphasia, numbness, tingling.            Patient Active Problem List    Diagnosis Date Noted    Right ventricular dysfunction 2019     Priority: High      LVEF: 29%  RVF: Mod, RVSP 76 mmHg, 2-3+MR/TR      Chronic systolic HF (heart failure) ([facility] 2018     Priority: High       RHC (10/2017, [facility]) BP: 122 / 76. BP Mean: 91. HR: 82 min. FiO2: 21.00%. Pulse Ox: 94.00%. CVP: 8 cm. Thermo CO: 3.97 l/min. Hg: 10.8. H2O = 6.2 mmHg. Thermo CI: 1.84 l/min/m2. TP. RA Mean: 8. Assumed Chan CO: 4.21 l/min. PVR: 3.0. RV: 76 / 12. Assumed Chan CI: 1.95 l/min/m2. SVR: 1679.0. PA: 72 / 34. PA Mean: 49.00. PCWP Mean: 36.0. SV: 48.41 cc/stroke. TS. SVI: 22.46 cc/stroke/m2. PCWP V Wave: 50.00. PAO2: 50.40%. LVSWI: 16.90 gm-m/m2/beat. RVSWI: 12.52 gm-m/m2/beat. RVSWI: 920.74 mmHg-ml/m2/beat. Impression: Pulmonary venous hypertension with prominent V waves borderline/mildly subnormal cardiac index       Ischemic cardiomyopathy 2013     Priority: High     Ejection fraction 30-35%. Ejection fraction 30% to 35% on cardiac catheterization, 04. Ejection fraction 44% on Gated SPECT Study 04    Echocardiogram(2013): LVEF 25 +/- 5%. Severe LV dilatation. EF = 19 ± 5% (2D biplane), 2017 ([facility])      Coronary atherosclerosis of native coronary artery 2013     Priority: High     -Acute posterior myocardial infarction (97). -LHC (97) Critical lesion mid left circumflex, significant lesion mid-LAD and first diagonal.    -PTCA (97) mid left circumflex. -LHC (00) Insignificant coronary atherosclerosis. -Acute inferior myocardial infarction (04). -LHC (04) patent angioplasty site of the left circumflex, total occlusion mid right coronary artery with otherwise unremarkable coronary artery disease. -PTCA and stent of right coronary artery (04). -Repeat LHC (04) Good angioplasty result of right coronary artery with minimal residual thrombus. -CABG x 3 (2013) LIMA-LAD, SVG-OM, SVG-PAD  -LHC (10/2017, [facility]) Severe disease of the SVG-PDA.  Patent LIMA-LAD and SVG-LCx. Trivial to mild AI with normal sized aorta. -Redo CABG (11/07/2017) SVG- to the PDA concomitant with valvular repairs.  Chronic kidney disease 03/09/2018     Priority: Medium     Stage 3   Baseline Cr - 1.7, GFR - 40                Dual implantable cardioverter-defibrillator in situ 09/24/2013     Priority: Medium     Upgrade of PPM to dual chamber ICD 9/12/13: Shannon WESLEY DR model [id number]; SN [id number]        PAF (paroxysmal atrial fibrillation) ([facility] 05/13/2013     Priority: Medium     Anticoagulated with coumadin   ROBER (2013)      Mitral regurgitation 04/12/2013     Priority: Medium     -s/p MVrp (2013) 30mm Future ring.   -s/p MVR (10/2017, [facility]) #29 Biocor   - Echo (12/18/2017, [facility]) Biocor prosthetic mitral valve (size #29). There is trivial mitral valve regurgitation. The peak gradient is 22 mmHg and the mean gradient is 8 mmHg. Prior gradients were 22/9 mmHg.  AI (aortic insufficiency) 04/12/2013     Priority: Medium     -Moderate AI  -S/p AVR with size #23 Trifecta valve (11/07/2017, [facility])  -Echo (12/2017, [facility]) There is trivial aortic valve regurgitation. The peak gradient is 9 mmHg, the mean gradient is 5 mmHg and the dimensionless valve index is 0.96. prior gradients were 15/8 mmHg.  DM (diabetes mellitus) ([facility] 04/09/2013     Priority: Medium     Hemoglobin A1C 6.730(11/12/2017, [facility])      Depression 04/09/2013     Priority: Medium    Hypothyroid 04/09/2013     Priority: Medium     TSH (11/12/2017, [facility]):  6.730 (H)  Free T4 (11/12/2017, [facility]):  0.9      Anticoagulated on Coumadin      Priority: Low    Tricuspid regurgitation 03/09/2018     Priority: Low     -Moderate TR  -s/p tricuspid valve repair with a size #30 CE ring. (11/07/2017, [facility])  - Echo (12/2017, [facility]) There is mild (1+ - 2+) tricuspid valve regurgitation. The peak gradient is 14 mmHg and the mean gradient is 5 mmHg.                 Ulnar nerve neuropathy 06/27/2013     Priority: Low    Hyperlipidemia 04/09/2013     Priority: Low    GERD (gastroesophageal reflux disease) 04/09/2013     Priority: Low           Past Medical History:   Diagnosis Date    Acid reflux     [facility] ([facility] 01/25/97,01/04    Anxiety     CAD (coronary artery disease)     follows w Dr. Brittney Yañez Diabetes mellitus ([facility]     Fluid retention     history of    Hyperlipidemia     Hypertension     Ischemic cardiomyopathy     Osteoarthritis     Post PTCA 68/24/37    Systolic dysfunction, left ventricle     follows with Dr. Brittney Yañez Thyroid disease            Past Surgical History:   Procedure Laterality Date    CARDIAC DEFIBRILLATOR PLACEMENT Left 2013    with pacemaker ([employer])    CORONARY ANGIOPLASTY      CORONARY ARTERY BYPASS GRAFT      DIAGNOSTIC CARDIAC CATH LAB PROCEDURE  01/27/97,11/00    Critical lesion mid left circumflex,significant lesion mid-LAD and first diagonal    ECHO COMPL W DOP COLOR FLOW  4/11/2013         ECHO COMPL W DOP COLOR FLOW  4/21/2013         MITRAL VALVE SURGERY      TRANSESOPHAGEAL ECHOCARDIOGRAM  4/12/2013    Dr. Nader Garcia TRANSESOPHAGEAL ECHOCARDIOGRAM  08/02/2017             No Known Allergies      Outpatient Medications Marked as Taking for the 5/17/19 encounter (Office Visit) with Kiki José MD   Medication Sig Dispense Refill    sacubitril-valsartan (ENTRESTO) 49-51 MG per tablet Entresto 49 mg-51 mg tablet   take one pill by mouth twice daily      varenicline (CHANTIX) 1 MG tablet Take 1 mg by mouth 2 times daily      albuterol (ACCUNEB) 0.63 MG/3ML nebulizer solution Take 1 ampule by nebulization 2 times daily      warfarin (COUMADIN) 5 MG tablet Take 5 mg by mouth Currently on hold since 3/10/19. Prior, was taking 5mg daily, with 2.5mg on Tues, Thurs & Sat.       metoprolol succinate (TOPROL XL) 50 MG extended release tablet Take 1 tablet by mouth daily 90 tablet 3    hydrALAZINE (APRESOLINE) 25 MG tablet Take 1 tablet by mouth 2 times daily 60 tablet 11    Respiratory Therapy Supplies (FULL KIT NEBULIZER SET) MISC Use as directed with nebulized medication. 1 each 0    bumetanide (BUMEX) 2 MG tablet Take 1 tablet by mouth See Admin Instructions 2mg in am and 1mg in afternoon. Dr Antwon Xie reduced dose 2/13/19. (Patient taking differently: Take by mouth See Admin Instructions 2mg in am and 1mg in afternoon. Dr Antwon Xie reduced dose 2/13/19.) 45 tablet 5    potassium chloride (KLOR-CON M) 20 MEQ extended release tablet Take 1 tablet by mouth 2 times daily 180 tablet 3    Magnesium 400 MG TABS Take 400 mg by mouth daily      Ascorbic Acid (VITAMIN C) 1000 MG tablet Take 2,000 mg by mouth daily      Multiple Vitamins-Minerals (THERAPEUTIC MULTIVITAMIN-MINERALS) tablet Take 1 tablet by mouth daily      zolpidem (AMBIEN) 10 MG tablet Take 10 mg by mouth nightly. Daun Skiff colchicine (COLCRYS) 0.6 MG tablet Take 0.6 mg by mouth 2 times daily as needed       allopurinol (ZYLOPRIM) 100 MG tablet Take 1 tablet by mouth daily 30 tablet 3    levothyroxine (SYNTHROID) 75 MCG tablet Take 1 tablet by mouth Daily (Patient taking differently: Take 50 mcg by mouth Daily ) 30 tablet 3    insulin glargine (LANTUS) 100 UNIT/ML injection vial Inject 20 Units into the skin every morning      atorvastatin (LIPITOR) 80 MG tablet Take 80 mg by mouth daily      linagliptin (TRADJENTA) 5 MG tablet Take 5 mg by mouth daily      albuterol-ipratropium (COMBIVENT)  MCG/ACT inhaler Inhale 2 puffs into the lungs every 12 hours. 1 Inhaler 1    aspirin 81 MG EC tablet Take 81 mg by mouth daily Prescribed per Dr Ness Dc      omeprazole (PRILOSEC) 20 MG capsule Take 20 mg by mouth daily              Guideline directed medical/device therapy:  ARNI/ACE I/ARB: ARNI  Beta blocker:   Yes  Aldosterone antagonist:  No  ICD/CRT-P/-D:  ICD  QRS interval on recent ECG (personally reviewed/interpreted): <120 ms  Percentage RV pacing (personally reviewed/interpreted): Pro-BNP 4,744 (H) 1,819 (H) 1,912 (H)  1,862 (H)       4/2019 ICD interrogation:    < 1 %  In AT/AF < 1 %  AS-VS 94 %  No tachyarrhythmias, no therapies, no ATP, no shocks, no aborted shocks        ECG: atrial fibrillation, anterior infarction and inferior infarction age undetermined      Assessment:   1. Chronic heart failure with reduced lvef  2. ischemic cardiomyopathy  3. Clinically hypervolemic  4. Warm, well perfused  5. nyha fc 3, despite med changes and improving proBNP  6. Admits to very frequent ongoing dietary indiscretion  7. Depression and anxiety, recent death of his dog  8. Cardiorenal, chronic CKD limited med titration  9. Patient is also very symptomatic with various medications, insists he has adverse reactions, limiting med titration       Plan:   1. Echocardiogram      2. Cardiopulmonary stress testing      3. Referral to cardiac rehabilitation      4. Return visit in August 2019        Jorge Luis Marino M.D.  [facility]  Heart Failure and Pulmonary Hypertension  Mobile [phone]

## 2019-05-24 ENCOUNTER — TELEPHONE (OUTPATIENT)
Dept: CARDIOLOGY CLINIC | Age: 74
End: 2019-05-24

## 2019-05-24 DIAGNOSIS — I50.9 CONGESTIVE HEART FAILURE, UNSPECIFIED HF CHRONICITY, UNSPECIFIED HEART FAILURE TYPE (HCC): Primary | ICD-10-CM

## 2019-05-24 NOTE — TELEPHONE ENCOUNTER
Elaine Perez [birth year] [phone] (home)        He called office,   I coordinated with Jamilah Dimas is off today) to schedule CPET for June 19 at 1230pm.    Patient agreeable to testing date/time. Arrive 11:45am  Light breakfast  Take all meds       dr note is in epic from 5/17/19. Diagnosis is in order (why test being done). Will send to [facility] to review for PA needs.     Coverage Information    Coverage information:     Subscriber: Doreen Simms     Rel to sub: 01 - Self     Member ID: [id number]     Payor: [address] [place] [address] MEDICARE     Benefit plan: [id number] [address] [place] [address] Dr Phelps ESSENTIAL/PLUS     Group number: [id number]     Member effective dates: from 01/01/19          Krysta Pendleton RN 5/24/2019 10:54 AM

## 2019-05-29 ENCOUNTER — HOSPITAL ENCOUNTER (OUTPATIENT)
Dept: CARDIAC REHAB | Age: 74
Discharge: HOME | End: 2019-05-29

## 2019-05-29 VITALS
RESPIRATION RATE: 24 BRPM | BODY MASS INDEX: 25.13 KG/M2 | WEIGHT: 195.8 LBS | HEIGHT: 74 IN | SYSTOLIC BLOOD PRESSURE: 89 MMHG | HEART RATE: 69 BPM | DIASTOLIC BLOOD PRESSURE: 59 MMHG

## 2019-05-29 ASSESSMENT — PATIENT HEALTH QUESTIONNAIRE - PHQ9: SUM OF ALL RESPONSES TO PHQ QUESTIONS 1-9: 10

## 2019-06-05 ENCOUNTER — HOSPITAL ENCOUNTER (OUTPATIENT)
Age: 74
Discharge: HOME | End: 2019-06-05

## 2019-06-05 ENCOUNTER — HOSPITAL ENCOUNTER (OUTPATIENT)
Dept: OTHER | Age: 74
Setting detail: THERAPIES SERIES
Discharge: HOME | End: 2019-06-05
Payer: MEDICARE

## 2019-06-05 PROCEDURE — 9900000038 HC CARDIAC REHAB PHASE 3 - MONTHLY

## 2019-06-13 ENCOUNTER — HOSPITAL ENCOUNTER (OUTPATIENT)
Dept: OTHER | Age: 74
Setting detail: THERAPIES SERIES
Discharge: HOME | End: 2019-06-13
Payer: MEDICARE

## 2019-06-13 VITALS
WEIGHT: 202 LBS | RESPIRATION RATE: 16 BRPM | DIASTOLIC BLOOD PRESSURE: 58 MMHG | HEART RATE: 87 BPM | BODY MASS INDEX: 25.94 KG/M2 | SYSTOLIC BLOOD PRESSURE: 92 MMHG

## 2019-06-13 LAB
ANION GAP SERPL CALCULATED.3IONS-SCNC: 12 MMOL/L (ref 7–16)
BUN BLDV-MCNC: 44 MG/DL (ref 8–23)
CALCIUM SERPL-MCNC: 9.1 MG/DL (ref 8.6–10.2)
CHLORIDE BLD-SCNC: 99 MMOL/L (ref 98–107)
CO2: 27 MMOL/L (ref 22–29)
CREAT SERPL-MCNC: 1.9 MG/DL (ref 0.7–1.2)
GFR AFRICAN AMERICAN: 42
GFR NON-AFRICAN AMERICAN: 35 ML/MIN/1.73
GLUCOSE BLD-MCNC: 135 MG/DL (ref 74–99)
POTASSIUM SERPL-SCNC: 4.4 MMOL/L (ref 3.5–5)
PRO-BNP: 1902 PG/ML (ref 0–125)
SODIUM BLD-SCNC: 138 MMOL/L (ref 132–146)

## 2019-06-13 PROCEDURE — 83880 ASSAY OF NATRIURETIC PEPTIDE: CPT

## 2019-06-13 PROCEDURE — 36415 COLL VENOUS BLD VENIPUNCTURE: CPT

## 2019-06-13 PROCEDURE — 99214 OFFICE O/P EST MOD 30 MIN: CPT

## 2019-06-13 PROCEDURE — 96374 THER/PROPH/DIAG INJ IV PUSH: CPT

## 2019-06-13 PROCEDURE — 2500000003 HC RX 250 WO HCPCS: Performed by: INTERNAL MEDICINE

## 2019-06-13 PROCEDURE — 80048 BASIC METABOLIC PNL TOTAL CA: CPT

## 2019-06-13 PROCEDURE — 2580000003 HC RX 258: Performed by: INTERNAL MEDICINE

## 2019-06-13 RX ORDER — SODIUM CHLORIDE 0.9 % (FLUSH) 0.9 %
10 SYRINGE (ML) INJECTION 2 TIMES DAILY
Status: DISCONTINUED | OUTPATIENT
Start: 2019-06-13 | End: 2019-06-14 | Stop reason: HOSPADM

## 2019-06-13 RX ORDER — BUMETANIDE 0.25 MG/ML
2 INJECTION, SOLUTION INTRAMUSCULAR; INTRAVENOUS ONCE
Status: COMPLETED | OUTPATIENT
Start: 2019-06-13 | End: 2019-06-13

## 2019-06-13 RX ADMIN — Medication 10 ML: at 12:25

## 2019-06-13 RX ADMIN — BUMETANIDE 2 MG: 0.25 INJECTION INTRAMUSCULAR; INTRAVENOUS at 12:25

## 2019-06-13 NOTE — PROGRESS NOTES
Weight 202lb,  Pt. C/o feeling bloated,. No edema noted BLE, abd. Softly distended, bowel sounds present all quads. IV Bumex given after labs drawn and follow up appt. Made.

## 2019-06-17 ENCOUNTER — TELEPHONE (OUTPATIENT)
Dept: NON INVASIVE DIAGNOSTICS | Age: 74
End: 2019-06-17

## 2019-06-19 ENCOUNTER — HOSPITAL ENCOUNTER (OUTPATIENT)
Dept: PULMONOLOGY | Age: 74
Discharge: HOME | End: 2019-06-19
Payer: MEDICARE

## 2019-06-19 DIAGNOSIS — I50.9 CONGESTIVE HEART FAILURE, UNSPECIFIED HF CHRONICITY, UNSPECIFIED HEART FAILURE TYPE (HCC): ICD-10-CM

## 2019-06-19 PROCEDURE — 94010 BREATHING CAPACITY TEST: CPT | Performed by: INTERNAL MEDICINE

## 2019-06-19 PROCEDURE — 94621 CARDIOPULM EXERCISE TESTING: CPT

## 2019-06-19 PROCEDURE — 94621 CARDIOPULM EXERCISE TESTING: CPT | Performed by: INTERNAL MEDICINE

## 2019-06-19 NOTE — PROCEDURES
Exercise and Cardiopulmonary Stress Testing    Indeterminate exercise stress test using the Modified Sonam protocol due to inability to achieve target heart rate due to use of beta blockers  Baseline ECG: normal sinus rhythm, inferior infarction, anterior infarction age undetermined, PVCs. Normal baseline HR and BP. Normal HR response and BP response. Achieved  MPHR. METS: 4.30. Exercise time: 8 minutes 1 second. Functional capacity: average. No stress induced ECG changes indicative of ischemia. No stress induced dysrythmias. Heart rate recovery: abnormal.  Symptoms: dyspnea, leg fatigue. Findings:   The peak VO2 was 13.9 mL/kg/min (47 % predicted), anaerobic threshold was 12.1, 87 % of peak VO2. VE/VCO2 was 40. PETCO2 was 30, PETO2 was 120. Interpretation:   1. Peak VO2 is 13.9 (anything less than 12 on beta blockers is concerning)  2. VE/VCO2 is 40 portending poorer prognosis        Rosie Win M.D.   Advanced Heart Failure and Pulmonary Hypertension  [place] [phone]

## 2019-07-02 NOTE — PROCEDURES
[address]                  [address] [facility],  [facility]                               PULMONARY FUNCTION    PATIENT NAME: Brayan Soto                    :        1945  MED REC NO:   [id number]                            ROOM:  ACCOUNT NO:   [de-identified]                           ADMIT DATE: 2019  PROVIDER:     Delon Villalobos MD      DATE OF PROCEDURE:  2019    HEIGHT:  74.    WEIGHT:  198. AGE:  72. SEX:  Male. YEAR OF SMOKIN. SPIROMETRY:  FVC 2.82, which is 59 of predicted. FEV1 of 1.98, which is  55 of predicted. FEV1/FVC 70. LUNG VOLUME:  3.03 which is 63 of predicted. ERV 0.43, which is 36 of  predicted. IMPRESSION:  This is a nonspecific spirometry. In the abscess of  diffusion capacity, it is very hard to see if the patient has  restrictive pattern. Also there is decrease in ERV, which may obviously  contributing. The flow volume loop compatible with restrictive pattern,  so definitely we need full PFT , as far as I can tell , I would favor  restricted pattern giving the flow volume loop and the low force vital  capacity. I do not see any evidence of obstruction, but again for  further evaluation clinical and radiological correlation is indicated.         Stormy Castillo MD    D: 2019 21:03:57       T: 2019 2:21:08     LEANN  Job#: [id number]     Doc#: [id number]    CC:

## 2019-07-05 ENCOUNTER — NURSE ONLY (OUTPATIENT)
Dept: NON INVASIVE DIAGNOSTICS | Age: 74
End: 2019-07-05
Payer: MEDICARE

## 2019-07-05 ENCOUNTER — HOSPITAL ENCOUNTER (OUTPATIENT)
Age: 74
Discharge: HOME | End: 2019-07-05

## 2019-07-05 DIAGNOSIS — I25.5 ISCHEMIC CARDIOMYOPATHY: ICD-10-CM

## 2019-07-05 DIAGNOSIS — Z95.810 DUAL IMPLANTABLE CARDIOVERTER-DEFIBRILLATOR IN SITU: Primary | ICD-10-CM

## 2019-07-05 DIAGNOSIS — I48.0 PAF (PAROXYSMAL ATRIAL FIBRILLATION) (HCC): ICD-10-CM

## 2019-07-05 PROCEDURE — 9900000038 HC CARDIAC REHAB PHASE 3 - MONTHLY

## 2019-07-05 PROCEDURE — 93297 REM INTERROG DEV EVAL ICPMS: CPT | Performed by: INTERNAL MEDICINE

## 2019-07-05 PROCEDURE — 93295 DEV INTERROG REMOTE 1/2/MLT: CPT | Performed by: INTERNAL MEDICINE

## 2019-07-05 PROCEDURE — 93296 REM INTERROG EVL PM/IDS: CPT | Performed by: INTERNAL MEDICINE

## 2019-07-17 ENCOUNTER — HOSPITAL ENCOUNTER (OUTPATIENT)
Dept: OTHER | Age: 74
Setting detail: THERAPIES SERIES
Discharge: HOME | End: 2019-07-17
Payer: MEDICARE

## 2019-07-17 VITALS
DIASTOLIC BLOOD PRESSURE: 57 MMHG | HEART RATE: 79 BPM | SYSTOLIC BLOOD PRESSURE: 118 MMHG | WEIGHT: 198 LBS | RESPIRATION RATE: 18 BRPM | BODY MASS INDEX: 25.42 KG/M2

## 2019-07-17 LAB
ANION GAP SERPL CALCULATED.3IONS-SCNC: 13 MMOL/L (ref 7–16)
BUN BLDV-MCNC: 41 MG/DL (ref 8–23)
CALCIUM SERPL-MCNC: 9.3 MG/DL (ref 8.6–10.2)
CHLORIDE BLD-SCNC: 99 MMOL/L (ref 98–107)
CO2: 30 MMOL/L (ref 22–29)
CREAT SERPL-MCNC: 2.3 MG/DL (ref 0.7–1.2)
GFR AFRICAN AMERICAN: 34
GFR NON-AFRICAN AMERICAN: 28 ML/MIN/1.73
GLUCOSE BLD-MCNC: 130 MG/DL (ref 74–99)
POTASSIUM SERPL-SCNC: 4.4 MMOL/L (ref 3.5–5)
PRO-BNP: 2132 PG/ML (ref 0–125)
SODIUM BLD-SCNC: 142 MMOL/L (ref 132–146)

## 2019-07-17 PROCEDURE — 2580000003 HC RX 258: Performed by: INTERNAL MEDICINE

## 2019-07-17 PROCEDURE — 83880 ASSAY OF NATRIURETIC PEPTIDE: CPT

## 2019-07-17 PROCEDURE — 36415 COLL VENOUS BLD VENIPUNCTURE: CPT

## 2019-07-17 PROCEDURE — 99204 OFFICE O/P NEW MOD 45 MIN: CPT

## 2019-07-17 PROCEDURE — 2500000003 HC RX 250 WO HCPCS: Performed by: INTERNAL MEDICINE

## 2019-07-17 PROCEDURE — 80048 BASIC METABOLIC PNL TOTAL CA: CPT

## 2019-07-17 PROCEDURE — 96374 THER/PROPH/DIAG INJ IV PUSH: CPT

## 2019-07-17 RX ORDER — BUMETANIDE 0.25 MG/ML
2 INJECTION, SOLUTION INTRAMUSCULAR; INTRAVENOUS ONCE
Status: COMPLETED | OUTPATIENT
Start: 2019-07-17 | End: 2019-07-17

## 2019-07-17 RX ORDER — SODIUM CHLORIDE 0.9 % (FLUSH) 0.9 %
10 SYRINGE (ML) INJECTION PRN
Status: DISCONTINUED | OUTPATIENT
Start: 2019-07-17 | End: 2019-07-18 | Stop reason: HOSPADM

## 2019-07-17 RX ADMIN — BUMETANIDE 1 MG: 0.25 INJECTION INTRAMUSCULAR; INTRAVENOUS at 12:54

## 2019-07-17 RX ADMIN — Medication 10 ML: at 12:54

## 2019-07-22 ENCOUNTER — TELEPHONE (OUTPATIENT)
Dept: NON INVASIVE DIAGNOSTICS | Age: 74
End: 2019-07-22

## 2019-07-22 NOTE — PROGRESS NOTES
See [employer] [place] report. Remote monitoring reviewed over a 90 day period. End of 90 day monitoring period date of service 7. 5.2019.

## 2019-08-05 ENCOUNTER — HOSPITAL ENCOUNTER (OUTPATIENT)
Age: 74
Discharge: HOME | End: 2019-08-05

## 2019-08-05 PROCEDURE — 9900000038 HC CARDIAC REHAB PHASE 3 - MONTHLY

## 2019-08-16 ENCOUNTER — HOSPITAL ENCOUNTER (OUTPATIENT)
Dept: OTHER | Age: 74
Setting detail: THERAPIES SERIES
Discharge: HOME | End: 2019-08-16
Payer: MEDICARE

## 2019-08-16 VITALS
RESPIRATION RATE: 18 BRPM | DIASTOLIC BLOOD PRESSURE: 54 MMHG | HEART RATE: 78 BPM | BODY MASS INDEX: 25.83 KG/M2 | WEIGHT: 201.2 LBS | SYSTOLIC BLOOD PRESSURE: 95 MMHG

## 2019-08-16 LAB
ANION GAP SERPL CALCULATED.3IONS-SCNC: 9 MMOL/L (ref 7–16)
BUN BLDV-MCNC: 37 MG/DL (ref 8–23)
CALCIUM SERPL-MCNC: 9.3 MG/DL (ref 8.6–10.2)
CHLORIDE BLD-SCNC: 101 MMOL/L (ref 98–107)
CO2: 30 MMOL/L (ref 22–29)
CREAT SERPL-MCNC: 2.1 MG/DL (ref 0.7–1.2)
GFR AFRICAN AMERICAN: 38
GFR NON-AFRICAN AMERICAN: 31 ML/MIN/1.73
GLUCOSE BLD-MCNC: 139 MG/DL (ref 74–99)
POTASSIUM SERPL-SCNC: 4.9 MMOL/L (ref 3.5–5)
PRO-BNP: 1792 PG/ML (ref 0–125)
SODIUM BLD-SCNC: 140 MMOL/L (ref 132–146)

## 2019-08-16 PROCEDURE — 36415 COLL VENOUS BLD VENIPUNCTURE: CPT

## 2019-08-16 PROCEDURE — 96374 THER/PROPH/DIAG INJ IV PUSH: CPT

## 2019-08-16 PROCEDURE — 80048 BASIC METABOLIC PNL TOTAL CA: CPT

## 2019-08-16 PROCEDURE — 83880 ASSAY OF NATRIURETIC PEPTIDE: CPT

## 2019-08-16 PROCEDURE — 99204 OFFICE O/P NEW MOD 45 MIN: CPT

## 2019-08-16 RX ORDER — BUMETANIDE 0.25 MG/ML
2 INJECTION, SOLUTION INTRAMUSCULAR; INTRAVENOUS ONCE
Status: DISCONTINUED | OUTPATIENT
Start: 2019-08-16 | End: 2019-08-17 | Stop reason: HOSPADM

## 2019-08-16 RX ORDER — SODIUM CHLORIDE 0.9 % (FLUSH) 0.9 %
10 SYRINGE (ML) INJECTION PRN
Status: DISCONTINUED | OUTPATIENT
Start: 2019-08-16 | End: 2019-08-17 | Stop reason: HOSPADM

## 2019-08-20 ENCOUNTER — OFFICE VISIT (OUTPATIENT)
Dept: CARDIOLOGY CLINIC | Age: 74
End: 2019-08-20
Payer: MEDICARE

## 2019-08-20 VITALS
DIASTOLIC BLOOD PRESSURE: 50 MMHG | HEART RATE: 66 BPM | WEIGHT: 199 LBS | BODY MASS INDEX: 25.54 KG/M2 | SYSTOLIC BLOOD PRESSURE: 94 MMHG | HEIGHT: 74 IN | RESPIRATION RATE: 16 BRPM | OXYGEN SATURATION: 94 %

## 2019-08-20 DIAGNOSIS — I50.22 CHRONIC HFREF (HEART FAILURE WITH REDUCED EJECTION FRACTION) (HCC): Primary | ICD-10-CM

## 2019-08-20 DIAGNOSIS — R53.83 FATIGUE, UNSPECIFIED TYPE: ICD-10-CM

## 2019-08-20 DIAGNOSIS — I25.10 CORONARY ARTERY DISEASE INVOLVING NATIVE CORONARY ARTERY OF NATIVE HEART WITHOUT ANGINA PECTORIS: ICD-10-CM

## 2019-08-20 DIAGNOSIS — D64.9 ANEMIA, UNSPECIFIED TYPE: ICD-10-CM

## 2019-08-20 PROCEDURE — 99215 OFFICE O/P EST HI 40 MIN: CPT | Performed by: INTERNAL MEDICINE

## 2019-08-20 PROCEDURE — 93000 ELECTROCARDIOGRAM COMPLETE: CPT | Performed by: INTERNAL MEDICINE

## 2019-08-20 RX ORDER — TEMAZEPAM 15 MG/1
15 CAPSULE ORAL NIGHTLY
COMMUNITY
End: 2019-11-08

## 2019-08-20 RX ORDER — TRIAMCINOLONE ACETONIDE 1 MG/G
CREAM TOPICAL PRN
Status: ON HOLD | COMMUNITY
End: 2020-01-01 | Stop reason: ALTCHOICE

## 2019-08-20 NOTE — PROGRESS NOTES
Advanced Heart Failure and Pulmonary Hypertension Clinic  Follow up Visit         Reason for Visit: follow up for heart failure        Primary Care Physician: Moshe Galvez M.D. Primary Cardiologist: Day Marley M.D.  HF cardiology: Rina Barnard M.D. History of Present Illness:   Veronica Garrett is a 72year old, ischemic CM, CAD status post CABG x 2 times in 2013 and again 2017, AVR, MVR, ICD in place, chronic HFrEF, here in follow up. Since then he denies any hospitalizations or ED visits. His biggest complaint today is fatigue because he has neuropathy and restless legs and is not sleeping well. Gabapentin has recently been readjusted but does not seem to help and he has been on a lot of other sleep meds. He reacted poorly to the gabapentin and therefore self discontinued it yesterday. He continues to admit to dietary indiscretion, eating fast food at least 2-3 times a week. He is seen regularly in the CHF infusion clinic, most recently earlier today. His weight increased 2 lbs with increased SOB, he was given 2 mg IV bumetanide and has urinated since that time which was only a couple hours ago. With regular visits in the CHF infusion clinic and monitoring diet more so than before, with still above indiscretions, he has had ongoing down trend in proBNP with blood work. He also has stable BUN:Cr. He reports ongoing chronic but stable dyspnea with exertion, shortness of breath, or decline in overall functional capacity. He denies orthopnea, PND, nocturnal cough or hemoptysis. He denies abdominal distention or bloating, early satiety, anorexia/change in appetite, unintentional weight loss. He does lower extremity edema. He denies exertional lightheadedness. She denies palpitations, syncope or near syncope. Review of systems is negative for chest pain, pressure, discomfort. When ambulating on an incline, he/she reports/denies leg claudication.  History is negative for neurological symptoms including transient loss of vision, asymmetric weakness, aphasia, dysphasia, numbness, tingling. Patient Active Problem List    Diagnosis Date Noted    Right ventricular dysfunction 2019     Priority: High      LVEF: 29%  RVF: Mod, RVSP 76 mmHg, 2-3+MR/TR      Chronic systolic HF (heart failure) ([facility] 2018     Priority: High       RHC (10/2017, [facility]) BP: 122 / 76. BP Mean: 91. HR: 82 min. FiO2: 21.00%. Pulse Ox: 94.00%. CVP: 8 cm. Thermo CO: 3.97 l/min. Hg: 10.8. H2O = 6.2 mmHg. Thermo CI: 1.84 l/min/m2. TP. RA Mean: 8. Assumed Chan CO: 4.21 l/min. PVR: 3.0. RV: 76 / 12. Assumed Chan CI: 1.95 l/min/m2. SVR: 1679.0. PA: 72 / 34. PA Mean: 49.00. PCWP Mean: 36.0. SV: 48.41 cc/stroke. TS. SVI: 22.46 cc/stroke/m2. PCWP V Wave: 50.00. PAO2: 50.40%. LVSWI: 16.90 gm-m/m2/beat. RVSWI: 12.52 gm-m/m2/beat. RVSWI: 920.74 mmHg-ml/m2/beat. Impression: Pulmonary venous hypertension with prominent V waves borderline/mildly subnormal cardiac index       Ischemic cardiomyopathy 2013     Priority: High     Ejection fraction 30-35%. Ejection fraction 30% to 35% on cardiac catheterization, 04. Ejection fraction 44% on Gated SPECT Study 04    Echocardiogram(2013): LVEF 25 +/- 5%. Severe LV dilatation. EF = 19 ± 5% (2D biplane), 2017 ([facility])      Coronary atherosclerosis of native coronary artery 2013     Priority: High     -Acute posterior myocardial infarction (97). -LHC (97) Critical lesion mid left circumflex, significant lesion mid-LAD and first diagonal.    -PTCA (97) mid left circumflex. -LHC (00) Insignificant coronary atherosclerosis. -Acute inferior myocardial infarction (04). -LHC (04) patent angioplasty site of the left circumflex, total occlusion mid right coronary artery with otherwise unremarkable coronary artery disease. -PTCA and stent of right coronary artery (04).   -Repeat C

## 2019-09-18 ENCOUNTER — HOSPITAL ENCOUNTER (OUTPATIENT)
Dept: CARDIOLOGY | Age: 74
Discharge: HOME | End: 2019-09-18
Payer: MEDICARE

## 2019-09-18 DIAGNOSIS — I50.22 CHRONIC HFREF (HEART FAILURE WITH REDUCED EJECTION FRACTION) (HCC): ICD-10-CM

## 2019-09-18 DIAGNOSIS — I50.9 CONGESTIVE HEART FAILURE, UNSPECIFIED HF CHRONICITY, UNSPECIFIED HEART FAILURE TYPE (HCC): ICD-10-CM

## 2019-09-18 LAB
LV EF: 35 %
LVEF MODALITY: NORMAL

## 2019-09-18 PROCEDURE — 93306 TTE W/DOPPLER COMPLETE: CPT

## 2019-09-19 ENCOUNTER — HOSPITAL ENCOUNTER (OUTPATIENT)
Dept: OTHER | Age: 74
Setting detail: THERAPIES SERIES
Discharge: HOME | End: 2019-09-19
Payer: MEDICARE

## 2019-09-19 VITALS
DIASTOLIC BLOOD PRESSURE: 64 MMHG | HEART RATE: 64 BPM | SYSTOLIC BLOOD PRESSURE: 108 MMHG | RESPIRATION RATE: 18 BRPM | BODY MASS INDEX: 25.55 KG/M2 | WEIGHT: 199 LBS

## 2019-09-19 LAB
ANION GAP SERPL CALCULATED.3IONS-SCNC: 11 MMOL/L (ref 7–16)
BUN BLDV-MCNC: 31 MG/DL (ref 8–23)
CALCIUM SERPL-MCNC: 9.7 MG/DL (ref 8.6–10.2)
CHLORIDE BLD-SCNC: 99 MMOL/L (ref 98–107)
CO2: 28 MMOL/L (ref 22–29)
CREAT SERPL-MCNC: 1.9 MG/DL (ref 0.7–1.2)
GFR AFRICAN AMERICAN: 42
GFR NON-AFRICAN AMERICAN: 35 ML/MIN/1.73
GLUCOSE BLD-MCNC: 123 MG/DL (ref 74–99)
POTASSIUM SERPL-SCNC: 4.7 MMOL/L (ref 3.5–5)
PRO-BNP: 1440 PG/ML (ref 0–125)
SODIUM BLD-SCNC: 138 MMOL/L (ref 132–146)

## 2019-09-19 PROCEDURE — 2500000003 HC RX 250 WO HCPCS: Performed by: INTERNAL MEDICINE

## 2019-09-19 PROCEDURE — 2580000003 HC RX 258: Performed by: INTERNAL MEDICINE

## 2019-09-19 PROCEDURE — 80048 BASIC METABOLIC PNL TOTAL CA: CPT

## 2019-09-19 PROCEDURE — 36415 COLL VENOUS BLD VENIPUNCTURE: CPT

## 2019-09-19 PROCEDURE — 96374 THER/PROPH/DIAG INJ IV PUSH: CPT

## 2019-09-19 PROCEDURE — 99214 OFFICE O/P EST MOD 30 MIN: CPT

## 2019-09-19 PROCEDURE — 83880 ASSAY OF NATRIURETIC PEPTIDE: CPT

## 2019-09-19 RX ORDER — SODIUM CHLORIDE 0.9 % (FLUSH) 0.9 %
10 SYRINGE (ML) INJECTION 2 TIMES DAILY
Status: DISCONTINUED | OUTPATIENT
Start: 2019-09-19 | End: 2019-09-20 | Stop reason: HOSPADM

## 2019-09-19 RX ORDER — BUMETANIDE 0.25 MG/ML
2 INJECTION, SOLUTION INTRAMUSCULAR; INTRAVENOUS ONCE
Status: COMPLETED | OUTPATIENT
Start: 2019-09-19 | End: 2019-09-19

## 2019-09-19 RX ADMIN — Medication 10 ML: at 12:53

## 2019-09-19 RX ADMIN — BUMETANIDE 2 MG: 0.25 INJECTION INTRAMUSCULAR; INTRAVENOUS at 12:53

## 2019-09-26 ENCOUNTER — TELEPHONE (OUTPATIENT)
Dept: CARDIOLOGY CLINIC | Age: 74
End: 2019-09-26

## 2019-10-04 ENCOUNTER — NURSE ONLY (OUTPATIENT)
Dept: NON INVASIVE DIAGNOSTICS | Age: 74
End: 2019-10-04
Payer: MEDICARE

## 2019-10-04 DIAGNOSIS — I25.5 ISCHEMIC CARDIOMYOPATHY: ICD-10-CM

## 2019-10-04 DIAGNOSIS — Z95.810 DUAL IMPLANTABLE CARDIOVERTER-DEFIBRILLATOR IN SITU: Primary | ICD-10-CM

## 2019-10-04 PROCEDURE — 93297 REM INTERROG DEV EVAL ICPMS: CPT | Performed by: INTERNAL MEDICINE

## 2019-10-04 PROCEDURE — 93295 DEV INTERROG REMOTE 1/2/MLT: CPT | Performed by: INTERNAL MEDICINE

## 2019-10-04 PROCEDURE — 93296 REM INTERROG EVL PM/IDS: CPT | Performed by: INTERNAL MEDICINE

## 2019-10-17 ENCOUNTER — HOSPITAL ENCOUNTER (OUTPATIENT)
Dept: OTHER | Age: 74
Setting detail: THERAPIES SERIES
Discharge: HOME | End: 2019-10-17
Payer: MEDICARE

## 2019-10-17 VITALS
BODY MASS INDEX: 25.68 KG/M2 | HEART RATE: 66 BPM | DIASTOLIC BLOOD PRESSURE: 79 MMHG | SYSTOLIC BLOOD PRESSURE: 122 MMHG | WEIGHT: 200 LBS | RESPIRATION RATE: 18 BRPM

## 2019-10-17 LAB
ANION GAP SERPL CALCULATED.3IONS-SCNC: 11 MMOL/L (ref 7–16)
BUN BLDV-MCNC: 62 MG/DL (ref 8–23)
CALCIUM SERPL-MCNC: 9.6 MG/DL (ref 8.6–10.2)
CHLORIDE BLD-SCNC: 100 MMOL/L (ref 98–107)
CO2: 31 MMOL/L (ref 22–29)
CREAT SERPL-MCNC: 2.1 MG/DL (ref 0.7–1.2)
GFR AFRICAN AMERICAN: 38
GFR NON-AFRICAN AMERICAN: 31 ML/MIN/1.73
GLUCOSE BLD-MCNC: 121 MG/DL (ref 74–99)
POTASSIUM SERPL-SCNC: 4.5 MMOL/L (ref 3.5–5)
PRO-BNP: 2937 PG/ML (ref 0–125)
SODIUM BLD-SCNC: 142 MMOL/L (ref 132–146)

## 2019-10-17 PROCEDURE — 36415 COLL VENOUS BLD VENIPUNCTURE: CPT

## 2019-10-17 PROCEDURE — 99204 OFFICE O/P NEW MOD 45 MIN: CPT

## 2019-10-17 PROCEDURE — 2500000003 HC RX 250 WO HCPCS: Performed by: INTERNAL MEDICINE

## 2019-10-17 PROCEDURE — 83880 ASSAY OF NATRIURETIC PEPTIDE: CPT

## 2019-10-17 PROCEDURE — 80048 BASIC METABOLIC PNL TOTAL CA: CPT

## 2019-10-17 PROCEDURE — 2580000003 HC RX 258: Performed by: INTERNAL MEDICINE

## 2019-10-17 PROCEDURE — 96374 THER/PROPH/DIAG INJ IV PUSH: CPT

## 2019-10-17 RX ORDER — SODIUM CHLORIDE 0.9 % (FLUSH) 0.9 %
10 SYRINGE (ML) INJECTION PRN
Status: DISCONTINUED | OUTPATIENT
Start: 2019-10-17 | End: 2019-10-18 | Stop reason: HOSPADM

## 2019-10-17 RX ORDER — BUMETANIDE 0.25 MG/ML
2 INJECTION, SOLUTION INTRAMUSCULAR; INTRAVENOUS ONCE
Status: COMPLETED | OUTPATIENT
Start: 2019-10-17 | End: 2019-10-17

## 2019-10-17 RX ADMIN — BUMETANIDE 2 MG: 0.25 INJECTION INTRAMUSCULAR; INTRAVENOUS at 13:10

## 2019-10-17 RX ADMIN — Medication 10 ML: at 13:10

## 2019-10-18 ENCOUNTER — TELEPHONE (OUTPATIENT)
Dept: NON INVASIVE DIAGNOSTICS | Age: 74
End: 2019-10-18

## 2019-10-21 ENCOUNTER — TELEPHONE (OUTPATIENT)
Dept: CARDIOLOGY CLINIC | Age: 74
End: 2019-10-21

## 2019-11-08 ENCOUNTER — OFFICE VISIT (OUTPATIENT)
Dept: NON INVASIVE DIAGNOSTICS | Age: 74
End: 2019-11-08
Payer: MEDICARE

## 2019-11-08 VITALS
DIASTOLIC BLOOD PRESSURE: 60 MMHG | RESPIRATION RATE: 16 BRPM | WEIGHT: 201 LBS | HEART RATE: 76 BPM | BODY MASS INDEX: 25.8 KG/M2 | SYSTOLIC BLOOD PRESSURE: 98 MMHG | HEIGHT: 74 IN

## 2019-11-08 DIAGNOSIS — Z95.810 DUAL IMPLANTABLE CARDIOVERTER-DEFIBRILLATOR IN SITU: Primary | ICD-10-CM

## 2019-11-08 PROCEDURE — 93290 INTERROG DEV EVAL ICPMS IP: CPT | Performed by: NURSE PRACTITIONER

## 2019-11-08 PROCEDURE — 99214 OFFICE O/P EST MOD 30 MIN: CPT | Performed by: NURSE PRACTITIONER

## 2019-11-08 PROCEDURE — 93283 PRGRMG EVAL IMPLANTABLE DFB: CPT | Performed by: NURSE PRACTITIONER

## 2019-11-08 RX ORDER — GABAPENTIN 100 MG/1
100 CAPSULE ORAL DAILY
COMMUNITY

## 2019-11-08 RX ORDER — ZOLPIDEM TARTRATE 12.5 MG/1
12.5 TABLET, FILM COATED, EXTENDED RELEASE ORAL NIGHTLY PRN
COMMUNITY
Start: 2019-09-10

## 2019-11-08 ASSESSMENT — ENCOUNTER SYMPTOMS: RESPIRATORY NEGATIVE: 1

## 2019-11-14 ENCOUNTER — HOSPITAL ENCOUNTER (OUTPATIENT)
Dept: OTHER | Age: 74
Setting detail: THERAPIES SERIES
Discharge: HOME | End: 2019-11-14
Payer: MEDICARE

## 2019-11-14 VITALS
DIASTOLIC BLOOD PRESSURE: 65 MMHG | BODY MASS INDEX: 26.58 KG/M2 | WEIGHT: 207 LBS | RESPIRATION RATE: 18 BRPM | HEART RATE: 68 BPM | SYSTOLIC BLOOD PRESSURE: 102 MMHG

## 2019-11-14 LAB
ANION GAP SERPL CALCULATED.3IONS-SCNC: 7 MMOL/L (ref 7–16)
BUN BLDV-MCNC: 45 MG/DL (ref 8–23)
CALCIUM SERPL-MCNC: 9.3 MG/DL (ref 8.6–10.2)
CHLORIDE BLD-SCNC: 99 MMOL/L (ref 98–107)
CO2: 30 MMOL/L (ref 22–29)
CREAT SERPL-MCNC: 2.2 MG/DL (ref 0.7–1.2)
GFR AFRICAN AMERICAN: 36
GFR NON-AFRICAN AMERICAN: 30 ML/MIN/1.73
GLUCOSE BLD-MCNC: 154 MG/DL (ref 74–99)
POTASSIUM SERPL-SCNC: 4.9 MMOL/L (ref 3.5–5)
PRO-BNP: 1831 PG/ML (ref 0–125)
SODIUM BLD-SCNC: 136 MMOL/L (ref 132–146)

## 2019-11-14 PROCEDURE — 99214 OFFICE O/P EST MOD 30 MIN: CPT

## 2019-11-14 PROCEDURE — 2580000003 HC RX 258: Performed by: INTERNAL MEDICINE

## 2019-11-14 PROCEDURE — 2500000003 HC RX 250 WO HCPCS: Performed by: INTERNAL MEDICINE

## 2019-11-14 PROCEDURE — 83880 ASSAY OF NATRIURETIC PEPTIDE: CPT

## 2019-11-14 PROCEDURE — 96374 THER/PROPH/DIAG INJ IV PUSH: CPT

## 2019-11-14 PROCEDURE — 80048 BASIC METABOLIC PNL TOTAL CA: CPT

## 2019-11-14 PROCEDURE — 36415 COLL VENOUS BLD VENIPUNCTURE: CPT

## 2019-11-14 RX ORDER — SODIUM CHLORIDE 0.9 % (FLUSH) 0.9 %
10 SYRINGE (ML) INJECTION ONCE
Status: COMPLETED | OUTPATIENT
Start: 2019-11-14 | End: 2019-11-14

## 2019-11-14 RX ORDER — BUMETANIDE 0.25 MG/ML
2 INJECTION, SOLUTION INTRAMUSCULAR; INTRAVENOUS ONCE
Status: COMPLETED | OUTPATIENT
Start: 2019-11-14 | End: 2019-11-14

## 2019-11-14 RX ADMIN — BUMETANIDE 2 MG: 0.25 INJECTION INTRAMUSCULAR; INTRAVENOUS at 12:11

## 2019-11-14 RX ADMIN — Medication 10 ML: at 12:11

## 2019-12-12 ENCOUNTER — HOSPITAL ENCOUNTER (OUTPATIENT)
Dept: OTHER | Age: 74
Setting detail: THERAPIES SERIES
Discharge: HOME | End: 2019-12-12
Payer: MEDICARE

## 2019-12-12 RX ORDER — BUMETANIDE 0.25 MG/ML
2 INJECTION, SOLUTION INTRAMUSCULAR; INTRAVENOUS ONCE
Status: DISCONTINUED | OUTPATIENT
Start: 2019-12-12 | End: 2019-12-12

## 2019-12-12 RX ORDER — SODIUM CHLORIDE 0.9 % (FLUSH) 0.9 %
10 SYRINGE (ML) INJECTION 2 TIMES DAILY
Status: DISCONTINUED | OUTPATIENT
Start: 2019-12-12 | End: 2019-12-12

## 2019-12-13 ENCOUNTER — HOSPITAL ENCOUNTER (OUTPATIENT)
Dept: OTHER | Age: 74
Setting detail: THERAPIES SERIES
Discharge: HOME | End: 2019-12-13
Payer: MEDICARE

## 2019-12-13 VITALS
WEIGHT: 203.6 LBS | SYSTOLIC BLOOD PRESSURE: 112 MMHG | RESPIRATION RATE: 16 BRPM | BODY MASS INDEX: 26.14 KG/M2 | DIASTOLIC BLOOD PRESSURE: 62 MMHG | HEART RATE: 75 BPM

## 2019-12-13 LAB
ANION GAP SERPL CALCULATED.3IONS-SCNC: 14 MMOL/L (ref 7–16)
BUN BLDV-MCNC: 38 MG/DL (ref 8–23)
CALCIUM SERPL-MCNC: 9.1 MG/DL (ref 8.6–10.2)
CHLORIDE BLD-SCNC: 96 MMOL/L (ref 98–107)
CO2: 28 MMOL/L (ref 22–29)
CREAT SERPL-MCNC: 1.8 MG/DL (ref 0.7–1.2)
GFR AFRICAN AMERICAN: 45
GFR NON-AFRICAN AMERICAN: 37 ML/MIN/1.73
GLUCOSE BLD-MCNC: 159 MG/DL (ref 74–99)
POTASSIUM SERPL-SCNC: 3.9 MMOL/L (ref 3.5–5)
PRO-BNP: 1474 PG/ML (ref 0–125)
SODIUM BLD-SCNC: 138 MMOL/L (ref 132–146)

## 2019-12-13 PROCEDURE — 83880 ASSAY OF NATRIURETIC PEPTIDE: CPT

## 2019-12-13 PROCEDURE — 2580000003 HC RX 258: Performed by: INTERNAL MEDICINE

## 2019-12-13 PROCEDURE — 36415 COLL VENOUS BLD VENIPUNCTURE: CPT

## 2019-12-13 PROCEDURE — 99204 OFFICE O/P NEW MOD 45 MIN: CPT

## 2019-12-13 PROCEDURE — 2500000003 HC RX 250 WO HCPCS: Performed by: INTERNAL MEDICINE

## 2019-12-13 PROCEDURE — 96374 THER/PROPH/DIAG INJ IV PUSH: CPT

## 2019-12-13 PROCEDURE — 80048 BASIC METABOLIC PNL TOTAL CA: CPT

## 2019-12-13 RX ORDER — BUMETANIDE 0.25 MG/ML
1 INJECTION, SOLUTION INTRAMUSCULAR; INTRAVENOUS ONCE
Status: COMPLETED | OUTPATIENT
Start: 2019-12-13 | End: 2019-12-13

## 2019-12-13 RX ORDER — SODIUM CHLORIDE 0.9 % (FLUSH) 0.9 %
10 SYRINGE (ML) INJECTION PRN
Status: DISCONTINUED | OUTPATIENT
Start: 2019-12-13 | End: 2019-12-14 | Stop reason: HOSPADM

## 2019-12-13 RX ADMIN — Medication 10 ML: at 12:17

## 2019-12-13 RX ADMIN — BUMETANIDE 2 MG: 0.25 INJECTION INTRAMUSCULAR; INTRAVENOUS at 12:17

## 2020-01-01 ENCOUNTER — TELEPHONE (OUTPATIENT)
Dept: CARDIOLOGY CLINIC | Age: 75
End: 2020-01-01

## 2020-01-01 ENCOUNTER — HOSPITAL ENCOUNTER (OUTPATIENT)
Dept: OTHER | Age: 75
Setting detail: THERAPIES SERIES
Discharge: HOME | End: 2020-04-30
Payer: MEDICARE

## 2020-01-01 ENCOUNTER — HOSPITAL ENCOUNTER (OUTPATIENT)
Dept: OTHER | Age: 75
Setting detail: THERAPIES SERIES
Discharge: HOME | End: 2020-07-23
Payer: MEDICARE

## 2020-01-01 ENCOUNTER — HOSPITAL ENCOUNTER (OUTPATIENT)
Dept: OTHER | Age: 75
Setting detail: THERAPIES SERIES
Discharge: HOME | End: 2020-11-10
Payer: MEDICARE

## 2020-01-01 ENCOUNTER — TELEPHONE (OUTPATIENT)
Dept: NON INVASIVE DIAGNOSTICS | Age: 75
End: 2020-01-01

## 2020-01-01 ENCOUNTER — HOSPITAL ENCOUNTER (INPATIENT)
Age: 75
LOS: 5 days | Discharge: HOME HEALTH | DRG: 291 | End: 2020-11-21
Attending: EMERGENCY MEDICINE | Admitting: INTERNAL MEDICINE
Payer: MEDICARE

## 2020-01-01 ENCOUNTER — OFFICE VISIT (OUTPATIENT)
Dept: CARDIOLOGY CLINIC | Age: 75
End: 2020-01-01
Payer: MEDICARE

## 2020-01-01 ENCOUNTER — HOSPITAL ENCOUNTER (OUTPATIENT)
Age: 75
Discharge: HOME | End: 2020-10-25
Payer: MEDICARE

## 2020-01-01 ENCOUNTER — APPOINTMENT (OUTPATIENT)
Dept: ULTRASOUND IMAGING | Age: 75
DRG: 291 | End: 2020-01-01
Payer: MEDICARE

## 2020-01-01 ENCOUNTER — HOSPITAL ENCOUNTER (OUTPATIENT)
Dept: OTHER | Age: 75
Setting detail: THERAPIES SERIES
Discharge: HOME | End: 2020-10-06
Payer: MEDICARE

## 2020-01-01 ENCOUNTER — HOSPITAL ENCOUNTER (OUTPATIENT)
Age: 75
Discharge: HOME | End: 2020-10-16
Payer: MEDICARE

## 2020-01-01 ENCOUNTER — HOSPITAL ENCOUNTER (OUTPATIENT)
Dept: INFUSION THERAPY | Age: 75
Setting detail: INFUSION SERIES
Discharge: HOME | End: 2020-10-28
Payer: MEDICARE

## 2020-01-01 ENCOUNTER — ANESTHESIA (OUTPATIENT)
Dept: CARDIAC CATH/INVASIVE PROCEDURES | Age: 75
End: 2020-01-01

## 2020-01-01 ENCOUNTER — HOSPITAL ENCOUNTER (OUTPATIENT)
Dept: OTHER | Age: 75
Setting detail: THERAPIES SERIES
Discharge: HOME | End: 2020-10-22
Payer: MEDICARE

## 2020-01-01 ENCOUNTER — NURSE ONLY (OUTPATIENT)
Dept: NON INVASIVE DIAGNOSTICS | Age: 75
End: 2020-01-01
Payer: MEDICARE

## 2020-01-01 ENCOUNTER — HOSPITAL ENCOUNTER (OUTPATIENT)
Dept: OTHER | Age: 75
Setting detail: THERAPIES SERIES
Discharge: HOME | End: 2020-01-24
Payer: MEDICARE

## 2020-01-01 ENCOUNTER — ANESTHESIA EVENT (OUTPATIENT)
Dept: CARDIAC CATH/INVASIVE PROCEDURES | Age: 75
End: 2020-01-01

## 2020-01-01 ENCOUNTER — APPOINTMENT (OUTPATIENT)
Dept: CT IMAGING | Age: 75
DRG: 291 | End: 2020-01-01
Payer: MEDICARE

## 2020-01-01 ENCOUNTER — HOSPITAL ENCOUNTER (OUTPATIENT)
Dept: OTHER | Age: 75
Setting detail: THERAPIES SERIES
Discharge: HOME | End: 2020-07-07
Payer: MEDICARE

## 2020-01-01 ENCOUNTER — HOSPITAL ENCOUNTER (OUTPATIENT)
Dept: OTHER | Age: 75
Setting detail: THERAPIES SERIES
Discharge: HOME | End: 2020-04-03
Payer: MEDICARE

## 2020-01-01 ENCOUNTER — HOSPITAL ENCOUNTER (OUTPATIENT)
Age: 75
Discharge: HOME | End: 2020-09-12
Payer: MEDICARE

## 2020-01-01 ENCOUNTER — HOSPITAL ENCOUNTER (OUTPATIENT)
Dept: OTHER | Age: 75
Setting detail: THERAPIES SERIES
Discharge: HOME | End: 2020-02-21
Payer: MEDICARE

## 2020-01-01 ENCOUNTER — APPOINTMENT (OUTPATIENT)
Dept: GENERAL RADIOLOGY | Age: 75
DRG: 291 | End: 2020-01-01
Payer: MEDICARE

## 2020-01-01 ENCOUNTER — HOSPITAL ENCOUNTER (OUTPATIENT)
Dept: OTHER | Age: 75
Setting detail: THERAPIES SERIES
Discharge: HOME | End: 2020-04-07
Payer: MEDICARE

## 2020-01-01 ENCOUNTER — HOSPITAL ENCOUNTER (OUTPATIENT)
Dept: OTHER | Age: 75
Setting detail: THERAPIES SERIES
Discharge: HOME | End: 2020-03-27
Payer: MEDICARE

## 2020-01-01 ENCOUNTER — HOSPITAL ENCOUNTER (OUTPATIENT)
Dept: OTHER | Age: 75
Setting detail: THERAPIES SERIES
Discharge: HOME | End: 2020-11-09
Payer: MEDICARE

## 2020-01-01 ENCOUNTER — HOSPITAL ENCOUNTER (OUTPATIENT)
Dept: OTHER | Age: 75
Setting detail: THERAPIES SERIES
Discharge: HOME | End: 2020-05-28
Payer: MEDICARE

## 2020-01-01 ENCOUNTER — HOSPITAL ENCOUNTER (OUTPATIENT)
Dept: OTHER | Age: 75
Setting detail: THERAPIES SERIES
Discharge: HOME | End: 2020-11-06
Payer: MEDICARE

## 2020-01-01 ENCOUNTER — HOSPITAL ENCOUNTER (INPATIENT)
Age: 75
LOS: 1 days | Discharge: ANOTHER HOSPITAL | DRG: 291 | End: 2020-12-01
Attending: EMERGENCY MEDICINE | Admitting: INTERNAL MEDICINE
Payer: MEDICARE

## 2020-01-01 ENCOUNTER — HOSPITAL ENCOUNTER (OUTPATIENT)
Dept: INFUSION THERAPY | Age: 75
Setting detail: INFUSION SERIES
Discharge: HOME | End: 2020-11-04
Payer: MEDICARE

## 2020-01-01 ENCOUNTER — HOSPITAL ENCOUNTER (OUTPATIENT)
Dept: OTHER | Age: 75
Setting detail: THERAPIES SERIES
Discharge: HOME | End: 2020-08-20
Payer: MEDICARE

## 2020-01-01 ENCOUNTER — HOSPITAL ENCOUNTER (OUTPATIENT)
Dept: OTHER | Age: 75
Setting detail: THERAPIES SERIES
Discharge: HOME | End: 2020-09-03
Payer: MEDICARE

## 2020-01-01 ENCOUNTER — HOSPITAL ENCOUNTER (OUTPATIENT)
Dept: OTHER | Age: 75
Setting detail: THERAPIES SERIES
Discharge: HOME | End: 2020-03-20
Payer: MEDICARE

## 2020-01-01 ENCOUNTER — HOSPITAL ENCOUNTER (OUTPATIENT)
Dept: OTHER | Age: 75
Setting detail: THERAPIES SERIES
Discharge: HOME | End: 2020-11-05
Payer: MEDICARE

## 2020-01-01 ENCOUNTER — OFFICE VISIT (OUTPATIENT)
Dept: NON INVASIVE DIAGNOSTICS | Age: 75
End: 2020-01-01
Payer: MEDICARE

## 2020-01-01 ENCOUNTER — HOSPITAL ENCOUNTER (OUTPATIENT)
Dept: CARDIAC CATH/INVASIVE PROCEDURES | Age: 75
Discharge: HOME | End: 2020-09-04
Payer: MEDICARE

## 2020-01-01 ENCOUNTER — HOSPITAL ENCOUNTER (OUTPATIENT)
Age: 75
Discharge: HOME | End: 2020-09-02
Payer: MEDICARE

## 2020-01-01 ENCOUNTER — HOSPITAL ENCOUNTER (OUTPATIENT)
Dept: OTHER | Age: 75
Setting detail: THERAPIES SERIES
Discharge: HOME | End: 2020-09-17
Payer: MEDICARE

## 2020-01-01 ENCOUNTER — HOSPITAL ENCOUNTER (OUTPATIENT)
Dept: OTHER | Age: 75
Setting detail: THERAPIES SERIES
Discharge: HOME | End: 2020-04-16
Payer: MEDICARE

## 2020-01-01 ENCOUNTER — HOSPITAL ENCOUNTER (OUTPATIENT)
Dept: OTHER | Age: 75
Setting detail: THERAPIES SERIES
Discharge: HOME | End: 2020-05-12
Payer: MEDICARE

## 2020-01-01 ENCOUNTER — HOSPITAL ENCOUNTER (OUTPATIENT)
Dept: OTHER | Age: 75
Setting detail: THERAPIES SERIES
End: 2020-01-01
Payer: MEDICARE

## 2020-01-01 ENCOUNTER — HOSPITAL ENCOUNTER (OUTPATIENT)
Dept: OTHER | Age: 75
Setting detail: THERAPIES SERIES
Discharge: HOME | End: 2020-09-22
Payer: MEDICARE

## 2020-01-01 ENCOUNTER — HOSPITAL ENCOUNTER (OUTPATIENT)
Dept: OTHER | Age: 75
Setting detail: THERAPIES SERIES
Discharge: HOME | End: 2020-02-13
Payer: MEDICARE

## 2020-01-01 ENCOUNTER — HOSPITAL ENCOUNTER (OUTPATIENT)
Dept: OTHER | Age: 75
Setting detail: THERAPIES SERIES
Discharge: HOME | End: 2020-06-11
Payer: MEDICARE

## 2020-01-01 ENCOUNTER — HOSPITAL ENCOUNTER (OUTPATIENT)
Dept: OTHER | Age: 75
Setting detail: THERAPIES SERIES
Discharge: HOME | End: 2020-06-25
Payer: MEDICARE

## 2020-01-01 ENCOUNTER — HOSPITAL ENCOUNTER (OUTPATIENT)
Dept: OTHER | Age: 75
Setting detail: THERAPIES SERIES
Discharge: HOME | End: 2020-03-06
Payer: MEDICARE

## 2020-01-01 ENCOUNTER — HOSPITAL ENCOUNTER (OUTPATIENT)
Dept: OTHER | Age: 75
Setting detail: THERAPIES SERIES
Discharge: HOME | End: 2020-08-06
Payer: MEDICARE

## 2020-01-01 VITALS
SYSTOLIC BLOOD PRESSURE: 107 MMHG | DIASTOLIC BLOOD PRESSURE: 69 MMHG | RESPIRATION RATE: 18 BRPM | BODY MASS INDEX: 25.29 KG/M2 | WEIGHT: 197 LBS

## 2020-01-01 VITALS
RESPIRATION RATE: 18 BRPM | DIASTOLIC BLOOD PRESSURE: 57 MMHG | HEART RATE: 78 BPM | RESPIRATION RATE: 18 BRPM | BODY MASS INDEX: 25.81 KG/M2 | SYSTOLIC BLOOD PRESSURE: 101 MMHG | WEIGHT: 201 LBS | DIASTOLIC BLOOD PRESSURE: 59 MMHG | HEART RATE: 75 BPM | SYSTOLIC BLOOD PRESSURE: 100 MMHG | WEIGHT: 197.8 LBS | BODY MASS INDEX: 25.4 KG/M2

## 2020-01-01 VITALS
RESPIRATION RATE: 18 BRPM | WEIGHT: 201 LBS | DIASTOLIC BLOOD PRESSURE: 57 MMHG | SYSTOLIC BLOOD PRESSURE: 94 MMHG | BODY MASS INDEX: 25.81 KG/M2

## 2020-01-01 VITALS
WEIGHT: 195 LBS | SYSTOLIC BLOOD PRESSURE: 105 MMHG | DIASTOLIC BLOOD PRESSURE: 69 MMHG | HEIGHT: 74 IN | RESPIRATION RATE: 16 BRPM | HEART RATE: 62 BPM | BODY MASS INDEX: 25.03 KG/M2 | OXYGEN SATURATION: 94 % | TEMPERATURE: 97.5 F

## 2020-01-01 VITALS
DIASTOLIC BLOOD PRESSURE: 67 MMHG | WEIGHT: 202 LBS | SYSTOLIC BLOOD PRESSURE: 102 MMHG | RESPIRATION RATE: 18 BRPM | OXYGEN SATURATION: 95 % | BODY MASS INDEX: 25.94 KG/M2 | HEART RATE: 74 BPM

## 2020-01-01 VITALS
RESPIRATION RATE: 18 BRPM | SYSTOLIC BLOOD PRESSURE: 94 MMHG | WEIGHT: 202.8 LBS | BODY MASS INDEX: 26.04 KG/M2 | HEART RATE: 78 BPM | DIASTOLIC BLOOD PRESSURE: 58 MMHG

## 2020-01-01 VITALS
WEIGHT: 200 LBS | HEART RATE: 78 BPM | DIASTOLIC BLOOD PRESSURE: 63 MMHG | SYSTOLIC BLOOD PRESSURE: 109 MMHG | BODY MASS INDEX: 25.68 KG/M2 | RESPIRATION RATE: 18 BRPM

## 2020-01-01 VITALS
RESPIRATION RATE: 18 BRPM | SYSTOLIC BLOOD PRESSURE: 105 MMHG | WEIGHT: 202 LBS | DIASTOLIC BLOOD PRESSURE: 68 MMHG | BODY MASS INDEX: 25.94 KG/M2

## 2020-01-01 VITALS
HEART RATE: 76 BPM | SYSTOLIC BLOOD PRESSURE: 100 MMHG | DIASTOLIC BLOOD PRESSURE: 70 MMHG | RESPIRATION RATE: 18 BRPM | BODY MASS INDEX: 25.55 KG/M2 | WEIGHT: 199 LBS

## 2020-01-01 VITALS
BODY MASS INDEX: 25.81 KG/M2 | SYSTOLIC BLOOD PRESSURE: 124 MMHG | WEIGHT: 203 LBS | WEIGHT: 201 LBS | SYSTOLIC BLOOD PRESSURE: 92 MMHG | DIASTOLIC BLOOD PRESSURE: 72 MMHG | DIASTOLIC BLOOD PRESSURE: 54 MMHG | RESPIRATION RATE: 18 BRPM | BODY MASS INDEX: 26.06 KG/M2 | HEART RATE: 77 BPM | RESPIRATION RATE: 18 BRPM | HEART RATE: 64 BPM

## 2020-01-01 VITALS
WEIGHT: 195 LBS | HEART RATE: 60 BPM | RESPIRATION RATE: 18 BRPM | OXYGEN SATURATION: 96 % | SYSTOLIC BLOOD PRESSURE: 97 MMHG | BODY MASS INDEX: 25.04 KG/M2 | DIASTOLIC BLOOD PRESSURE: 65 MMHG | TEMPERATURE: 96.2 F

## 2020-01-01 VITALS
HEART RATE: 75 BPM | RESPIRATION RATE: 18 BRPM | WEIGHT: 204.9 LBS | HEART RATE: 71 BPM | RESPIRATION RATE: 18 BRPM | SYSTOLIC BLOOD PRESSURE: 108 MMHG | DIASTOLIC BLOOD PRESSURE: 66 MMHG | BODY MASS INDEX: 26.31 KG/M2 | DIASTOLIC BLOOD PRESSURE: 67 MMHG | SYSTOLIC BLOOD PRESSURE: 112 MMHG

## 2020-01-01 VITALS
BODY MASS INDEX: 26.19 KG/M2 | SYSTOLIC BLOOD PRESSURE: 104 MMHG | RESPIRATION RATE: 18 BRPM | DIASTOLIC BLOOD PRESSURE: 59 MMHG | HEART RATE: 81 BPM | WEIGHT: 204 LBS

## 2020-01-01 VITALS — TEMPERATURE: 97.2 F

## 2020-01-01 VITALS
DIASTOLIC BLOOD PRESSURE: 54 MMHG | WEIGHT: 202 LBS | HEART RATE: 84 BPM | RESPIRATION RATE: 18 BRPM | SYSTOLIC BLOOD PRESSURE: 96 MMHG | BODY MASS INDEX: 25.94 KG/M2

## 2020-01-01 VITALS
HEART RATE: 78 BPM | WEIGHT: 203 LBS | DIASTOLIC BLOOD PRESSURE: 58 MMHG | RESPIRATION RATE: 18 BRPM | BODY MASS INDEX: 26.06 KG/M2 | SYSTOLIC BLOOD PRESSURE: 128 MMHG

## 2020-01-01 VITALS
RESPIRATION RATE: 14 BRPM | HEART RATE: 70 BPM | DIASTOLIC BLOOD PRESSURE: 72 MMHG | BODY MASS INDEX: 26.09 KG/M2 | SYSTOLIC BLOOD PRESSURE: 102 MMHG | HEIGHT: 74 IN | TEMPERATURE: 97.5 F | OXYGEN SATURATION: 95 % | WEIGHT: 203.3 LBS

## 2020-01-01 VITALS
OXYGEN SATURATION: 98 % | SYSTOLIC BLOOD PRESSURE: 87 MMHG | RESPIRATION RATE: 16 BRPM | DIASTOLIC BLOOD PRESSURE: 62 MMHG

## 2020-01-01 VITALS
RESPIRATION RATE: 18 BRPM | HEART RATE: 68 BPM | SYSTOLIC BLOOD PRESSURE: 101 MMHG | DIASTOLIC BLOOD PRESSURE: 55 MMHG | BODY MASS INDEX: 25.81 KG/M2 | WEIGHT: 201 LBS

## 2020-01-01 VITALS
RESPIRATION RATE: 18 BRPM | DIASTOLIC BLOOD PRESSURE: 66 MMHG | SYSTOLIC BLOOD PRESSURE: 105 MMHG | BODY MASS INDEX: 25.29 KG/M2 | WEIGHT: 197 LBS | HEART RATE: 79 BPM

## 2020-01-01 VITALS
SYSTOLIC BLOOD PRESSURE: 106 MMHG | WEIGHT: 202 LBS | HEART RATE: 66 BPM | RESPIRATION RATE: 18 BRPM | BODY MASS INDEX: 25.94 KG/M2 | DIASTOLIC BLOOD PRESSURE: 66 MMHG

## 2020-01-01 VITALS
SYSTOLIC BLOOD PRESSURE: 92 MMHG | HEIGHT: 74 IN | TEMPERATURE: 97.8 F | HEART RATE: 97 BPM | OXYGEN SATURATION: 97 % | DIASTOLIC BLOOD PRESSURE: 75 MMHG | BODY MASS INDEX: 25.12 KG/M2 | WEIGHT: 195.77 LBS | RESPIRATION RATE: 20 BRPM

## 2020-01-01 VITALS
OXYGEN SATURATION: 94 % | HEIGHT: 74 IN | RESPIRATION RATE: 18 BRPM | BODY MASS INDEX: 25.74 KG/M2 | WEIGHT: 200.6 LBS | SYSTOLIC BLOOD PRESSURE: 92 MMHG | DIASTOLIC BLOOD PRESSURE: 58 MMHG | HEART RATE: 72 BPM

## 2020-01-01 VITALS
SYSTOLIC BLOOD PRESSURE: 98 MMHG | DIASTOLIC BLOOD PRESSURE: 62 MMHG | BODY MASS INDEX: 25.42 KG/M2 | RESPIRATION RATE: 18 BRPM | HEART RATE: 76 BPM | WEIGHT: 198 LBS

## 2020-01-01 VITALS
RESPIRATION RATE: 18 BRPM | WEIGHT: 198 LBS | DIASTOLIC BLOOD PRESSURE: 58 MMHG | HEART RATE: 72 BPM | SYSTOLIC BLOOD PRESSURE: 91 MMHG | BODY MASS INDEX: 25.42 KG/M2

## 2020-01-01 VITALS
BODY MASS INDEX: 25.62 KG/M2 | HEART RATE: 82 BPM | RESPIRATION RATE: 18 BRPM | DIASTOLIC BLOOD PRESSURE: 54 MMHG | SYSTOLIC BLOOD PRESSURE: 100 MMHG | WEIGHT: 199.6 LBS | HEIGHT: 74 IN

## 2020-01-01 VITALS — HEIGHT: 74 IN | WEIGHT: 198 LBS | BODY MASS INDEX: 25.41 KG/M2

## 2020-01-01 VITALS
BODY MASS INDEX: 25.42 KG/M2 | DIASTOLIC BLOOD PRESSURE: 59 MMHG | SYSTOLIC BLOOD PRESSURE: 96 MMHG | WEIGHT: 198 LBS | RESPIRATION RATE: 18 BRPM | HEART RATE: 106 BPM

## 2020-01-01 VITALS
DIASTOLIC BLOOD PRESSURE: 57 MMHG | WEIGHT: 199.5 LBS | RESPIRATION RATE: 18 BRPM | BODY MASS INDEX: 25.61 KG/M2 | SYSTOLIC BLOOD PRESSURE: 96 MMHG

## 2020-01-01 DIAGNOSIS — D63.1 ANEMIA OF CHRONIC RENAL FAILURE, STAGE 3 (MODERATE), UNSPECIFIED WHETHER STAGE 3A OR 3B CKD (HCC): Primary | ICD-10-CM

## 2020-01-01 DIAGNOSIS — N18.30 ANEMIA OF CHRONIC RENAL FAILURE, STAGE 3 (MODERATE), UNSPECIFIED WHETHER STAGE 3A OR 3B CKD (HCC): Primary | ICD-10-CM

## 2020-01-01 LAB
ACANTHOCYTES: ABNORMAL
ADENOVIRUS BY PCR: NOT DETECTED
AFP-TUMOR MARKER: 2 NG/ML (ref 0–9)
AFP-TUMOR MARKER: 2 NG/ML (ref 0–9)
ALBUMIN FLUID: 1.1 G/DL
ALBUMIN SERPL-MCNC: 3.1 G/DL (ref 3.5–4.7)
ALBUMIN SERPL-MCNC: 3.4 G/DL (ref 3.5–5.2)
ALBUMIN SERPL-MCNC: 3.4 G/DL (ref 3.5–5.2)
ALBUMIN SERPL-MCNC: 3.6 G/DL (ref 3.5–5.2)
ALBUMIN SERPL-MCNC: 3.7 G/DL (ref 3.5–5.2)
ALBUMIN SERPL-MCNC: 3.8 G/DL (ref 3.5–5.2)
ALBUMIN SERPL-MCNC: 3.9 G/DL (ref 3.5–5.2)
ALBUMIN SERPL-MCNC: 4 G/DL (ref 3.5–5.2)
ALBUMIN SERPL-MCNC: 4 G/DL (ref 3.5–5.2)
ALP BLD-CCNC: 111 U/L (ref 40–129)
ALP BLD-CCNC: 116 U/L (ref 40–129)
ALP BLD-CCNC: 122 U/L (ref 40–129)
ALP BLD-CCNC: 122 U/L (ref 40–129)
ALP BLD-CCNC: 123 U/L (ref 40–129)
ALP BLD-CCNC: 128 U/L (ref 40–129)
ALP BLD-CCNC: 130 U/L (ref 40–129)
ALP BLD-CCNC: 133 U/L (ref 40–129)
ALP BLD-CCNC: 136 U/L (ref 40–129)
ALP BLD-CCNC: 153 U/L (ref 40–129)
ALP BLD-CCNC: 168 U/L (ref 40–129)
ALPHA-1-GLOBULIN: 0.3 G/DL (ref 0.2–0.4)
ALPHA-2-GLOBULIN: 1 G/FL (ref 0.5–1)
ALT SERPL-CCNC: 13 U/L (ref 0–40)
ALT SERPL-CCNC: 16 U/L (ref 0–40)
ALT SERPL-CCNC: 26 U/L (ref 0–40)
ALT SERPL-CCNC: 26 U/L (ref 0–40)
ALT SERPL-CCNC: 28 U/L (ref 0–40)
ALT SERPL-CCNC: 29 U/L (ref 0–40)
ALT SERPL-CCNC: 31 U/L (ref 0–40)
ALT SERPL-CCNC: 33 U/L (ref 0–40)
ALT SERPL-CCNC: 36 U/L (ref 0–40)
ALT SERPL-CCNC: 43 U/L (ref 0–40)
ALT SERPL-CCNC: 51 U/L (ref 0–40)
ANION GAP SERPL CALCULATED.3IONS-SCNC: 10 MMOL/L (ref 7–16)
ANION GAP SERPL CALCULATED.3IONS-SCNC: 10 MMOL/L (ref 7–16)
ANION GAP SERPL CALCULATED.3IONS-SCNC: 11 MMOL/L (ref 7–16)
ANION GAP SERPL CALCULATED.3IONS-SCNC: 12 MMOL/L (ref 7–16)
ANION GAP SERPL CALCULATED.3IONS-SCNC: 13 MMOL/L (ref 7–16)
ANION GAP SERPL CALCULATED.3IONS-SCNC: 14 MMOL/L (ref 7–16)
ANION GAP SERPL CALCULATED.3IONS-SCNC: 15 MMOL/L (ref 7–16)
ANION GAP SERPL CALCULATED.3IONS-SCNC: 16 MMOL/L (ref 7–16)
ANION GAP SERPL CALCULATED.3IONS-SCNC: 17 MMOL/L (ref 7–16)
ANION GAP SERPL CALCULATED.3IONS-SCNC: 18 MMOL/L (ref 7–16)
ANION GAP SERPL CALCULATED.3IONS-SCNC: 9 MMOL/L (ref 7–16)
ANISOCYTOSIS: ABNORMAL
ANTI-MITOCHONDRIAL AB, IFA: NEGATIVE
ANTI-NUCLEAR ANTIBODY (ANA): NEGATIVE
APPEARANCE FLUID: NORMAL
APTT: 35.9 SEC (ref 24.5–35.1)
AST SERPL-CCNC: 27 U/L (ref 0–39)
AST SERPL-CCNC: 28 U/L (ref 0–39)
AST SERPL-CCNC: 43 U/L (ref 0–39)
AST SERPL-CCNC: 46 U/L (ref 0–39)
AST SERPL-CCNC: 47 U/L (ref 0–39)
AST SERPL-CCNC: 51 U/L (ref 0–39)
AST SERPL-CCNC: 55 U/L (ref 0–39)
AST SERPL-CCNC: 57 U/L (ref 0–39)
AST SERPL-CCNC: 61 U/L (ref 0–39)
AST SERPL-CCNC: 66 U/L (ref 0–39)
AST SERPL-CCNC: 79 U/L (ref 0–39)
B.E.: 5.1 MMOL/L (ref -3–3)
BACTERIA: ABNORMAL /HPF
BACTERIA: NORMAL /HPF
BASOPHILIC STIPPLING: ABNORMAL
BASOPHILIC STIPPLING: ABNORMAL
BASOPHILS ABSOLUTE: 0.03 E9/L (ref 0–0.2)
BASOPHILS ABSOLUTE: 0.04 E9/L (ref 0–0.2)
BASOPHILS ABSOLUTE: 0.05 E9/L (ref 0–0.2)
BASOPHILS ABSOLUTE: 0.06 E9/L (ref 0–0.2)
BASOPHILS ABSOLUTE: 0.06 E9/L (ref 0–0.2)
BASOPHILS ABSOLUTE: 0.07 E9/L (ref 0–0.2)
BASOPHILS RELATIVE PERCENT: 0.3 % (ref 0–2)
BASOPHILS RELATIVE PERCENT: 0.4 % (ref 0–2)
BASOPHILS RELATIVE PERCENT: 0.5 % (ref 0–2)
BASOPHILS RELATIVE PERCENT: 0.6 % (ref 0–2)
BASOPHILS RELATIVE PERCENT: 0.6 % (ref 0–2)
BASOPHILS RELATIVE PERCENT: 0.8 % (ref 0–2)
BETA GLOBULIN: 1.1 G/DL (ref 0.8–1.3)
BILIRUB SERPL-MCNC: 0.6 MG/DL (ref 0–1.2)
BILIRUB SERPL-MCNC: 0.7 MG/DL (ref 0–1.2)
BILIRUB SERPL-MCNC: 0.8 MG/DL (ref 0–1.2)
BILIRUB SERPL-MCNC: 0.8 MG/DL (ref 0–1.2)
BILIRUB SERPL-MCNC: 0.9 MG/DL (ref 0–1.2)
BILIRUB SERPL-MCNC: 0.9 MG/DL (ref 0–1.2)
BILIRUB SERPL-MCNC: 1 MG/DL (ref 0–1.2)
BILIRUB SERPL-MCNC: 1.1 MG/DL (ref 0–1.2)
BILIRUB SERPL-MCNC: 1.5 MG/DL (ref 0–1.2)
BILIRUBIN DIRECT: 0.5 MG/DL (ref 0–0.3)
BILIRUBIN DIRECT: 0.6 MG/DL (ref 0–0.3)
BILIRUBIN URINE: NEGATIVE
BILIRUBIN URINE: NEGATIVE
BILIRUBIN, INDIRECT: 0.4 MG/DL (ref 0–1)
BILIRUBIN, INDIRECT: 0.5 MG/DL (ref 0–1)
BLOOD, URINE: NEGATIVE
BLOOD, URINE: NEGATIVE
BODY FLUID CULTURE, STERILE: NORMAL
BORDETELLA PARAPERTUSSIS BY PCR: NOT DETECTED
BORDETELLA PERTUSSIS BY PCR: NOT DETECTED
BUN BLDV-MCNC: 26 MG/DL (ref 8–23)
BUN BLDV-MCNC: 32 MG/DL (ref 8–23)
BUN BLDV-MCNC: 33 MG/DL (ref 8–23)
BUN BLDV-MCNC: 34 MG/DL (ref 8–23)
BUN BLDV-MCNC: 34 MG/DL (ref 8–23)
BUN BLDV-MCNC: 35 MG/DL (ref 8–23)
BUN BLDV-MCNC: 36 MG/DL (ref 8–23)
BUN BLDV-MCNC: 38 MG/DL (ref 8–23)
BUN BLDV-MCNC: 39 MG/DL (ref 8–23)
BUN BLDV-MCNC: 41 MG/DL (ref 8–23)
BUN BLDV-MCNC: 41 MG/DL (ref 8–23)
BUN BLDV-MCNC: 42 MG/DL (ref 8–23)
BUN BLDV-MCNC: 43 MG/DL (ref 8–23)
BUN BLDV-MCNC: 44 MG/DL (ref 8–23)
BUN BLDV-MCNC: 45 MG/DL (ref 8–23)
BUN BLDV-MCNC: 47 MG/DL (ref 8–23)
BUN BLDV-MCNC: 48 MG/DL (ref 8–23)
BUN BLDV-MCNC: 49 MG/DL (ref 8–23)
BUN BLDV-MCNC: 50 MG/DL (ref 8–23)
BUN BLDV-MCNC: 51 MG/DL (ref 8–23)
BUN BLDV-MCNC: 51 MG/DL (ref 8–23)
BUN BLDV-MCNC: 52 MG/DL (ref 8–23)
BUN BLDV-MCNC: 54 MG/DL (ref 8–23)
BUN BLDV-MCNC: 55 MG/DL (ref 8–23)
BURR CELLS: ABNORMAL
C-REACTIVE PROTEIN, HIGH SENSITIVITY: 6.8 MG/L (ref 0–3)
CALCIUM IONIZED: 1.18 MMOL/L (ref 1.15–1.33)
CALCIUM IONIZED: 1.19 MMOL/L (ref 1.15–1.33)
CALCIUM IONIZED: 1.2 MMOL/L (ref 1.15–1.33)
CALCIUM IONIZED: 1.22 MMOL/L (ref 1.15–1.33)
CALCIUM SERPL-MCNC: 10 MG/DL (ref 8.6–10.2)
CALCIUM SERPL-MCNC: 8.3 MG/DL (ref 8.6–10.2)
CALCIUM SERPL-MCNC: 8.6 MG/DL (ref 8.6–10.2)
CALCIUM SERPL-MCNC: 8.7 MG/DL (ref 8.6–10.2)
CALCIUM SERPL-MCNC: 8.8 MG/DL (ref 8.6–10.2)
CALCIUM SERPL-MCNC: 8.8 MG/DL (ref 8.6–10.2)
CALCIUM SERPL-MCNC: 8.9 MG/DL (ref 8.6–10.2)
CALCIUM SERPL-MCNC: 9 MG/DL (ref 8.6–10.2)
CALCIUM SERPL-MCNC: 9.1 MG/DL (ref 8.6–10.2)
CALCIUM SERPL-MCNC: 9.1 MG/DL (ref 8.6–10.2)
CALCIUM SERPL-MCNC: 9.2 MG/DL (ref 8.6–10.2)
CALCIUM SERPL-MCNC: 9.2 MG/DL (ref 8.6–10.2)
CALCIUM SERPL-MCNC: 9.3 MG/DL (ref 8.6–10.2)
CALCIUM SERPL-MCNC: 9.3 MG/DL (ref 8.6–10.2)
CALCIUM SERPL-MCNC: 9.4 MG/DL (ref 8.6–10.2)
CALCIUM SERPL-MCNC: 9.5 MG/DL (ref 8.6–10.2)
CALCIUM SERPL-MCNC: 9.6 MG/DL (ref 8.6–10.2)
CALCIUM SERPL-MCNC: 9.7 MG/DL (ref 8.6–10.2)
CALCIUM SERPL-MCNC: 9.8 MG/DL (ref 8.6–10.2)
CEA,FLUID: 0.5 NG/ML
CELL COUNT FLUID TYPE: NORMAL
CHLAMYDOPHILIA PNEUMONIAE BY PCR: NOT DETECTED
CHLORIDE BLD-SCNC: 100 MMOL/L (ref 98–107)
CHLORIDE BLD-SCNC: 101 MMOL/L (ref 98–107)
CHLORIDE BLD-SCNC: 102 MMOL/L (ref 98–107)
CHLORIDE BLD-SCNC: 102 MMOL/L (ref 98–107)
CHLORIDE BLD-SCNC: 107 MMOL/L (ref 98–107)
CHLORIDE BLD-SCNC: 89 MMOL/L (ref 98–107)
CHLORIDE BLD-SCNC: 93 MMOL/L (ref 98–107)
CHLORIDE BLD-SCNC: 94 MMOL/L (ref 98–107)
CHLORIDE BLD-SCNC: 94 MMOL/L (ref 98–107)
CHLORIDE BLD-SCNC: 95 MMOL/L (ref 98–107)
CHLORIDE BLD-SCNC: 95 MMOL/L (ref 98–107)
CHLORIDE BLD-SCNC: 96 MMOL/L (ref 98–107)
CHLORIDE BLD-SCNC: 97 MMOL/L (ref 98–107)
CHLORIDE BLD-SCNC: 98 MMOL/L (ref 98–107)
CHLORIDE BLD-SCNC: 99 MMOL/L (ref 98–107)
CHOLESTEROL FLUID: 5 MG/DL
CHOLESTEROL, TOTAL: 61 MG/DL (ref 0–199)
CHOLESTEROL, TOTAL: 85 MG/DL (ref 0–199)
CLARITY: CLEAR
CLARITY: CLEAR
CO2: 20 MMOL/L (ref 22–29)
CO2: 22 MMOL/L (ref 22–29)
CO2: 22 MMOL/L (ref 22–29)
CO2: 23 MMOL/L (ref 22–29)
CO2: 24 MMOL/L (ref 22–29)
CO2: 25 MMOL/L (ref 22–29)
CO2: 26 MMOL/L (ref 22–29)
CO2: 27 MMOL/L (ref 22–29)
CO2: 28 MMOL/L (ref 22–29)
CO2: 28 MMOL/L (ref 22–29)
CO2: 30 MMOL/L (ref 22–29)
CO2: 31 MMOL/L (ref 22–29)
COHB: 1.7 % (ref 0–1.5)
COLOR FLUID: YELLOW
COLOR: YELLOW
COLOR: YELLOW
CORONAVIRUS 229E BY PCR: NOT DETECTED
CORONAVIRUS HKU1 BY PCR: NOT DETECTED
CORONAVIRUS NL63 BY PCR: NOT DETECTED
CORONAVIRUS OC43 BY PCR: NOT DETECTED
CORTISOL TOTAL: 18.23 MCG/DL (ref 2.68–18.4)
CORTISOL TOTAL: 30.31 MCG/DL (ref 2.68–18.4)
CORTISOL TOTAL: 34.65 MCG/DL (ref 2.68–18.4)
CORTISOL TOTAL: 8.6 MCG/DL (ref 2.68–18.4)
CREAT SERPL-MCNC: 1.5 MG/DL (ref 0.7–1.2)
CREAT SERPL-MCNC: 1.6 MG/DL (ref 0.7–1.2)
CREAT SERPL-MCNC: 1.7 MG/DL (ref 0.7–1.2)
CREAT SERPL-MCNC: 1.8 MG/DL (ref 0.7–1.2)
CREAT SERPL-MCNC: 1.9 MG/DL (ref 0.7–1.2)
CREAT SERPL-MCNC: 2 MG/DL (ref 0.7–1.2)
CREAT SERPL-MCNC: 2.1 MG/DL (ref 0.7–1.2)
CREAT SERPL-MCNC: 2.2 MG/DL (ref 0.7–1.2)
CREAT SERPL-MCNC: 2.3 MG/DL (ref 0.7–1.2)
CREAT SERPL-MCNC: 2.3 MG/DL (ref 0.7–1.2)
CREAT SERPL-MCNC: 2.4 MG/DL (ref 0.7–1.2)
CREAT SERPL-MCNC: 2.5 MG/DL (ref 0.7–1.2)
CREAT SERPL-MCNC: 2.6 MG/DL (ref 0.7–1.2)
CREAT SERPL-MCNC: 2.8 MG/DL (ref 0.7–1.2)
CREATININE URINE: 108 MG/DL (ref 40–278)
CREATININE URINE: 227 MG/DL (ref 40–278)
CRITICAL: ABNORMAL
D DIMER: 1984 NG/ML DDU
DATE ANALYZED: ABNORMAL
DATE OF COLLECTION: ABNORMAL
EKG ATRIAL RATE: 110 BPM
EKG ATRIAL RATE: 48 BPM
EKG ATRIAL RATE: 61 BPM
EKG ATRIAL RATE: 91 BPM
EKG P AXIS: 73 DEGREES
EKG P-R INTERVAL: 230 MS
EKG P-R INTERVAL: 250 MS
EKG P-R INTERVAL: 56 MS
EKG Q-T INTERVAL: 390 MS
EKG Q-T INTERVAL: 396 MS
EKG Q-T INTERVAL: 456 MS
EKG Q-T INTERVAL: 550 MS
EKG QRS DURATION: 116 MS
EKG QRS DURATION: 122 MS
EKG QRS DURATION: 140 MS
EKG QRS DURATION: 170 MS
EKG QTC CALCULATION (BAZETT): 459 MS
EKG QTC CALCULATION (BAZETT): 471 MS
EKG QTC CALCULATION (BAZETT): 502 MS
EKG QTC CALCULATION (BAZETT): 550 MS
EKG R AXIS: -122 DEGREES
EKG R AXIS: -49 DEGREES
EKG R AXIS: -61 DEGREES
EKG R AXIS: -79 DEGREES
EKG T AXIS: 101 DEGREES
EKG T AXIS: 106 DEGREES
EKG T AXIS: 115 DEGREES
EKG T AXIS: 99 DEGREES
EKG VENTRICULAR RATE: 60 BPM
EKG VENTRICULAR RATE: 61 BPM
EKG VENTRICULAR RATE: 88 BPM
EKG VENTRICULAR RATE: 97 BPM
ELECTROPHORESIS: ABNORMAL
EOSINOPHILS ABSOLUTE: 0.07 E9/L (ref 0.05–0.5)
EOSINOPHILS ABSOLUTE: 0.09 E9/L (ref 0.05–0.5)
EOSINOPHILS ABSOLUTE: 0.14 E9/L (ref 0.05–0.5)
EOSINOPHILS ABSOLUTE: 0.15 E9/L (ref 0.05–0.5)
EOSINOPHILS ABSOLUTE: 0.16 E9/L (ref 0.05–0.5)
EOSINOPHILS ABSOLUTE: 0.16 E9/L (ref 0.05–0.5)
EOSINOPHILS ABSOLUTE: 0.2 E9/L (ref 0.05–0.5)
EOSINOPHILS ABSOLUTE: 0.24 E9/L (ref 0.05–0.5)
EOSINOPHILS ABSOLUTE: 0.26 E9/L (ref 0.05–0.5)
EOSINOPHILS ABSOLUTE: 0.28 E9/L (ref 0.05–0.5)
EOSINOPHILS RELATIVE PERCENT: 0.7 % (ref 0–6)
EOSINOPHILS RELATIVE PERCENT: 0.9 % (ref 0–6)
EOSINOPHILS RELATIVE PERCENT: 1.4 % (ref 0–6)
EOSINOPHILS RELATIVE PERCENT: 1.5 % (ref 0–6)
EOSINOPHILS RELATIVE PERCENT: 1.6 % (ref 0–6)
EOSINOPHILS RELATIVE PERCENT: 1.7 % (ref 0–6)
EOSINOPHILS RELATIVE PERCENT: 2 % (ref 0–6)
EOSINOPHILS RELATIVE PERCENT: 2.5 % (ref 0–6)
EOSINOPHILS RELATIVE PERCENT: 2.5 % (ref 0–6)
EOSINOPHILS RELATIVE PERCENT: 3.1 % (ref 0–6)
FERRITIN: 52 NG/ML
FERRITIN: 625 NG/ML
FLUID TYPE: NORMAL
FOLATE: >20 NG/ML (ref 4.8–24.2)
GAMMA GLOBULIN: 1.8 G/DL (ref 0.7–1.6)
GFR AFRICAN AMERICAN: 27
GFR AFRICAN AMERICAN: 29
GFR AFRICAN AMERICAN: 31
GFR AFRICAN AMERICAN: 32
GFR AFRICAN AMERICAN: 34
GFR AFRICAN AMERICAN: 34
GFR AFRICAN AMERICAN: 36
GFR AFRICAN AMERICAN: 38
GFR AFRICAN AMERICAN: 40
GFR AFRICAN AMERICAN: 42
GFR AFRICAN AMERICAN: 45
GFR AFRICAN AMERICAN: 48
GFR AFRICAN AMERICAN: 52
GFR AFRICAN AMERICAN: 56
GFR NON-AFRICAN AMERICAN: 22 ML/MIN/1.73
GFR NON-AFRICAN AMERICAN: 24 ML/MIN/1.73
GFR NON-AFRICAN AMERICAN: 25 ML/MIN/1.73
GFR NON-AFRICAN AMERICAN: 27 ML/MIN/1.73
GFR NON-AFRICAN AMERICAN: 28 ML/MIN/1.73
GFR NON-AFRICAN AMERICAN: 28 ML/MIN/1.73
GFR NON-AFRICAN AMERICAN: 29 ML/MIN/1.73
GFR NON-AFRICAN AMERICAN: 30 ML/MIN/1.73
GFR NON-AFRICAN AMERICAN: 30 ML/MIN/1.73
GFR NON-AFRICAN AMERICAN: 31 ML/MIN/1.73
GFR NON-AFRICAN AMERICAN: 33 ML/MIN/1.73
GFR NON-AFRICAN AMERICAN: 35 ML/MIN/1.73
GFR NON-AFRICAN AMERICAN: 37 ML/MIN/1.73
GFR NON-AFRICAN AMERICAN: 40 ML/MIN/1.73
GFR NON-AFRICAN AMERICAN: 43 ML/MIN/1.73
GFR NON-AFRICAN AMERICAN: 46 ML/MIN/1.73
GLUCOSE BLD-MCNC: 102 MG/DL (ref 74–99)
GLUCOSE BLD-MCNC: 106 MG/DL (ref 74–99)
GLUCOSE BLD-MCNC: 106 MG/DL (ref 74–99)
GLUCOSE BLD-MCNC: 111 MG/DL (ref 74–99)
GLUCOSE BLD-MCNC: 113 MG/DL (ref 74–99)
GLUCOSE BLD-MCNC: 119 MG/DL (ref 74–99)
GLUCOSE BLD-MCNC: 123 MG/DL (ref 74–99)
GLUCOSE BLD-MCNC: 123 MG/DL (ref 74–99)
GLUCOSE BLD-MCNC: 124 MG/DL (ref 74–99)
GLUCOSE BLD-MCNC: 124 MG/DL (ref 74–99)
GLUCOSE BLD-MCNC: 126 MG/DL (ref 74–99)
GLUCOSE BLD-MCNC: 130 MG/DL (ref 74–99)
GLUCOSE BLD-MCNC: 131 MG/DL (ref 74–99)
GLUCOSE BLD-MCNC: 133 MG/DL (ref 74–99)
GLUCOSE BLD-MCNC: 134 MG/DL (ref 74–99)
GLUCOSE BLD-MCNC: 138 MG/DL (ref 74–99)
GLUCOSE BLD-MCNC: 140 MG/DL (ref 74–99)
GLUCOSE BLD-MCNC: 142 MG/DL (ref 74–99)
GLUCOSE BLD-MCNC: 143 MG/DL (ref 74–99)
GLUCOSE BLD-MCNC: 150 MG/DL (ref 74–99)
GLUCOSE BLD-MCNC: 158 MG/DL (ref 74–99)
GLUCOSE BLD-MCNC: 163 MG/DL (ref 74–99)
GLUCOSE BLD-MCNC: 178 MG/DL (ref 74–99)
GLUCOSE BLD-MCNC: 185 MG/DL (ref 74–99)
GLUCOSE BLD-MCNC: 205 MG/DL (ref 74–99)
GLUCOSE BLD-MCNC: 230 MG/DL (ref 74–99)
GLUCOSE BLD-MCNC: 250 MG/DL (ref 74–99)
GLUCOSE BLD-MCNC: 266 MG/DL (ref 74–99)
GLUCOSE BLD-MCNC: 269 MG/DL (ref 74–99)
GLUCOSE BLD-MCNC: 74 MG/DL (ref 74–99)
GLUCOSE BLD-MCNC: 87 MG/DL (ref 74–99)
GLUCOSE BLD-MCNC: 94 MG/DL (ref 74–99)
GLUCOSE BLD-MCNC: 99 MG/DL (ref 74–99)
GLUCOSE URINE: NEGATIVE MG/DL
GLUCOSE URINE: NEGATIVE MG/DL
GLUCOSE, FLUID: 128 MG/DL
GRAM STAIN ORDERABLE: NORMAL
GRAM STAIN RESULT: NORMAL
HAV IGM SER IA-ACNC: NORMAL
HBA1C MFR BLD: 5.8 % (ref 4–5.6)
HBA1C MFR BLD: 6.2 % (ref 4–5.6)
HBV SURFACE AB TITR SER: NORMAL {TITER}
HCO3: 30.1 MMOL/L (ref 22–26)
HCT VFR BLD CALC: 35 % (ref 37–54)
HCT VFR BLD CALC: 35.3 % (ref 37–54)
HCT VFR BLD CALC: 36.6 % (ref 37–54)
HCT VFR BLD CALC: 37.4 % (ref 37–54)
HCT VFR BLD CALC: 37.9 % (ref 37–54)
HCT VFR BLD CALC: 38.6 % (ref 37–54)
HCT VFR BLD CALC: 38.6 % (ref 37–54)
HCT VFR BLD CALC: 39.4 % (ref 37–54)
HCT VFR BLD CALC: 40.6 % (ref 37–54)
HCT VFR BLD CALC: 41.6 % (ref 37–54)
HCT VFR BLD CALC: 41.6 % (ref 37–54)
HDLC SERPL-MCNC: 27 MG/DL
HDLC SERPL-MCNC: 35 MG/DL
HEMOGLOBIN: 10.5 G/DL (ref 12.5–16.5)
HEMOGLOBIN: 10.5 G/DL (ref 12.5–16.5)
HEMOGLOBIN: 11.3 G/DL (ref 12.5–16.5)
HEMOGLOBIN: 11.4 G/DL (ref 12.5–16.5)
HEMOGLOBIN: 11.5 G/DL (ref 12.5–16.5)
HEMOGLOBIN: 11.7 G/DL (ref 12.5–16.5)
HEMOGLOBIN: 11.8 G/DL (ref 12.5–16.5)
HEMOGLOBIN: 11.8 G/DL (ref 12.5–16.5)
HEMOGLOBIN: 12.6 G/DL (ref 12.5–16.5)
HEMOGLOBIN: 12.6 G/DL (ref 12.5–16.5)
HEMOGLOBIN: 12.9 G/DL (ref 12.5–16.5)
HEPATITIS B CORE IGM ANTIBODY: NORMAL
HEPATITIS B CORE IGM ANTIBODY: NORMAL
HEPATITIS B SURFACE ANTIGEN INTERPRETATION: NORMAL
HEPATITIS C ANTIBODY INTERPRETATION: NORMAL
HEPATITIS C ANTIBODY INTERPRETATION: NORMAL
HHB: 2.5 % (ref 0–5)
HUMAN METAPNEUMOVIRUS BY PCR: NOT DETECTED
HUMAN RHINOVIRUS/ENTEROVIRUS BY PCR: NOT DETECTED
HYALINE CASTS: ABNORMAL /LPF (ref 0–2)
HYALINE CASTS: NORMAL /LPF (ref 0–2)
HYPOCHROMIA: ABNORMAL
IMMATURE GRANULOCYTES #: 0.02 E9/L
IMMATURE GRANULOCYTES #: 0.03 E9/L
IMMATURE GRANULOCYTES #: 0.04 E9/L
IMMATURE GRANULOCYTES %: 0.2 % (ref 0–5)
IMMATURE GRANULOCYTES %: 0.3 % (ref 0–5)
IMMATURE GRANULOCYTES %: 0.4 % (ref 0–5)
INFLUENZA A BY PCR: NOT DETECTED
INFLUENZA B BY PCR: NOT DETECTED
INR BLD: 1.7
INR BLD: 1.8
INR BLD: 2.1
INR BLD: 2.1
INR BLD: 2.6
INR BLD: 3.3
INR BLD: 3.3
INR BLD: 4.2
INR BLD: 4.7
INR BLD: 5
INR BLD: 5.9
IRON SATURATION: 10 % (ref 20–55)
IRON SATURATION: 23 % (ref 20–55)
IRON: 35 MCG/DL (ref 59–158)
IRON: 59 MCG/DL (ref 59–158)
KETONES, URINE: NEGATIVE MG/DL
KETONES, URINE: NEGATIVE MG/DL
LAB: ABNORMAL
LACTIC ACID: 1.4 MMOL/L (ref 0.5–2.2)
LACTIC ACID: 2.5 MMOL/L (ref 0.5–2.2)
LD, FLUID: 69 U/L
LDL CHOLESTEROL CALCULATED: 19 MG/DL (ref 0–99)
LDL CHOLESTEROL CALCULATED: 38 MG/DL (ref 0–99)
LEUKOCYTE ESTERASE, URINE: NEGATIVE
LEUKOCYTE ESTERASE, URINE: NEGATIVE
LIPASE: 12 U/L (ref 13–60)
LV EF: 18 %
LVEF MODALITY: NORMAL
LYMPHOCYTES ABSOLUTE: 0.59 E9/L (ref 1.5–4)
LYMPHOCYTES ABSOLUTE: 0.71 E9/L (ref 1.5–4)
LYMPHOCYTES ABSOLUTE: 0.79 E9/L (ref 1.5–4)
LYMPHOCYTES ABSOLUTE: 0.79 E9/L (ref 1.5–4)
LYMPHOCYTES ABSOLUTE: 0.88 E9/L (ref 1.5–4)
LYMPHOCYTES ABSOLUTE: 0.9 E9/L (ref 1.5–4)
LYMPHOCYTES ABSOLUTE: 0.93 E9/L (ref 1.5–4)
LYMPHOCYTES ABSOLUTE: 0.95 E9/L (ref 1.5–4)
LYMPHOCYTES ABSOLUTE: 1.25 E9/L (ref 1.5–4)
LYMPHOCYTES ABSOLUTE: 1.35 E9/L (ref 1.5–4)
LYMPHOCYTES RELATIVE PERCENT: 13.4 % (ref 20–42)
LYMPHOCYTES RELATIVE PERCENT: 14 % (ref 20–42)
LYMPHOCYTES RELATIVE PERCENT: 6.1 % (ref 20–42)
LYMPHOCYTES RELATIVE PERCENT: 7.5 % (ref 20–42)
LYMPHOCYTES RELATIVE PERCENT: 7.6 % (ref 20–42)
LYMPHOCYTES RELATIVE PERCENT: 8.1 % (ref 20–42)
LYMPHOCYTES RELATIVE PERCENT: 8.6 % (ref 20–42)
LYMPHOCYTES RELATIVE PERCENT: 9.2 % (ref 20–42)
LYMPHOCYTES RELATIVE PERCENT: 9.3 % (ref 20–42)
LYMPHOCYTES RELATIVE PERCENT: 9.8 % (ref 20–42)
Lab: ABNORMAL
MAGNESIUM: 2.1 MG/DL (ref 1.6–2.6)
MAGNESIUM: 2.2 MG/DL (ref 1.6–2.6)
MAGNESIUM: 2.3 MG/DL (ref 1.6–2.6)
MCH RBC QN AUTO: 24.7 PG (ref 26–35)
MCH RBC QN AUTO: 25 PG (ref 26–35)
MCH RBC QN AUTO: 26.1 PG (ref 26–35)
MCH RBC QN AUTO: 26.1 PG (ref 26–35)
MCH RBC QN AUTO: 26.3 PG (ref 26–35)
MCH RBC QN AUTO: 26.4 PG (ref 26–35)
MCH RBC QN AUTO: 26.5 PG (ref 26–35)
MCH RBC QN AUTO: 26.5 PG (ref 26–35)
MCH RBC QN AUTO: 26.6 PG (ref 26–35)
MCH RBC QN AUTO: 26.7 PG (ref 26–35)
MCH RBC QN AUTO: 27.3 PG (ref 26–35)
MCHC RBC AUTO-ENTMCNC: 29.7 % (ref 32–34.5)
MCHC RBC AUTO-ENTMCNC: 29.8 % (ref 32–34.5)
MCHC RBC AUTO-ENTMCNC: 29.9 % (ref 32–34.5)
MCHC RBC AUTO-ENTMCNC: 30 % (ref 32–34.5)
MCHC RBC AUTO-ENTMCNC: 30.3 % (ref 32–34.5)
MCHC RBC AUTO-ENTMCNC: 30.3 % (ref 32–34.5)
MCHC RBC AUTO-ENTMCNC: 30.5 % (ref 32–34.5)
MCHC RBC AUTO-ENTMCNC: 30.9 % (ref 32–34.5)
MCHC RBC AUTO-ENTMCNC: 31 % (ref 32–34.5)
MCHC RBC AUTO-ENTMCNC: 31 % (ref 32–34.5)
MCHC RBC AUTO-ENTMCNC: 31.1 % (ref 32–34.5)
MCV RBC AUTO: 83.1 FL (ref 80–99.9)
MCV RBC AUTO: 83.3 FL (ref 80–99.9)
MCV RBC AUTO: 84.9 FL (ref 80–99.9)
MCV RBC AUTO: 85.7 FL (ref 80–99.9)
MCV RBC AUTO: 85.7 FL (ref 80–99.9)
MCV RBC AUTO: 86 FL (ref 80–99.9)
MCV RBC AUTO: 86.7 FL (ref 80–99.9)
MCV RBC AUTO: 87 FL (ref 80–99.9)
MCV RBC AUTO: 87.7 FL (ref 80–99.9)
MCV RBC AUTO: 87.9 FL (ref 80–99.9)
MCV RBC AUTO: 88.9 FL (ref 80–99.9)
METER GLUCOSE: 100 MG/DL (ref 74–99)
METER GLUCOSE: 108 MG/DL (ref 74–99)
METER GLUCOSE: 110 MG/DL (ref 74–99)
METER GLUCOSE: 110 MG/DL (ref 74–99)
METER GLUCOSE: 113 MG/DL (ref 74–99)
METER GLUCOSE: 114 MG/DL (ref 74–99)
METER GLUCOSE: 115 MG/DL (ref 74–99)
METER GLUCOSE: 117 MG/DL (ref 74–99)
METER GLUCOSE: 127 MG/DL (ref 74–99)
METER GLUCOSE: 128 MG/DL (ref 74–99)
METER GLUCOSE: 135 MG/DL (ref 74–99)
METER GLUCOSE: 138 MG/DL (ref 74–99)
METER GLUCOSE: 140 MG/DL (ref 74–99)
METER GLUCOSE: 141 MG/DL (ref 74–99)
METER GLUCOSE: 143 MG/DL (ref 74–99)
METER GLUCOSE: 159 MG/DL (ref 74–99)
METER GLUCOSE: 164 MG/DL (ref 74–99)
METER GLUCOSE: 180 MG/DL (ref 74–99)
METER GLUCOSE: 225 MG/DL (ref 74–99)
METER GLUCOSE: 62 MG/DL (ref 74–99)
METER GLUCOSE: 72 MG/DL (ref 74–99)
METER GLUCOSE: 82 MG/DL (ref 74–99)
METER GLUCOSE: 96 MG/DL (ref 74–99)
METHB: 0.3 % (ref 0–1.5)
MODE: ABNORMAL
MONOCYTE, FLUID: 95 %
MONOCYTES ABSOLUTE: 0.78 E9/L (ref 0.1–0.95)
MONOCYTES ABSOLUTE: 0.79 E9/L (ref 0.1–0.95)
MONOCYTES ABSOLUTE: 0.81 E9/L (ref 0.1–0.95)
MONOCYTES ABSOLUTE: 0.83 E9/L (ref 0.1–0.95)
MONOCYTES ABSOLUTE: 0.84 E9/L (ref 0.1–0.95)
MONOCYTES ABSOLUTE: 0.86 E9/L (ref 0.1–0.95)
MONOCYTES ABSOLUTE: 0.87 E9/L (ref 0.1–0.95)
MONOCYTES ABSOLUTE: 0.88 E9/L (ref 0.1–0.95)
MONOCYTES ABSOLUTE: 0.89 E9/L (ref 0.1–0.95)
MONOCYTES ABSOLUTE: 0.96 E9/L (ref 0.1–0.95)
MONOCYTES RELATIVE PERCENT: 7.4 % (ref 2–12)
MONOCYTES RELATIVE PERCENT: 8 % (ref 2–12)
MONOCYTES RELATIVE PERCENT: 8.1 % (ref 2–12)
MONOCYTES RELATIVE PERCENT: 8.6 % (ref 2–12)
MONOCYTES RELATIVE PERCENT: 8.8 % (ref 2–12)
MONOCYTES RELATIVE PERCENT: 8.9 % (ref 2–12)
MONOCYTES RELATIVE PERCENT: 9.1 % (ref 2–12)
MONOCYTES RELATIVE PERCENT: 9.2 % (ref 2–12)
MONOCYTES RELATIVE PERCENT: 9.4 % (ref 2–12)
MONOCYTES RELATIVE PERCENT: 9.4 % (ref 2–12)
MRSA CULTURE ONLY: NORMAL
MYCOPLASMA PNEUMONIAE BY PCR: NOT DETECTED
NEUTROPHIL, FLUID: 5 %
NEUTROPHILS ABSOLUTE: 6.47 E9/L (ref 1.8–7.3)
NEUTROPHILS ABSOLUTE: 7.52 E9/L (ref 1.8–7.3)
NEUTROPHILS ABSOLUTE: 7.56 E9/L (ref 1.8–7.3)
NEUTROPHILS ABSOLUTE: 7.62 E9/L (ref 1.8–7.3)
NEUTROPHILS ABSOLUTE: 7.69 E9/L (ref 1.8–7.3)
NEUTROPHILS ABSOLUTE: 7.89 E9/L (ref 1.8–7.3)
NEUTROPHILS ABSOLUTE: 8.1 E9/L (ref 1.8–7.3)
NEUTROPHILS ABSOLUTE: 8.21 E9/L (ref 1.8–7.3)
NEUTROPHILS ABSOLUTE: 8.21 E9/L (ref 1.8–7.3)
NEUTROPHILS ABSOLUTE: 8.7 E9/L (ref 1.8–7.3)
NEUTROPHILS RELATIVE PERCENT: 72.4 % (ref 43–80)
NEUTROPHILS RELATIVE PERCENT: 75.5 % (ref 43–80)
NEUTROPHILS RELATIVE PERCENT: 78.1 % (ref 43–80)
NEUTROPHILS RELATIVE PERCENT: 79.4 % (ref 43–80)
NEUTROPHILS RELATIVE PERCENT: 79.9 % (ref 43–80)
NEUTROPHILS RELATIVE PERCENT: 80 % (ref 43–80)
NEUTROPHILS RELATIVE PERCENT: 80.9 % (ref 43–80)
NEUTROPHILS RELATIVE PERCENT: 81 % (ref 43–80)
NEUTROPHILS RELATIVE PERCENT: 81.9 % (ref 43–80)
NEUTROPHILS RELATIVE PERCENT: 84.3 % (ref 43–80)
NITRITE, URINE: NEGATIVE
NITRITE, URINE: NEGATIVE
NUCLEATED CELLS FLUID: 296 /UL
O2 CONTENT: 17.8 ML/DL
O2 SATURATION: 97.4 % (ref 92–98.5)
O2HB: 95.5 % (ref 94–97)
OPERATOR ID: ABNORMAL
OVALOCYTES: ABNORMAL
PARAINFLUENZA VIRUS 1 BY PCR: NOT DETECTED
PARAINFLUENZA VIRUS 2 BY PCR: NOT DETECTED
PARAINFLUENZA VIRUS 3 BY PCR: NOT DETECTED
PARAINFLUENZA VIRUS 4 BY PCR: NOT DETECTED
PARATHYROID HORMONE INTACT: 106 PG/ML (ref 15–65)
PARATHYROID HORMONE INTACT: 83 PG/ML (ref 15–65)
PATIENT TEMP: 37 C
PCO2: 45.7 MMHG (ref 35–45)
PDW BLD-RTO: 16.4 FL (ref 11.5–15)
PDW BLD-RTO: 18.3 FL (ref 11.5–15)
PDW BLD-RTO: 25 FL (ref 11.5–15)
PDW BLD-RTO: 25.2 FL (ref 11.5–15)
PDW BLD-RTO: 25.9 FL (ref 11.5–15)
PDW BLD-RTO: 26.4 FL (ref 11.5–15)
PDW BLD-RTO: 26.6 FL (ref 11.5–15)
PDW BLD-RTO: 26.9 FL (ref 11.5–15)
PDW BLD-RTO: 27 FL (ref 11.5–15)
PH BLOOD GAS: 7.44 (ref 7.35–7.45)
PH UA: 5 (ref 5–9)
PH UA: 6 (ref 5–9)
PHOSPHORUS: 2.5 MG/DL (ref 2.5–4.5)
PHOSPHORUS: 2.5 MG/DL (ref 2.5–4.5)
PHOSPHORUS: 2.6 MG/DL (ref 2.5–4.5)
PHOSPHORUS: 3.1 MG/DL (ref 2.5–4.5)
PLATELET # BLD: 187 E9/L (ref 130–450)
PLATELET # BLD: 206 E9/L (ref 130–450)
PLATELET # BLD: 215 E9/L (ref 130–450)
PLATELET # BLD: 220 E9/L (ref 130–450)
PLATELET # BLD: 228 E9/L (ref 130–450)
PLATELET # BLD: 231 E9/L (ref 130–450)
PLATELET # BLD: 232 E9/L (ref 130–450)
PLATELET # BLD: 238 E9/L (ref 130–450)
PLATELET # BLD: 248 E9/L (ref 130–450)
PLATELET # BLD: 250 E9/L (ref 130–450)
PLATELET # BLD: 263 E9/L (ref 130–450)
PMV BLD AUTO: 10.4 FL (ref 7–12)
PMV BLD AUTO: 10.8 FL (ref 7–12)
PMV BLD AUTO: 10.9 FL (ref 7–12)
PMV BLD AUTO: 11 FL (ref 7–12)
PMV BLD AUTO: 11.1 FL (ref 7–12)
PMV BLD AUTO: 11.1 FL (ref 7–12)
PMV BLD AUTO: 11.2 FL (ref 7–12)
PMV BLD AUTO: 11.3 FL (ref 7–12)
PMV BLD AUTO: 11.7 FL (ref 7–12)
PO2: 96.1 MMHG (ref 75–100)
POIKILOCYTES: ABNORMAL
POLYCHROMASIA: ABNORMAL
POTASSIUM REFLEX MAGNESIUM: 3.9 MMOL/L (ref 3.5–5)
POTASSIUM REFLEX MAGNESIUM: 4.4 MMOL/L (ref 3.5–5)
POTASSIUM REFLEX MAGNESIUM: 4.4 MMOL/L (ref 3.5–5)
POTASSIUM SERPL-SCNC: 3.7 MMOL/L (ref 3.5–5)
POTASSIUM SERPL-SCNC: 3.8 MMOL/L (ref 3.5–5)
POTASSIUM SERPL-SCNC: 3.8 MMOL/L (ref 3.5–5)
POTASSIUM SERPL-SCNC: 3.9 MMOL/L (ref 3.5–5)
POTASSIUM SERPL-SCNC: 4 MMOL/L (ref 3.5–5)
POTASSIUM SERPL-SCNC: 4 MMOL/L (ref 3.5–5)
POTASSIUM SERPL-SCNC: 4.1 MMOL/L (ref 3.5–5)
POTASSIUM SERPL-SCNC: 4.1 MMOL/L (ref 3.5–5)
POTASSIUM SERPL-SCNC: 4.2 MMOL/L (ref 3.5–5)
POTASSIUM SERPL-SCNC: 4.3 MMOL/L (ref 3.5–5)
POTASSIUM SERPL-SCNC: 4.4 MMOL/L (ref 3.5–5)
POTASSIUM SERPL-SCNC: 4.5 MMOL/L (ref 3.5–5)
POTASSIUM SERPL-SCNC: 4.6 MMOL/L (ref 3.5–5)
POTASSIUM SERPL-SCNC: 4.6 MMOL/L (ref 3.5–5)
POTASSIUM SERPL-SCNC: 4.7 MMOL/L (ref 3.5–5)
POTASSIUM SERPL-SCNC: 4.7 MMOL/L (ref 3.5–5)
POTASSIUM SERPL-SCNC: 4.9 MMOL/L (ref 3.5–5)
POTASSIUM SERPL-SCNC: 5 MMOL/L (ref 3.5–5)
PRO-BNP: 1179 PG/ML (ref 0–125)
PRO-BNP: 1357 PG/ML (ref 0–125)
PRO-BNP: 1360 PG/ML (ref 0–125)
PRO-BNP: 1410 PG/ML (ref 0–125)
PRO-BNP: 1572 PG/ML (ref 0–125)
PRO-BNP: 1688 PG/ML (ref 0–125)
PRO-BNP: 1750 PG/ML (ref 0–125)
PRO-BNP: 1795 PG/ML (ref 0–125)
PRO-BNP: 1888 PG/ML (ref 0–125)
PRO-BNP: 1897 PG/ML (ref 0–125)
PRO-BNP: 2059 PG/ML (ref 0–125)
PRO-BNP: 2077 PG/ML (ref 0–125)
PRO-BNP: 2138 PG/ML (ref 0–125)
PRO-BNP: 2278 PG/ML (ref 0–125)
PRO-BNP: 2659 PG/ML (ref 0–125)
PRO-BNP: 2689 PG/ML (ref 0–125)
PRO-BNP: 2806 PG/ML (ref 0–125)
PRO-BNP: 3275 PG/ML (ref 0–125)
PRO-BNP: 3735 PG/ML (ref 0–125)
PRO-BNP: 3736 PG/ML (ref 0–125)
PRO-BNP: 3818 PG/ML (ref 0–125)
PRO-BNP: 5076 PG/ML (ref 0–125)
PRO-BNP: 5318 PG/ML (ref 0–125)
PRO-BNP: 5430 PG/ML (ref 0–125)
PRO-BNP: 5849 PG/ML (ref 0–125)
PRO-BNP: ABNORMAL PG/ML (ref 0–125)
PROTEIN FLUID: 2 G/DL
PROTEIN PROTEIN: 27 MG/DL (ref 0–12)
PROTEIN UA: NORMAL MG/DL
PROTEIN UA: NORMAL MG/DL
PROTEIN/CREAT RATIO: 0.1
PROTEIN/CREAT RATIO: 0.1 (ref 0–0.2)
PROTHROMBIN TIME: 20.8 SEC (ref 9.3–12.4)
PROTHROMBIN TIME: 21.3 SEC (ref 9.3–12.4)
PROTHROMBIN TIME: 24.4 SEC (ref 9.3–12.4)
PROTHROMBIN TIME: 25.6 SEC (ref 9.3–12.4)
PROTHROMBIN TIME: 31.4 SEC (ref 9.3–12.4)
PROTHROMBIN TIME: 37.4 SEC (ref 9.3–12.4)
PROTHROMBIN TIME: 40 SEC (ref 9.3–12.4)
PROTHROMBIN TIME: 49.2 SEC (ref 9.3–12.4)
PROTHROMBIN TIME: 55.2 SEC (ref 9.3–12.4)
PROTHROMBIN TIME: 59.5 SEC (ref 9.3–12.4)
PROTHROMBIN TIME: 70.7 SEC (ref 9.3–12.4)
RBC # BLD: 4.2 E12/L (ref 3.8–5.8)
RBC # BLD: 4.25 E12/L (ref 3.8–5.8)
RBC # BLD: 4.27 E12/L (ref 3.8–5.8)
RBC # BLD: 4.3 E12/L (ref 3.8–5.8)
RBC # BLD: 4.4 E12/L (ref 3.8–5.8)
RBC # BLD: 4.42 E12/L (ref 3.8–5.8)
RBC # BLD: 4.43 E12/L (ref 3.8–5.8)
RBC # BLD: 4.49 E12/L (ref 3.8–5.8)
RBC # BLD: 4.73 E12/L (ref 3.8–5.8)
RBC # BLD: 4.78 E12/L (ref 3.8–5.8)
RBC # BLD: 4.8 E12/L (ref 3.8–5.8)
RBC FLUID: <2000 /UL
RBC UA: ABNORMAL /HPF (ref 0–2)
RBC UA: NORMAL /HPF (ref 0–2)
RESPIRATORY SYNCYTIAL VIRUS BY PCR: NOT DETECTED
SARS-COV-2, NAAT: NOT DETECTED
SARS-COV-2, PCR: NOT DETECTED
SARS-COV-2: NOT DETECTED
SARS-COV-2: NOT DETECTED
SCHISTOCYTES: ABNORMAL
SMOOTH MUSCLE ANTIBODY: NEGATIVE
SODIUM BLD-SCNC: 128 MMOL/L (ref 132–146)
SODIUM BLD-SCNC: 128 MMOL/L (ref 132–146)
SODIUM BLD-SCNC: 130 MMOL/L (ref 132–146)
SODIUM BLD-SCNC: 131 MMOL/L (ref 132–146)
SODIUM BLD-SCNC: 134 MMOL/L (ref 132–146)
SODIUM BLD-SCNC: 135 MMOL/L (ref 132–146)
SODIUM BLD-SCNC: 136 MMOL/L (ref 132–146)
SODIUM BLD-SCNC: 137 MMOL/L (ref 132–146)
SODIUM BLD-SCNC: 138 MMOL/L (ref 132–146)
SODIUM BLD-SCNC: 139 MMOL/L (ref 132–146)
SODIUM BLD-SCNC: 140 MMOL/L (ref 132–146)
SODIUM BLD-SCNC: 140 MMOL/L (ref 132–146)
SODIUM BLD-SCNC: 141 MMOL/L (ref 132–146)
SOURCE, BLOOD GAS: ABNORMAL
SOURCE: NORMAL
SOURCE: NORMAL
SPECIFIC GRAVITY UA: 1.02 (ref 1–1.03)
SPECIFIC GRAVITY UA: >=1.03 (ref 1–1.03)
T4 FREE: 1.06 NG/DL (ref 0.93–1.7)
T4 FREE: 1.52 NG/DL (ref 0.93–1.7)
T4 FREE: 1.55 NG/DL (ref 0.93–1.7)
TARGET CELLS: ABNORMAL
TEAR DROP CELLS: ABNORMAL
THB: 13.2 G/DL (ref 11.5–16.5)
TIME ANALYZED: 256
TOTAL IRON BINDING CAPACITY: 260 MCG/DL (ref 250–450)
TOTAL IRON BINDING CAPACITY: 362 MCG/DL (ref 250–450)
TOTAL PROTEIN: 6.7 G/DL (ref 6.4–8.3)
TOTAL PROTEIN: 6.9 G/DL (ref 6.4–8.3)
TOTAL PROTEIN: 7 G/DL (ref 6.4–8.3)
TOTAL PROTEIN: 7.2 G/DL (ref 6.4–8.3)
TOTAL PROTEIN: 7.2 G/DL (ref 6.4–8.3)
TOTAL PROTEIN: 7.3 G/DL (ref 6.4–8.3)
TOTAL PROTEIN: 7.3 G/DL (ref 6.4–8.3)
TOTAL PROTEIN: 7.4 G/DL (ref 6.4–8.3)
TOTAL PROTEIN: 7.4 G/DL (ref 6.4–8.3)
TOTAL PROTEIN: 7.5 G/DL (ref 6.4–8.3)
TOTAL PROTEIN: 7.6 G/DL (ref 6.4–8.3)
TOTAL PROTEIN: 7.8 G/DL (ref 6.4–8.3)
TRIGL SERPL-MCNC: 60 MG/DL (ref 0–149)
TRIGL SERPL-MCNC: 75 MG/DL (ref 0–149)
TROPONIN: 0.05 NG/ML (ref 0–0.03)
TROPONIN: 0.07 NG/ML (ref 0–0.03)
TSH SERPL DL<=0.05 MIU/L-ACNC: 11.32 UIU/ML (ref 0.27–4.2)
TSH SERPL DL<=0.05 MIU/L-ACNC: 7.11 UIU/ML (ref 0.27–4.2)
TSH SERPL DL<=0.05 MIU/L-ACNC: 7.62 UIU/ML (ref 0.27–4.2)
UREA NITROGEN, UR: 649 MG/DL (ref 800–1666)
URIC ACID, SERUM: 8.1 MG/DL (ref 3.4–7)
UROBILINOGEN, URINE: 0.2 E.U./DL
UROBILINOGEN, URINE: 0.2 E.U./DL
VITAMIN B-12: 1057 PG/ML (ref 211–946)
VITAMIN D 25-HYDROXY: 46 NG/ML (ref 30–100)
VITAMIN D 25-HYDROXY: 49 NG/ML (ref 30–100)
VLDLC SERPL CALC-MCNC: 12 MG/DL
VLDLC SERPL CALC-MCNC: 15 MG/DL
WBC # BLD: 10.1 E9/L (ref 4.5–11.5)
WBC # BLD: 10.1 E9/L (ref 4.5–11.5)
WBC # BLD: 10.3 E9/L (ref 4.5–11.5)
WBC # BLD: 10.6 E9/L (ref 4.5–11.5)
WBC # BLD: 8.4 E9/L (ref 4.5–11.5)
WBC # BLD: 8.9 E9/L (ref 4.5–11.5)
WBC # BLD: 9.4 E9/L (ref 4.5–11.5)
WBC # BLD: 9.6 E9/L (ref 4.5–11.5)
WBC # BLD: 9.7 E9/L (ref 4.5–11.5)
WBC # BLD: 9.7 E9/L (ref 4.5–11.5)
WBC # BLD: 9.8 E9/L (ref 4.5–11.5)
WBC UA: ABNORMAL /HPF (ref 0–5)
WBC UA: NORMAL /HPF (ref 0–5)

## 2020-01-01 PROCEDURE — 6360000002 HC RX W HCPCS: Performed by: HOSPITALIST

## 2020-01-01 PROCEDURE — 96365 THER/PROPH/DIAG IV INF INIT: CPT

## 2020-01-01 PROCEDURE — 99233 SBSQ HOSP IP/OBS HIGH 50: CPT | Performed by: INTERNAL MEDICINE

## 2020-01-01 PROCEDURE — 74176 CT ABD & PELVIS W/O CONTRAST: CPT

## 2020-01-01 PROCEDURE — 80048 BASIC METABOLIC PNL TOTAL CA: CPT

## 2020-01-01 PROCEDURE — 36415 COLL VENOUS BLD VENIPUNCTURE: CPT

## 2020-01-01 PROCEDURE — 6360000002 HC RX W HCPCS: Performed by: INTERNAL MEDICINE

## 2020-01-01 PROCEDURE — 83880 ASSAY OF NATRIURETIC PEPTIDE: CPT

## 2020-01-01 PROCEDURE — 2500000003 HC RX 250 WO HCPCS: Performed by: INTERNAL MEDICINE

## 2020-01-01 PROCEDURE — 99204 OFFICE O/P NEW MOD 45 MIN: CPT

## 2020-01-01 PROCEDURE — 80076 HEPATIC FUNCTION PANEL: CPT

## 2020-01-01 PROCEDURE — 99214 OFFICE O/P EST MOD 30 MIN: CPT

## 2020-01-01 PROCEDURE — 6370000000 HC RX 637 (ALT 250 FOR IP): Performed by: INTERNAL MEDICINE

## 2020-01-01 PROCEDURE — 6370000000 HC RX 637 (ALT 250 FOR IP): Performed by: NURSE PRACTITIONER

## 2020-01-01 PROCEDURE — 0W993ZZ DRAINAGE OF RIGHT PLEURAL CAVITY, PERCUTANEOUS APPROACH: ICD-10-PCS | Performed by: INTERNAL MEDICINE

## 2020-01-01 PROCEDURE — 2580000003 HC RX 258: Performed by: INTERNAL MEDICINE

## 2020-01-01 PROCEDURE — 85025 COMPLETE CBC W/AUTO DIFF WBC: CPT

## 2020-01-01 PROCEDURE — 94640 AIRWAY INHALATION TREATMENT: CPT

## 2020-01-01 PROCEDURE — 6360000002 HC RX W HCPCS: Performed by: NURSE PRACTITIONER

## 2020-01-01 PROCEDURE — U0003 INFECTIOUS AGENT DETECTION BY NUCLEIC ACID (DNA OR RNA); SEVERE ACUTE RESPIRATORY SYNDROME CORONAVIRUS 2 (SARS-COV-2) (CORONAVIRUS DISEASE [COVID-19]), AMPLIFIED PROBE TECHNIQUE, MAKING USE OF HIGH THROUGHPUT TECHNOLOGIES AS DESCRIBED BY CMS-2020-01-R: HCPCS

## 2020-01-01 PROCEDURE — 82570 ASSAY OF URINE CREATININE: CPT

## 2020-01-01 PROCEDURE — 2580000003 HC RX 258: Performed by: HOSPITALIST

## 2020-01-01 PROCEDURE — 97161 PT EVAL LOW COMPLEX 20 MIN: CPT

## 2020-01-01 PROCEDURE — 86705 HEP B CORE ANTIBODY IGM: CPT

## 2020-01-01 PROCEDURE — 96374 THER/PROPH/DIAG INJ IV PUSH: CPT

## 2020-01-01 PROCEDURE — 86038 ANTINUCLEAR ANTIBODIES: CPT

## 2020-01-01 PROCEDURE — 99215 OFFICE O/P EST HI 40 MIN: CPT | Performed by: INTERNAL MEDICINE

## 2020-01-01 PROCEDURE — 84100 ASSAY OF PHOSPHORUS: CPT

## 2020-01-01 PROCEDURE — 81001 URINALYSIS AUTO W/SCOPE: CPT

## 2020-01-01 PROCEDURE — 82533 TOTAL CORTISOL: CPT

## 2020-01-01 PROCEDURE — 83735 ASSAY OF MAGNESIUM: CPT

## 2020-01-01 PROCEDURE — 83605 ASSAY OF LACTIC ACID: CPT

## 2020-01-01 PROCEDURE — 93005 ELECTROCARDIOGRAM TRACING: CPT | Performed by: STUDENT IN AN ORGANIZED HEALTH CARE EDUCATION/TRAINING PROGRAM

## 2020-01-01 PROCEDURE — 82746 ASSAY OF FOLIC ACID SERUM: CPT

## 2020-01-01 PROCEDURE — 1200000000 HC SEMI PRIVATE

## 2020-01-01 PROCEDURE — 93005 ELECTROCARDIOGRAM TRACING: CPT | Performed by: INTERNAL MEDICINE

## 2020-01-01 PROCEDURE — 82728 ASSAY OF FERRITIN: CPT

## 2020-01-01 PROCEDURE — 36415 COLL VENOUS BLD VENIPUNCTURE: CPT | Performed by: INTERNAL MEDICINE

## 2020-01-01 PROCEDURE — 84165 PROTEIN E-PHORESIS SERUM: CPT

## 2020-01-01 PROCEDURE — 80053 COMPREHEN METABOLIC PANEL: CPT

## 2020-01-01 PROCEDURE — 82805 BLOOD GASES W/O2 SATURATION: CPT

## 2020-01-01 PROCEDURE — 82962 GLUCOSE BLOOD TEST: CPT

## 2020-01-01 PROCEDURE — 93000 ELECTROCARDIOGRAM COMPLETE: CPT | Performed by: INTERNAL MEDICINE

## 2020-01-01 PROCEDURE — 84484 ASSAY OF TROPONIN QUANT: CPT

## 2020-01-01 PROCEDURE — 82330 ASSAY OF CALCIUM: CPT

## 2020-01-01 PROCEDURE — 83970 ASSAY OF PARATHORMONE: CPT

## 2020-01-01 PROCEDURE — 97165 OT EVAL LOW COMPLEX 30 MIN: CPT

## 2020-01-01 PROCEDURE — 86706 HEP B SURFACE ANTIBODY: CPT

## 2020-01-01 PROCEDURE — 82306 VITAMIN D 25 HYDROXY: CPT

## 2020-01-01 PROCEDURE — 85610 PROTHROMBIN TIME: CPT

## 2020-01-01 PROCEDURE — 93010 ELECTROCARDIOGRAM REPORT: CPT | Performed by: INTERNAL MEDICINE

## 2020-01-01 PROCEDURE — 2060000000 HC ICU INTERMEDIATE R&B

## 2020-01-01 PROCEDURE — 2500000003 HC RX 250 WO HCPCS: Performed by: HOSPITALIST

## 2020-01-01 PROCEDURE — 71045 X-RAY EXAM CHEST 1 VIEW: CPT

## 2020-01-01 PROCEDURE — 82042 OTHER SOURCE ALBUMIN QUAN EA: CPT

## 2020-01-01 PROCEDURE — 2580000003 HC RX 258: Performed by: NURSE PRACTITIONER

## 2020-01-01 PROCEDURE — P9047 ALBUMIN (HUMAN), 25%, 50ML: HCPCS | Performed by: NURSE PRACTITIONER

## 2020-01-01 PROCEDURE — 93296 REM INTERROG EVL PM/IDS: CPT | Performed by: INTERNAL MEDICINE

## 2020-01-01 PROCEDURE — 93295 DEV INTERROG REMOTE 1/2/MLT: CPT | Performed by: INTERNAL MEDICINE

## 2020-01-01 PROCEDURE — 83540 ASSAY OF IRON: CPT

## 2020-01-01 PROCEDURE — 83550 IRON BINDING TEST: CPT

## 2020-01-01 PROCEDURE — 99285 EMERGENCY DEPT VISIT HI MDM: CPT

## 2020-01-01 PROCEDURE — 92960 CARDIOVERSION ELECTRIC EXT: CPT | Performed by: INTERNAL MEDICINE

## 2020-01-01 PROCEDURE — 2700000000 HC OXYGEN THERAPY PER DAY

## 2020-01-01 PROCEDURE — 88112 CYTOPATH CELL ENHANCE TECH: CPT

## 2020-01-01 PROCEDURE — 93321 DOPPLER ECHO F-UP/LMTD STD: CPT

## 2020-01-01 PROCEDURE — 6370000000 HC RX 637 (ALT 250 FOR IP): Performed by: PHYSICIAN ASSISTANT

## 2020-01-01 PROCEDURE — 82607 VITAMIN B-12: CPT

## 2020-01-01 PROCEDURE — 93297 REM INTERROG DEV EVAL ICPMS: CPT | Performed by: INTERNAL MEDICINE

## 2020-01-01 PROCEDURE — 2000000000 HC ICU R&B

## 2020-01-01 PROCEDURE — 83615 LACTATE (LD) (LDH) ENZYME: CPT

## 2020-01-01 PROCEDURE — 93283 PRGRMG EVAL IMPLANTABLE DFB: CPT | Performed by: INTERNAL MEDICINE

## 2020-01-01 PROCEDURE — 96375 TX/PRO/DX INJ NEW DRUG ADDON: CPT

## 2020-01-01 PROCEDURE — 83036 HEMOGLOBIN GLYCOSYLATED A1C: CPT

## 2020-01-01 PROCEDURE — 93005 ELECTROCARDIOGRAM TRACING: CPT | Performed by: EMERGENCY MEDICINE

## 2020-01-01 PROCEDURE — 82947 ASSAY GLUCOSE BLOOD QUANT: CPT

## 2020-01-01 PROCEDURE — 93312 ECHO TRANSESOPHAGEAL: CPT

## 2020-01-01 PROCEDURE — 93284 PRGRMG EVAL IMPLANTABLE DFB: CPT | Performed by: INTERNAL MEDICINE

## 2020-01-01 PROCEDURE — 84443 ASSAY THYROID STIM HORMONE: CPT

## 2020-01-01 PROCEDURE — 6360000002 HC RX W HCPCS: Performed by: PHYSICIAN ASSISTANT

## 2020-01-01 PROCEDURE — 76705 ECHO EXAM OF ABDOMEN: CPT

## 2020-01-01 PROCEDURE — 99214 OFFICE O/P EST MOD 30 MIN: CPT | Performed by: INTERNAL MEDICINE

## 2020-01-01 PROCEDURE — 36556 INSERT NON-TUNNEL CV CATH: CPT

## 2020-01-01 PROCEDURE — 80061 LIPID PANEL: CPT

## 2020-01-01 PROCEDURE — 86803 HEPATITIS C AB TEST: CPT

## 2020-01-01 PROCEDURE — 2709999900 HC NON-CHARGEABLE SUPPLY

## 2020-01-01 PROCEDURE — 32555 ASPIRATE PLEURA W/ IMAGING: CPT

## 2020-01-01 PROCEDURE — 82378 CARCINOEMBRYONIC ANTIGEN: CPT

## 2020-01-01 PROCEDURE — 36592 COLLECT BLOOD FROM PICC: CPT

## 2020-01-01 PROCEDURE — 83690 ASSAY OF LIPASE: CPT

## 2020-01-01 PROCEDURE — 94664 DEMO&/EVAL PT USE INHALER: CPT

## 2020-01-01 PROCEDURE — 99291 CRITICAL CARE FIRST HOUR: CPT | Performed by: INTERNAL MEDICINE

## 2020-01-01 PROCEDURE — 02HV33Z INSERTION OF INFUSION DEVICE INTO SUPERIOR VENA CAVA, PERCUTANEOUS APPROACH: ICD-10-PCS | Performed by: INTERNAL MEDICINE

## 2020-01-01 PROCEDURE — 99205 OFFICE O/P NEW HI 60 MIN: CPT | Performed by: INTERNAL MEDICINE

## 2020-01-01 PROCEDURE — 86255 FLUORESCENT ANTIBODY SCREEN: CPT

## 2020-01-01 PROCEDURE — 2580000003 HC RX 258: Performed by: PHYSICIAN ASSISTANT

## 2020-01-01 PROCEDURE — 80074 ACUTE HEPATITIS PANEL: CPT

## 2020-01-01 PROCEDURE — 85730 THROMBOPLASTIN TIME PARTIAL: CPT

## 2020-01-01 PROCEDURE — 2580000003 HC RX 258: Performed by: NURSE ANESTHETIST, CERTIFIED REGISTERED

## 2020-01-01 PROCEDURE — 71250 CT THORAX DX C-: CPT

## 2020-01-01 PROCEDURE — 82105 ALPHA-FETOPROTEIN SERUM: CPT

## 2020-01-01 PROCEDURE — 84156 ASSAY OF PROTEIN URINE: CPT

## 2020-01-01 PROCEDURE — 84540 ASSAY OF URINE/UREA-N: CPT

## 2020-01-01 PROCEDURE — 84439 ASSAY OF FREE THYROXINE: CPT

## 2020-01-01 PROCEDURE — 84999 UNLISTED CHEMISTRY PROCEDURE: CPT

## 2020-01-01 PROCEDURE — 6360000004 HC RX CONTRAST MEDICATION: Performed by: RADIOLOGY

## 2020-01-01 PROCEDURE — 96372 THER/PROPH/DIAG INJ SC/IM: CPT

## 2020-01-01 PROCEDURE — 2580000003 HC RX 258: Performed by: STUDENT IN AN ORGANIZED HEALTH CARE EDUCATION/TRAINING PROGRAM

## 2020-01-01 PROCEDURE — 84550 ASSAY OF BLOOD/URIC ACID: CPT

## 2020-01-01 PROCEDURE — 87015 SPECIMEN INFECT AGNT CONCNTJ: CPT

## 2020-01-01 PROCEDURE — 97530 THERAPEUTIC ACTIVITIES: CPT

## 2020-01-01 PROCEDURE — 87206 SMEAR FLUORESCENT/ACID STAI: CPT

## 2020-01-01 PROCEDURE — 87205 SMEAR GRAM STAIN: CPT

## 2020-01-01 PROCEDURE — 3700000001 HC ADD 15 MINUTES (ANESTHESIA)

## 2020-01-01 PROCEDURE — 92960 CARDIOVERSION ELECTRIC EXT: CPT

## 2020-01-01 PROCEDURE — 6360000002 HC RX W HCPCS: Performed by: NURSE ANESTHETIST, CERTIFIED REGISTERED

## 2020-01-01 PROCEDURE — 87070 CULTURE OTHR SPECIMN AEROBIC: CPT

## 2020-01-01 PROCEDURE — 85378 FIBRIN DEGRADE SEMIQUANT: CPT

## 2020-01-01 PROCEDURE — 3700000000 HC ANESTHESIA ATTENDED CARE

## 2020-01-01 PROCEDURE — 93325 DOPPLER ECHO COLOR FLOW MAPG: CPT

## 2020-01-01 PROCEDURE — U0002 COVID-19 LAB TEST NON-CDC: HCPCS

## 2020-01-01 PROCEDURE — 87116 MYCOBACTERIA CULTURE: CPT

## 2020-01-01 PROCEDURE — 94618 PULMONARY STRESS TESTING: CPT | Performed by: INTERNAL MEDICINE

## 2020-01-01 PROCEDURE — 0202U NFCT DS 22 TRGT SARS-COV-2: CPT

## 2020-01-01 PROCEDURE — 89051 BODY FLUID CELL COUNT: CPT

## 2020-01-01 PROCEDURE — 99223 1ST HOSP IP/OBS HIGH 75: CPT | Performed by: INTERNAL MEDICINE

## 2020-01-01 PROCEDURE — 88305 TISSUE EXAM BY PATHOLOGIST: CPT

## 2020-01-01 PROCEDURE — 6370000000 HC RX 637 (ALT 250 FOR IP): Performed by: STUDENT IN AN ORGANIZED HEALTH CARE EDUCATION/TRAINING PROGRAM

## 2020-01-01 PROCEDURE — P9047 ALBUMIN (HUMAN), 25%, 50ML: HCPCS | Performed by: INTERNAL MEDICINE

## 2020-01-01 PROCEDURE — 6360000002 HC RX W HCPCS: Performed by: EMERGENCY MEDICINE

## 2020-01-01 PROCEDURE — 86141 C-REACTIVE PROTEIN HS: CPT

## 2020-01-01 PROCEDURE — 85027 COMPLETE CBC AUTOMATED: CPT

## 2020-01-01 PROCEDURE — 87081 CULTURE SCREEN ONLY: CPT

## 2020-01-01 PROCEDURE — 84157 ASSAY OF PROTEIN OTHER: CPT

## 2020-01-01 RX ORDER — MIDODRINE HYDROCHLORIDE 5 MG/1
5 TABLET ORAL 2 TIMES DAILY WITH MEALS
Qty: 60 TABLET | Refills: 0 | Status: SHIPPED | OUTPATIENT
Start: 2020-01-01

## 2020-01-01 RX ORDER — SODIUM CHLORIDE 0.9 % (FLUSH) 0.9 %
10 SYRINGE (ML) INJECTION PRN
Status: DISCONTINUED | OUTPATIENT
Start: 2020-01-01 | End: 2020-01-01

## 2020-01-01 RX ORDER — FAMOTIDINE 20 MG/1
20 TABLET, FILM COATED ORAL DAILY
Status: DISCONTINUED | OUTPATIENT
Start: 2020-01-01 | End: 2020-01-01 | Stop reason: HOSPADM

## 2020-01-01 RX ORDER — SODIUM CHLORIDE 0.9 % (FLUSH) 0.9 %
10 SYRINGE (ML) INJECTION PRN
Status: DISCONTINUED | OUTPATIENT
Start: 2020-01-01 | End: 2020-01-01 | Stop reason: HOSPADM

## 2020-01-01 RX ORDER — ALBUMIN (HUMAN) 12.5 G/50ML
25 SOLUTION INTRAVENOUS ONCE
Status: COMPLETED | OUTPATIENT
Start: 2020-01-01 | End: 2020-01-01

## 2020-01-01 RX ORDER — BUMETANIDE 0.25 MG/ML
2 INJECTION, SOLUTION INTRAMUSCULAR; INTRAVENOUS ONCE
Status: COMPLETED | OUTPATIENT
Start: 2020-01-01 | End: 2020-01-01

## 2020-01-01 RX ORDER — BUMETANIDE 1 MG/1
1 TABLET ORAL 2 TIMES DAILY
Status: DISCONTINUED | OUTPATIENT
Start: 2020-01-01 | End: 2020-01-01

## 2020-01-01 RX ORDER — GABAPENTIN 100 MG/1
100 CAPSULE ORAL DAILY
Status: DISCONTINUED | OUTPATIENT
Start: 2020-01-01 | End: 2020-01-01 | Stop reason: HOSPADM

## 2020-01-01 RX ORDER — SODIUM CHLORIDE 0.9 % (FLUSH) 0.9 %
10 SYRINGE (ML) INJECTION 2 TIMES DAILY
Status: DISCONTINUED | OUTPATIENT
Start: 2020-01-01 | End: 2020-01-01 | Stop reason: HOSPADM

## 2020-01-01 RX ORDER — M-VIT,TX,IRON,MINS/CALC/FOLIC 27MG-0.4MG
1 TABLET ORAL DAILY
Status: DISCONTINUED | OUTPATIENT
Start: 2020-01-01 | End: 2020-01-01 | Stop reason: HOSPADM

## 2020-01-01 RX ORDER — SODIUM CHLORIDE 0.9 % (FLUSH) 0.9 %
10 SYRINGE (ML) INJECTION EVERY 12 HOURS SCHEDULED
Status: DISCONTINUED | OUTPATIENT
Start: 2020-01-01 | End: 2020-01-01 | Stop reason: HOSPADM

## 2020-01-01 RX ORDER — ALLOPURINOL 100 MG/1
100 TABLET ORAL DAILY
Status: DISCONTINUED | OUTPATIENT
Start: 2020-01-01 | End: 2020-01-01 | Stop reason: HOSPADM

## 2020-01-01 RX ORDER — COSYNTROPIN 0.25 MG/ML
0.25 INJECTION, POWDER, FOR SOLUTION INTRAMUSCULAR; INTRAVENOUS ONCE
Status: COMPLETED | OUTPATIENT
Start: 2020-01-01 | End: 2020-01-01

## 2020-01-01 RX ORDER — BUMETANIDE 1 MG/1
1 TABLET ORAL 2 TIMES DAILY
Status: DISCONTINUED | OUTPATIENT
Start: 2020-01-01 | End: 2020-01-01 | Stop reason: HOSPADM

## 2020-01-01 RX ORDER — LORAZEPAM 0.5 MG/1
0.5 TABLET ORAL NIGHTLY PRN
Status: DISCONTINUED | OUTPATIENT
Start: 2020-01-01 | End: 2020-01-01 | Stop reason: HOSPADM

## 2020-01-01 RX ORDER — POLYETHYLENE GLYCOL 3350 17 G/17G
17 POWDER, FOR SOLUTION ORAL DAILY PRN
Status: DISCONTINUED | OUTPATIENT
Start: 2020-01-01 | End: 2020-01-01 | Stop reason: HOSPADM

## 2020-01-01 RX ORDER — BUMETANIDE 0.25 MG/ML
2 INJECTION, SOLUTION INTRAMUSCULAR; INTRAVENOUS ONCE
Status: DISCONTINUED | OUTPATIENT
Start: 2020-01-01 | End: 2020-01-01 | Stop reason: HOSPADM

## 2020-01-01 RX ORDER — ACETAMINOPHEN 325 MG/1
650 TABLET ORAL EVERY 6 HOURS PRN
Status: DISCONTINUED | OUTPATIENT
Start: 2020-01-01 | End: 2020-01-01 | Stop reason: HOSPADM

## 2020-01-01 RX ORDER — NICOTINE POLACRILEX 4 MG
15 LOZENGE BUCCAL PRN
Status: DISCONTINUED | OUTPATIENT
Start: 2020-01-01 | End: 2020-01-01 | Stop reason: HOSPADM

## 2020-01-01 RX ORDER — METOPROLOL SUCCINATE 25 MG/1
25 TABLET, EXTENDED RELEASE ORAL DAILY
Status: DISCONTINUED | OUTPATIENT
Start: 2020-01-01 | End: 2020-01-01 | Stop reason: HOSPADM

## 2020-01-01 RX ORDER — ASCORBIC ACID 500 MG
2000 TABLET ORAL DAILY
Status: DISCONTINUED | OUTPATIENT
Start: 2020-01-01 | End: 2020-01-01 | Stop reason: HOSPADM

## 2020-01-01 RX ORDER — MIDODRINE HYDROCHLORIDE 5 MG/1
5 TABLET ORAL 2 TIMES DAILY WITH MEALS
Status: CANCELLED | OUTPATIENT
Start: 2020-01-01

## 2020-01-01 RX ORDER — WARFARIN SODIUM 5 MG/1
2.5 TABLET ORAL DAILY
Qty: 30 TABLET | Refills: 0
Start: 2020-01-01

## 2020-01-01 RX ORDER — ALBUMIN (HUMAN) 12.5 G/50ML
25 SOLUTION INTRAVENOUS
Status: DISPENSED | OUTPATIENT
Start: 2020-01-01 | End: 2020-01-01

## 2020-01-01 RX ORDER — BUMETANIDE 1 MG/1
2 TABLET ORAL DAILY
Status: DISCONTINUED | OUTPATIENT
Start: 2020-01-01 | End: 2020-01-01

## 2020-01-01 RX ORDER — DOBUTAMINE HYDROCHLORIDE 400 MG/100ML
2.5 INJECTION INTRAVENOUS CONTINUOUS
Status: DISCONTINUED | OUTPATIENT
Start: 2020-01-01 | End: 2020-01-01 | Stop reason: HOSPADM

## 2020-01-01 RX ORDER — NITROGLYCERIN 0.4 MG/1
0.4 TABLET SUBLINGUAL EVERY 5 MIN PRN
Status: DISCONTINUED | OUTPATIENT
Start: 2020-01-01 | End: 2020-01-01 | Stop reason: HOSPADM

## 2020-01-01 RX ORDER — ALBUMIN (HUMAN) 12.5 G/50ML
25 SOLUTION INTRAVENOUS SEE ADMIN INSTRUCTIONS
Status: DISCONTINUED | OUTPATIENT
Start: 2020-01-01 | End: 2020-01-01

## 2020-01-01 RX ORDER — PANTOPRAZOLE SODIUM 20 MG/1
20 TABLET, DELAYED RELEASE ORAL DAILY
COMMUNITY

## 2020-01-01 RX ORDER — DEXTROSE MONOHYDRATE 25 G/50ML
12.5 INJECTION, SOLUTION INTRAVENOUS PRN
Status: DISCONTINUED | OUTPATIENT
Start: 2020-01-01 | End: 2020-01-01 | Stop reason: HOSPADM

## 2020-01-01 RX ORDER — WARFARIN SODIUM 2.5 MG/1
2.5 TABLET ORAL DAILY
Status: DISCONTINUED | OUTPATIENT
Start: 2020-01-01 | End: 2020-01-01

## 2020-01-01 RX ORDER — POTASSIUM CHLORIDE 20 MEQ/1
20 TABLET, EXTENDED RELEASE ORAL 2 TIMES DAILY
Status: DISCONTINUED | OUTPATIENT
Start: 2020-01-01 | End: 2020-01-01

## 2020-01-01 RX ORDER — SODIUM CHLORIDE 9 MG/ML
INJECTION, SOLUTION INTRAVENOUS CONTINUOUS PRN
Status: DISCONTINUED | OUTPATIENT
Start: 2020-01-01 | End: 2020-01-01 | Stop reason: SDUPTHER

## 2020-01-01 RX ORDER — DEXTROSE MONOHYDRATE 50 MG/ML
100 INJECTION, SOLUTION INTRAVENOUS PRN
Status: DISCONTINUED | OUTPATIENT
Start: 2020-01-01 | End: 2020-01-01 | Stop reason: HOSPADM

## 2020-01-01 RX ORDER — POLYETHYLENE GLYCOL 3350 17 G/17G
17 POWDER, FOR SOLUTION ORAL DAILY PRN
Qty: 527 G | Refills: 1 | Status: SHIPPED | OUTPATIENT
Start: 2020-01-01 | End: 2020-12-20

## 2020-01-01 RX ORDER — FUROSEMIDE 10 MG/ML
40 INJECTION INTRAMUSCULAR; INTRAVENOUS ONCE
Status: DISCONTINUED | OUTPATIENT
Start: 2020-01-01 | End: 2020-01-01

## 2020-01-01 RX ORDER — PROPOFOL 10 MG/ML
INJECTION, EMULSION INTRAVENOUS PRN
Status: DISCONTINUED | OUTPATIENT
Start: 2020-01-01 | End: 2020-01-01 | Stop reason: SDUPTHER

## 2020-01-01 RX ORDER — AMIODARONE HYDROCHLORIDE 400 MG/1
400 TABLET ORAL 2 TIMES DAILY
Qty: 30 TABLET | Refills: 1 | Status: SHIPPED | OUTPATIENT
Start: 2020-01-01 | End: 2020-01-01

## 2020-01-01 RX ORDER — SODIUM CHLORIDE 0.9 % (FLUSH) 0.9 %
10 SYRINGE (ML) INJECTION PRN
Status: CANCELLED | OUTPATIENT
Start: 2020-01-01

## 2020-01-01 RX ORDER — METHYLPREDNISOLONE SODIUM SUCCINATE 125 MG/2ML
125 INJECTION, POWDER, LYOPHILIZED, FOR SOLUTION INTRAMUSCULAR; INTRAVENOUS ONCE
Status: CANCELLED | OUTPATIENT
Start: 2020-01-01

## 2020-01-01 RX ORDER — DIPHENHYDRAMINE HYDROCHLORIDE 50 MG/ML
50 INJECTION INTRAMUSCULAR; INTRAVENOUS ONCE
Status: CANCELLED | OUTPATIENT
Start: 2020-01-01

## 2020-01-01 RX ORDER — ONDANSETRON 2 MG/ML
4 INJECTION INTRAMUSCULAR; INTRAVENOUS EVERY 6 HOURS PRN
Status: DISCONTINUED | OUTPATIENT
Start: 2020-01-01 | End: 2020-01-01 | Stop reason: SINTOL

## 2020-01-01 RX ORDER — LEVOTHYROXINE SODIUM 0.07 MG/1
75 TABLET ORAL DAILY
Status: DISCONTINUED | OUTPATIENT
Start: 2020-01-01 | End: 2020-01-01 | Stop reason: HOSPADM

## 2020-01-01 RX ORDER — BUMETANIDE 0.25 MG/ML
2 INJECTION, SOLUTION INTRAMUSCULAR; INTRAVENOUS 2 TIMES DAILY
Status: DISCONTINUED | OUTPATIENT
Start: 2020-01-01 | End: 2020-01-01

## 2020-01-01 RX ORDER — SODIUM CHLORIDE 9 MG/ML
INJECTION, SOLUTION INTRAVENOUS ONCE
Status: COMPLETED | OUTPATIENT
Start: 2020-01-01 | End: 2020-01-01

## 2020-01-01 RX ORDER — AMOXICILLIN AND CLAVULANATE POTASSIUM 875; 125 MG/1; MG/1
1 TABLET, FILM COATED ORAL 2 TIMES DAILY
Qty: 10 TABLET | Refills: 0 | Status: SHIPPED | OUTPATIENT
Start: 2020-01-01 | End: 2020-01-01

## 2020-01-01 RX ORDER — ONDANSETRON 2 MG/ML
4 INJECTION INTRAMUSCULAR; INTRAVENOUS EVERY 6 HOURS PRN
Status: DISCONTINUED | OUTPATIENT
Start: 2020-01-01 | End: 2020-01-01 | Stop reason: HOSPADM

## 2020-01-01 RX ORDER — FUROSEMIDE 10 MG/ML
20 INJECTION INTRAMUSCULAR; INTRAVENOUS ONCE
Status: DISCONTINUED | OUTPATIENT
Start: 2020-01-01 | End: 2020-01-01

## 2020-01-01 RX ORDER — AMIODARONE HYDROCHLORIDE 200 MG/1
200 TABLET ORAL DAILY
Qty: 90 TABLET | Refills: 2 | Status: ON HOLD
Start: 2020-01-01 | End: 2020-01-01 | Stop reason: SDUPTHER

## 2020-01-01 RX ORDER — TRIAMCINOLONE ACETONIDE 1 MG/G
CREAM TOPICAL 2 TIMES DAILY
Status: DISCONTINUED | OUTPATIENT
Start: 2020-01-01 | End: 2020-01-01

## 2020-01-01 RX ORDER — TRIAMCINOLONE ACETONIDE 1 MG/G
CREAM TOPICAL 2 TIMES DAILY
Status: DISCONTINUED | OUTPATIENT
Start: 2020-01-01 | End: 2020-01-01 | Stop reason: ALTCHOICE

## 2020-01-01 RX ORDER — METOPROLOL SUCCINATE 25 MG/1
25 TABLET, EXTENDED RELEASE ORAL ONCE
Status: COMPLETED | OUTPATIENT
Start: 2020-01-01 | End: 2020-01-01

## 2020-01-01 RX ORDER — AMIODARONE HYDROCHLORIDE 200 MG/1
200 TABLET ORAL DAILY
Qty: 30 TABLET | Refills: 0
Start: 2020-01-01

## 2020-01-01 RX ORDER — PANTOPRAZOLE SODIUM 40 MG/1
40 TABLET, DELAYED RELEASE ORAL
Status: DISCONTINUED | OUTPATIENT
Start: 2020-01-01 | End: 2020-01-01 | Stop reason: HOSPADM

## 2020-01-01 RX ORDER — METOPROLOL SUCCINATE 25 MG/1
25 TABLET, EXTENDED RELEASE ORAL DAILY
Status: DISCONTINUED | OUTPATIENT
Start: 2020-01-01 | End: 2020-01-01

## 2020-01-01 RX ORDER — MIDODRINE HYDROCHLORIDE 5 MG/1
5 TABLET ORAL 2 TIMES DAILY WITH MEALS
Status: DISCONTINUED | OUTPATIENT
Start: 2020-01-01 | End: 2020-01-01 | Stop reason: HOSPADM

## 2020-01-01 RX ORDER — ACETAMINOPHEN 650 MG/1
650 SUPPOSITORY RECTAL EVERY 6 HOURS PRN
Status: DISCONTINUED | OUTPATIENT
Start: 2020-01-01 | End: 2020-01-01 | Stop reason: HOSPADM

## 2020-01-01 RX ORDER — WARFARIN SODIUM 2.5 MG/1
2.5 TABLET ORAL
Status: COMPLETED | OUTPATIENT
Start: 2020-01-01 | End: 2020-01-01

## 2020-01-01 RX ORDER — SODIUM CHLORIDE 0.9 % (FLUSH) 0.9 %
10 SYRINGE (ML) INJECTION PRN
Status: DISCONTINUED | OUTPATIENT
Start: 2020-01-01 | End: 2020-01-01 | Stop reason: ALTCHOICE

## 2020-01-01 RX ORDER — ATORVASTATIN CALCIUM 40 MG/1
80 TABLET, FILM COATED ORAL DAILY
Status: DISCONTINUED | OUTPATIENT
Start: 2020-01-01 | End: 2020-01-01 | Stop reason: HOSPADM

## 2020-01-01 RX ORDER — SODIUM CHLORIDE 0.9 % (FLUSH) 0.9 %
10 SYRINGE (ML) INJECTION ONCE
Status: DISCONTINUED | OUTPATIENT
Start: 2020-01-01 | End: 2020-01-01 | Stop reason: HOSPADM

## 2020-01-01 RX ORDER — BUMETANIDE 1 MG/1
2 TABLET ORAL SEE ADMIN INSTRUCTIONS
Status: DISCONTINUED | OUTPATIENT
Start: 2020-01-01 | End: 2020-01-01

## 2020-01-01 RX ORDER — ZOLPIDEM TARTRATE 12.5 MG/1
12.5 TABLET, FILM COATED, EXTENDED RELEASE ORAL NIGHTLY PRN
Status: DISCONTINUED | OUTPATIENT
Start: 2020-01-01 | End: 2020-01-01 | Stop reason: CLARIF

## 2020-01-01 RX ORDER — WARFARIN SODIUM 2.5 MG/1
2.5 TABLET ORAL
Status: DISCONTINUED | OUTPATIENT
Start: 2020-01-01 | End: 2020-01-01 | Stop reason: HOSPADM

## 2020-01-01 RX ORDER — ALBUTEROL SULFATE 2.5 MG/3ML
2.5 SOLUTION RESPIRATORY (INHALATION) EVERY 6 HOURS PRN
Status: DISCONTINUED | OUTPATIENT
Start: 2020-01-01 | End: 2020-01-01 | Stop reason: HOSPADM

## 2020-01-01 RX ORDER — AMIODARONE HYDROCHLORIDE 200 MG/1
200 TABLET ORAL DAILY
Status: DISCONTINUED | OUTPATIENT
Start: 2020-01-01 | End: 2020-01-01 | Stop reason: HOSPADM

## 2020-01-01 RX ORDER — BUMETANIDE 0.25 MG/ML
1 INJECTION, SOLUTION INTRAMUSCULAR; INTRAVENOUS EVERY 8 HOURS
Status: DISCONTINUED | OUTPATIENT
Start: 2020-01-01 | End: 2020-01-01 | Stop reason: HOSPADM

## 2020-01-01 RX ORDER — PROMETHAZINE HYDROCHLORIDE 25 MG/1
12.5 TABLET ORAL EVERY 6 HOURS PRN
Status: DISCONTINUED | OUTPATIENT
Start: 2020-01-01 | End: 2020-01-01 | Stop reason: SINTOL

## 2020-01-01 RX ORDER — METOPROLOL SUCCINATE 50 MG/1
25 TABLET, EXTENDED RELEASE ORAL DAILY
Qty: 30 TABLET | Refills: 0
Start: 2020-01-01

## 2020-01-01 RX ORDER — BUMETANIDE 1 MG/1
1 TABLET ORAL NIGHTLY
Status: DISCONTINUED | OUTPATIENT
Start: 2020-01-01 | End: 2020-01-01

## 2020-01-01 RX ORDER — BUMETANIDE 2 MG/1
1 TABLET ORAL 2 TIMES DAILY
Qty: 30 TABLET | Refills: 5
Start: 2020-01-01

## 2020-01-01 RX ORDER — SODIUM CHLORIDE 0.9 % (FLUSH) 0.9 %
10 SYRINGE (ML) INJECTION 2 TIMES DAILY
Status: DISCONTINUED | OUTPATIENT
Start: 2020-01-01 | End: 2020-01-01

## 2020-01-01 RX ORDER — BUMETANIDE 0.25 MG/ML
2 INJECTION, SOLUTION INTRAMUSCULAR; INTRAVENOUS ONCE
Status: DISCONTINUED | OUTPATIENT
Start: 2020-01-01 | End: 2020-01-01

## 2020-01-01 RX ORDER — QUETIAPINE FUMARATE 50 MG/1
50 TABLET, FILM COATED ORAL ONCE
COMMUNITY
End: 2020-01-01

## 2020-01-01 RX ORDER — MIDODRINE HYDROCHLORIDE 5 MG/1
10 TABLET ORAL ONCE
Status: COMPLETED | OUTPATIENT
Start: 2020-01-01 | End: 2020-01-01

## 2020-01-01 RX ORDER — IPRATROPIUM BROMIDE AND ALBUTEROL SULFATE 2.5; .5 MG/3ML; MG/3ML
1 SOLUTION RESPIRATORY (INHALATION) 4 TIMES DAILY
Status: DISCONTINUED | OUTPATIENT
Start: 2020-01-01 | End: 2020-01-01 | Stop reason: HOSPADM

## 2020-01-01 RX ORDER — ASPIRIN 81 MG/1
81 TABLET ORAL DAILY
Status: DISCONTINUED | OUTPATIENT
Start: 2020-01-01 | End: 2020-01-01 | Stop reason: HOSPADM

## 2020-01-01 RX ORDER — ALBUTEROL SULFATE 2.5 MG/3ML
2.5 SOLUTION RESPIRATORY (INHALATION) EVERY 12 HOURS
Status: DISCONTINUED | OUTPATIENT
Start: 2020-01-01 | End: 2020-01-01 | Stop reason: HOSPADM

## 2020-01-01 RX ORDER — 0.9 % SODIUM CHLORIDE 0.9 %
250 INTRAVENOUS SOLUTION INTRAVENOUS ONCE
Status: COMPLETED | OUTPATIENT
Start: 2020-01-01 | End: 2020-01-01

## 2020-01-01 RX ORDER — FUROSEMIDE 10 MG/ML
40 INJECTION INTRAMUSCULAR; INTRAVENOUS 2 TIMES DAILY
Status: DISCONTINUED | OUTPATIENT
Start: 2020-01-01 | End: 2020-01-01

## 2020-01-01 RX ORDER — PROMETHAZINE HYDROCHLORIDE 25 MG/1
12.5 TABLET ORAL EVERY 6 HOURS PRN
Status: DISCONTINUED | OUTPATIENT
Start: 2020-01-01 | End: 2020-01-01 | Stop reason: HOSPADM

## 2020-01-01 RX ORDER — SODIUM CHLORIDE 0.9 % (FLUSH) 0.9 %
10 SYRINGE (ML) INJECTION ONCE
Status: COMPLETED | OUTPATIENT
Start: 2020-01-01 | End: 2020-01-01

## 2020-01-01 RX ORDER — POTASSIUM CHLORIDE 20 MEQ/1
20 TABLET, EXTENDED RELEASE ORAL 2 TIMES DAILY
Status: DISCONTINUED | OUTPATIENT
Start: 2020-01-01 | End: 2020-01-01 | Stop reason: HOSPADM

## 2020-01-01 RX ADMIN — ALBUTEROL SULFATE 2.5 MG: 2.5 SOLUTION RESPIRATORY (INHALATION) at 20:10

## 2020-01-01 RX ADMIN — Medication 10 ML: at 09:18

## 2020-01-01 RX ADMIN — METRONIDAZOLE 500 MG: 500 INJECTION, SOLUTION INTRAVENOUS at 08:24

## 2020-01-01 RX ADMIN — ALBUMIN (HUMAN) 25 G: 0.25 INJECTION, SOLUTION INTRAVENOUS at 10:11

## 2020-01-01 RX ADMIN — Medication 10 ML: at 12:37

## 2020-01-01 RX ADMIN — BUMETANIDE 1 MG: 0.25 INJECTION INTRAMUSCULAR; INTRAVENOUS at 18:26

## 2020-01-01 RX ADMIN — LORAZEPAM 0.5 MG: 0.5 TABLET ORAL at 20:52

## 2020-01-01 RX ADMIN — SODIUM CHLORIDE, PRESERVATIVE FREE 10 ML: 5 INJECTION INTRAVENOUS at 13:52

## 2020-01-01 RX ADMIN — ALLOPURINOL 100 MG: 100 TABLET ORAL at 08:40

## 2020-01-01 RX ADMIN — ALBUTEROL SULFATE 2.5 MG: 2.5 SOLUTION RESPIRATORY (INHALATION) at 08:05

## 2020-01-01 RX ADMIN — METRONIDAZOLE 500 MG: 500 INJECTION, SOLUTION INTRAVENOUS at 08:59

## 2020-01-01 RX ADMIN — ALBUTEROL SULFATE 2.5 MG: 2.5 SOLUTION RESPIRATORY (INHALATION) at 12:42

## 2020-01-01 RX ADMIN — LEVOTHYROXINE SODIUM 75 MCG: 75 TABLET ORAL at 06:53

## 2020-01-01 RX ADMIN — METOPROLOL SUCCINATE 25 MG: 25 TABLET, EXTENDED RELEASE ORAL at 10:05

## 2020-01-01 RX ADMIN — ALBUTEROL SULFATE 2.5 MG: 2.5 SOLUTION RESPIRATORY (INHALATION) at 09:51

## 2020-01-01 RX ADMIN — AMIODARONE HYDROCHLORIDE 200 MG: 200 TABLET ORAL at 08:59

## 2020-01-01 RX ADMIN — PHENYLEPHRINE HYDROCHLORIDE 50 MCG: 10 INJECTION INTRAVENOUS at 10:05

## 2020-01-01 RX ADMIN — Medication 10 ML: at 13:09

## 2020-01-01 RX ADMIN — AMIODARONE HYDROCHLORIDE 200 MG: 200 TABLET ORAL at 08:13

## 2020-01-01 RX ADMIN — DOBUTAMINE HYDROCHLORIDE 2.5 MCG/KG/MIN: 400 INJECTION INTRAVENOUS at 08:05

## 2020-01-01 RX ADMIN — IPRATROPIUM BROMIDE AND ALBUTEROL SULFATE 1 AMPULE: .5; 3 SOLUTION RESPIRATORY (INHALATION) at 17:00

## 2020-01-01 RX ADMIN — PROPOFOL 100 MG: 10 INJECTION, EMULSION INTRAVENOUS at 09:36

## 2020-01-01 RX ADMIN — Medication 2000 MG: at 12:30

## 2020-01-01 RX ADMIN — Medication 10 ML: at 12:10

## 2020-01-01 RX ADMIN — SODIUM CHLORIDE, PRESERVATIVE FREE 10 ML: 5 INJECTION INTRAVENOUS at 12:55

## 2020-01-01 RX ADMIN — Medication 10 ML: at 08:13

## 2020-01-01 RX ADMIN — Medication 2000 MG: at 08:23

## 2020-01-01 RX ADMIN — LEVOTHYROXINE SODIUM 75 MCG: 75 TABLET ORAL at 06:46

## 2020-01-01 RX ADMIN — LORAZEPAM 0.5 MG: 0.5 TABLET ORAL at 23:37

## 2020-01-01 RX ADMIN — Medication 10 ML: at 08:40

## 2020-01-01 RX ADMIN — IPRATROPIUM BROMIDE AND ALBUTEROL SULFATE 1 AMPULE: .5; 3 SOLUTION RESPIRATORY (INHALATION) at 12:39

## 2020-01-01 RX ADMIN — Medication 10 ML: at 20:31

## 2020-01-01 RX ADMIN — ASPIRIN 81 MG: 81 TABLET, COATED ORAL at 09:14

## 2020-01-01 RX ADMIN — IPRATROPIUM BROMIDE 0.5 MG: 0.5 SOLUTION RESPIRATORY (INHALATION) at 19:50

## 2020-01-01 RX ADMIN — Medication 2000 MG: at 09:50

## 2020-01-01 RX ADMIN — LEVOTHYROXINE SODIUM 75 MCG: 75 TABLET ORAL at 06:27

## 2020-01-01 RX ADMIN — AMIODARONE HYDROCHLORIDE 200 MG: 200 TABLET ORAL at 09:17

## 2020-01-01 RX ADMIN — BUMETANIDE 2 MG: 0.25 INJECTION INTRAMUSCULAR; INTRAVENOUS at 12:10

## 2020-01-01 RX ADMIN — SODIUM CHLORIDE, PRESERVATIVE FREE 10 ML: 5 INJECTION INTRAVENOUS at 13:45

## 2020-01-01 RX ADMIN — BUMETANIDE 1 MG: 0.25 INJECTION INTRAMUSCULAR; INTRAVENOUS at 09:49

## 2020-01-01 RX ADMIN — PANTOPRAZOLE SODIUM 40 MG: 40 TABLET, DELAYED RELEASE ORAL at 06:53

## 2020-01-01 RX ADMIN — POTASSIUM CHLORIDE 20 MEQ: 20 TABLET, EXTENDED RELEASE ORAL at 20:52

## 2020-01-01 RX ADMIN — METRONIDAZOLE 500 MG: 500 INJECTION, SOLUTION INTRAVENOUS at 17:15

## 2020-01-01 RX ADMIN — Medication 2000 MG: at 08:13

## 2020-01-01 RX ADMIN — MULTIPLE VITAMINS W/ MINERALS TAB 1 TABLET: TAB at 12:30

## 2020-01-01 RX ADMIN — BUMETANIDE 1 MG: 1 TABLET ORAL at 17:23

## 2020-01-01 RX ADMIN — ATORVASTATIN CALCIUM 80 MG: 40 TABLET, FILM COATED ORAL at 08:59

## 2020-01-01 RX ADMIN — BUMETANIDE 2 MG: 0.25 INJECTION INTRAMUSCULAR; INTRAVENOUS at 12:42

## 2020-01-01 RX ADMIN — ATORVASTATIN CALCIUM 80 MG: 40 TABLET, FILM COATED ORAL at 09:51

## 2020-01-01 RX ADMIN — Medication 10 ML: at 21:00

## 2020-01-01 RX ADMIN — BUMETANIDE 2 MG: 0.25 INJECTION INTRAMUSCULAR; INTRAVENOUS at 13:10

## 2020-01-01 RX ADMIN — Medication 10 ML: at 08:23

## 2020-01-01 RX ADMIN — COSYNTROPIN 0.25 MG: 0.25 INJECTION, POWDER, LYOPHILIZED, FOR SOLUTION INTRAVENOUS at 08:31

## 2020-01-01 RX ADMIN — Medication 10 ML: at 20:52

## 2020-01-01 RX ADMIN — INSULIN LISPRO 1 UNITS: 100 INJECTION, SOLUTION INTRAVENOUS; SUBCUTANEOUS at 20:30

## 2020-01-01 RX ADMIN — METOPROLOL SUCCINATE 25 MG: 25 TABLET, EXTENDED RELEASE ORAL at 08:35

## 2020-01-01 RX ADMIN — SACUBITRIL AND VALSARTAN 1 TABLET: 24; 26 TABLET, FILM COATED ORAL at 09:00

## 2020-01-01 RX ADMIN — METRONIDAZOLE 500 MG: 500 INJECTION, SOLUTION INTRAVENOUS at 16:51

## 2020-01-01 RX ADMIN — Medication 10 ML: at 20:45

## 2020-01-01 RX ADMIN — LORAZEPAM 0.5 MG: 0.5 TABLET ORAL at 23:05

## 2020-01-01 RX ADMIN — BUMETANIDE 1 MG: 1 TABLET ORAL at 16:31

## 2020-01-01 RX ADMIN — SODIUM CHLORIDE 250 ML: 9 INJECTION, SOLUTION INTRAVENOUS at 05:13

## 2020-01-01 RX ADMIN — Medication 10 ML: at 13:07

## 2020-01-01 RX ADMIN — BUMETANIDE 2 MG: 0.25 INJECTION INTRAMUSCULAR; INTRAVENOUS at 13:06

## 2020-01-01 RX ADMIN — Medication 10 ML: at 13:11

## 2020-01-01 RX ADMIN — SACUBITRIL AND VALSARTAN 1 TABLET: 49; 51 TABLET, FILM COATED ORAL at 20:45

## 2020-01-01 RX ADMIN — METRONIDAZOLE 500 MG: 500 INJECTION, SOLUTION INTRAVENOUS at 02:40

## 2020-01-01 RX ADMIN — AMIODARONE HYDROCHLORIDE 200 MG: 200 TABLET ORAL at 08:40

## 2020-01-01 RX ADMIN — ALBUTEROL SULFATE 2.5 MG: 2.5 SOLUTION RESPIRATORY (INHALATION) at 09:03

## 2020-01-01 RX ADMIN — SACUBITRIL AND VALSARTAN 1 TABLET: 24; 26 TABLET, FILM COATED ORAL at 20:22

## 2020-01-01 RX ADMIN — Medication 10 ML: at 13:04

## 2020-01-01 RX ADMIN — IPRATROPIUM BROMIDE 0.5 MG: 0.5 SOLUTION RESPIRATORY (INHALATION) at 09:03

## 2020-01-01 RX ADMIN — GABAPENTIN 100 MG: 100 CAPSULE ORAL at 08:36

## 2020-01-01 RX ADMIN — FAMOTIDINE 20 MG: 20 TABLET, FILM COATED ORAL at 09:51

## 2020-01-01 RX ADMIN — Medication 10 ML: at 13:21

## 2020-01-01 RX ADMIN — SACUBITRIL AND VALSARTAN 1 TABLET: 24; 26 TABLET, FILM COATED ORAL at 16:30

## 2020-01-01 RX ADMIN — ALBUTEROL SULFATE 2.5 MG: 2.5 SOLUTION RESPIRATORY (INHALATION) at 09:55

## 2020-01-01 RX ADMIN — ALLOPURINOL 100 MG: 100 TABLET ORAL at 12:31

## 2020-01-01 RX ADMIN — BUMETANIDE 2 MG: 0.25 INJECTION INTRAMUSCULAR; INTRAVENOUS at 08:40

## 2020-01-01 RX ADMIN — POTASSIUM CHLORIDE 20 MEQ: 20 TABLET, EXTENDED RELEASE ORAL at 12:31

## 2020-01-01 RX ADMIN — Medication 10 ML: at 13:17

## 2020-01-01 RX ADMIN — FERUMOXYTOL 510 MG: 510 INJECTION INTRAVENOUS at 14:00

## 2020-01-01 RX ADMIN — IPRATROPIUM BROMIDE 0.5 MG: 0.5 SOLUTION RESPIRATORY (INHALATION) at 18:58

## 2020-01-01 RX ADMIN — MIDODRINE HYDROCHLORIDE 5 MG: 5 TABLET ORAL at 08:59

## 2020-01-01 RX ADMIN — METRONIDAZOLE 500 MG: 500 INJECTION, SOLUTION INTRAVENOUS at 08:39

## 2020-01-01 RX ADMIN — METOPROLOL SUCCINATE 25 MG: 25 TABLET, EXTENDED RELEASE ORAL at 12:31

## 2020-01-01 RX ADMIN — Medication 2000 MG: at 08:34

## 2020-01-01 RX ADMIN — SACUBITRIL AND VALSARTAN 1 TABLET: 24; 26 TABLET, FILM COATED ORAL at 08:36

## 2020-01-01 RX ADMIN — BUMETANIDE 1 MG: 1 TABLET ORAL at 08:59

## 2020-01-01 RX ADMIN — METRONIDAZOLE 500 MG: 500 INJECTION, SOLUTION INTRAVENOUS at 01:04

## 2020-01-01 RX ADMIN — SODIUM CHLORIDE, PRESERVATIVE FREE 10 ML: 5 INJECTION INTRAVENOUS at 13:51

## 2020-01-01 RX ADMIN — INSULIN LISPRO 1 UNITS: 100 INJECTION, SOLUTION INTRAVENOUS; SUBCUTANEOUS at 11:22

## 2020-01-01 RX ADMIN — PHENYLEPHRINE HYDROCHLORIDE 50 MCG: 10 INJECTION INTRAVENOUS at 10:14

## 2020-01-01 RX ADMIN — BUMETANIDE 2 MG: 0.25 INJECTION, SOLUTION INTRAMUSCULAR; INTRAVENOUS at 13:21

## 2020-01-01 RX ADMIN — Medication 2000 MG: at 09:00

## 2020-01-01 RX ADMIN — FERUMOXYTOL 510 MG: 510 INJECTION INTRAVENOUS at 13:23

## 2020-01-01 RX ADMIN — IPRATROPIUM BROMIDE 0.5 MG: 0.5 SOLUTION RESPIRATORY (INHALATION) at 08:06

## 2020-01-01 RX ADMIN — IPRATROPIUM BROMIDE 0.5 MG: 0.5 SOLUTION RESPIRATORY (INHALATION) at 08:05

## 2020-01-01 RX ADMIN — POTASSIUM CHLORIDE 20 MEQ: 20 TABLET, EXTENDED RELEASE ORAL at 08:40

## 2020-01-01 RX ADMIN — SODIUM CHLORIDE, PRESERVATIVE FREE 10 ML: 5 INJECTION INTRAVENOUS at 17:23

## 2020-01-01 RX ADMIN — Medication 10 ML: at 12:45

## 2020-01-01 RX ADMIN — IPRATROPIUM BROMIDE 0.5 MG: 0.5 SOLUTION RESPIRATORY (INHALATION) at 09:51

## 2020-01-01 RX ADMIN — CEFTRIAXONE 1 G: 1 INJECTION, POWDER, FOR SOLUTION INTRAMUSCULAR; INTRAVENOUS at 16:49

## 2020-01-01 RX ADMIN — ATORVASTATIN CALCIUM 80 MG: 40 TABLET, FILM COATED ORAL at 08:23

## 2020-01-01 RX ADMIN — Medication 400 MG: at 12:29

## 2020-01-01 RX ADMIN — Medication 10 ML: at 12:56

## 2020-01-01 RX ADMIN — Medication 10 ML: at 12:51

## 2020-01-01 RX ADMIN — PANTOPRAZOLE SODIUM 40 MG: 40 TABLET, DELAYED RELEASE ORAL at 06:27

## 2020-01-01 RX ADMIN — LEVOTHYROXINE SODIUM 75 MCG: 75 TABLET ORAL at 09:51

## 2020-01-01 RX ADMIN — LEVOTHYROXINE SODIUM 75 MCG: 75 TABLET ORAL at 06:20

## 2020-01-01 RX ADMIN — ENOXAPARIN SODIUM 90 MG: 100 INJECTION SUBCUTANEOUS at 05:13

## 2020-01-01 RX ADMIN — BUMETANIDE 2 MG: 0.25 INJECTION INTRAMUSCULAR; INTRAVENOUS at 13:08

## 2020-01-01 RX ADMIN — BUMETANIDE 2 MG: 0.25 INJECTION INTRAMUSCULAR; INTRAVENOUS at 12:44

## 2020-01-01 RX ADMIN — IPRATROPIUM BROMIDE 0.5 MG: 0.5 SOLUTION RESPIRATORY (INHALATION) at 20:05

## 2020-01-01 RX ADMIN — MULTIPLE VITAMINS W/ MINERALS TAB 1 TABLET: TAB at 08:13

## 2020-01-01 RX ADMIN — ALBUTEROL SULFATE 2.5 MG: 2.5 SOLUTION RESPIRATORY (INHALATION) at 08:06

## 2020-01-01 RX ADMIN — PANTOPRAZOLE SODIUM 40 MG: 40 TABLET, DELAYED RELEASE ORAL at 06:58

## 2020-01-01 RX ADMIN — MIDODRINE HYDROCHLORIDE 10 MG: 5 TABLET ORAL at 05:14

## 2020-01-01 RX ADMIN — ASPIRIN 81 MG: 81 TABLET, COATED ORAL at 08:40

## 2020-01-01 RX ADMIN — MULTIPLE VITAMINS W/ MINERALS TAB 1 TABLET: TAB at 08:40

## 2020-01-01 RX ADMIN — IPRATROPIUM BROMIDE AND ALBUTEROL SULFATE 1 AMPULE: .5; 3 SOLUTION RESPIRATORY (INHALATION) at 08:22

## 2020-01-01 RX ADMIN — AMIODARONE HYDROCHLORIDE 200 MG: 200 TABLET ORAL at 12:30

## 2020-01-01 RX ADMIN — BUMETANIDE 2 MG: 0.25 INJECTION INTRAMUSCULAR; INTRAVENOUS at 17:14

## 2020-01-01 RX ADMIN — INSULIN LISPRO 1 UNITS: 100 INJECTION, SOLUTION INTRAVENOUS; SUBCUTANEOUS at 20:53

## 2020-01-01 RX ADMIN — GABAPENTIN 100 MG: 100 CAPSULE ORAL at 08:40

## 2020-01-01 RX ADMIN — SODIUM CHLORIDE, PRESERVATIVE FREE 10 ML: 5 INJECTION INTRAVENOUS at 14:24

## 2020-01-01 RX ADMIN — Medication 10 ML: at 08:59

## 2020-01-01 RX ADMIN — SODIUM CHLORIDE, PRESERVATIVE FREE 10 ML: 5 INJECTION INTRAVENOUS at 14:23

## 2020-01-01 RX ADMIN — SODIUM CHLORIDE: 9 INJECTION, SOLUTION INTRAVENOUS at 08:19

## 2020-01-01 RX ADMIN — ASPIRIN 81 MG: 81 TABLET, COATED ORAL at 12:29

## 2020-01-01 RX ADMIN — POTASSIUM CHLORIDE 20 MEQ: 20 TABLET, EXTENDED RELEASE ORAL at 08:23

## 2020-01-01 RX ADMIN — BUMETANIDE 2 MG: 0.25 INJECTION INTRAMUSCULAR; INTRAVENOUS at 12:50

## 2020-01-01 RX ADMIN — AMIODARONE HYDROCHLORIDE 200 MG: 200 TABLET ORAL at 09:51

## 2020-01-01 RX ADMIN — GABAPENTIN 100 MG: 100 CAPSULE ORAL at 12:31

## 2020-01-01 RX ADMIN — ALLOPURINOL 100 MG: 100 TABLET ORAL at 08:36

## 2020-01-01 RX ADMIN — Medication 400 MG: at 08:23

## 2020-01-01 RX ADMIN — METRONIDAZOLE 500 MG: 500 INJECTION, SOLUTION INTRAVENOUS at 01:43

## 2020-01-01 RX ADMIN — CEFTRIAXONE 1 G: 1 INJECTION, POWDER, FOR SOLUTION INTRAMUSCULAR; INTRAVENOUS at 17:22

## 2020-01-01 RX ADMIN — PHENYLEPHRINE HYDROCHLORIDE 50 MCG: 10 INJECTION INTRAVENOUS at 10:09

## 2020-01-01 RX ADMIN — ALBUTEROL SULFATE 2.5 MG: 2.5 SOLUTION RESPIRATORY (INHALATION) at 19:50

## 2020-01-01 RX ADMIN — Medication 10 ML: at 12:53

## 2020-01-01 RX ADMIN — IPRATROPIUM BROMIDE 0.5 MG: 0.5 SOLUTION RESPIRATORY (INHALATION) at 20:10

## 2020-01-01 RX ADMIN — Medication 10 ML: at 13:05

## 2020-01-01 RX ADMIN — BUMETANIDE 2 MG: 0.25 INJECTION INTRAMUSCULAR; INTRAVENOUS at 12:53

## 2020-01-01 RX ADMIN — MULTIPLE VITAMINS W/ MINERALS TAB 1 TABLET: TAB at 08:36

## 2020-01-01 RX ADMIN — BUMETANIDE 2 MG: 0.25 INJECTION INTRAMUSCULAR; INTRAVENOUS at 13:04

## 2020-01-01 RX ADMIN — IOHEXOL 50 ML: 240 INJECTION, SOLUTION INTRATHECAL; INTRAVASCULAR; INTRAVENOUS; ORAL at 21:42

## 2020-01-01 RX ADMIN — BUMETANIDE 2 MG: 0.25 INJECTION INTRAMUSCULAR; INTRAVENOUS at 13:09

## 2020-01-01 RX ADMIN — BUMETANIDE 1 MG: 1 TABLET ORAL at 08:37

## 2020-01-01 RX ADMIN — ALBUTEROL SULFATE 2.5 MG: 2.5 SOLUTION RESPIRATORY (INHALATION) at 05:31

## 2020-01-01 RX ADMIN — ATORVASTATIN CALCIUM 80 MG: 40 TABLET, FILM COATED ORAL at 09:14

## 2020-01-01 RX ADMIN — Medication 10 ML: at 12:34

## 2020-01-01 RX ADMIN — MULTIPLE VITAMINS W/ MINERALS TAB 1 TABLET: TAB at 09:14

## 2020-01-01 RX ADMIN — FAMOTIDINE 20 MG: 20 TABLET, FILM COATED ORAL at 08:13

## 2020-01-01 RX ADMIN — Medication 400 MG: at 08:34

## 2020-01-01 RX ADMIN — CEFTRIAXONE 1 G: 1 INJECTION, POWDER, FOR SOLUTION INTRAMUSCULAR; INTRAVENOUS at 18:26

## 2020-01-01 RX ADMIN — BUMETANIDE 2 MG: 0.25 INJECTION INTRAMUSCULAR; INTRAVENOUS at 18:27

## 2020-01-01 RX ADMIN — GABAPENTIN 100 MG: 100 CAPSULE ORAL at 09:15

## 2020-01-01 RX ADMIN — BUMETANIDE 2 MG: 0.25 INJECTION INTRAMUSCULAR; INTRAVENOUS at 13:25

## 2020-01-01 RX ADMIN — BUMETANIDE 1 MG: 0.25 INJECTION INTRAMUSCULAR; INTRAVENOUS at 01:49

## 2020-01-01 RX ADMIN — Medication 400 MG: at 08:40

## 2020-01-01 RX ADMIN — ATORVASTATIN CALCIUM 80 MG: 40 TABLET, FILM COATED ORAL at 08:13

## 2020-01-01 RX ADMIN — Medication 400 MG: at 08:59

## 2020-01-01 RX ADMIN — MULTIPLE VITAMINS W/ MINERALS TAB 1 TABLET: TAB at 09:51

## 2020-01-01 RX ADMIN — METOPROLOL SUCCINATE 25 MG: 25 TABLET, EXTENDED RELEASE ORAL at 09:00

## 2020-01-01 RX ADMIN — ENOXAPARIN SODIUM 40 MG: 40 INJECTION SUBCUTANEOUS at 08:13

## 2020-01-01 RX ADMIN — PHENYLEPHRINE HYDROCHLORIDE 50 MCG: 10 INJECTION INTRAVENOUS at 10:00

## 2020-01-01 RX ADMIN — SACUBITRIL AND VALSARTAN 1 TABLET: 49; 51 TABLET, FILM COATED ORAL at 20:52

## 2020-01-01 RX ADMIN — ALLOPURINOL 100 MG: 100 TABLET ORAL at 09:14

## 2020-01-01 RX ADMIN — GABAPENTIN 100 MG: 100 CAPSULE ORAL at 08:23

## 2020-01-01 RX ADMIN — LORAZEPAM 0.5 MG: 0.5 TABLET ORAL at 01:25

## 2020-01-01 RX ADMIN — INSULIN LISPRO 2 UNITS: 100 INJECTION, SOLUTION INTRAVENOUS; SUBCUTANEOUS at 16:29

## 2020-01-01 RX ADMIN — METRONIDAZOLE 500 MG: 500 INJECTION, SOLUTION INTRAVENOUS at 18:26

## 2020-01-01 RX ADMIN — MULTIPLE VITAMINS W/ MINERALS TAB 1 TABLET: TAB at 09:01

## 2020-01-01 RX ADMIN — Medication 10 ML: at 12:49

## 2020-01-01 RX ADMIN — ALBUMIN (HUMAN) 25 G: 0.25 INJECTION, SOLUTION INTRAVENOUS at 09:33

## 2020-01-01 RX ADMIN — WARFARIN SODIUM 2.5 MG: 2.5 TABLET ORAL at 19:50

## 2020-01-01 RX ADMIN — Medication 10 ML: at 10:33

## 2020-01-01 RX ADMIN — ALBUMIN (HUMAN) 25 G: 0.25 INJECTION, SOLUTION INTRAVENOUS at 14:00

## 2020-01-01 RX ADMIN — BUMETANIDE 2 MG: 0.25 INJECTION INTRAMUSCULAR; INTRAVENOUS at 12:49

## 2020-01-01 RX ADMIN — METRONIDAZOLE 500 MG: 500 INJECTION, SOLUTION INTRAVENOUS at 17:44

## 2020-01-01 RX ADMIN — IPRATROPIUM BROMIDE AND ALBUTEROL SULFATE 1 AMPULE: .5; 3 SOLUTION RESPIRATORY (INHALATION) at 20:30

## 2020-01-01 RX ADMIN — MULTIPLE VITAMINS W/ MINERALS TAB 1 TABLET: TAB at 08:23

## 2020-01-01 RX ADMIN — AMIODARONE HYDROCHLORIDE 200 MG: 200 TABLET ORAL at 08:36

## 2020-01-01 RX ADMIN — ALBUTEROL SULFATE 2.5 MG: 2.5 SOLUTION RESPIRATORY (INHALATION) at 20:05

## 2020-01-01 RX ADMIN — ATORVASTATIN CALCIUM 80 MG: 40 TABLET, FILM COATED ORAL at 12:30

## 2020-01-01 RX ADMIN — Medication 10 ML: at 13:06

## 2020-01-01 RX ADMIN — ALLOPURINOL 100 MG: 100 TABLET ORAL at 09:00

## 2020-01-01 RX ADMIN — ATORVASTATIN CALCIUM 80 MG: 40 TABLET, FILM COATED ORAL at 08:40

## 2020-01-01 RX ADMIN — Medication 10 ML: at 12:14

## 2020-01-01 RX ADMIN — CEFTRIAXONE 1 G: 1 INJECTION, POWDER, FOR SOLUTION INTRAMUSCULAR; INTRAVENOUS at 17:14

## 2020-01-01 RX ADMIN — ALLOPURINOL 100 MG: 100 TABLET ORAL at 08:23

## 2020-01-01 RX ADMIN — METRONIDAZOLE 500 MG: 500 INJECTION, SOLUTION INTRAVENOUS at 08:53

## 2020-01-01 RX ADMIN — BUMETANIDE 2 MG: 0.25 INJECTION INTRAMUSCULAR; INTRAVENOUS at 13:17

## 2020-01-01 RX ADMIN — Medication 10 ML: at 13:08

## 2020-01-01 RX ADMIN — INSULIN LISPRO 1 UNITS: 100 INJECTION, SOLUTION INTRAVENOUS; SUBCUTANEOUS at 11:39

## 2020-01-01 RX ADMIN — METRONIDAZOLE 500 MG: 500 INJECTION, SOLUTION INTRAVENOUS at 01:38

## 2020-01-01 RX ADMIN — SACUBITRIL AND VALSARTAN 1 TABLET: 49; 51 TABLET, FILM COATED ORAL at 08:40

## 2020-01-01 RX ADMIN — BUMETANIDE 2 MG: 0.25 INJECTION INTRAMUSCULAR; INTRAVENOUS at 12:34

## 2020-01-01 RX ADMIN — IPRATROPIUM BROMIDE 0.5 MG: 0.5 SOLUTION RESPIRATORY (INHALATION) at 09:55

## 2020-01-01 RX ADMIN — METRONIDAZOLE 500 MG: 500 INJECTION, SOLUTION INTRAVENOUS at 01:14

## 2020-01-01 RX ADMIN — POTASSIUM CHLORIDE 20 MEQ: 20 TABLET, EXTENDED RELEASE ORAL at 20:45

## 2020-01-01 RX ADMIN — BUMETANIDE 2 MG: 0.25 INJECTION INTRAMUSCULAR; INTRAVENOUS at 12:14

## 2020-01-01 RX ADMIN — Medication 10 ML: at 21:28

## 2020-01-01 RX ADMIN — SACUBITRIL AND VALSARTAN 1 TABLET: 24; 26 TABLET, FILM COATED ORAL at 19:49

## 2020-01-01 RX ADMIN — Medication 10 ML: at 12:43

## 2020-01-01 RX ADMIN — ALBUTEROL SULFATE 2.5 MG: 2.5 SOLUTION RESPIRATORY (INHALATION) at 18:57

## 2020-01-01 RX ADMIN — Medication 10 ML: at 13:26

## 2020-01-01 RX ADMIN — LORAZEPAM 0.5 MG: 0.5 TABLET ORAL at 02:51

## 2020-01-01 RX ADMIN — Medication 400 MG: at 09:14

## 2020-01-01 RX ADMIN — METRONIDAZOLE 500 MG: 500 INJECTION, SOLUTION INTRAVENOUS at 17:22

## 2020-01-01 RX ADMIN — ATORVASTATIN CALCIUM 80 MG: 40 TABLET, FILM COATED ORAL at 08:36

## 2020-01-01 RX ADMIN — Medication 10 ML: at 19:50

## 2020-01-01 RX ADMIN — METRONIDAZOLE 500 MG: 500 INJECTION, SOLUTION INTRAVENOUS at 09:18

## 2020-01-01 RX ADMIN — Medication 2000 MG: at 09:14

## 2020-01-01 RX ADMIN — CEFTRIAXONE 1 G: 1 INJECTION, POWDER, FOR SOLUTION INTRAMUSCULAR; INTRAVENOUS at 17:44

## 2020-01-01 RX ADMIN — Medication 2000 MG: at 08:40

## 2020-01-01 RX ADMIN — INSULIN LISPRO 1 UNITS: 100 INJECTION, SOLUTION INTRAVENOUS; SUBCUTANEOUS at 19:50

## 2020-01-01 RX ADMIN — INSULIN LISPRO 1 UNITS: 100 INJECTION, SOLUTION INTRAVENOUS; SUBCUTANEOUS at 20:54

## 2020-01-01 RX ADMIN — Medication 10 ML: at 08:30

## 2020-01-01 RX ADMIN — SODIUM CHLORIDE: 9 INJECTION, SOLUTION INTRAVENOUS at 09:31

## 2020-01-01 RX ADMIN — BUMETANIDE 2 MG: 0.25 INJECTION INTRAMUSCULAR; INTRAVENOUS at 13:22

## 2020-01-01 RX ADMIN — BUMETANIDE 1 MG: 0.25 INJECTION INTRAMUSCULAR; INTRAVENOUS at 08:13

## 2020-01-01 RX ADMIN — Medication 10 ML: at 13:22

## 2020-01-01 RX ADMIN — PANTOPRAZOLE SODIUM 40 MG: 40 TABLET, DELAYED RELEASE ORAL at 06:21

## 2020-01-01 RX ADMIN — GABAPENTIN 100 MG: 100 CAPSULE ORAL at 09:00

## 2020-01-01 RX ADMIN — SACUBITRIL AND VALSARTAN 1 TABLET: 49; 51 TABLET, FILM COATED ORAL at 12:31

## 2020-01-01 RX ADMIN — BUMETANIDE 2 MG: 0.25 INJECTION INTRAMUSCULAR; INTRAVENOUS at 12:55

## 2020-01-01 RX ADMIN — LEVOTHYROXINE SODIUM 75 MCG: 75 TABLET ORAL at 07:00

## 2020-01-01 RX ADMIN — ASPIRIN 81 MG: 81 TABLET, COATED ORAL at 08:23

## 2020-01-01 ASSESSMENT — ENCOUNTER SYMPTOMS
VOMITING: 0
RHINORRHEA: 0
SHORTNESS OF BREATH: 1
CHEST TIGHTNESS: 1
DIARRHEA: 0
BACK PAIN: 0
VOMITING: 0
BLOOD IN STOOL: 0
COUGH: 0
ABDOMINAL PAIN: 1
COUGH: 1
RHINORRHEA: 0
DIARRHEA: 0
EYE PAIN: 0
ABDOMINAL PAIN: 0
NAUSEA: 1
WHEEZING: 0
EYE PAIN: 0
SHORTNESS OF BREATH: 1
NAUSEA: 0
WHEEZING: 0
TROUBLE SWALLOWING: 0

## 2020-01-01 ASSESSMENT — PAIN SCALES - GENERAL
PAINLEVEL_OUTOF10: 0
PAINLEVEL_OUTOF10: 10
PAINLEVEL_OUTOF10: 0
PAINLEVEL_OUTOF10: 10
PAINLEVEL_OUTOF10: 0

## 2020-01-01 ASSESSMENT — PAIN DESCRIPTION - LOCATION
LOCATION: ABDOMEN
LOCATION: ABDOMEN

## 2020-01-01 ASSESSMENT — PAIN DESCRIPTION - PAIN TYPE: TYPE: OTHER (COMMENT)

## 2020-01-01 ASSESSMENT — PAIN DESCRIPTION - DESCRIPTORS: DESCRIPTORS: DISCOMFORT

## 2020-01-01 ASSESSMENT — LIFESTYLE VARIABLES: SMOKING_STATUS: 0

## 2020-01-20 NOTE — PROGRESS NOTES
Advanced Heart Failure and Pulmonary Hypertension Clinic  Follow up Visit         Reason for Visit: follow up for heart failure        Primary Care Physician: Genesis Child M.D. Primary Cardiologist: Dat Sin M.D.  HF cardiology: Cory Batista M.D. History of Present Illness:   Jeancarlos Horner is a 69year old, ischemic CM, CAD status post CABG x 2 times in 2013 and again 2017, AVR, MVR, ICD in place, chronic HFrEF, here in follow up. Since he was last seen in September he denies any hospitalizations or ED visits. Echocardiogram in September reveals an LVEF of 35 %. He denies any complaints of heart failure. He has remained euvolemic. He is a foodie, and eats out frequently, but also does cook most of his meals at home, containing tomatoes, olive oil, and plenty of fresh fruits and vegetables. He denies needing to use extra bumetanide at home. He is seen in the HF infusion clinic every 2 months and receives bumetanide IV 2 mg each visit. He did have bronchitis and URI which he felt provoked his HF in December but has since recovered. Now he is feeling depressed and fatigued because his best friends are all dying of one chronic illness or not. He reports ongoing chronic but stable dyspnea with exertion, shortness of breath, or decline in overall functional capacity. He denies orthopnea, PND, nocturnal cough or hemoptysis. He denies abdominal distention or bloating, early satiety, anorexia/change in appetite, unintentional weight loss. He does lower extremity edema. He denies exertional lightheadedness. She denies palpitations, syncope or near syncope. Review of systems is negative for chest pain, pressure, discomfort. When ambulating on an incline, he/she reports/denies leg claudication. History is negative for neurological symptoms including transient loss of vision, asymmetric weakness, aphasia, dysphasia, numbness, tingling.            Patient Active Problem List    Diagnosis Date Noted    Right ventricular dysfunction 2019     Priority: High      LVEF: 29%  RVF: Mod, RVSP 76 mmHg, 2-3+MR/TR      Chronic systolic HF (heart failure) ([facility] [address] 2018     Priority: High       RHC (10/2017, [facility]) BP: 122 / 76. BP Mean: 91. HR: 82 min. FiO2: 21.00%. Pulse Ox: 94.00%. CVP: 8 cm. Thermo CO: 3.97 l/min. Hg: 10.8. H2O = 6.2 mmHg. Thermo CI: 1.84 l/min/m2. TP. RA Mean: 8. Assumed Chan CO: 4.21 l/min. PVR: 3.0. RV: 76 / 12. Assumed Chan CI: 1.95 l/min/m2. SVR: 1679.0. PA: 72 / 34. PA Mean: 49.00. PCWP Mean: 36.0. SV: 48.41 cc/stroke. TS. SVI: 22.46 cc/stroke/m2. PCWP V Wave: 50.00. PAO2: 50.40%. LVSWI: 16.90 gm-m/m2/beat. RVSWI: 12.52 gm-m/m2/beat. RVSWI: 920.74 mmHg-ml/m2/beat. Impression: Pulmonary venous hypertension with prominent V waves borderline/mildly subnormal cardiac index       Ischemic cardiomyopathy 2013     Priority: High     Ejection fraction 30-35%. Ejection fraction 30% to 35% on cardiac catheterization, 04. Ejection fraction 44% on Gated SPECT Study 04    Echocardiogram(2013): LVEF 25 +/- 5%. Severe LV dilatation. EF = 19 ± 5% (2D biplane), 2017 ([facility])      Coronary atherosclerosis of native coronary artery 2013     Priority: High     -Acute posterior myocardial infarction (97). -LHC (97) Critical lesion mid left circumflex, significant lesion mid-LAD and first diagonal.    -PTCA (97) mid left circumflex. -LHC (00) Insignificant coronary atherosclerosis. -Acute inferior myocardial infarction (04). -LHC (04) patent angioplasty site of the left circumflex, total occlusion mid right coronary artery with otherwise unremarkable coronary artery disease. -PTCA and stent of right coronary artery (04). -Repeat LHC (04) Good angioplasty result of right coronary artery with minimal residual thrombus.    -CABG x 3 (2013) LIMA-LAD, SVG-OM, SVG-PAD  -LHC (10/2017, [facility]) Severe disease of the SVG-PDA. Patent LIMA-LAD and SVG-LCx. Trivial to mild AI with normal sized aorta. -Redo CABG (11/07/2017) SVG- to the PDA concomitant with valvular repairs.  Chronic kidney disease 03/09/2018     Priority: Medium     Stage 3   Baseline Cr - 1.7, GFR - 40                Dual implantable cardioverter-defibrillator in situ 09/24/2013     Priority: Medium     Upgrade of PPM to dual chamber ICD 9/12/13: Paul Liborio XT DR model [id number]; SN [id number]        [facility]ox[facility] fibrillation) [facility] 05/13/2013     Priority: Medium     Anticoagulated with coumadin   ROBER (2013)      Mitral regurgitation 04/12/2013     Priority: Medium     -s/p MVrp (2013) 30mm Future ring.   -s/p MVR (10/2017, [facility]) #29 Biocor   - Echo (12/18/2017, [facility]) Biocor prosthetic mitral valve (size #29). There is trivial mitral valve regurgitation. The peak gradient is 22 mmHg and the mean gradient is 8 mmHg. Prior gradients were 22/9 mmHg.  AI (aortic insufficiency) 04/12/2013     Priority: Medium     -Moderate AI  -S/p AVR with size #23 Trifecta valve (11/07/2017, [facility])  -Echo (12/2017, [facility]) There is trivial aortic valve regurgitation. The peak gradient is 9 mmHg, the mean gradient is 5 mmHg and the dimensionless valve index is 0.96. prior gradients were 15/8 mmHg.  DM (diabetes mellitus) ([facility] 04/09/2013     Priority: Medium     Hemoglobin A1C 6.730(11/12/2017, [facility])      Depression 04/09/2013     Priority: Medium    Hypothyroid 04/09/2013     Priority: Medium     TSH (11/12/2017, [facility]):  6.730 (H)  Free T4 (11/12/2017, [facility]):  0.9      Anticoagulated on Coumadin      Priority: Low    Tricuspid regurgitation 03/09/2018     Priority: Low     -Moderate TR  -s/p tricuspid valve repair with a size #30 CE ring. (11/07/2017, [facility])  - Echo (12/2017, [facility]) There is mild (1+ - 2+) tricuspid valve regurgitation. The peak gradient is 14 mmHg and the mean gradient is 5 mmHg.                 Ulnar nerve neuropathy 06/27/2013     Priority: Low    Hyperlipidemia 04/09/2013     Priority: Low    GERD (gastroesophageal reflux disease) 04/09/2013     Priority: Low    Congestive heart failure (HCC)            Past Medical History:   Diagnosis Date    Acid reflux     [facility] 01/25/97,01/04    Anxiety     CAD (coronary artery disease)     follows w Dr. Jerzy Singre Diabetes mellitus ([facility]     Fluid retention     history of    Hyperlipidemia     Hypertension     Ischemic cardiomyopathy     Osteoarthritis     Post PTCA 11/14/73    Systolic dysfunction, left ventricle     follows with Dr. Jerzy Singer Thyroid disease            Past Surgical History:   Procedure Laterality Date    CARDIAC DEFIBRILLATOR PLACEMENT Left 2013    with pacemaker ([employer])   [address] GRAFT      DIAGNOSTIC CARDIAC CATH LAB PROCEDURE  01/27/97,11/00    Critical lesion mid left circumflex,significant lesion mid-LAD and first diagonal    ECHO COMPL W DOP COLOR FLOW  4/11/2013         ECHO COMPL W DOP COLOR FLOW  4/21/2013         MITRAL VALVE SURGERY      TRANSESOPHAGEAL ECHOCARDIOGRAM  4/12/2013    Dr. Kayla Borges TRANSESOPHAGEAL ECHOCARDIOGRAM  08/02/2017         Social History     Socioeconomic History    Marital status:      Spouse name: None    Number of children: None    Years of education: None    Highest education level: None   Occupational History    None   Social Needs    Financial resource strain: None    Food insecurity:     Worry: None     Inability: None    Transportation needs:     Medical: None     Non-medical: None   Tobacco Use    Smoking status: Current Every Day Smoker     Packs/day: 2.00     Years: 50.00     Pack years: 100.00     Types: Cigarettes    Smokeless tobacco: Never Used    Tobacco comment: currently 3-4 cigarettes a day (11/8/19)   Substance and Sexual Activity    Alcohol use: Yes     Frequency: Monthly or less     Drinks per MG tablet Take 1 tablet by mouth See Admin Instructions 2mg in am and 1mg in afternoon. Dr Lyndsey Bolanos reduced dose 2/13/19. (Patient taking differently: Take by mouth See Admin Instructions 2mg in am and 1mg in afternoon. Dr Lyndsey Bolanos reduced dose 2/13/19.) 45 tablet 5    potassium chloride (KLOR-CON M) 20 MEQ extended release tablet Take 1 tablet by mouth 2 times daily 180 tablet 3    Magnesium 400 MG TABS Take 400 mg by mouth daily      Ascorbic Acid (VITAMIN C) 1000 MG tablet Take 2,000 mg by mouth daily      Multiple Vitamins-Minerals (THERAPEUTIC MULTIVITAMIN-MINERALS) tablet Take 1 tablet by mouth daily      colchicine (COLCRYS) 0.6 MG tablet Take 0.6 mg by mouth 2 times daily as needed       allopurinol (ZYLOPRIM) 100 MG tablet Take 1 tablet by mouth daily 30 tablet 3    levothyroxine (SYNTHROID) 75 MCG tablet Take 1 tablet by mouth Daily (Patient taking differently: Take 50 mcg by mouth Daily ) 30 tablet 3    insulin glargine (LANTUS) 100 UNIT/ML injection vial Inject 20 Units into the skin every morning      atorvastatin (LIPITOR) 80 MG tablet Take 80 mg by mouth daily      linagliptin (TRADJENTA) 5 MG tablet Take 5 mg by mouth daily      albuterol-ipratropium (COMBIVENT)  MCG/ACT inhaler Inhale 2 puffs into the lungs every 12 hours. 1 Inhaler 1    aspirin 81 MG EC tablet Take 81 mg by mouth daily Prescribed per Dr Ash Stephen      omeprazole (PRILOSEC) 20 MG capsule Take 20 mg by mouth daily              Guideline directed medical/device therapy:  ARNI/ACE I/ARB: ARNI  Beta blocker:   Yes  Aldosterone antagonist:  No  ICD/CRT-P/-D:  ICD  QRS interval on recent ECG (personally reviewed/interpreted): <120 ms  Percentage RV pacing (personally reviewed/interpreted): %/NA        Review of Systems:   Cardiac: As per HPI  General: No fever, chills, rigors  Pulmonary: As per HPI  HEENT: No visual disturbances, difficult swallowing  GI: No nausea, vomiting, abdominal pain  : No dysuria or

## 2020-01-24 NOTE — PROGRESS NOTES
Weight 204.9lb, breath sounds clear, pt c/o feeling bloated, abd softly distended , IV Bumex given after labs drawn and follow up appt. Made.

## 2020-02-13 NOTE — PROGRESS NOTES
Called to be seen today. Weight stable, but c/o increased SOB. States abdomen feels full. Bumex 2 mg IVP given and labs obtained.

## 2020-02-21 NOTE — PROGRESS NOTES
weight today 204lb. , breath sounds remain diminished with fine scattered crackles in left base. IV Bumex given after labs drawn.

## 2020-03-06 NOTE — PROGRESS NOTES
Weight today 203lbs, unchanged. Breath sounds clear, fine crackles noted to LLL. Labs obtained, Bumex 2mg IVP given & F/U appt made.

## 2020-03-20 NOTE — TELEPHONE ENCOUNTER
----- Message from Cristina Sotelo MD sent at 3/20/2020  1:01 PM EDT -----  Always admits to dietary indiscretion, chronically elevated proBNP  Encourage him to take extra diuretics as needed especially since the HF infusion clinic has limited hours/changed location

## 2020-06-02 NOTE — TELEPHONE ENCOUNTER
Pt called looking for his lab results from May 28th, they weren't on mycStyloolat. I saw that it was sent to Dr. Bob Pereira but he said no. Please advise [phone].

## 2020-06-02 NOTE — TELEPHONE ENCOUNTER
Pt would like to come back to Dr. Isabel Wright like you to call him to make an appointment. [phone] is his number.

## 2020-06-02 NOTE — TELEPHONE ENCOUNTER
· I Called Woo Ybarra back regardng labs:   DR Nye ordered labs from 5 28 2020 , please follow with nephrology office regarding release of labs to my chart. He is aware DR Nye currently managing his diuretics and kidney #'s. And has written for CHF Clinic. Labs are under DR Nye. · Patient request DR Wright  To f/u with general cardiology. DR William advanced HF  no longer with [facility] ([facility]). Xi Araiza had notified Sd Singh at [facility]. Pending advise from DENTON Wright for f/u.    ( I will close my encounter as this request is being managed in another encounter from today from Xi Araiza to Sd Singh)

## 2020-06-24 NOTE — PROGRESS NOTES
(ENTRESTO) 49-51 MG per tablet Entresto 49 mg-51 mg tablet   take one pill by mouth twice daily      warfarin (COUMADIN) 5 MG tablet Take by mouth Currently 5mg on Mon, Wed, Fri & Sun and  2.5mg on Tu, Thurs & Sat.  metoprolol succinate (TOPROL XL) 50 MG extended release tablet Take 1 tablet by mouth daily (Patient taking differently: Take 25 mg by mouth daily ) 90 tablet 3    albuterol sulfate HFA (PROAIR HFA) 108 (90 Base) MCG/ACT inhaler Inhale 2 puffs into the lungs every 4 hours as needed for Wheezing or Shortness of Breath 1 Inhaler 1    bumetanide (BUMEX) 2 MG tablet Take 1 tablet by mouth See Admin Instructions 2mg in am and 1mg in afternoon. Dr Kathy Gaines reduced dose 2/13/19. (Patient taking differently: Take by mouth 3 times daily 2mg in am and 1mg in afternoon. Dr Kathy Gaines reduced dose 2/13/19.) 45 tablet 5    potassium chloride (KLOR-CON M) 20 MEQ extended release tablet Take 1 tablet by mouth 2 times daily 180 tablet 3    Magnesium 400 MG TABS Take 400 mg by mouth daily      Ascorbic Acid (VITAMIN C) 1000 MG tablet Take 2,000 mg by mouth daily      Multiple Vitamins-Minerals (THERAPEUTIC MULTIVITAMIN-MINERALS) tablet Take 1 tablet by mouth daily      colchicine (COLCRYS) 0.6 MG tablet Take 0.6 mg by mouth 2 times daily as needed       allopurinol (ZYLOPRIM) 100 MG tablet Take 1 tablet by mouth daily 30 tablet 3    levothyroxine (SYNTHROID) 75 MCG tablet Take 1 tablet by mouth Daily 30 tablet 3    insulin glargine (LANTUS) 100 UNIT/ML injection vial Inject 20 Units into the skin every morning      atorvastatin (LIPITOR) 80 MG tablet Take 80 mg by mouth daily      linagliptin (TRADJENTA) 5 MG tablet Take 5 mg by mouth daily      albuterol-ipratropium (COMBIVENT)  MCG/ACT inhaler Inhale 2 puffs into the lungs every 12 hours.  1 Inhaler 1    aspirin 81 MG EC tablet Take 81 mg by mouth daily Prescribed per Dr Genaro Mendez      omeprazole (PRILOSEC) 20 MG capsule Take 20 mg by mouth daily No current facility-administered medications for this visit.       Physical Exam:  Temp 97.2 °F (36.2 °C)   Weight change: 8 lb 4.8 oz (3.765 kg)  Wt Readings from Last 3 Encounters:   06/11/20 201 lb (91.2 kg)   05/28/20 199 lb 8 oz (90.5 kg)   05/12/20 202 lb (91.6 kg)     Appearance: Awake, alert, no acute respiratory distress   Skin: Intact, no rash   Head: Normocephalic, atraumatic   Eyes: EOMI, no conjunctival erythema   ENMT: No pharyngeal erythema, MMM, no rhinorrhea   Neck: Supple, no carotid bruits   Lungs: Decreased BS B/L, no wheezing  Cardiac: Regular rate and rhythm, 1/6 systolic murmur  Abdomen: Soft, nontender, +bowel sounds   Extremities: Moves all extremities x 4, no lower extremity edema   Neurologic: No focal motor deficits apparent, normal mood and affect     Laboratory Tests:  Lab Results   Component Value Date    CREATININE 2.1 (H) 06/11/2020    BUN 26 (H) 06/11/2020     06/11/2020    K 4.5 06/11/2020    CL 99 06/11/2020    CO2 25 06/11/2020     Lab Results   Component Value Date    WBC 7.4 03/08/2019    RBC 4.11 03/08/2019    HGB 11.8 03/08/2019    HCT 37.4 03/08/2019    MCV 91.0 03/08/2019    MCH 28.7 03/08/2019    MCHC 31.6 03/08/2019    RDW 15.3 03/08/2019     03/08/2019    MPV 11.0 03/08/2019     Lab Results   Component Value Date    MG 1.6 01/31/2019     Lab Results   Component Value Date    PROTIME 55.0 03/12/2019    INR 1.6 03/15/2019    INR 5.0 03/12/2019     Lab Results   Component Value Date    TSH 4.170 03/12/2018     Lab Results   Component Value Date    TRIG 87 12/02/2017    TRIG 202 (H) 04/09/2013     Lab Results   Component Value Date    HDL 20 12/02/2017    HDL 32.4 (A) 04/09/2013     Lab Results   Component Value Date    LDLCALC 48 12/02/2017    LDLCALC 142 (H) 04/09/2013     Lab Results   Component Value Date    CKTOTAL 25 (L) 05/14/2013    CKMB 0.5 05/14/2013    TROPONINI 0.11 (H) 03/08/2019     Echocardiogram: 7/14/15  Ejection fraction is visually

## 2020-07-07 NOTE — PROGRESS NOTES
Weight down 4 lbs from last visit. Requesting IV diuretic due to off and on SOB. Bumex 2 mg IVP given and labs obtained.

## 2020-07-09 NOTE — PROGRESS NOTES
See [employer] [place] report. Remote monitoring reviewed over a 90 day period. End of 90 day monitoring period date of service 7-9-2020    MDT Ayanna WESLEY DR, DDD  ppm. DOI: 9/12/2013.   Current rhythm: SR, AS/VS, PAC, HR ~72 bpm  P wave: 0.6 mV  Impedance: 323 ohms   Threshold: 0.75 V @ 0.4 ms  RV R wave: 9.9 mV  Impedance: 380 ohms   Threshold: 0.625 V @ 0.5 ms  Pacing: A: 13%  RV: <1%    Battery Voltage/Longevity: 3.1 years  Charge time: 3.9 seconds    Arrhythmias: NSVT x2: <1-4 sec; self-terminating  OptiVol: currently stable, below baseline  Lead trends: stable    Comment:  -Follows with CHF Clinic     Medications:  -Coumadin as directed  -Entresto 49-51 mg BID  -Toprol XL 50 mg QD  -Bumex as directed  -Synthroid 75 mcg QD  -ASA 81 mg QD     Plan:   -91 day remote transmission  -OV 11/7/20 with Dr Veronika Anglin, APRN-CNP, [facility]

## 2020-07-23 NOTE — PROGRESS NOTES
Weight today 201lb, breath sounds diminished bilat. Pt. C/o feeling bloated. IV Bumex given  After labs drawn.

## 2020-08-18 NOTE — TELEPHONE ENCOUNTER
[facility] alert for AF. In AF for 2 hours on 8-, AFB 0.6%. On Coumadin and Toprol. Will resend wireless tx in 1 week on 8-.      Nelli Watts RN, BSN  [facility]

## 2020-08-20 NOTE — TELEPHONE ENCOUNTER
----- Message from Rock Richelle MD sent at 8/20/2020  2:27 PM EDT -----  He is in AF. Please check remote in 1 week. Thanks.  ----- Message -----  From: Karen Cancino  Sent: 8/20/2020   1:33 PM EDT  To: Rock Richelle MD    [facility] pt[facility] EP to look at his EKG. Thanks.

## 2020-08-20 NOTE — TELEPHONE ENCOUNTER
Spoke with patient let him know he is in AF, we will check his remote next week and contact him again. He verbalized understanding.

## 2020-08-28 NOTE — TELEPHONE ENCOUNTER
Patient states the last time he talked with you about his labs, you were going to check on something and call him back. Please advise.

## 2020-08-31 NOTE — TELEPHONE ENCOUNTER
Spoke with patient let him know we can do NNEKA/CV, patient is agreeable. Patient had INR drawn at [address] [facility] that was 2.3, waiting for fax. Patient scheduled for procedure on 09/04/2020 at 9:30, patient to arrive at 7:30 at main. Nothing to eat or drink after midnight. Patient to hold water and diabetic medication, also have a  both wear a mask. Patient going to [facility] for [place] testing on 08/31/2020, then to self isolate until morning of. Patient verbalized understanding.

## 2020-09-04 PROBLEM — N18.4 CKD (CHRONIC KIDNEY DISEASE) STAGE 4, GFR 15-29 ML/MIN (HCC): Status: ACTIVE | Noted: 2018-03-09

## 2020-09-04 NOTE — TELEPHONE ENCOUNTER
----- Message from Km Sam MD sent at 9/4/2020 10:11 AM EDT -----  I started amio today.    INR check on Monday to make sure he is not supra therapeutic  Remote check in one week    thx

## 2020-09-04 NOTE — ANESTHESIA PRE PROCEDURE
MD Stewart   Magnesium 400 MG TABS Take 400 mg by mouth daily   Yes Historical Provider, MD   Ascorbic Acid (VITAMIN C) 1000 MG tablet Take 2,000 mg by mouth daily   Yes Historical Provider, MD   Multiple Vitamins-Minerals (THERAPEUTIC MULTIVITAMIN-MINERALS) tablet Take 1 tablet by mouth daily   Yes Historical Provider, MD   colchicine (COLCRYS) 0.6 MG tablet Take 0.6 mg by mouth 2 times daily as needed    Yes Historical Provider, MD   allopurinol (ZYLOPRIM) 100 MG tablet Take 1 tablet by mouth daily 12/3/17  Yes Para MD Jake   levothyroxine (SYNTHROID) 75 MCG tablet Take 1 tablet by mouth Daily 12/4/17  Yes Karen Joseph MD   insulin glargine (LANTUS) 100 UNIT/ML injection vial Inject 20 Units into the skin every morning   Yes Historical Provider, MD   atorvastatin (LIPITOR) 80 MG tablet Take 80 mg by mouth daily   Yes Historical Provider, MD   linagliptin (TRADJENTA) 5 MG tablet Take 5 mg by mouth daily   Yes Historical Provider, MD   albuterol-ipratropium (COMBIVENT)  MCG/ACT inhaler Inhale 2 puffs into the lungs every 12 hours. 4/23/13  Yes Ya Lamb MD   aspirin 81 MG EC tablet Take 81 mg by mouth daily Prescribed per Dr Felecia Rosenberg   Yes Historical Provider, MD   omeprazole (PRILOSEC) 20 MG capsule Take 20 mg by mouth daily    Yes Historical Provider, MD       Current medications:    Current Outpatient Medications   Medication Sig Dispense Refill    QUEtiapine (SEROQUEL) 100 MG tablet Take 50 mg by mouth once      zolpidem (AMBIEN CR) 12.5 MG extended release tablet Take 12.5 mg by mouth nightly as needed.  gabapentin (NEURONTIN) 100 MG capsule Take 100 mg by mouth daily.  triamcinolone (KENALOG) 0.1 % cream Apply topically as needed Apply topically 2 times daily.       sacubitril-valsartan (ENTRESTO) 49-51 MG per tablet Entresto 49 mg-51 mg tablet   take one pill by mouth twice daily      warfarin (COUMADIN) 5 MG tablet Take by mouth Currently 5mg on Mon, Wed, Fri & Sun and  2.5mg on Tu, Thurs & Sat.  metoprolol succinate (TOPROL XL) 50 MG extended release tablet Take 1 tablet by mouth daily (Patient taking differently: Take 25 mg by mouth daily ) 90 tablet 3    albuterol sulfate HFA (PROAIR HFA) 108 (90 Base) MCG/ACT inhaler Inhale 2 puffs into the lungs every 4 hours as needed for Wheezing or Shortness of Breath 1 Inhaler 1    bumetanide (BUMEX) 2 MG tablet Take 1 tablet by mouth See Admin Instructions 2mg in am and 1mg in afternoon. Dr Ingris Zamora reduced dose 2/13/19. (Patient taking differently: Take by mouth 3 times daily 2mg in am and 1mg in afternoon. Dr Ingris Zamora reduced dose 2/13/19.) 45 tablet 5    potassium chloride (KLOR-CON M) 20 MEQ extended release tablet Take 1 tablet by mouth 2 times daily 180 tablet 3    Magnesium 400 MG TABS Take 400 mg by mouth daily      Ascorbic Acid (VITAMIN C) 1000 MG tablet Take 2,000 mg by mouth daily      Multiple Vitamins-Minerals (THERAPEUTIC MULTIVITAMIN-MINERALS) tablet Take 1 tablet by mouth daily      colchicine (COLCRYS) 0.6 MG tablet Take 0.6 mg by mouth 2 times daily as needed       allopurinol (ZYLOPRIM) 100 MG tablet Take 1 tablet by mouth daily 30 tablet 3    levothyroxine (SYNTHROID) 75 MCG tablet Take 1 tablet by mouth Daily 30 tablet 3    insulin glargine (LANTUS) 100 UNIT/ML injection vial Inject 20 Units into the skin every morning      atorvastatin (LIPITOR) 80 MG tablet Take 80 mg by mouth daily      linagliptin (TRADJENTA) 5 MG tablet Take 5 mg by mouth daily      albuterol-ipratropium (COMBIVENT)  MCG/ACT inhaler Inhale 2 puffs into the lungs every 12 hours. 1 Inhaler 1    aspirin 81 MG EC tablet Take 81 mg by mouth daily Prescribed per Dr Yesenai Cadet      omeprazole (PRILOSEC) 20 MG capsule Take 20 mg by mouth daily        No current facility-administered medications for this encounter.         Allergies:  No Known Allergies    Problem List:    Patient Active Problem List   Diagnosis Code    Hyperlipidemia E78.5    DM (diabetes mellitus) ([facility] E11.9    Coronary atherosclerosis of native coronary artery I25.10    GERD (gastroesophageal reflux disease) K21.9    Depression F32.9    Hypothyroid E03.9    Ischemic cardiomyopathy I25.5    Mitral regurgitation I34.0    AI (aortic insufficiency) I35.1    [facility]ox[facility] fibrillation) ([facility] I48.0    Ulnar nerve neuropathy G56.20    Dual implantable cardioverter-defibrillator in situ Z95.810    Tricuspid regurgitation I07.1    Chronic kidney disease N18.9    Chronic systolic HF (heart failure) ([facility]) I50.22    Anticoagulated on Coumadin Z79.01    Right ventricular dysfunction I51.9    Congestive heart failure ([facility]) I50.9       Past Medical History:        Diagnosis Date    Acid reflux     Acute MI ([facility] 01/25/97,01/04    Anxiety     CAD (coronary artery disease)     follows w Dr. Rena Wright Diabetes mellitus ([facility]     Fluid retention     history of    Hyperlipidemia     Hypertension     Ischemic cardiomyopathy     Osteoarthritis     Post PTCA 07/96/13    Systolic dysfunction, left ventricle     follows with Dr. Rena Wright Thyroid disease        Past Surgical History:        Procedure Laterality Date    CARDIAC DEFIBRILLATOR PLACEMENT Left 2013    with pacemaker ([employer]tr[employer])    CORONARY ANGIOPLASTY      CORONARY ARTERY BYPASS GRAFT      DIAGNOSTIC CARDIAC CATH LAB PROCEDURE  01/27/97,11/00    Critical lesion mid left circumflex,significant lesion mid-LAD and first diagonal    ECHO COMPL W DOP COLOR FLOW  4/11/2013         ECHO COMPL W DOP COLOR FLOW  4/21/2013         MITRAL VALVE SURGERY      TRANSESOPHAGEAL ECHOCARDIOGRAM  4/12/2013    Dr. Carmel Mcnulty    TRANSESOPHAGEAL ECHOCARDIOGRAM  08/02/2017       Social History:    Social History     Tobacco Use    Smoking status: Current Every Day Smoker     Packs/day: 0.25     Years: 50.00     Pack years: 12.50     Types: Cigarettes    Smokeless tobacco: Never Used  Tobacco comment: currently 3-4 cigarettes a day (11/8/19); used to be 2 packs a day   Substance Use Topics    Alcohol use: Yes     Frequency: Monthly or less     Drinks per session: 1 or 2     Binge frequency: Never     Comment: rare tequilla/beer                                Ready to quit: Not Answered  Counseling given: Not Answered  Comment: currently 3-4 cigarettes a day (11/8/19); used to be 2 packs a day      Vital Signs (Current):   Vitals:    09/04/20 0801   Weight: 198 lb (89.8 kg)   Height: 6' 2\" (1.88 m)                                              BP Readings from Last 3 Encounters:   09/03/20 (!) 96/59   08/06/20 109/63   07/23/20 (!) 100/57       NPO Status:  >12 HRS                                                                               BMI:   Wt Readings from Last 3 Encounters:   09/04/20 198 lb (89.8 kg)   09/03/20 198 lb (89.8 kg)   08/06/20 200 lb (90.7 kg)     Body mass index is 25.42 kg/m². CBC:   Lab Results   Component Value Date    WBC 8.9 09/04/2020    RBC 4.20 09/04/2020    HGB 10.5 09/04/2020    HCT 35.0 09/04/2020    MCV 83.3 09/04/2020    RDW 16.4 09/04/2020     09/04/2020       CMP:   Lab Results   Component Value Date     09/03/2020    K 4.6 09/03/2020    K 4.5 03/09/2019     09/03/2020    CO2 26 09/03/2020    BUN 43 09/03/2020    CREATININE 2.1 09/03/2020    GFRAA 38 09/03/2020    LABGLOM 31 09/03/2020    GLUCOSE 134 09/03/2020    PROT 8.5 03/08/2019    CALCIUM 9.6 09/03/2020    BILITOT 0.7 03/08/2019    ALKPHOS 160 03/08/2019    AST 37 03/08/2019    ALT 21 03/08/2019       POC Tests: No results for input(s): POCGLU, POCNA, POCK, POCCL, POCBUN, POCHEMO, POCHCT in the last 72 hours.     Coags:   Lab Results   Component Value Date    PROTIME 55.0 03/12/2019    INR 1.6 03/15/2019    INR 5.0 03/12/2019    APTT 32.9 03/08/2019       HCG (If Applicable): No results found for: PREGTESTUR, PREGSERUM, HCG, HCGQUANT     ABGs:   Lab Results   Component Value Date    PO2ART 24.4 01/17/2019    DEA2ZXT 45.4 01/17/2019    EPU8ZBU 29.4 01/17/2019        Type & Screen (If Applicable):  No results found for: LABABO, LABRH    Drug/Infectious Status (If Applicable):  No results found for: HIV, HEPCAB    COVID-19 Screening (If Applicable):   Lab Results   Component Value Date    COVID19 Not Detected 08/31/2020         Anesthesia Evaluation  Patient summary reviewed and Nursing notes reviewed no history of anesthetic complications:   Airway: Mallampati: II  TM distance: >3 FB   Neck ROM: full  Mouth opening: > = 3 FB Dental:          Pulmonary: breath sounds clear to auscultation  (+) COPD:      (-) not a current smoker (QUIT FEW MONTHS AGO)                           Cardiovascular:  Exercise tolerance: poor (<4 METS),   (+) hypertension:, valvular problems/murmurs (h/o MVR, AVR, TRICUSPID REPAIR): MR and AI, past MI:, CAD:, CABG/stent:, dysrhythmias: atrial fibrillation, CHF (ICM): systolic,       ECG reviewed  Rhythm: irregular  Rate: normal  Echocardiogram reviewed    Cleared by cardiology             PE comment: AF   Neuro/Psych:   (+) neuromuscular disease (ulnar nerve neuropatly):, psychiatric history: stable with treatmentdepression/anxiety             GI/Hepatic/Renal:   (+) GERD:, renal disease: CRI,           Endo/Other:    (+) Diabetes, hypothyroidism, blood dyscrasia: anticoagulation therapy:., .          Pt had no PAT visit       Abdominal:       Abdomen: soft. Vascular:                                      Anesthesia Plan      MAC     ASA 4       Induction: intravenous. Anesthetic plan and risks discussed with patient. Use of blood products discussed with patient whom. Plan discussed with attending and CRNA. ANTONY Christensen - CRNA   9/4/2020    Patient seen and examined, chart reviewed, agree with above findings. Anesthetic plan, risks, benefits, alternatives, and personnel involved discussed with patient.   Patient verbalized an understanding and agreed to proceed. NPO status confirmed. Anesthetic plan discussed with care team members and agreed upon.     Tobias Chavarria DO   9/4/2020  9:45 AM

## 2020-09-04 NOTE — TELEPHONE ENCOUNTER
I left a message for Abiola Yao to remind him to have his INR checked at the [place] on Tuesday (Monday is a [place] will be closed). Device nurse will scheduled the 1 week remote check. I asked Abiola Yao to call me back to confirm he received my message.

## 2020-09-04 NOTE — OP NOTE
Operative Note  PROCEDURE:    1. External Cardioversion   2. Barbara-procedural ICD Programming     INDICATION: Symptomatic persistent  atrial fibrillation     PROCEDURE PERFORMED BY: Joana Navarrete MD     PROCEDURE TIME: 45 minutes     COMPLICATIONS: none immediately apparent     ANESTHESIA: MAC     DESCRIPTION OF PROCEDURE: The risks, benefits, and alternatives to the procedure were discussed with the patient, and informed consent was obtained. Once anesthesia was deemed adequate, NNEKA had been performed and no ROBER clot seen. He did have LA smoke present, LVEF 10-15%,  a 200 J shock was delivered externally and restored normal sinus rhythm. The patient was then allowed to wake in the usual fashion. Device reprogramming was performed post procedure. Device Interrogation   Make/Model  Mode changed  From DDD to DDDR 60/130 ppm  P wave:  0.6mV  Impedance: 342 ohms Threshold:   RV R wave: 9.5  Impedance:  380/58ohms   Threshold: 0.75V @ 0.4 ms  Pacing: A: 15%  RV: 0.5%   Battery Voltage/Longevity: 2.9 years  Arrhythmias:   Overall device function is normal  Reprogramming: changed mode from DDD to DDDR  All device programmable settings were evaluated per above and in the scanned document, along with iterative adjustments (capture thresholds) to assess and select the most appropriate final programming to provide for consistent delivery of the appropriate therapy and to verify function of the device. SUMMARY:  1. Successful cardioversion of persistent atrial fibrillation to sinus rhythm. 2. Reprogramming of D-ICD    RECOMMENDATIONS:  1. Discharge to home when fully awake and alert, if clinically stable. 2. Resume all pre-procedure medications, including anticoagulation.   3. Start Amiodarone 400 mg bid for one week then 200 mg daily

## 2020-09-04 NOTE — H&P
Cardiac Electrophysiology H & P Note    Yuri Gamboa  [id number]  Date of Service: 9/4/2020  PCP: Fazal Yañez MD  Cardiologist: Dr Yesenia Cadet  Electrophysiologist:Dr Isha Andrade    Reason for Admission: Persistent Atrial fibrillation    SUBJECTIVE: Yuri Gamboa is a 69 yo male who I am admitting for NNEKA/DCCV. He has an ICD in situ, chronic systolic HF, and PAF. He followed with Dr Fausto Amezquita from an Advanced Heart Failure perspective. An echocardiogram was performed in September 2019  showed slight improvement in his LVEF at 35-40%. He has extensive CAD s/p stenting in the past, Redo CABG 2017, CKD with baseline creatine at 2. He has been noted to have persistent AF on remote monitoring thus DCCV was recommended      He denies any chest pain, dizziness, syncope. He does have chronic sob        Patient Active Problem List    Diagnosis Date Noted    Ischemic cardiomyopathy 04/11/2013     Priority: High     Overview Note:     Ejection fraction 30-35%. Ejection fraction 30% to 35% on cardiac catheterization, 01/09/04. Ejection fraction 44% on Gated SPECT Study 7/16/04    Echocardiogram(6/2013): LVEF 25 +/- 5%. Severe LV dilatation. EF = 19 ± 5% (2D biplane), 12/18/2017 ([facility])      Congestive heart failure ([address]     Right ventricular dysfunction 05/19/2019     Overview Note:     2014 LVEF: 29%  RVF: Mod, RVSP 76 mmHg, 2-3+MR/TR      Anticoagulated on Coumadin     Tricuspid regurgitation 03/09/2018     Overview Note:     -Moderate TR  -s/p tricuspid valve repair with a size #30 CE ring. (11/07/2017, [facility])  - Echo (12/2017, [facility]) There is mild (1+ - 2+) tricuspid valve regurgitation. The peak gradient is 14 mmHg and the mean gradient is 5 mmHg.  Chronic kidney disease 03/09/2018     Overview Note:     Stage 3   Baseline Cr - 1.7, GFR - 40                Chronic systolic HF (heart failure) ([address] 03/09/2018     Overview Note:       RHC (10/2017, [facility]) BP: 122 / 76.  BP Mean: 91. HR: 82 min. FiO2: 21.00%. Pulse Ox: 94.00%. CVP: 8 cm. Thermo CO: 3.97 l/min. Hg: 10.8. H2O = 6.2 mmHg. Thermo CI: 1.84 l/min/m2. TP. RA Mean: 8. Assumed Chan CO: 4.21 l/min. PVR: 3.0. RV: 76 / 12. Assumed Chan CI: 1.95 l/min/m2. SVR: 1679.0. PA: 72 / 34. PA Mean: 49.00. PCWP Mean: 36.0. SV: 48.41 cc/stroke. TS. SVI: 22.46 cc/stroke/m2. PCWP V Wave: 50.00. PAO2: 50.40%. LVSWI: 16.90 gm-m/m2/beat. RVSWI: 12.52 gm-m/m2/beat. RVSWI: 920.74 mmHg-ml/m2/beat. Impression: Pulmonary venous hypertension with prominent V waves borderline/mildly subnormal cardiac index       Dual implantable cardioverter-defibrillator in situ 2013     Overview Note:     Upgrade of PPM to dual chamber ICD 13: Over [address] Fem[address] XT DR model [id number]; SN [id number]        Ulnar nerve neuropathy 2013    PAF (paroxysmal atrial fibrillation) ([facility] 2013     Overview Note:     Anticoagulated with coumadin   ROBER ()      Mitral regurgitation 2013     Overview Note:     -s/p MVrp () 30mm Future ring.   -s/p MVR (10/2017, [facility]) #29 Biocor   - Echo (2017, [facility]) Biocor prosthetic mitral valve (size #29). There is trivial mitral valve regurgitation. The peak gradient is 22 mmHg and the mean gradient is 8 mmHg. Prior gradients were 22/9 mmHg.  AI (aortic insufficiency) 2013     Overview Note:     -Moderate AI  -S/p AVR with size #23 Trifecta valve (2017, [facility])  -Echo (2017, [facility]) There is trivial aortic valve regurgitation. The peak gradient is 9 mmHg, the mean gradient is 5 mmHg and the dimensionless valve index is 0.96. prior gradients were 15/8 mmHg.  Hyperlipidemia 2013    DM (diabetes mellitus) ([facility] 2013     Overview Note:     Hemoglobin A1C 6.730(2017, [facility])      Coronary atherosclerosis of native coronary artery 2013     Overview Note:     -Acute posterior myocardial infarction (97).     -[facility] (97) Critical lesion mid left circumflex, significant lesion mid-LAD and first diagonal.    -PTCA (01/29/97) mid left circumflex. -LHC (11/16/00) Insignificant coronary atherosclerosis. -Acute inferior myocardial infarction (01/08/04). -LHC (01/08/04) patent angioplasty site of the left circumflex, total occlusion mid right coronary artery with otherwise unremarkable coronary artery disease. -PTCA and stent of right coronary artery (01/08/04). -Repeat LHC (01/09/04) Good angioplasty result of right coronary artery with minimal residual thrombus. -CABG x 3 (04/2013) LIMA-LAD, SVG-OM, SVG-PAD  -LHC (10/2017, [facility]) Severe disease of the SVG-PDA. Patent LIMA-LAD and SVG-LCx. Trivial to mild AI with normal sized aorta. -Redo CABG (11/07/2017) SVG- to the PDA concomitant with valvular repairs.             GERD (gastroesophageal reflux disease) 04/09/2013    Depression 04/09/2013    Hypothyroid 04/09/2013     Overview Note:     TSH (11/12/2017, [facility]):  6.730 (H)  Free T4 (11/12/2017, [facility]):  0.9         Past Medical History:   Diagnosis Date    Acid reflux     [facility] 01/25/97,01/04    Anxiety     CAD (coronary artery disease)     follows w Dr. Aden Blas Diabetes mellitus ([facility]     Fluid retention     history of    Hyperlipidemia     Hypertension     Ischemic cardiomyopathy     Osteoarthritis     Post PTCA 76/06/51    Systolic dysfunction, left ventricle     follows with Dr. Aden Blas Thyroid disease        Past Surgical History:   Procedure Laterality Date    CARDIAC DEFIBRILLATOR PLACEMENT Left 2013    with pacemaker ([employer]tr[employer])    CORONARY ANGIOPLASTY      CORONARY ARTERY BYPASS GRAFT      DIAGNOSTIC CARDIAC CATH LAB PROCEDURE  01/27/97,11/00    Critical lesion mid left circumflex,significant lesion mid-LAD and first diagonal    ECHO COMPL W DOP COLOR FLOW  4/11/2013         ECHO COMPL W DOP COLOR FLOW  4/21/2013         MITRAL VALVE SURGERY      TRANSESOPHAGEAL ECHOCARDIOGRAM  4/12/2013    Dr. Emmanuelle Reveles Genitourinary: Negative for hematuria, burning or frequency. Musculoskeletal: Negative for myalgias, stiffness, or swelling. Skin: Negative for rash, pain, or lumps. Neurological: Negative for syncope, seizures, or headaches. Psychiatric/Behavioral: Negative for confusion and agitation. The patient is not nervous/anxious. PHYSICAL EXAM:  Vitals:    09/04/20 0801   Weight: 198 lb (89.8 kg)   Height: 6' 2\" (1.88 m)     Constitutional: Oriented to person, place, and time. Well-developed and cooperative. Head: Normocephalic and atraumatic. Eyes: Conjunctivae are normal.   Neck:  no JVD present. Cardiovascular: S1 normal, S2 normal and intact distal pulses. A regular rhythm present. PMI is not displaced. Pulmonary/Chest: Effort normal and breath sounds normal. No respiratory distress. Abdominal: Soft. Normal appearance and bowel sounds are normal.    Musculoskeletal: Normal range of motion present, no muscle weakness. Neurological: Alert and oriented to person, place, and time. No focal findings on my exam  Skin: Skin is warm and dry. No bruising, no ecchymosis and no rash noted. Extremity: No visible clubbing or cyanosis. No edema. Psychiatric: Normal mood and affect. Thought content normal.     Pertinent Labs:  CBC:   Recent Labs     09/04/20  0755   WBC 8.9   HGB 10.5*   HCT 35.0*        BMP:  Recent Labs     09/03/20  1315 09/04/20  0755    140   K 4.6 4.2    101   CO2 26 26   BUN 43* 48*   CREATININE 2.1* 2.2*   CALCIUM 9.6 9.1     ABGs:   Lab Results   Component Value Date    PO2ART 24.4 01/17/2019    EDL6NPJ 45.4 01/17/2019     INR:   Recent Labs     09/04/20  0755   INR 3.3     BNP: No results for input(s): BNP in the last 72 hours. TSH:   Lab Results   Component Value Date    TSH 4.170 03/12/2018      Cardiac Injury Profile: No results for input(s): CKTOTAL, CKMB, CKMBINDEX, TROPONINI in the last 72 hours.    Lipid Profile:   Lab Results   Component Value Date TRIG 87 12/02/2017    HDL 20 12/02/2017    LDLCALC 48 12/02/2017    CHOL 85 12/02/2017      Hemoglobin A1C: No components found for: HGBA1C   Xray:   No orders to display         Pertinent Cardiac Testing:   TTE 9/2019   Severe eccentric left ventricular hypertrophy. Severe left ventricular systolic dysfunction. Visually estimated LVEF is 35 %. Normal right ventricular size. Mild right ventricular systolic function. TAPSE is 1.5 cm. TDI s' is 7 cm/s. ICD or pacer lead visualized in the right sided chambers. Status post mitral valve # 29 mm. The valve appears well seated with no rocking motion. Status post aortic valve # 23 mm. This appears well seated and without any rocking motion. Mean gradient is 10.4 mm Hg. Moderate tricuspid regurgitation. Telemetry9/4/2020:  AF rate 70's    ECG 9/4/2020: pending    I have independently reviewed all of the ECGs and rhythm strips per above. I have personally reviewed the laboratory, cardiac diagnostic and radiographic testing as outlined above. I have reviewed previous records noted in [address]. Impression:    1. Persistent AF  - Plan NNEKA/DCCV today  - Anticoagulated with coumadin . ROBER (2013)  - [id number] score: 4, annual stroke risk 4%  INR 3.3 today    2. CAD   -Acute posterior myocardial infarction (1/25/97). -[facility] (01/27/97) Critical lesion mid left circumflex, significant lesion mid-LAD and first diagonal.    -PTCA (01/29/97) mid left circumflex. -[facility] (11/16/00) Insignificant coronary atherosclerosis. -Acute inferior myocardial infarction (01/08/04). -[facility] (01/08/04) patent angioplasty site of the left circumflex, total occlusion mid right coronary artery with otherwise unremarkable coronary artery disease. -PTCA and stent of right coronary artery (01/08/04). -Repeat C (01/09/04) Good angioplasty result of right coronary artery with minimal residual thrombus.    -CABG x 3 (04/2013) LIMA-LAD, SVG-OM, SVG-PAD  -C (10/2017, [facility]) Severe disease of the SVG-PDA. Patent LIMA-LAD and SVG-LCx. Trivial to mild AI with normal sized aorta. -Redo CABG (11/07/2017) SVG- to the PDA concomitant with valvular repairs    3. Chronic systolic HF  - GDMT: Toprol XL 25 mg QD, Entresto 49-51 BID, Bumex 2 mg QD,     4. Dual chamber ICD in situ  - Upgrade of PPM to dual chamber ICD 9/12/13: Evera XT  model [id number]; SN [id number]     5. CKD Stage 3     6. DM    I have spent a total of 70 minutes with the patient and his/her family reviewing the above stated recommendations. A total of >50% of that time involved face-to-face time providing counseling and or coordination of care with the other providers. Thank you for allowing me to participate in your patient's care. [facility] Cardiac Electrophysiology  [address] [facility]    NOTE: This report was transcribed using voice recognition software. Every effort was made to ensure accuracy; however, inadvertent computerized transcription errors may be present.

## 2020-09-08 NOTE — TELEPHONE ENCOUNTER
Sharmaine Arauz called back and spoke to Tish and stated he can only go scheduled days at the [facility] which isn't until next week. I called and left another message for him to come here to our office so he can have his INR checked this week. I will wait for him to call me back to schedule a lab visit.

## 2020-09-16 NOTE — TELEPHONE ENCOUNTER
No transmission - gave phone # for tehc services [employer]. Pt denies any symtpomts. Spoke with patient - trouble shooting done, will try again this afternoon. L/M on V/M for patient to send tx in today to check rhythm after DCCV.      Cami Suresh RN, BSN  [facility]

## 2020-09-21 NOTE — TELEPHONE ENCOUNTER
Remote txs received on 9-: no AF since DCCV on 9-4-2020. Phone call to patient - discussed and all questions answered.        Pravin Rosado RN, BSN  [facility]

## 2020-09-22 NOTE — PROGRESS NOTES
Weight today 197lb. , breath sounds diminished bilat. abd softly distended. Pt.c/o feeling bloated. IV Bumex given after labs drawn and follow up appt. Made.

## 2020-09-23 NOTE — TELEPHONE ENCOUNTER
----- Message from Michael Calvo MD sent at 9/23/2020  2:24 PM EDT -----  I spoke with Dr. Edin Pacheco today about results

## 2020-10-06 NOTE — PROGRESS NOTES
Weight 203lb. , breath sound diminished bilat with fine scattered crackles in left base. No edema noted. IV Bumex given after labs drawn and follow up appt. Made.

## 2020-10-07 NOTE — TELEPHONE ENCOUNTER
----- Message from Elio Samuels MD sent at 10/7/2020  3:55 PM EDT -----  Please let patient know that his BNP was 2659 (he keeps track of his numbers) and his Cr was stable at 2.0.  Thanks

## 2020-10-12 PROBLEM — N18.30 ANEMIA OF CHRONIC RENAL FAILURE, STAGE 3 (MODERATE) (HCC): Status: ACTIVE | Noted: 2020-01-01

## 2020-10-12 PROBLEM — D63.1 ANEMIA OF CHRONIC RENAL FAILURE, STAGE 3 (MODERATE) (HCC): Status: ACTIVE | Noted: 2020-01-01

## 2020-10-13 NOTE — PROGRESS NOTES
See [facility] [place] report. Remote monitoring reviewed over a 90 day period. End of 90 day monitoring period date of service 10.8.2020.

## 2020-10-13 NOTE — PROGRESS NOTES
Diamond Bridges  1945  Date of Service: 10/14/2020      SUBJECTIVE: Diamond Brdiges presents to the office today with the chief complaint:  PAF    He has an ICD in situ, chronic systolic HF, and PAF. He followed with Dr Veronica Banks from an Advanced Heart Failure perspective. An echocardiogram was performed in September 2019  showed slight improvement in his LVEF at 35-40%. He has extensive CAD s/p stenting in the past, Redo CABG 2017, CKD with baseline creatine at 2. He has been noted to have persistent AF on remote monitoring thus DCCV was recommended. He had DCCV 9/4/20    Today, Mr. Jack Lynn presents today for follow up. Since his last office visit he reports feeling overall great. He continue on Amiodarone for rhythm control and presents in SR. His device site looks well healed and free from infection or erosion. He offers no complaints from a device POV. Currently denies any angina, syncope, dyspnea on exertion, paroxysmal nocturnal dyspnea and palpitations. The patient continues to be followed remotely. Patient Active Problem List    Diagnosis Date Noted    Ischemic cardiomyopathy 04/11/2013     Priority: High     Overview Note:     Ejection fraction 30-35%. Ejection fraction 30% to 35% on cardiac catheterization, 01/09/04. Ejection fraction 44% on Gated SPECT Study 7/16/04    Echocardiogram(6/2013): LVEF 25 +/- 5%. Severe LV dilatation. EF = 19 ± 5% (2D biplane), 12/18/2017 ([facility])      Anemia of chronic renal failure, stage 3 (moderate) 10/12/2020    Persistent atrial fibrillation (HCC)     Congestive heart failure ([address]     Right ventricular dysfunction 05/19/2019     Overview Note:     2014 LVEF: 29%  RVF: Mod, RVSP 76 mmHg, 2-3+MR/TR      Anticoagulated on Coumadin     Tricuspid regurgitation 03/09/2018     Overview Note:     -Moderate TR  -s/p tricuspid valve repair with a size #30 CE ring.  (11/07/2017, [facility])  - Echo (12/2017, [facility]) There is mild (1+ - 2+) tricuspid valve gradients were 15/8 mmHg.  Hyperlipidemia 04/09/2013    DM (diabetes mellitus) ([address] 04/09/2013     Overview Note:     Hemoglobin A1C 6.730(11/12/2017, [facility])      Coronary atherosclerosis of native coronary artery 04/09/2013     Overview Note:     -Acute posterior myocardial infarction (1/25/97). -LHC (01/27/97) Critical lesion mid left circumflex, significant lesion mid-LAD and first diagonal.    -PTCA (01/29/97) mid left circumflex. -LHC (11/16/00) Insignificant coronary atherosclerosis. -Acute inferior myocardial infarction (01/08/04). -LHC (01/08/04) patent angioplasty site of the left circumflex, total occlusion mid right coronary artery with otherwise unremarkable coronary artery disease. -PTCA and stent of right coronary artery (01/08/04). -Repeat LHC (01/09/04) Good angioplasty result of right coronary artery with minimal residual thrombus. -CABG x 3 (04/2013) LIMA-LAD, SVG-OM, SVG-PAD  -LHC (10/2017, [facility]) Severe disease of the SVG-PDA. Patent LIMA-LAD and SVG-LCx. Trivial to mild AI with normal sized aorta. -Redo CABG (11/07/2017) SVG- to the PDA concomitant with valvular repairs.  GERD (gastroesophageal reflux disease) 04/09/2013    Depression 04/09/2013    Hypothyroid 04/09/2013     Overview Note:     TSH (11/12/2017, [facility]):  6.730 (H)  Free T4 (11/12/2017, [facility]):  0.9         Current Outpatient Medications   Medication Sig Dispense Refill    amiodarone (CORDARONE) 200 MG tablet Take 1 tablet by mouth daily 400mg twice daily for one week then 200mg daily 90 tablet 2    zolpidem (AMBIEN CR) 12.5 MG extended release tablet Take 12.5 mg by mouth nightly as needed.  gabapentin (NEURONTIN) 100 MG capsule Take 100 mg by mouth daily.  triamcinolone (KENALOG) 0.1 % cream Apply topically as needed Apply topically 2 times daily.       sacubitril-valsartan (ENTRESTO) 49-51 MG per tablet Entresto 49 mg-51 mg tablet   take one pill by mouth twice daily      warfarin (COUMADIN) 5 MG tablet Take by mouth Currently 5mg on Mon, Wed, Fri & Sun and  2.5mg on Tu, Thurs & Sat.  metoprolol succinate (TOPROL XL) 50 MG extended release tablet Take 1 tablet by mouth daily (Patient taking differently: Take 25 mg by mouth daily ) 90 tablet 3    albuterol sulfate HFA (PROAIR HFA) 108 (90 Base) MCG/ACT inhaler Inhale 2 puffs into the lungs every 4 hours as needed for Wheezing or Shortness of Breath 1 Inhaler 1    bumetanide (BUMEX) 2 MG tablet Take 1 tablet by mouth See Admin Instructions 2mg in am and 1mg in afternoon. Dr Estefanía Osullivan reduced dose 2/13/19. (Patient taking differently: Take by mouth 2mg in am and 1mg in afternoon. Dr Estefanía Osullivan reduced dose 2/13/19.) 45 tablet 5    potassium chloride (KLOR-CON M) 20 MEQ extended release tablet Take 1 tablet by mouth 2 times daily 180 tablet 3    Magnesium 400 MG TABS Take 400 mg by mouth daily      Ascorbic Acid (VITAMIN C) 1000 MG tablet Take 2,000 mg by mouth daily      Multiple Vitamins-Minerals (THERAPEUTIC MULTIVITAMIN-MINERALS) tablet Take 1 tablet by mouth daily      colchicine (COLCRYS) 0.6 MG tablet Take 0.6 mg by mouth 2 times daily as needed       allopurinol (ZYLOPRIM) 100 MG tablet Take 1 tablet by mouth daily 30 tablet 3    levothyroxine (SYNTHROID) 75 MCG tablet Take 1 tablet by mouth Daily 30 tablet 3    insulin glargine (LANTUS) 100 UNIT/ML injection vial Inject 20 Units into the skin every morning      atorvastatin (LIPITOR) 80 MG tablet Take 80 mg by mouth daily      linagliptin (TRADJENTA) 5 MG tablet Take 5 mg by mouth daily      albuterol-ipratropium (COMBIVENT)  MCG/ACT inhaler Inhale 2 puffs into the lungs every 12 hours.  1 Inhaler 1    aspirin 81 MG EC tablet Take 81 mg by mouth daily Prescribed per Dr Perry Angry      omeprazole (PRILOSEC) 20 MG capsule Take 20 mg by mouth as needed       QUEtiapine (SEROQUEL) 50 MG tablet Take 50 mg by mouth once        No current facility-administered medications for this visit. No Known Allergies        ROS:   Constitutional: Negative for fever, activity change and appetite change. HENT: Negative for epistaxis, difficulty swallowing. Eyes: Negative for blurred vision or double vision. Respiratory: Negative for cough, chest tightness, shortness of breath and wheezing. Cardiovascular: Negative for chest pain, palpitations and leg swelling. Gastrointestinal: Negative for abdominal pain, heartburn, or blood in stool. Genitourinary: Negative for hematuria, burning or frequency. Musculoskeletal: Negative for myalgias, stiffness, or swelling. Skin: Negative for rash, pain, or lumps. Neurological: Negative for syncope, seizures, or headaches. Psychiatric/Behavioral: Negative for confusion and agitation. The patient is not nervous/anxious. PHYSICAL EXAM:  Vitals:    10/14/20 1325   BP: (!) 100/54   Pulse: 82   Resp: 18   Weight: 199 lb 9.6 oz (90.5 kg)   Height: 6' 2\" (1.88 m)     Constitutional: Oriented to person, place, and time. Well-developed and cooperative. Head: Normocephalic and atraumatic. Eyes: Conjunctivae are normal. Pupils are equal, round, and reactive to light. Neck: No hepatojugular reflux and no JVD present. Carotid bruit is not present. Cardiovascular: S1 normal, S2 normal and intact distal pulses. A regular rhythm present. PMI is not displaced. Pulmonary/Chest: Effort normal and breath sounds normal. No respiratory distress. Abdominal: Soft. Normal appearance and bowel sounds are normal.  No abdominal bruit and no pulsatile midline mass. There is no hepatomegaly. No tenderness. Musculoskeletal: Normal range of motion of all extremities, no muscle weakness. Neurological: Alert and oriented to person, place, and time. Gait normal.   Skin: Skin is warm and dry. No bruising, no ecchymosis and no rash noted. Extremity: No clubbing or cyanosis. No edema. Psychiatric: Normal mood and affect.  Thought content normal. Pacemaker Interrogation: see attached interrogation 10/14/20   P wave: 0.8 mV  Impedance: 342 ohms   Threshold: 1.0 V @ 0.4 ms  RV R wave: 10.5 mV  Impedance: 380 ohms   Threshold: 0.75 V @ 0.4 ms  Pacing: A: 58.9%  RV: 0.4%    Battery Voltage/Longevity:  2.4 years    Charge time: 4 seconds  Arrhythmias: AF burden: 0.3%  - AT/AF episodes on 10/1/2020, longest 3 hours, Vrates:  bpm  Underlying rhythm: SR  EKG: ST w/ first degree AVB, rate: 82bpm    TTE 9/2019   Severe eccentric left ventricular hypertrophy.   Severe left ventricular systolic dysfunction.   Visually estimated LVEF is 35 %.     Normal right ventricular size.   Mild right ventricular systolic function.   TAPSE is 1.5 cm.   TDI s' is 7 cm/s.      ICD or pacer lead visualized in the right sided chambers.   Status post mitral valve # 29 mm.   The valve appears well seated with no rocking motion.   Status post aortic valve # 23 mm.   This appears well seated and without any rocking motion.   Mean gradient is 10.4 mm Hg.      Moderate tricuspid regurgitation.       Summary:     Darwyn Brittle  Pacemaker/ICD function is overall normal    1. Dual implantable cardioverter-defibrillator in situ    2. On amiodarone therapy    3. Ischemic cardiomyopathy    4. PAF (paroxysmal atrial fibrillation) ([address]    5. Chronic systolic congestive heart failure ([address]    6. ICD (implantable cardioverter-defibrillator) in place    7. CKD (chronic kidney disease) stage 4, GFR 15-29 ml/min ([facility])    8. Anticoagulated on Coumadin    9. Right ventricular dysfunction          Recommendations:     1. Persistent AF  -  NNEKA/DCCV  9/4/20  - Anticoagulated with coumadin   - [id number] score: 4, annual stroke risk 4%   - Started Amiodarone 400 mg bid for one week then 200 mg daily    2. CAD   -Acute posterior myocardial infarction (1/25/97).     -LHC (01/27/97) Critical lesion mid left circumflex, significant lesion mid-LAD and first diagonal.    -PTCA (01/29/97) mid left circumflex.    -LHC (11/16/00) Insignificant coronary atherosclerosis. -Acute inferior myocardial infarction (01/08/04). -LHC (01/08/04) patent angioplasty site of the left circumflex, total occlusion mid right coronary artery with otherwise unremarkable coronary artery disease. -PTCA and stent of right coronary artery (01/08/04). -Repeat LHC (01/09/04) Good angioplasty result of right coronary artery with minimal residual thrombus. -CABG x 3 (04/2013) LIMA-LAD, SVG-OM, SVG-PAD  -LHC (10/2017, [facility]) Severe disease of the SVG-PDA. Patent LIMA-LAD and SVG-LCx. Trivial to mild AI with normal sized aorta. -Redo CABG (11/07/2017) SVG- to the PDA concomitant with valvular repairs     3. Chronic systolic HF  - GDMT: Toprol XL 25 mg QD, Entresto 49-51 BID, Bumex 2 mg QD,   LVEF 15-20% 9/4/20 on NNEKA  QRS 138ms, consider CRT upgrade in the future. Benefit uncertain given non-typical LBBB morphology. Continue to monitor QRS width and ventricular pacing. Currently pacing <1%    4. Dual chamber ICD in situ  - Upgrade of PPM to dual chamber ICD 9/12/13: Evera XT  model [id number]; SN [id number]     5. CKD Stage 3     6. DM      1). The patient will present back to the office in 6 months for pacemaker/ICD follow-up. 2). He will send a remote transmission in 3 months. 3). NO changes were made in the device settings today. 4). Will continue Amiodarone 200mg daily. 5). Will obtain TFT's, LFT's and PFTs - will call with results. Thank you again for allowing me to participate in their care.       Jazzmine Navarrete M.D  Cardiac Electrophysiology  [facility]  [address] [facility] AND [facility]            CC:

## 2020-10-14 NOTE — PROGRESS NOTES
Patient had blood work drawn in office. Patient had no issues and tolerated it well. Left Arm. Stormy Gill MA.

## 2020-10-16 NOTE — TELEPHONE ENCOUNTER
----- Message from Vanda Dey MD sent at 10/16/2020  8:54 AM EDT -----  Pls forward labs to pcp. His TSH is more abnormal. He needs treatment for it.  Creat also worse at 2.6.   ----- Message -----  From: Lucius Xiao Incoming Results From [facility]  Sent: 10/14/2020   7:36 PM EDT  To: Vanda Dey MD

## 2020-10-16 NOTE — TELEPHONE ENCOUNTER
Spoke with patient let him know TSH and Creat were abnormal to follow up with PCP. He verbalized understanding, labs also faxed.

## 2020-10-22 NOTE — PROGRESS NOTES
Weight today 202lb, breath sounds clear diminished bilat., abd softy distended, bowel sounds present all quads. IV Bumex given after labs drawn.

## 2020-10-28 NOTE — PROGRESS NOTES
Tolerated infusion well. Therapy plan reviewed with patient. Verbalizes understanding. Reviewed AVS with patient, reviewed medication information, verbalizes good knowledge of current plan, and has no signs or symptoms to report at this time. Denies copy of AVS. Patient stayed for 30 minute observation after completion of infusion. Next appointment scheduled.  Given medication info regarding faraheme

## 2020-11-04 NOTE — PROGRESS NOTES
Tolerated infusion well. Therapy plan reviewed with patient. Verbalizes understanding. Reviewed AVS with patient, reviewed medication information, verbalizes good knowledge of current plan, and has no signs or symptoms to report at this time. Patient given copy of AVS. Patient stayed for 30 minute observation after completion of infusion.  Discharged from infusion services therapy completed

## 2020-11-06 NOTE — TELEPHONE ENCOUNTER
Patient needs cardiac clearance for upper endoscopy/colonoscopy with anesthesia by [facility]. Also need recommendations regarding Coumadin and if bridging is necessary. Spoke to patient. Patient denies any chest pain or palpitations since last visit in June 2020. He does admit to shortness of breath which he always has, but sometimes it is worse than others. He is able to complete all activities of daily living, including climbing one flight of stairs but he does not know if he could walk the distance of one block. He also states that he has a lot of bloating, he does not feel the Bumex is working that well and he would like to cut back on it. Please advise.

## 2020-11-09 NOTE — TELEPHONE ENCOUNTER
Nida Pleva for scopes from a cardiology standpoint. If needed, ok to hold coumadin (with no bridging).

## 2020-11-10 NOTE — PROGRESS NOTES
Weight up 4 lbs from last week. Assessment basically unchanged from previous. Bumex 2 mg IVP given and labs obtained.

## 2020-11-16 PROBLEM — Z79.01 CHRONIC ANTICOAGULATION: Status: ACTIVE | Noted: 2020-01-01

## 2020-11-16 PROBLEM — K74.60 CIRRHOSIS (HCC): Status: ACTIVE | Noted: 2020-01-01

## 2020-11-16 PROBLEM — I10 HYPERTENSION: Status: ACTIVE | Noted: 2020-01-01

## 2020-11-16 PROBLEM — R91.1 LESION OF LUNG: Status: ACTIVE | Noted: 2020-01-01

## 2020-11-16 PROBLEM — I50.20 HFREF (HEART FAILURE WITH REDUCED EJECTION FRACTION) (HCC): Status: ACTIVE | Noted: 2020-01-01

## 2020-11-16 PROBLEM — N18.30 CKD (CHRONIC KIDNEY DISEASE) STAGE 3, GFR 30-59 ML/MIN (HCC): Status: ACTIVE | Noted: 2020-01-01

## 2020-11-16 PROBLEM — Z95.1 HISTORY OF CORONARY ARTERY BYPASS GRAFT: Status: ACTIVE | Noted: 2020-01-01

## 2020-11-16 PROBLEM — R18.8 ASCITES: Status: ACTIVE | Noted: 2020-01-01

## 2020-11-16 PROBLEM — R79.1 SUPRATHERAPEUTIC INR: Status: ACTIVE | Noted: 2020-01-01

## 2020-11-16 NOTE — CONSULTS
Thierry Reyes M.D. [facility]  Dr. Rona Saenz M.D.,  Dr. Ahsanti Braga M.D.,  Dr. Eduardo Walker D.O.,  Dr. Debo Graves D.O. ,  Dr. Ronda Mathews M.D.,  Dr. Raudel Pérez D.O. Karthik Ybarra  70 y.o.  male      Re: Cirrhosis, ascites, abdominal pain  Requesting physician: Karen Chiu NP  Date:5:00 PM 11/16/2020          HPI: Mr. Sotero Lazo is a 70-year-old male patient seen in the hospital for above-described issues. He apparently presents to the hospital because of increasing shortness of breath. Patient complains also of diffuse chronic abdominal pain for the past 3 weeks. Pain appears to be associated with somewhat decreased appetite and patient believes that he has lost some weight however he cannot quantify. Patient reports pain to be chronic and diffuse. Apparently patient has been scheduled for endoscopy on December 5. Patient denies nausea vomiting including denying hematemesis emesis of coffee-ground material.  He denies dysphagia renal failure. He denies blood in the stool or melena. He denies diarrhea constipation. Patient reports colonoscopy in the past at the [facility] facility which patient believes was to be unremarkable. He is unsure of previous EGD. Patient denies previous history of cirrhosis or hepatitis. Patient reports no significant alcohol abuse currently or in the past.  Patient reports that in his early years he used to drink couple of beers a day but never been a heavy drinker. Patient continues to smoke tobacco however reports that he has significantly decreased. On presentation to the hospital patient was noted to have no significant leukocytosis, no significant anemia and normal platelet count. INR is 5.9 on presentation however patient reports that he takes Coumadin for history of atrial fibrillation. Liver profile shows slightly elevated bilirubin of 1.1 and AST of 55 with ALT of 31. Alkaline phosphatase is 133.   CT scan of the abdomen and pelvis obtained on presentation reveals cardiomegaly with pleural effusions. Liver appears to be cirrhotic with ascites. Report is diverticulosis of the colon with mild thickening of the sigmoid colon possibly secondary to suboptimal distention per radiology report however cannot exclude uncomplicated diverticulitis.     Information sources:   -Patient  -medical record  -health care team    PMHx:  Past Medical History:   Diagnosis Date    Acid reflux     Acute MI ([facility] [address] 01/25/97,01/04    Anxiety     CAD (coronary artery disease)     follows w Dr. Carly Cota Diabetes mellitus ([facility] [address]     Fluid retention     history of    Hyperlipidemia     Hypertension     Ischemic cardiomyopathy     Osteoarthritis     Post PTCA 32/77/29    Systolic dysfunction, left ventricle     follows with Dr. Carly Cota Thyroid disease        PSHx:  Past Surgical History:   Procedure Laterality Date    CARDIAC DEFIBRILLATOR PLACEMENT Left 2013    with pacemaker ([employer])    CARDIOVERSION  09/04/2020    Successful     Dr. Tracy Cole CATH LAB PROCEDURE  01/27/97,11/00    Critical lesion mid left circumflex,significant lesion mid-LAD and first diagonal    ECHO COMPL W DOP COLOR FLOW  4/11/2013         ECHO COMPL W DOP COLOR FLOW  4/21/2013         MITRAL VALVE SURGERY      TRANSESOPHAGEAL ECHOCARDIOGRAM  4/12/2013    Dr. Roly Royal TRANSESOPHAGEAL ECHOCARDIOGRAM  08/02/2017       Meds:  Current Facility-Administered Medications   Medication Dose Route Frequency Provider Last Rate Last Dose    albuterol (PROVENTIL) nebulizer solution 2.5 mg  2.5 mg Nebulization Q6H PRN Vicenta Sherman, APRN - CNP        allopurinol (ZYLOPRIM) tablet 100 mg  100 mg Oral Daily Vicenta Sherman, APRN - CNP   100 mg at 11/16/20 1231    amiodarone (CORDARONE) tablet 200 mg  200 mg Oral Daily Vicenta Sherman, APRN - CNP   200 mg at 11/16/20 1230    ascorbic acid (VITAMIN C) tablet 2,000 mg  2,000 mg Oral Daily Brianna Sherman, APRN - CNP   2,000 mg at 11/16/20 1230    aspirin EC tablet 81 mg  81 mg Oral Daily Brianna Sherman, APRN - CNP   81 mg at 11/16/20 1229    atorvastatin (LIPITOR) tablet 80 mg  80 mg Oral Daily Brianna Sherman, APRN - CNP   80 mg at 11/16/20 1230    gabapentin (NEURONTIN) capsule 100 mg  100 mg Oral Daily Brainna Sherman, APRN - CNP   100 mg at 11/16/20 1231    insulin lispro (HUMALOG) injection vial 0-6 Units  0-6 Units Subcutaneous TID WC Brianna Sherman, APRN - CNP        insulin lispro (HUMALOG) injection vial 0-3 Units  0-3 Units Subcutaneous Nightly Brianna Sherman, APRN - CNP        glucose (GLUTOSE) 40 % oral gel 15 g  15 g Oral PRN Brianna Sherman, APRN - CNP        dextrose 50 % IV solution  12.5 g Intravenous PRN Brianna Sherman, APRN - CNP        glucagon (rDNA) injection 1 mg  1 mg Intramuscular PRN Brianna Sherman, APRN - CNP        dextrose 5 % solution  100 mL/hr Intravenous PRN Brianna Sherman, APRN - CNP        levothyroxine (SYNTHROID) tablet 75 mcg  75 mcg Oral Daily Brianna Sherman, APRN - CNP   75 mcg at 11/16/20 9329    magnesium oxide (MAG-OX) tablet 400 mg  400 mg Oral Daily Brianna Sherman, APRN - CNP   400 mg at 11/16/20 1229    metoprolol succinate (TOPROL XL) extended release tablet 25 mg  25 mg Oral Daily Brianna Sherman, APRN - CNP   25 mg at 11/16/20 1231    therapeutic multivitamin-minerals 1 tablet  1 tablet Oral Daily Brianna Sherman, APRN - CNP   1 tablet at 11/16/20 1230    pantoprazole (PROTONIX) tablet 40 mg  40 mg Oral QAM AC Brianna Sherman, APRN - CNP   40 mg at 11/16/20 5367    sodium chloride flush 0.9 % injection 10 mL  10 mL Intravenous 2 times per day ANTONY Stratton - CNP   10 mL at 11/16/20 1237    sodium chloride flush 0.9 % injection 10 mL  10 mL Intravenous PRN Brianna Sherman, APRN - CNP  acetaminophen (TYLENOL) tablet 650 mg  650 mg Oral Q6H PRN ANTONY Srivastava - CNP        Or    acetaminophen (TYLENOL) suppository 650 mg  650 mg Rectal Q6H PRN Brianna Sherman, APRN - GUERA        polyethylene glycol (GLYCOLAX) packet 17 g  17 g Oral Daily PRN Brianna Sherman, APRN - CNP        potassium chloride (KLOR-CON M) extended release tablet 20 mEq  20 mEq Oral BID Brianna Sherman, APRN - CNP   20 mEq at 11/16/20 1231    sacubitril-valsartan (ENTRESTO) 49-51 MG per tablet 1 tablet  1 tablet Oral BID Brianna Sherman, APRN - CNP   1 tablet at 11/16/20 1231    albumin human 25 % IV solution 25 g  25 g Intravenous See Admin Instructions Brainna Sherman APRN - CNP   Stopped at 11/16/20 1033    LORazepam (ATIVAN) tablet 0.5 mg  0.5 mg Oral Nightly PRN Brianna Sherman APRN - CNP        albuterol (PROVENTIL) nebulizer solution 2.5 mg  2.5 mg Nebulization Q12H Brianna Sherman, APRN - CNP   2.5 mg at 11/16/20 0246    ipratropium (ATROVENT) 0.02 % nebulizer solution 0.5 mg  0.5 mg Nebulization Q12H Brianna Sherman, APRN - CNP   0.5 mg at 11/16/20 0781    bumetanide (BUMEX) injection 2 mg  2 mg Intravenous BID Shayy Mccauley MD        warfarin (COUMADIN) daily dosing (placeholder)   Other RX Placeholder Brianna Sherman APRN - CNP           SocHx:  Social History     Socioeconomic History    Marital status:      Spouse name: Not on file    Number of children: Not on file    Years of education: Not on file    Highest education level: Not on file   Occupational History    Not on file   Social Needs    Financial resource strain: Not on file    Food insecurity     Worry: Not on file     Inability: Not on file   [employer] needs     Medical: Not on file     Non-medical: Not on file   Tobacco Use    Smoking status: Current Every Day Smoker     Packs/day: 0.25     Years: 50.00     Pack years: 12.50     Types: Cigarettes    Smokeless tobacco: Never Used    Tobacco comment: currently 3-4 cigarettes a day (11/8/19); used to be 2 packs a day   Substance and Sexual Activity    Alcohol use: Yes     Frequency: Monthly or less     Drinks per session: 1 or 2     Binge frequency: Never     Comment: rare tequilla/beer    Drug use: Never    Sexual activity: Not on file   Lifestyle    Physical activity     Days per week: Not on file     Minutes per session: Not on file    Stress: Not on file   Relationships    Social connections     Talks on phone: Not on file     Gets together: Not on file     Attends [employer] service: Not on file     Active member of club or organization: Not on file     Attends meetings of clubs or organizations: Not on file     Relationship status: Not on file    Intimate partner violence     Fear of current or ex partner: Not on file     Emotionally abused: Not on file     Physically abused: Not on file     Forced sexual activity: Not on file   Other Topics Concern    Not on file   Social History Narrative    . Two kids who he is close with. Foodie. Loves cooking. Drinks 1 cup of coffee daily. Compliant with meds and sodium restrictions for the most part. No smoking. Occasional wine with his food. FamHx:  Family History   Problem Relation Age of Onset    Hypertension Mother     Hypertension Father     Heart Surgery Father     High Cholesterol Father        Allergy:No Known Allergies      ROS: As described in the HPI and in addition is negative upon detailed review of systems or unobtainable unless otherwise stated in this dictation. PE:  /70   Pulse 64   Temp 98 °F (36.7 °C) (Oral)   Resp 18   Ht 6' 2\" (1.88 m)   Wt 205 lb (93 kg)   SpO2 96%   BMI 26.32 kg/m²     Gen.: NAD/ male.   AAO x3  Head: Atraumatic/normocephalic  Eyes: EOMI/anicteric sclera/no conjunctival erythema ENT: Moist oral mucosa/few missing teeth  Neck: Supple/trachea midline  Chest: CTA B/no

## 2020-11-16 NOTE — PROGRESS NOTES
Loy Bryant NP currently covering and has been notified of consult.  Angel Ovalles 11/16/2020 8:32 AM

## 2020-11-16 NOTE — ED NOTES
Pt made aware of need for urine specimen, unable to void at this time. Instructed to notify staff when urge to void.      Bert Pablo RN  11/15/20 0937

## 2020-11-16 NOTE — ED NOTES
Bed: 24  Expected date:   Expected time:   Means of arrival:   Comments:  350 Johnny Conrad RN  11/15/20 5992

## 2020-11-16 NOTE — PROGRESS NOTES
Consult received. Patient known to Dr. Urszula Kramer. Will change consult.     Electronically signed by Davon Palacio MD on 11/16/2020 at 12:30 PM

## 2020-11-16 NOTE — PROGRESS NOTES
Dr. Balbir Gallardo reviewed ascites scan, not a sufficient amount of fluid for paracentesis at this time. Floor called and notified.

## 2020-11-16 NOTE — ED PROVIDER NOTES
[facility]  eMERGENCY dEPARTMENT eNCOUnter      Pt Name: Chemo Leal  MRN: [id number]  [place] 1945  Date of evaluation: 11/15/2020  Provider: Rupa Sky MD     [address] [place] [address]       Chief Complaint   Patient presents with    Abdominal Pain    Shortness of Breath         HISTORY OF PRESENT ILLNESS   (Location/Symptom, Timing/Onset,Context/Setting, Quality, Duration, Modifying Factors, Severity) Note limiting factors. Presents here with a chief complaint of abdominal pain and shortness of breath. Patient states he has been having abdominal pain for about the last 3 weeks. He did speak with his physician over the phone and apparently they are going to have a endoscopy done in another week to 10 days but he states he is not going to make it that long. He states his pain is much more severe and now he is very short of breath. It sounds as though he has a history of congestive heart failure and they had been adjusting his recent Lasix recently. He did speak with cardiology to get cleared for the endoscopy. He is not sure if he had any weight loss but he has a decreased appetite. He is unable to really walk across his house. He has not had any vomiting. He has not had a fever. The pain is diffuse in his abdomen. Chemo Leal is a 70 y.o. male who presents to the emergency department      Nursing Notes were reviewed. REVIEW OF SYSTEMS    (2+ for4; 10+ for level 5)     Review of Systems   Constitutional: Negative for appetite change, chills, fatigue, fever and unexpected weight change. HENT: Negative for congestion, drooling, nosebleeds, rhinorrhea and trouble swallowing. Eyes: Negative for pain and visual disturbance. Respiratory: Positive for chest tightness and shortness of breath. Negative for cough and wheezing. Cardiovascular: Negative for chest pain, palpitations and leg swelling.    Gastrointestinal: Positive for abdominal pain and nausea. Negative for blood in stool, diarrhea and vomiting. Endocrine: Negative for polydipsia and polyuria. Genitourinary: Negative for difficulty urinating, dysuria, flank pain, frequency, hematuria and urgency. Musculoskeletal: Negative for arthralgias, back pain, myalgias and neck pain. Skin: Negative for pallor and rash. Allergic/Immunologic: Negative for environmental allergies. Neurological: Negative for dizziness, syncope, speech difficulty, weakness, light-headedness, numbness and headaches. Hematological: Does not bruise/bleed easily. Psychiatric/Behavioral: Negative for confusion and decreased concentration. All other systems reviewed and are negative.       PAST MEDICAL HISTORY     Past Medical History:   Diagnosis Date    Acid reflux     Acute MI ([facility] [address] 01/25/97,01/04    Anxiety     CAD (coronary artery disease)     follows w Dr. Kelvin Gardiner Diabetes mellitus ([facility] [address]     Fluid retention     history of    Hyperlipidemia     Hypertension     Ischemic cardiomyopathy     Osteoarthritis     Post PTCA 81/30/54    Systolic dysfunction, left ventricle     follows with Dr. Kelvin Gardiner Thyroid disease        SURGICALHISTORY       Past Surgical History:   Procedure Laterality Date    CARDIAC DEFIBRILLATOR PLACEMENT Left 2013    with pacemaker ([employer])    CARDIOVERSION  09/04/2020    Successful     Dr. Anika Greenwood CATH LAB PROCEDURE  01/27/97,11/00    Critical lesion mid left circumflex,significant lesion mid-LAD and first diagonal    ECHO COMPL W DOP COLOR FLOW  4/11/2013         ECHO COMPL W DOP COLOR FLOW  4/21/2013         MITRAL VALVE SURGERY      TRANSESOPHAGEAL ECHOCARDIOGRAM  4/12/2013    Dr. Radha Joshua    TRANSESOPHAGEAL ECHOCARDIOGRAM  08/02/2017       CURRENT MEDICATIONS       Previous Medications    ALBUTEROL SULFATE HFA (PROAIR HFA) 108 (90 BASE) MCG/ACT INHALER    Inhale 2 puffs into the lungs every 4 hours as needed for Wheezing or Shortness of Breath    ALBUTEROL-IPRATROPIUM (COMBIVENT)  MCG/ACT INHALER    Inhale 2 puffs into the lungs every 12 hours. ALLOPURINOL (ZYLOPRIM) 100 MG TABLET    Take 1 tablet by mouth daily    AMIODARONE (CORDARONE) 200 MG TABLET    Take 1 tablet by mouth daily 400mg twice daily for one week then 200mg daily    ASCORBIC ACID (VITAMIN C) 1000 MG TABLET    Take 2,000 mg by mouth daily    ASPIRIN 81 MG EC TABLET    Take 81 mg by mouth daily Prescribed per Dr Zachery Brooks    ATORVASTATIN (LIPITOR) 80 MG TABLET    Take 80 mg by mouth daily    BUMETANIDE (BUMEX) 2 MG TABLET    Take 1 tablet by mouth See Admin Instructions 2mg in am and 1mg in afternoon. Dr Lacey Lange reduced dose 2/13/19. COLCHICINE (COLCRYS) 0.6 MG TABLET    Take 0.6 mg by mouth 2 times daily as needed     GABAPENTIN (NEURONTIN) 100 MG CAPSULE    Take 100 mg by mouth daily. INSULIN GLARGINE (LANTUS) 100 UNIT/ML INJECTION VIAL    Inject 20 Units into the skin every morning    LEVOTHYROXINE (SYNTHROID) 75 MCG TABLET    Take 1 tablet by mouth Daily    LINAGLIPTIN (TRADJENTA) 5 MG TABLET    Take 5 mg by mouth daily    MAGNESIUM 400 MG TABS    Take 400 mg by mouth daily    METOPROLOL SUCCINATE (TOPROL XL) 50 MG EXTENDED RELEASE TABLET    Take 1 tablet by mouth daily    MULTIPLE VITAMINS-MINERALS (THERAPEUTIC MULTIVITAMIN-MINERALS) TABLET    Take 1 tablet by mouth daily    OMEPRAZOLE (PRILOSEC) 20 MG CAPSULE    Take 20 mg by mouth as needed     PANTOPRAZOLE (PROTONIX) 20 MG TABLET    Take 20 mg by mouth daily    POTASSIUM CHLORIDE (KLOR-CON M) 20 MEQ EXTENDED RELEASE TABLET    Take 1 tablet by mouth 2 times daily    SACUBITRIL-VALSARTAN (ENTRESTO) 49-51 MG PER TABLET    Entresto 49 mg-51 mg tablet   take one pill by mouth twice daily    TRIAMCINOLONE (KENALOG) 0.1 % CREAM    Apply topically as needed Apply topically 2 times daily.     WARFARIN (COUMADIN) 5 MG TABLET Take by mouth Currently 5mg on Mon, Wed, Fri & Sun and  2.5mg on Tu, Thurs & Sat. ZOLPIDEM (AMBIEN CR) 12.5 MG EXTENDED RELEASE TABLET    Take 12.5 mg by mouth nightly as needed. Patient has no known allergies. FAMILY HISTORY       Family History   Problem Relation Age of Onset    Hypertension Mother     Hypertension Father     Heart Surgery Father     High Cholesterol Father           SOCIAL HISTORY       Social History     Socioeconomic History    Marital status:      Spouse name: None    Number of children: None    Years of education: None    Highest education level: None   Occupational History    None   Social Needs    Financial resource strain: None    Food insecurity     Worry: None     Inability: None    Transportation needs     Medical: None     Non-medical: None   Tobacco Use    Smoking status: Current Every Day Smoker     Packs/day: 0.25     Years: 50.00     Pack years: 12.50     Types: Cigarettes    Smokeless tobacco: Never Used    Tobacco comment: currently 3-4 cigarettes a day (11/8/19); used to be 2 packs a day   Substance and Sexual Activity    Alcohol use: Yes     Frequency: Monthly or less     Drinks per session: 1 or 2     Binge frequency: Never     Comment: rare tequilla/beer    Drug use: Never    Sexual activity: None   Lifestyle    Physical activity     Days per week: None     Minutes per session: None    Stress: None   Relationships    Social connections     Talks on phone: None     Gets together: None     Attends [employer]ical service: None     Active member of club or organization: None     Attends meetings of clubs or organizations: None     Relationship status: None    Intimate partner violence     Fear of current or ex partner: None     Emotionally abused: None     Physically abused: None     Forced sexual activity: None   Other Topics Concern    None   Social History Narrative    . Two kids who he is close with. [employer].  Loves cooking. Drinks 1 cup of coffee daily. Compliant with meds and sodium restrictions for the most part. No smoking. Occasional wine with his food. SCREENINGS    [place] Coma Scale  Eye Opening: Spontaneous  Best Verbal Response: Oriented  Best Motor Response: Obeys commands  Ezequiel Coma Scale Score: 15 @FLOW([id number])@    PHYSICAL EXAM    (5+ for level 4, 8+ for level 5)     ED Triage Vitals   BP Temp Temp Source Pulse Resp SpO2 Height Weight   11/15/20 2004 11/15/20 2001 11/15/20 2001 11/15/20 2001 11/15/20 2001 11/15/20 2001 11/15/20 2001 11/15/20 2001   103/75 96.7 °F (35.9 °C) Oral 66 16 95 % 6' 2\" (1.88 m) 205 lb (93 kg)       Physical Exam  Vitals signs and nursing note reviewed. Constitutional:       General: He is not in acute distress. Appearance: Normal appearance. He is well-developed. He is ill-appearing. He is not diaphoretic. Comments: Appears breathless. HENT:      Head: Normocephalic and atraumatic. Nose: Nose normal.      Mouth/Throat:      Pharynx: No oropharyngeal exudate. Eyes:      General: No scleral icterus. Right eye: No discharge. Left eye: No discharge. Conjunctiva/sclera: Conjunctivae normal.      Pupils: Pupils are equal, round, and reactive to light. Neck:      Musculoskeletal: Normal range of motion and neck supple. Thyroid: No thyromegaly. Vascular: No JVD. Trachea: No tracheal deviation. Cardiovascular:      Rate and Rhythm: Normal rate and regular rhythm. Heart sounds: Normal heart sounds. No murmur. No gallop. Pulmonary:      Effort: Pulmonary effort is normal. No respiratory distress. Breath sounds: No stridor. Rales present. No wheezing. Chest:      Chest wall: No tenderness. Abdominal:      General: There is no distension. Palpations: Abdomen is soft. There is no mass. Tenderness: There is abdominal tenderness. There is no guarding or rebound.       Comments: Diffuse Abdominal pain no masses rebound or guarding   Musculoskeletal: Normal range of motion. General: No tenderness or deformity. Lymphadenopathy:      Cervical: No cervical adenopathy. Skin:     General: Skin is warm and dry. Coloration: Skin is not pale. Findings: No erythema or rash. Neurological:      General: No focal deficit present. Mental Status: He is alert and oriented to person, place, and time. Cranial Nerves: No cranial nerve deficit. Motor: No abnormal muscle tone. Coordination: Coordination normal.   Psychiatric:         Mood and Affect: Mood normal.         Behavior: Behavior normal.         Thought Content: Thought content normal.         Judgment: Judgment normal.         DIAGNOSTIC RESULTS     EKG (Per Emergency Physician):   Atrial paced rate of 60 prolonged MD interventricular conduction delay    RADIOLOGY (Per Santina Claude): Interpretation per the Radiologist below, if available at the time of this note:  Ct Abdomen Pelvis Wo Contrast Additional Contrast? Oral    Result Date: 11/15/2020  EXAMINATION: CT OF THE ABDOMEN AND PELVIS WITHOUT CONTRAST 11/15/2020 9:42 pm TECHNIQUE: CT of the abdomen and pelvis was performed without the administration of intravenous contrast. Multiplanar reformatted images are provided for review. Dose modulation, iterative reconstruction, and/or weight based adjustment of the mA/kV was utilized to reduce the radiation dose to as low as reasonably achievable. COMPARISON: None. HISTORY: ORDERING SYSTEM PROVIDED HISTORY: pain TECHNOLOGIST PROVIDED HISTORY: Reason for exam:->pain Additional Contrast?->Oral FINDINGS: The lung bases demonstrate cardiomegaly with coronary artery calcification. There is moderate pleural effusions and atelectasis in lung bases. Masslike densities are identified in the right lung base largest 1 measuring up to 5 by 2.7 cm likely  rounded atelectasis. Malignant lesion is less likely.  Liver is consistent with congestive failure and perihilar pulmonary edema. Large right pleural effusion, significantly increased in the interval since the prior examination. Congestive failure with extensive perihilar interstitial pulmonary edema and large right pleural effusion.       :  Labs Reviewed   CBC WITH AUTO DIFFERENTIAL - Abnormal; Notable for the following components:       Result Value    MCHC 30.3 (*)     RDW 25.0 (*)     Lymphocytes % 9.8 (*)     Neutrophils Absolute 7.69 (*)     Lymphocytes Absolute 0.95 (*)     All other components within normal limits   COMPREHENSIVE METABOLIC PANEL W/ REFLEX TO MG FOR LOW K - Abnormal; Notable for the following components:    Sodium 128 (*)     Chloride 89 (*)     BUN 43 (*)     CREATININE 2.1 (*)     Total Bilirubin 1.5 (*)     Alkaline Phosphatase 136 (*)     AST 57 (*)     All other components within normal limits   LIPASE - Abnormal; Notable for the following components:    Lipase 12 (*)     All other components within normal limits   TROPONIN - Abnormal; Notable for the following components:    Troponin 0.07 (*)     All other components within normal limits   BRAIN NATRIURETIC PEPTIDE - Abnormal; Notable for the following components:    Pro-BNP 5,849 (*)     All other components within normal limits   PROTIME-INR - Abnormal; Notable for the following components:    Protime 70.7 (*)     INR 5.9 (*)     All other components within normal limits    Narrative:     CALL  [facility]  [facility] tel. [phone],  Coag results called to and read back by fred preston rn, 11/15/2020 21:30, by  [address]       All other labs were within normal range or not returned as of this dictation.     EMERGENCY DEPARTMENT COURSE and DIFFERENTIALDIAGNOSIS/MDM:   Vitals:    Vitals:    11/15/20 2001 11/15/20 2004 11/15/20 2206 11/15/20 2239   BP:  103/75  104/77   Pulse: 66  68 61   Resp: 16  16 16   Temp: 96.7 °F (35.9 °C)      TempSrc: Oral      SpO2: 95%  98% 98% Weight: 205 lb (93 kg)      Height: 6' 2\" (1.88 m)          Medications   iohexol (OMNIPAQUE 240) injection 50 mL (50 mLs Oral Given 11/15/20 2142)       MDM  Number of Diagnoses or Management Options  Diagnosis management comments: Presents here with abdominal pain as well as shortness of breath. He sounds as though there is been some adjustments with his diuretics. He also has continued to use other medication but most of his complaint is pain in the mid abdomen. He does not have any history of malignancy that he is aware of. He does have a cardiac history. EKG did not reveal any acute ischemic changes but he does have evidence of a paced rhythm and a interventricular conduction delay. He is very anticoagulated with an INR of 5.9. Chest x-ray does reveal evidence of possible atelectasis versus tumor. He also has evidence of CHF. His abdominal CT reveals ascites as well as evidence of cirrhosis and lymph nodes. He has some evidence of diverticular disease but no evidence of acute diverticulitis. Patient's BNP is elevated over his previous. His renal function is also worsened. At this time patient was discussed with Juan Elizondo who is covering for Dr. Aida Quinones. The patient will be admitted for diuresis and further stabilization. . have spoken with the patient and discussed todays results, in addition to providing specific details for the plan of care and counseling regarding the diagnosis and prognosis. Their questions are answered at this time and they are agreeable with the plan    Risk of Complications, Morbidity, and/or Mortality  Presenting problems: high  Diagnostic procedures: moderate  Management options: high    Patient Progress  Patient progress: stable  . CONSULTS:  None    PROCEDURES:  Unless otherwise noted below, none     Procedures    FINAL IMPRESSION      1. Generalized abdominal pain    2.  Acute on chronic congestive heart failure, unspecified heart failure type (HCC)    3. Elevated INR (international normalized ratio)        DISPOSITION/PLAN   DISPOSITION Admitted 11/16/2020 12:29:51 AM      PATIENT REFERRED TO:  No follow-up provider specified. DISCHARGE MEDICATIONS:  New Prescriptions    No medications on file          (Please note:  Portions of this note were completed with a voice recognition program.  Efforts were made to edit thedictations but occasionally words and phrases are mis-transcribed.)    Form v2016. J.5-cn    Charli Velasquez MD (electronically signed)  Emergency Medicine Provider          Charli Velasquez MD  11/16/20 8081       Charli Velasquez MD  11/16/20 9221

## 2020-11-16 NOTE — CONSULTS
BYPASS GRAFT      DIAGNOSTIC CARDIAC CATH LAB PROCEDURE  01/27/97,11/00    Critical lesion mid left circumflex,significant lesion mid-LAD and first diagonal    ECHO COMPL W DOP COLOR FLOW  4/11/2013         ECHO COMPL W DOP COLOR FLOW  4/21/2013         MITRAL VALVE SURGERY      TRANSESOPHAGEAL ECHOCARDIOGRAM  4/12/2013    Dr. Chao Ogden TRANSESOPHAGEAL ECHOCARDIOGRAM  08/02/2017       Family History:  Family History   Problem Relation Age of Onset    Hypertension Mother     Hypertension Father     Heart Surgery Father     High Cholesterol Father        Social History:  Social History     Socioeconomic History    Marital status:      Spouse name: Not on file    Number of children: Not on file    Years of education: Not on file    Highest education level: Not on file   Occupational History    Not on file   Social Needs    Financial resource strain: Not on file    Food insecurity     Worry: Not on file     Inability: Not on file    Transportation needs     Medical: Not on file     Non-medical: Not on file   Tobacco Use    Smoking status: Current Every Day Smoker     Packs/day: 0.25     Years: 50.00     Pack years: 12.50     Types: Cigarettes    Smokeless tobacco: Never Used    Tobacco comment: currently 3-4 cigarettes a day (11/8/19); used to be 2 packs a day   Substance and Sexual Activity    Alcohol use: Yes     Frequency: Monthly or less     Drinks per session: 1 or 2     Binge frequency: Never     Comment: rare tequilla/beer    Drug use: Never    Sexual activity: Not on file   Lifestyle    Physical activity     Days per week: Not on file     Minutes per session: Not on file    Stress: Not on file   Relationships    Social connections     Talks on phone: Not on file     Gets together: Not on file     Attends Scientologist service: Not on file     Active member of club or organization: Not on file     Attends meetings of clubs or organizations: Not on file     Relationship status: Not on file    Intimate partner violence     Fear of current or ex partner: Not on file     Emotionally abused: Not on file     Physically abused: Not on file     Forced sexual activity: Not on file   Other Topics Concern    Not on file   Social History Narrative    . Two kids who he is close with. Foodie. Loves cooking. Drinks 1 cup of coffee daily. Compliant with meds and sodium restrictions for the most part. No smoking. Occasional wine with his food. Allergies:  No Known Allergies    Home Medications:  Prior to Admission medications    Medication Sig Start Date End Date Taking? Authorizing Provider   amiodarone (CORDARONE) 200 MG tablet Take 1 tablet by mouth daily 400mg twice daily for one week then 200mg daily 10/14/20  Yes Ure Lorri Alberts MD   aspirin 81 MG EC tablet Take 81 mg by mouth daily Prescribed per Dr Gerard Zayas Provider, MD   pantoprazole (PROTONIX) 20 MG tablet Take 20 mg by mouth daily    Historical Provider, MD   zolpidem (AMBIEN CR) 12.5 MG extended release tablet Take 12.5 mg by mouth nightly as needed. 9/10/19   Historical Provider, MD   gabapentin (NEURONTIN) 100 MG capsule Take 100 mg by mouth daily. Historical Provider, MD   sacubitril-valsartan (ENTRESTO) 49-51 MG per tablet Entresto 49 mg-51 mg tablet   take one pill by mouth twice daily    Historical Provider, MD   warfarin (COUMADIN) 5 MG tablet Take by mouth Currently 5mg on Mon, Wed, Fri & Sun and  2.5mg on Tu, Thurs & Sat.     Historical Provider, MD   metoprolol succinate (TOPROL XL) 50 MG extended release tablet Take 1 tablet by mouth daily  Patient taking differently: Take 25 mg by mouth daily  3/15/19   Carley Tanner MD   albuterol sulfate HFA (PROAIR HFA) 108 (90 Base) MCG/ACT inhaler Inhale 2 puffs into the lungs every 4 hours as needed for Wheezing or Shortness of Breath 3/12/19 11/8/20  Kory Terry MD   bumetanide (BUMEX) 2 MG tablet Take 1 tablet by mouth See  ascorbic acid (VITAMIN C) tablet 2,000 mg  2,000 mg Oral Daily Vaughn Nissen Masters, APRN - CNP        aspirin EC tablet 81 mg  81 mg Oral Daily Vaughn Nissen Masters, ANTONY - GUERA        atorvastatin (LIPITOR) tablet 80 mg  80 mg Oral Daily Vaughn Nissen Masters, APRN - GUERA        gabapentin (NEURONTIN) capsule 100 mg  100 mg Oral Daily Vaughn Nissen Masters, ANTONY - CNP        insulin lispro (HUMALOG) injection vial 0-6 Units  0-6 Units Subcutaneous TID WC Vaughn Nissen Masters, APRN - CNP        insulin lispro (HUMALOG) injection vial 0-3 Units  0-3 Units Subcutaneous Nightly Vaughn Nissen Masters, APRN - CNP        glucose (GLUTOSE) 40 % oral gel 15 g  15 g Oral PRN Vaughn Nissen Masters, ANTONY - CNP        dextrose 50 % IV solution  12.5 g Intravenous PRN Vaughn Nissen Masters, APRN - CNP        glucagon (rDNA) injection 1 mg  1 mg Intramuscular PRN Vaughn Nissen Masters, ANTONY - CNP        dextrose 5 % solution  100 mL/hr Intravenous PRN Vaughn Nissen Masters, APRN - GUERA        levothyroxine (SYNTHROID) tablet 75 mcg  75 mcg Oral Daily Vaughn Nissen Masters, ANTONY - CNP   75 mcg at 11/16/20 4617    magnesium oxide (MAG-OX) tablet 400 mg  400 mg Oral Daily Vaughn Nissen Masters, APRN - GUERA        metoprolol succinate (TOPROL XL) extended release tablet 25 mg  25 mg Oral Daily Vaughn Nissen Masters, ANTONY - GUERA        therapeutic multivitamin-minerals 1 tablet  1 tablet Oral Daily Vaughn Nissen Masters, APRN - GUERA        pantoprazole (PROTONIX) tablet 40 mg  40 mg Oral QAM AC Vaughn Nissen Masters, APRN - CNP   40 mg at 11/16/20 0715    sodium chloride flush 0.9 % injection 10 mL  10 mL Intravenous 2 times per day ANTONY Westfall - CNP        sodium chloride flush 0.9 % injection 10 mL  10 mL Intravenous PRN Vaughn Nissen Masters, APRN - GUERA        acetaminophen (TYLENOL) tablet 650 mg  650 mg Oral Q6H PRN Vaughn Nissen Masters, APRN - CNP        Or    acetaminophen (TYLENOL) suppository 650 mg  650 mg Rectal Q6H PRN Arturo Sherman, ANTONY - GUERA        polyethylene glycol [facility]) packet 17 g  17 g Oral Daily PRN Arturo Sherman, ANTONY Mallory CNP        promethazine (PHENERGAN) tablet 12.5 mg  12.5 mg Oral Q6H PRN Arturo Sherman, ANTONY - CNP        Or    [facility]ron [facility]) injection 4 mg  4 mg Intravenous Q6H PRN Arturo Sherman, ANTONY - CNP        potassium chloride (KLOR-CON M) extended release tablet 20 mEq  20 mEq Oral BID Arturo Sherman, APRN - CNP        sacubitril-valsartan (ENTRESTO) 49-51 MG per tablet 1 tablet  1 tablet Oral BID ANTONY Barnard CNP        albumin human 25 % IV solution 25 g  25 g Intravenous See Admin Instructions ANTONY Barnard CNP   Stopped at 11/16/20 1033    triamcinolone (KENALOG) 0.1 % cream   Topical BID ANTONY Barnard CNP        furosemide (LASIX) injection 20 mg  20 mg Intravenous Once ANTONY Barnard CNP        LORazepam (ATIVAN) tablet 0.5 mg  0.5 mg Oral Nightly PRN ANTONY Barnard CNP        albuterol (PROVENTIL) nebulizer solution 2.5 mg  2.5 mg Nebulization Q12H ANTONY Barnard - CNP   2.5 mg at 11/16/20 3967    ipratropium (ATROVENT) 0.02 % nebulizer solution 0.5 mg  0.5 mg Nebulization Q12H ANTONY Barnard - CNP   0.5 mg at 11/16/20 2072    bumetanide (BUMEX) tablet 2 mg  2 mg Oral Daily Winnie Delgado MD        bumetanide (BUMEX) tablet 1 mg  1 mg Oral Nightly Winnie Delgado MD          dextrose         Physical Exam:  /74   Pulse 61   Temp 97.2 °F (36.2 °C) (Oral)   Resp 16   Ht 6' 2\" (1.88 m)   Wt 205 lb (93 kg)   SpO2 98%   BMI 26.32 kg/m²   Wt Readings from Last 3 Encounters:   11/15/20 205 lb (93 kg)   11/10/20 202 lb (91.6 kg)   11/06/20 198 lb (89.8 kg)     Appearance: Awake, alert, no acute respiratory distress   Skin: Intact, no rash   Head: Normocephalic, atraumatic   Eyes: EOMI, no conjunctival erythema   ENMT: No pharyngeal erythema, MMM, no rhinorrhea Neck: Supple, no carotid bruits   Lungs: Decreased BS B/L, no wheezing  Cardiac: Regular rate and rhythm, 1/6 systolic murmur  Abdomen: Soft, nontender, +bowel sounds   Extremities: Moves all extremities x 4, no lower extremity edema   Neurologic: No focal motor deficits apparent, normal mood and affect     Intake/Output:  No intake or output data in the 24 hours ending 11/16/20 1206  No intake/output data recorded.     Laboratory Tests:  Recent Labs     11/15/20  2025 11/16/20  0203   * 128*   K 4.4 4.4   CL 89* 93*   CO2 25 20*   BUN 43* 45*   CREATININE 2.1* 2.1*   GLUCOSE 87 74   CALCIUM 8.8 8.7     Lab Results   Component Value Date    MG 2.1 10/06/2020     Recent Labs     11/15/20  2025 11/16/20  0203   ALKPHOS 136* 133*   ALT 29 31   AST 57* 55*   PROT 7.8 7.4   BILITOT 1.5* 1.1   BILIDIR  --  0.6*   LABALBU 3.6 3.6     Recent Labs     11/15/20  2025 11/16/20  0203   WBC 9.7 10.1   RBC 4.80 4.78   HGB 12.6 12.6   HCT 41.6 40.6   MCV 86.7 84.9   MCH 26.3 26.4   MCHC 30.3* 31.0*   RDW 25.0* 25.0*    248   MPV 11.3 11.1     Lab Results   Component Value Date    CKTOTAL 25 (L) 05/14/2013    CKMB 0.5 05/14/2013    TROPONINI 0.07 (H) 11/16/2020    TROPONINI 0.05 (H) 11/16/2020    TROPONINI 0.07 (H) 11/15/2020     Lab Results   Component Value Date    INR 5.0 11/16/2020    INR 5.9 (HH) 11/15/2020    INR 4.7 09/10/2020    PROTIME 59.5 (H) 11/16/2020    PROTIME 70.7 (H) 11/15/2020    PROTIME 55.2 (H) 09/10/2020     Lab Results   Component Value Date    TSH 7.620 (H) 11/16/2020     Lab Results   Component Value Date    LABA1C 5.8 (H) 11/16/2020     No results found for: EAG  Lab Results   Component Value Date    CHOL 85 10/06/2020    CHOL 85 12/02/2017    CHOL 215 (H) 04/09/2013     Lab Results   Component Value Date    TRIG 60 10/06/2020    TRIG 87 12/02/2017    TRIG 202 (H) 04/09/2013     Lab Results   Component Value Date    HDL 35 10/06/2020    HDL 20 12/02/2017    HDL 32.4 (A) 04/09/2013     Lab Results   Component Value Date    LDLCALC 38 10/06/2020    LDLCALC 48 12/02/2017    LDLCALC 142 (H) 04/09/2013     Lab Results   Component Value Date    LABVLDL 12 10/06/2020    LABVLDL 17 12/02/2017     No results found for: [facility]  Recent Labs     11/15/20  2025   PROBNP 5,849*       Cardiac Tests:  EKG: AP/VS    Telemetry reviewed (date: 11/16/2020): AP/VS    Echocardiogram: 7/14/15  Ejection fraction is visually estimated at 35%. The inferior and inferolateral walls are severely hypokinetic/akinetic. Mildly dilated right ventricle with normal right ventricular function. Indeterminate diastolic function. Moderately dilated left atrium. Pulmonary vein doppler suggestive of elevated left atrial pressure. Status post mitral annular ring. Mean mitral valve gradient 5.9 mmHg. Moderate mitral regurgitation. Moderate aortic regurgitation. Mild tricuspid regurgitation. PASP is estimated at 37 mmHg. Mild pulmonic regurgitation.      NNEKA: 8/2/17 (Dr. Petros Kaur)   Ejection fraction is visually estimated at 35%.   Inferolateral/inferior hypokinesis.   Rakel right ventricle size with low normal right ventricular function.   Moderately dilated left atrium.   History of oversewing of ROBER. There is color flow from the LA into the   ROBER. No evidence of a left atrial appendage thrombus.   No evidence of interatrial shunting on bubble study.   History of mitral annular ring (30mm Future Ring)   MV mean gradient 3.4 mmHg.   Poor coaptation of MV leaflets.   Severe mitral regurgitation.   Moderate aortic regurgitation.   Moderate tricuspid regurgitation.  RV-RA gradient is estimated at 35 mmHg.   Mild pulmonic regurgitation.     Echocardiogram: 1/9/18 ([facility])  - Exam indication: Initial postoperative evaluation of prosthetic valve (baseline)  - The left ventricle is moderately dilated. Left ventricular systolic function is   severely decreased.  EF = 22 ± 5% (2D biplane) Left ventricular diastolic function   was not evaluated due to prosthetic valve. - The right ventricle is dilated. Right ventricular systolic function is normal.  - The left atrial cavity is severely dilated. - The right atrial cavity is dilated. - Post mitral valve replacement. Biocor prosthetic mitral valve (size #29). There   is trivial mitral valve regurgitation. The peak gradient is 23 mmHg and the mean   gradient is 8 mmHg. MV gradients obtained at a HR of 80 bpm. Prior peak/mean   gradients of 22/8 mmHg. - Post tricuspid valve repair. tricuspid valve annuloplasty ring (size #30). There   is mild (1+ - 2+) tricuspid valve regurgitation. The peak gradient is 4 mmHg and   the mean gradient is 2 mmHg. TV gradients obtained at a HR of 80 bpm. Prior mean   gradient of 3 mmHg. - Trifecta prosthetic aortic valve (size #23). The peak gradient is 10 mmHg, the   mean gradient is 5 mmHg and the dimensionless valve index is 0.57. Prior peak/mean   gradients of 9/5 mmHg. - Exam was compared with the prior  echocardiographic exam performed on   12/18/2017. There is no significant change.     Echocardiogram: 9/18/19 (Dr. Maribel Orourke)  [facility] eccentric left ventricular hypertrophy.   Severe left ventricular systolic dysfunction.   Visually estimated LVEF is 35 %.    Normal right ventricular size.   Mild right ventricular systolic function.   TAPSE is 1.5 cm.   TDI s' is 7 cm/s.  ICD or pacer lead visualized in the right sided chambers.   Status post mitral valve # 29 mm.   The valve appears well seated with no rocking motion.   Status post aortic valve # 23 mm.   This appears well seated and without any rocking motion.   Mean gradient is 10.4 mm Hg.   Moderate tricuspid regurgitation.     NNEKA: 9/4/2020 (Dr. Chris Bills)   Mildly dilated left ventricle. Severely reduced left ventricular systolic function. Ejection fraction is visually estimated at 15-20%. Severely dilated left atrium.    There was evidence of severe spontaneous echo contrast in the left atrium. Mildly enlarged right ventricle cavity. Right ventricle global systolic function is severely reduced . Moderately enlarged right atrium size. Normally functioning bioprosthetic valve in mitral position. Mild mitral regurgitation is present. Mean transmitral gradient (Doppler) 5 mm Hg . Normally functioning bioprosthetic valve in aortic position. Planimetered aortic valve area 1.4 cm2 . The tricuspid valve appears structurally normal with evidence of tricuspid   ring. Physiologic and/or trace tricuspid regurgitation. CXR: 11/15/2020  Congestive failure with extensive perihilar interstitial pulmonary edema and    large right pleural effusion. ASSESSMENT / PLAN:  1. Acute on chronic HFrEF  2. CAD/prior NSTEMI s/p CABG on 4/15/13 (LIMA-LAD, SVG-OM, SVG-PDA) and repeat CABG on 11/7/2017 (SVG-PDA)  3. Ischemic cardiomyopathy with history of severely reduced EF -- s/p upgrade to dual chamber ICD on 9/12/13  4. Persistent atrial fibrillation (anticoagulated with coumadin, s/p over-sewing of LA appendage at the time of 4/2013 CABG) -- follows with EP, s/p CVN on 9/4/2020, on amiodarone  5. Sick sinus syndrome s/p ICD -- follows with EP  6. MR s/p MV repair on 4/15/13 (30 mm Future Ring)  7. MR, TR, AR s/p MVR (#29 Biocor), AVR (#23 Trifecta), TV repair (#30 CE ring) on 11/7/2017  8. HTN -- controlled, most recent /74; history of intermittent hypotension  9. HLD   10. DM -- Hgb A1c 6.3 --> 5.8  11. Tobacco abuse -- currently 1 pack/week  12. Possible PANTERA  13. Hypothyroidism - on synthroid  14.  CKD -- most recent Cr 2.1, he follows with Dr. Dave connor to IV dosing  - Continue entresto 49-51 mg BID and Toprol XL 25 mg daily (up-titration of GDMT has been limited by intermittent hypotension historically)  - Continue amiodarone (follows with EP; monitor AF burden on device interrogations)  - Hold coumadin -- INR 5.9 --> 5.0  - Monitor renal function and electrolytes closely  - Monitor telemetry  - Serial echocardiograms  - Sleep study ordered at prior office visit (patient cancelled study/deferred work-up)  - Continue with CHF clinic follow-up on d/c    Thank you for allowing me to participate in your patient's care. Please feel free to contact me if you have any questions or concerns.     Denzel Manning MD  [facility]) Cardiology

## 2020-11-16 NOTE — CONSULTS
We reviewed the HF zones, signs and symptoms to report on day 1 of onset, medication compliance, daily weights, low sodium diet (label reading). Patient refusing the HF booklet and zones at this time. \"Said, I have many of those\"     I advised patient you can reduce the risk for heart failure exacerbations by modifying/controlling the risk factors they have. We discussed self-managed care which includes the following: to take medications as prescribed- to call the doctor with any side effects do not just stop taking the medication, follow a cardiac heart healthy / low sodium diet, do daily weights, exercise regularly- per doctor recommendation and not to smoke. I stressed the importance of informing the cardiologist the first day of onset of signs and symptoms in the \"Yellow Zone\" of the HF Zones. Verbalizes understanding     Echocardiogram / EF: 9/4/2020   Summary   Mildly dilated left ventricle. Severely reduced left ventricular systolic function. Severely dilated left atrium. There was evidence of severe spontaneous echo contrast in the left atrium. Mildly enlarged right ventricle cavity. Right ventricle global systolic function is severely reduced . Moderately enlarged right atrium size. Normally functioning bioprosthetic valve in mitral position. Mild mitral regurgitation is present. Normally functioning bioprosthetic valve in aortic position. The tricuspid valve appears structurally normal with evidence of tricuspid   ring. Physiologic and/or trace tricuspid regurgitation.     Lab Results   Component Value Date     (H) 05/13/2013      Lab Results   Component Value Date    PROBNP 5,849 (H) 11/15/2020        History of: thyroid diease, systolic dysfunction, ischemic cardiomyopathy, HTN, HLD, CAD, diabetes, anxiety, acute MI, acid reflux     Medications:    allopurinol  100 mg Oral Daily    amiodarone  200 mg Oral Daily    vitamin C  2,000 mg Oral Daily    aspirin  81 mg Oral

## 2020-11-17 NOTE — PROGRESS NOTES
Pharmacy Consultation Note  (Anticoagulant Dosing and Monitoring)    Initial consult date: 11/16/2020  Consulting physician: YOGESH Lowe    Allergies:  Patient has no known allergies. Ht Readings from Last 1 Encounters:   11/15/20 6' 2\" (1.88 m)     Wt Readings from Last 1 Encounters:   11/15/20 205 lb (93 kg)       Warfarin Indication Target   INR [place] Dose  (if applicable) Diet/Feeding Tube   Atrial fibrillation 2-3 Warfarin 5 mg Mon/Wed/Fri/Sun  ----------------------------  Warfarin 2.5 mg Tues/Thurs/Sat Renal/carb control diet       Vitamin K or Blood product  Administration Date                 Warfarin drug-drug interactions  Start  Stop Home Med?  Comments    Metronidazole 11/16   May increase INR                         TSH:    Lab Results   Component Value Date    TSH 7.620 11/16/2020        Hepatic Function Panel:                          Lab Results   Component Value Date    ALKPHOS 122 11/17/2020    ALT 36 11/17/2020    AST 61 11/17/2020    PROT 7.0 11/17/2020    BILITOT 0.9 11/17/2020    BILIDIR 0.5 11/17/2020    IBILI 0.4 11/17/2020    LABALBU 3.4 11/17/2020       Date Warfarin Dose INR Heparin or LMWH HBG/  HCT PLT Comment   11/15 No dose 5.9 ------------- 12.6/41.6 250    11/16 No dose 5 ------------- 12.6/40.6 248    11/17 No dose 4.2 ------------- 11.8/37.9 228                        Assessment:  · Patient is a 70year old male on warfarin for atrial fibrillation  · Per documentation, patient takes warfarin 5 mg on Monday, Wednesday, Friday, and Sunday and warfarin 2.5 mg on Tuesday, Thursday, and Saturday  · INR goal = 2-3; INR today = 4.2 (supratherapeutic, decreased from 5 yesterday)    Plan:  · Will hold warfarin again tonight  · Daily PT/INR until the INR is stable within the therapeutic range  · Pharmacist will follow and monitor/adjust dosing as necessary    Brianna Wolfe PharmD, BCCCP  11/17/2020  1:03 PM

## 2020-11-17 NOTE — PROGRESS NOTES
Subjective:    Patient seen and examined at bedside, slightly improved shortness of breath. Oxygen requirements decreasing. Denies lower extremity edema. Abdominal distention improving. Did not have a paracentesis due to small amount of ascites. Objective:    BP 98/63   Pulse 60   Temp 98.6 °F (37 °C) (Oral)   Resp 18   Ht 6' 2\" (1.88 m)   Wt 205 lb (93 kg)   SpO2 97%   BMI 26.32 kg/m²     Current medications that patient is taking have been reviewed. Heart:  RRR, no murmurs, gallops, or rubs. Lungs:  Inspiratory crackles  Abd: bowel sounds present, soft, nontender, nondistended, no masses  Extrem:  No cyanosis or edema    CBC:   Lab Results   Component Value Date    WBC 10.1 11/17/2020    RBC 4.42 11/17/2020    HGB 11.8 11/17/2020    HCT 37.9 11/17/2020    MCV 85.7 11/17/2020    MCH 26.7 11/17/2020    MCHC 31.1 11/17/2020    RDW 25.0 11/17/2020     11/17/2020    MPV 10.8 11/17/2020     BMP:    Lab Results   Component Value Date     11/17/2020    K 4.2 11/17/2020    K 4.4 11/16/2020    CL 94 11/17/2020    CO2 27 11/17/2020    BUN 50 11/17/2020    LABALBU 3.4 11/17/2020    CREATININE 2.4 11/17/2020    CALCIUM 8.7 11/17/2020    GFRAA 32 11/17/2020    LABGLOM 27 11/17/2020    GLUCOSE 94 11/17/2020        Assessment:    Patient Active Problem List   Diagnosis    Hyperlipidemia    DM [facility]diabetes mellitus) ([facility] [address]    Coronary atherosclerosis of native coronary artery    GERD (gastroesophageal reflux disease)    Depression    Hypothyroid    Ischemic cardiomyopathy    Mitral regurgitation    AI (aortic insufficiency)    [facility]ox[facility] ([facility]    Ulnar nerve neuropathy    ICD (implantable cardioverter-defibrillator) in place    Tricuspid regurgitation    CKD (chronic kidney disease) stage 4, GFR 15-29 ml/min ([facility])    Chronic systolic CHF (congestive heart failure) ([facility])    Anticoagulated on Coumadin    Right ventricular dysfunction    Congest[facility] heart failure ([facility] [address]    Persistent atrial fibrillation (HCC)    Anemia of chronic renal failure, stage 3 (moderate)    History of coronary artery bypass graft    HFrEF (heart failure with reduced ejection fraction) (HCC)    CKD (chronic kidney disease) stage 3, GFR 30-59 ml/min    Hypertension    Cirrhosis (HCC)    Ascites    Lesion of lung    Chronic anticoagulation    Supratherapeutic INR       Plan:    1. Acute on chronic systolic HF              BNP 5849, CXR shows pulmonary edema with right pleural effusion              Continue diuresis with IV bumex cardiology consulted              Recent TTE and NNEKA show EF 35% and 15-20%              Continue entresto + beta blocker   No accurate I/O  2. Cirrhosis with ascites - on CT abd     No paracentesis. Denies history of cirrhosis. GI consulted              LFTs mildly elevated alk phos, AST, Tbil              Lipase WNL              Possible congestive hepatopathy              INR elevated on coumadin  3. Supratherapeutic INR              Hold Coumadin, no signs of bleeding. Hb stable              Trending down  4. CKD 3 - at baseline  5. DVT px - supratherapeutic INR  6.   Hypothyroidism-continue Synthroid      Kristie Ruiz    11:03 AM  11/17/2020

## 2020-11-17 NOTE — PROGRESS NOTES
PROGRESS NOTE  By Jocelyne Amado M.D. [facility]  Dr. Marjie Cushing, M.D.,  Dr. Stephanie Fisher M.D.,   Dr. Darrius Bella D.O.,  Dr. Ashley Rocha M.D.,  Dr. Mary Jane Lindsey D.O.,  Gillis Babinski, D.O. Louise Orellana  70 y.o.  male    SUBJECTIVE:  Significantly improved abdominal pain    OBJECTIVE:    BP 98/63   Pulse 60   Temp 98.6 °F (37 °C) (Oral)   Resp 18   Ht 6' 2\" (1.88 m)   Wt 205 lb (93 kg)   SpO2 97%   BMI 26.32 kg/m²     General: NAD/ male  HEENT: Anicteric sclera/moist oral mucosa  Neck: Supple/trachea midline  Chest: CTA B  Cor: Regular. Pacemaker  Abd.: Soft/NT/ND  Extr.:  No significant peripheral edema  Skin: Warm and dry/anicteric       DATA:    Monitor data reviewed - AFib noted. Lab Results   Component Value Date    WBC 10.1 11/17/2020    RBC 4.42 11/17/2020    HGB 11.8 11/17/2020    HCT 37.9 11/17/2020    MCV 85.7 11/17/2020    MCH 26.7 11/17/2020    MCHC 31.1 11/17/2020    RDW 25.0 11/17/2020     11/17/2020    MPV 10.8 11/17/2020     Lab Results   Component Value Date     11/17/2020    K 4.2 11/17/2020    K 4.4 11/16/2020    CL 94 11/17/2020    CO2 27 11/17/2020    BUN 50 11/17/2020    CREATININE 2.4 11/17/2020    CALCIUM 8.7 11/17/2020    PROT 7.0 11/17/2020    LABALBU 3.4 11/17/2020    BILITOT 0.9 11/17/2020    ALKPHOS 122 11/17/2020    AST 61 11/17/2020    ALT 36 11/17/2020     Lab Results   Component Value Date    LIPASE 12 11/15/2020     No results found for: AMYLASE      ASSESSMENT/PLAN:  1. Cirrhosis/abdominal pain/ascites  -Obtain full liver serology  -Cannot calculate meld reliably secondary to iatrogenic coagulopathy  -Obtain AFP  -Further evaluation with upper endoscopy once cleared from cardiac standpoint and improved coagulopathy  -Await paracentesis  -Obtain liver serology     2.   Abdominal pain/abnormal CT  -Possible diverticulitis -diffuse abdominal pain however no leukocytosis  -Start empiric antibiotics  -Consider postponing colonoscopy for 4 to 6 weeks to minimize risk of perforation associated with acute diverticulitis     3. Shortness of breath  -Ongoing tobacco abuse  -Per admitting/pulmonology     4. CHF  -Acute on chronic  -ICD  -Per admitting/cardiology    Nori Tadeo MD  11/17/2020  4:03 PM    NOTE:  This report was transcribed using voice recognition software. Every effort was made to ensure accuracy; however, inadvertent computerized transcription errors may be present.

## 2020-11-17 NOTE — PROGRESS NOTES
Upon returning from restroom to provide urine sample, pt was 85% on RA. Pt placed on 3L NC. Pt currently 94% and rising.   with pacemaker ([employer])    CARDIOVERSION  09/04/2020    Successful     Dr. Story Host CATH LAB PROCEDURE  01/27/97,11/00    Critical lesion mid left circumflex,significant lesion mid-LAD and first diagonal    ECHO COMPL W DOP COLOR FLOW  4/11/2013         ECHO COMPL W DOP COLOR FLOW  4/21/2013         MITRAL VALVE SURGERY      TRANSESOPHAGEAL ECHOCARDIOGRAM  4/12/2013    Dr. Zeinab Hernandez TRANSESOPHAGEAL ECHOCARDIOGRAM  08/02/2017       Family History:  Family History   Problem Relation Age of Onset    Hypertension Mother     Hypertension Father     Heart Surgery Father     High Cholesterol Father        Social History:  Social History     Socioeconomic History    Marital status:      Spouse name: Not on file    Number of children: Not on file    Years of education: Not on file    Highest education level: Not on file   Occupational History    Not on file   Social Needs    Financial resource strain: Not on file    Food insecurity     Worry: Not on file     Inability: Not on file    Transportation needs     Medical: Not on file     Non-medical: Not on file   Tobacco Use    Smoking status: Current Every Day Smoker     Packs/day: 0.25     Years: 50.00     Pack years: 12.50     Types: Cigarettes    Smokeless tobacco: Never Used    Tobacco comment: currently 3-4 cigarettes a day (11/8/19); used to be 2 packs a day   Substance and Sexual Activity    Alcohol use: Yes     Frequency: Monthly or less     Drinks per session: 1 or 2     Binge frequency: Never     Comment: rare tequilla/beer    Drug use: Never    Sexual activity: Not on file   Lifestyle    Physical activity     Days per week: Not on file     Minutes per session: Not on file    Stress: Not on file   Relationships    Social connections     Talks on phone: Not on file     Gets together: Not on file     Attends Alevism service: Not on file Active member of club or organization: Not on file     Attends meetings of clubs or organizations: Not on file     Relationship status: Not on file    Intimate partner violence     Fear of current or ex partner: Not on file     Emotionally abused: Not on file     Physically abused: Not on file     Forced sexual activity: Not on file   Other Topics Concern    Not on file   Social History Narrative    . Two kids who he is close with. Foodie. Loves cooking. Drinks 1 cup of coffee daily. Compliant with meds and sodium restrictions for the most part. No smoking. Occasional wine with his food. Allergies:  No Known Allergies    Home Medications:  Prior to Admission medications    Medication Sig Start Date End Date Taking? Authorizing Provider   amiodarone (CORDARONE) 200 MG tablet Take 1 tablet by mouth daily 400mg twice daily for one week then 200mg daily 10/14/20  Yes Ure Aria Hernandez MD   aspirin 81 MG EC tablet Take 81 mg by mouth daily Prescribed per Dr Nitish Shah Provider, MD   pantoprazole (PROTONIX) 20 MG tablet Take 20 mg by mouth daily    Historical Provider, MD   zolpidem (AMBIEN CR) 12.5 MG extended release tablet Take 12.5 mg by mouth nightly as needed. 9/10/19   Historical Provider, MD   gabapentin (NEURONTIN) 100 MG capsule Take 100 mg by mouth daily. Historical Provider, MD   sacubitril-valsartan (ENTRESTO) 49-51 MG per tablet Entresto 49 mg-51 mg tablet   take one pill by mouth twice daily    Historical Provider, MD   warfarin (COUMADIN) 5 MG tablet Take by mouth Currently 5mg on Mon, Wed, Fri & Sun and  2.5mg on Tu, Thurs & Sat.     Historical Provider, MD   metoprolol succinate (TOPROL XL) 50 MG extended release tablet Take 1 tablet by mouth daily  Patient taking differently: Take 25 mg by mouth daily  3/15/19   Zander Beatty MD   albuterol sulfate HFA (PROAIR HFA) 108 (90 Base) MCG/ACT inhaler Inhale 2 puffs into the lungs every 4 hours as Oral Daily Jessica Sherman, APRN - CNP   100 mg at 11/17/20 0840    amiodarone (CORDARONE) tablet 200 mg  200 mg Oral Daily Jessica Sherman, APRN - CNP   200 mg at 11/17/20 0840    ascorbic acid (VITAMIN C) tablet 2,000 mg  2,000 mg Oral Daily Jessica Sherman, APRN - CNP   2,000 mg at 11/17/20 0840    aspirin EC tablet 81 mg  81 mg Oral Daily Jessica Sherman, APRN - CNP   81 mg at 11/17/20 0840    atorvastatin (LIPITOR) tablet 80 mg  80 mg Oral Daily Jessica Sherman, APRN - CNP   80 mg at 11/17/20 0840    gabapentin (NEURONTIN) capsule 100 mg  100 mg Oral Daily Jessica Sherman, APRN - CNP   100 mg at 11/17/20 0840    insulin lispro (HUMALOG) injection vial 0-6 Units  0-6 Units Subcutaneous TID WC Jessica Sherman, APRN - CNP        insulin lispro (HUMALOG) injection vial 0-3 Units  0-3 Units Subcutaneous Nightly Jessica Sherman, APRN - CNP        glucose (GLUTOSE) 40 % oral gel 15 g  15 g Oral PRN Jessica Sherman, APRN - CNP        dextrose 50 % IV solution  12.5 g Intravenous PRN Jessica Sherman, APRN - CNP        glucagon (rDNA) injection 1 mg  1 mg Intramuscular PRN Jessica Sherman, APRN - CNP        dextrose 5 % solution  100 mL/hr Intravenous PRN Jessica Sherman, APRN - CNP        levothyroxine (SYNTHROID) tablet 75 mcg  75 mcg Oral Daily Jessica Sherman, APRN - CNP   75 mcg at 11/17/20 3701    magnesium oxide (MAG-OX) tablet 400 mg  400 mg Oral Daily Jessica Sherman, APRN - CNP   400 mg at 11/17/20 0840    metoprolol succinate (TOPROL XL) extended release tablet 25 mg  25 mg Oral Daily Jessica Sherman, APRN - CNP   25 mg at 11/16/20 1231    therapeutic multivitamin-minerals 1 tablet  1 tablet Oral Daily Jessica Sherman, APRN - CNP   1 tablet at 11/17/20 0840    pantoprazole (PROTONIX) tablet 40 mg  40 mg Oral BERNIE Sherman, ANTONY - CNP   40 mg at 11/17/20 0653    sodium chloride flush 0.9 % injection 10 mL  10 mL Intravenous 2 times per day ANTONY Peralta CNP   10 mL at 11/17/20 0840    sodium chloride flush 0.9 % injection 10 mL  10 mL Intravenous PRN ANTONY Galdamez CNP        acetaminophen (TYLENOL) tablet 650 mg  650 mg Oral Q6H PRN ANTONY Galdamez CNP        Or   Sproul Jillian acetaminophen (TYLENOL) suppository 650 mg  650 mg Rectal Q6H PRN ANTONY Galdamez CNP        polyethylene glycol (GLYCOLAX) packet 17 g  17 g Oral Daily PRN ANTONY Galdamez CNP        potassium chloride (KLOR-CON M) extended release tablet 20 mEq  20 mEq Oral BID ANTONY Galdamez CNP   20 mEq at 11/17/20 0840    sacubitril-valsartan (ENTRESTO) 49-51 MG per tablet 1 tablet  1 tablet Oral BID ANTONY Galdamez CNP   1 tablet at 11/17/20 0840    albumin human 25 % IV solution 25 g  25 g Intravenous See Admin Instructions ANTONY Galdamez CNP   Stopped at 11/16/20 1033    LORazepam (ATIVAN) tablet 0.5 mg  0.5 mg Oral Nightly PRN ANTONY Peralta CNP   0.5 mg at 11/17/20 0251    albuterol (PROVENTIL) nebulizer solution 2.5 mg  2.5 mg Nebulization Q12H ANTONY Galdamez CNP   2.5 mg at 11/17/20 3744    ipratropium (ATROVENT) 0.02 % nebulizer solution 0.5 mg  0.5 mg Nebulization Q12H ANTONY Galdamez CNP   0.5 mg at 11/17/20 6560    bumetanide (BUMEX) injection 2 mg  2 mg Intravenous BID Damien Ohara MD   2 mg at 11/17/20 0840    warfarin (COUMADIN) daily dosing (placeholder)   Other RX Placeholder ANTONY Galdamez CNP        metronidazole (FLAGYL) 500 mg in NaCl 100 mL IVPB premix  500 mg Intravenous Q8H Casandra Kahn MD   Stopped at 11/17/20 0940    cefTRIAXone (ROCEPHIN) 1 g in sterile water 10 mL IV syringe  1 g Intravenous Q24H Casandra Kahn MD   1 g at 11/16/20 1826      dextrose         Physical Exam:  BP 98/63   Pulse 60   Temp 98.6 °F (37 °C) (Oral)   Resp 18   Ht 6' 2\" (1.88 m) Wt 205 lb (93 kg)   SpO2 97%   BMI 26.32 kg/m²   Wt Readings from Last 3 Encounters:   11/15/20 205 lb (93 kg)   11/10/20 202 lb (91.6 kg)   11/06/20 198 lb (89.8 kg)     Appearance: Awake, alert, no acute respiratory distress   Skin: Intact, no rash   Head: Normocephalic, atraumatic   Eyes: EOMI, no conjunctival erythema   ENMT: No pharyngeal erythema, MMM, no rhinorrhea   Neck: Supple, no carotid bruits   Lungs: Decreased BS B/L, no wheezing  Cardiac: Regular rate and rhythm, 1/6 systolic murmur  Abdomen: Soft, nontender, +bowel sounds   Extremities: Moves all extremities x 4, no lower extremity edema   Neurologic: No focal motor deficits apparent, normal mood and affect     Intake/Output:    Intake/Output Summary (Last 24 hours) at 11/17/2020 1430  Last data filed at 11/17/2020 1025  Gross per 24 hour   Intake --   Output 425 ml   Net -425 ml     I/O this shift:  In: -   Out: 425 [Urine:425]    Laboratory Tests:  Recent Labs     11/15/20  2025 11/16/20  0203 11/17/20  0909   * 128* 131*   K 4.4 4.4 4.2   CL 89* 93* 94*   CO2 25 20* 27   BUN 43* 45* 50*   CREATININE 2.1* 2.1* 2.4*   GLUCOSE 87 74 94   CALCIUM 8.8 8.7 8.7     Lab Results   Component Value Date    MG 2.1 10/06/2020     Recent Labs     11/15/20  2025 11/16/20  0203 11/17/20  0652   ALKPHOS 136* 133* 122   ALT 29 31 36   AST 57* 55* 61*   PROT 7.8 7.4 7.0   BILITOT 1.5* 1.1 0.9   BILIDIR  --  0.6* 0.5*   LABALBU 3.6 3.6 3.4*     Recent Labs     11/15/20  2025 11/16/20  0203 11/17/20  0652   WBC 9.7 10.1 10.1   RBC 4.80 4.78 4.42   HGB 12.6 12.6 11.8*   HCT 41.6 40.6 37.9   MCV 86.7 84.9 85.7   MCH 26.3 26.4 26.7   MCHC 30.3* 31.0* 31.1*   RDW 25.0* 25.0* 25.0*    248 228   MPV 11.3 11.1 10.8     Lab Results   Component Value Date    CKTOTAL 25 (L) 05/14/2013    CKMB 0.5 05/14/2013    TROPONINI 0.07 (H) 11/16/2020    TROPONINI 0.05 (H) 11/16/2020    TROPONINI 0.07 (H) 11/15/2020     Lab Results   Component Value Date    INR 4.2 11/17/2020    INR 5.0 11/16/2020    INR 5.9 (HH) 11/15/2020    PROTIME 49.2 (H) 11/17/2020    PROTIME 59.5 (H) 11/16/2020    PROTIME 70.7 (H) 11/15/2020     Lab Results   Component Value Date    TSH 7.620 (H) 11/16/2020     Lab Results   Component Value Date    LABA1C 5.8 (H) 11/16/2020     No results found for: EAG  Lab Results   Component Value Date    CHOL 85 10/06/2020    CHOL 85 12/02/2017    CHOL 215 (H) 04/09/2013     Lab Results   Component Value Date    TRIG 60 10/06/2020    TRIG 87 12/02/2017    TRIG 202 (H) 04/09/2013     Lab Results   Component Value Date    HDL 35 10/06/2020    HDL 20 12/02/2017    HDL 32.4 (A) 04/09/2013     Lab Results   Component Value Date    LDLCALC 38 10/06/2020    LDLCALC 48 12/02/2017    LDLCALC 142 (H) 04/09/2013     Lab Results   Component Value Date    LABVLDL 12 10/06/2020    LABVLDL 17 12/02/2017     No results found for: CHOLHDLRATIO  Recent Labs     11/15/20  2025   PROBNP 5,849*       Cardiac Tests:  EKG: AP/VS    Telemetry reviewed (date: 11/17/2020): AP/VS    Echocardiogram: 7/14/15  Ejection fraction is visually estimated at 35%. The inferior and inferolateral walls are severely hypokinetic/akinetic. Mildly dilated right ventricle with normal right ventricular function. Indeterminate diastolic function. Moderately dilated left atrium. Pulmonary vein doppler suggestive of elevated left atrial pressure. Status post mitral annular ring. Mean mitral valve gradient 5.9 mmHg. Moderate mitral regurgitation. Moderate aortic regurgitation. Mild tricuspid regurgitation. PASP is estimated at 37 mmHg. Mild pulmonic regurgitation.      NNEKA: 8/2/17 (Dr. Radha Joshua)   Ejection fraction is visually estimated at 35%.   Inferolateral/inferior hypokinesis.   Rakel right ventricle size with low normal right ventricular function.   Moderately dilated left atrium.   History of oversewing of ROBER. There is color flow from the LA into the   ROBER.  No evidence of a left atrial appendage thrombus.   No evidence of interatrial shunting on bubble study.   History of mitral annular ring (30mm Future Ring)   MV mean gradient 3.4 mmHg.   Poor coaptation of MV leaflets.   Severe mitral regurgitation.   Moderate aortic regurgitation.   Moderate tricuspid regurgitation.  RV-RA gradient is estimated at 35 mmHg.   Mild pulmonic regurgitation.     Echocardiogram: 1/9/18 ([facility])  - Exam indication: Initial postoperative evaluation of prosthetic valve (baseline)  - The left ventricle is moderately dilated. Left ventricular systolic function is   severely decreased. EF = 22 ± 5% (2D biplane) Left ventricular diastolic function   was not evaluated due to prosthetic valve. - The right ventricle is dilated. Right ventricular systolic function is normal.  - The left atrial cavity is severely dilated. - The right atrial cavity is dilated. - Post mitral valve replacement. Biocor prosthetic mitral valve (size #29). There   is trivial mitral valve regurgitation. The peak gradient is 23 mmHg and the mean   gradient is 8 mmHg. MV gradients obtained at a HR of 80 bpm. Prior peak/mean   gradients of 22/8 mmHg. - Post tricuspid valve repair. tricuspid valve annuloplasty ring (size #30). There   is mild (1+ - 2+) tricuspid valve regurgitation. The peak gradient is 4 mmHg and   the mean gradient is 2 mmHg. TV gradients obtained at a HR of 80 bpm. Prior mean   gradient of 3 mmHg. - Trifecta prosthetic aortic valve (size #23). The peak gradient is 10 mmHg, the   mean gradient is 5 mmHg and the dimensionless valve index is 0.57. Prior peak/mean   gradients of 9/5 mmHg. - Exam was compared with the prior  echocardiographic exam performed on   12/18/2017.  There is no significant change.     Echocardiogram: 9/18/19 (Dr. Mary Barrios)  [facility] eccentric left ventricular hypertrophy.   Severe left ventricular systolic dysfunction.   Visually estimated LVEF is 35 %.    Normal right ventricular size.   Mild right ventricular systolic function.   TAPSE is 1.5 cm.   TDI s' is 7 cm/s.  ICD or pacer lead visualized in the right sided chambers.   Status post mitral valve # 29 mm.   The valve appears well seated with no rocking motion.   Status post aortic valve # 23 mm.   This appears well seated and without any rocking motion.   Mean gradient is 10.4 mm Hg.   Moderate tricuspid regurgitation.     NNEKA: 9/4/2020 (Dr. Jj Solis)   Mildly dilated left ventricle. Severely reduced left ventricular systolic function. Ejection fraction is visually estimated at 15-20%. Severely dilated left atrium. There was evidence of severe spontaneous echo contrast in the left atrium. Mildly enlarged right ventricle cavity. Right ventricle global systolic function is severely reduced . Moderately enlarged right atrium size. Normally functioning bioprosthetic valve in mitral position. Mild mitral regurgitation is present. Mean transmitral gradient (Doppler) 5 mm Hg . Normally functioning bioprosthetic valve in aortic position. Planimetered aortic valve area 1.4 cm2 . The tricuspid valve appears structurally normal with evidence of tricuspid   ring. Physiologic and/or trace tricuspid regurgitation. CXR: 11/15/2020  Congestive failure with extensive perihilar interstitial pulmonary edema and    large right pleural effusion. ASSESSMENT / PLAN:  1. Acute on chronic HFrEF  2. CAD/prior NSTEMI s/p CABG on 4/15/13 (LIMA-LAD, SVG-OM, SVG-PDA) and repeat CABG on 11/7/2017 (SVG-PDA)  3. Ischemic cardiomyopathy with history of severely reduced EF -- s/p upgrade to dual chamber ICD on 9/12/13  4. Persistent atrial fibrillation (anticoagulated with coumadin, s/p over-sewing of LA appendage at the time of 4/2013 CABG) -- follows with EP, s/p CVN on 9/4/2020, on amiodarone  5. Sick sinus syndrome s/p ICD -- follows with EP  6. MR s/p MV repair on 4/15/13 (30 mm Future Ring)  7.  MR, TR, AR s/p MVR (#29 Biocor), AVR (#23 Trifecta), TV repair (#30 CE ring) on 11/7/2017  8. HTN -- controlled, most recent /74; history of intermittent hypotension  9. HLD   10. DM -- Hgb A1c 6.3 --> 5.8  11. Tobacco abuse -- currently 1 pack/week  12. Possible PANTERA  13. Hypothyroidism - on synthroid  14.  CKD -- most recent Cr 2.1 --> 2.4, he follows with Dr. Roland connor for today  - Continue entresto 49-51 mg BID and Toprol XL 25 mg daily (up-titration of GDMT has been limited by intermittent hypotension historically -- including this admission)  - Continue amiodarone (follows with EP; monitor AF burden on device interrogations)  - Coumadin on hold -- INR 5.9 --> 5.0 --> 4.2  - Monitor renal function and electrolytes closely  - Monitor telemetry  - Serial echocardiograms  - Sleep study ordered at prior office visit (patient cancelled study/deferred work-up)  - Continue with CHF clinic follow-up on d/c  - Discussed re: re-establishing with advanced heart failure specialist    Chen Dee MD  [facility]) Cardiology

## 2020-11-17 NOTE — CARE COORDINATION
Pt continues to diurese with iv bumex. Attempting to wean 02 2lnc, on none at home. CHF coordinator following. Hx non compliance. Declines HHC. Will follow clinically for any discharge needs. Eulogio Burton.

## 2020-11-17 NOTE — PLAN OF CARE
Problem: Safety:  Goal: Free from accidental physical injury  Description: Free from accidental physical injury  Outcome: Met This Shift     Problem: OXYGENATION/RESPIRATORY FUNCTION  Goal: Patient will maintain patent airway  Outcome: Met This Shift     Problem: OXYGENATION/RESPIRATORY FUNCTION  Goal: Patient will achieve/maintain normal respiratory rate/effort  Description: Respiratory rate and effort will be within normal limits for the patient  Outcome: Not Met This Shift     Problem: HEMODYNAMIC STATUS  Goal: Patient has stable vital signs and fluid balance  Outcome: Not Met This Shift

## 2020-11-17 NOTE — CONSULTS
Pulmonary Consultation    Admit Date: 11/15/2020  Requesting Physician: Bala Terry MD    Reason for consultation:  · Pleural effusion  HPI:  · Shalom Jain is a 79-year-old white male known to Dr. Mihir Jeong. I am asked to cover for him. He presented to the hospital with increasing shortness of breath and was found to be markedly volume overloaded. His chest CT evidenced a large right pleural effusion. He short of breath from this. Pulmonary standpoint, he is also thought to have sleep apnea. He was supposed to have a sleep study done but apparently canceled it. Cardiology and GI notes are reviewed in detail. PMH:    Past Medical History:   Diagnosis Date    Acid reflux     Acute MI ([facility] [address] 01/25/97,01/04    Anxiety     CAD (coronary artery disease)     follows w Dr. Marisel Banegas Diabetes mellitus ([facility] [address]     Fluid retention     history of    Hyperlipidemia     Hypertension     Ischemic cardiomyopathy     Osteoarthritis     Post PTCA 08/40/55    Systolic dysfunction, left ventricle     follows with Dr. Marisel Banegas Thyroid disease      PSH:   Past Surgical History:   Procedure Laterality Date    CARDIAC DEFIBRILLATOR PLACEMENT Left 2013    with pacemaker ([employer])    CARDIOVERSION  09/04/2020    Successful     Dr. Mcclure formerly [facility] CATH LAB PROCEDURE  01/27/97,11/00    Critical lesion mid left circumflex,significant lesion mid-LAD and first diagonal    ECHO COMPL W DOP COLOR FLOW  4/11/2013         ECHO COMPL W DOP COLOR FLOW  4/21/2013         MITRAL VALVE SURGERY      TRANSESOPHAGEAL ECHOCARDIOGRAM  4/12/2013    Dr. Bianca Shahid TRANSESOPHAGEAL ECHOCARDIOGRAM  08/02/2017       Review of Systems:   · Constitutional: As noted in the HPI. · Eyes: No visual changes or diplopia. No scleral icterus. · ENT: No headaches, hearing loss or vertigo.  No nasal congestion, or sore throat. · Cardiovascular: No chest pain, dyspnea on exertion, or palpitations. · Respiratory: See above  · Gastrointestinal: No abdominal pain, nausea or emesis. No diarrhea or rectal bleeding or melena. No change in bowel habits. · Genitourinary: No dysuria, urinary frequency, or incontinence. No hematuria. · Musculoskeletal: No gait disturbance, weakness or joint complaints. · Integumentary: No rash or pruritis. No abnormal pigmentation, hair or nail changes. · Neurological: No headache, diplopia, dizziness, tremor, change in muscle strength, numbness or tingling. No change in gait, balance, coordination, mood, affect, memory, mentation, behavior. · Psychiatric: No anxiety or depression. · Endocrine: No temperature intolerance, excessive thirst, fluid intake, urinary frequency, excessive appetite, or recent weight change. · Hematologic/Lymphatic: No abnormal bruising or bleeding, blood clots or swollen lymph nodes. No anemia, fever, chills, night sweats, or swollen glands. · Allergic/Immunologic: No seasonal or perenial allergies. No history of hives or atopic dermatitis.     Social History:  · Alcohol:  Yes  · Tobacco:   Ongoing  · Employment:  no silica or asbestos exposure  · Family:  No family history of lung disease    Medications:   dextrose        allopurinol  100 mg Oral Daily    amiodarone  200 mg Oral Daily    vitamin C  2,000 mg Oral Daily    aspirin  81 mg Oral Daily    atorvastatin  80 mg Oral Daily    gabapentin  100 mg Oral Daily    insulin lispro  0-6 Units Subcutaneous TID WC    insulin lispro  0-3 Units Subcutaneous Nightly    levothyroxine  75 mcg Oral Daily    magnesium oxide  400 mg Oral Daily    metoprolol succinate  25 mg Oral Daily    therapeutic multivitamin-minerals  1 tablet Oral Daily    pantoprazole  40 mg Oral QAM AC    sodium chloride flush  10 mL Intravenous 2 times per day    potassium chloride  20 mEq Oral BID    sacubitril-valsartan  1 tablet Oral BID  albumin human  25 g Intravenous See Admin Instructions    albuterol  2.5 mg Nebulization Q12H    ipratropium  0.5 mg Nebulization Q12H    bumetanide  2 mg Intravenous BID    warfarin (COUMADIN) daily dosing (placeholder)   Other RX Placeholder    metroNIDAZOLE  500 mg Intravenous Q8H    cefTRIAXone (ROCEPHIN) IV  1 g Intravenous Q24H       Vitals:  Tmax:  VITALS:  BP 98/63   Pulse 60   Temp 98.6 °F (37 °C) (Oral)   Resp 18   Ht 6' 2\" (1.88 m)   Wt 205 lb (93 kg)   SpO2 97%   BMI 26.32 kg/m²   24HR INTAKE/OUTPUT:      Intake/Output Summary (Last 24 hours) at 2020  Last data filed at 2020 171  Gross per 24 hour   Intake 300 ml   Output 625 ml   Net -325 ml     CURRENT PULSE OXIMETRY:  SpO2: 97 %  24HR PULSE OXIMETRY RANGE:  SpO2  Av %  Min: 90 %  Max: 98 %    EXAM:  General: No distress. Alert. Eyes: PERRL. No sclera icterus. No conjunctival injection. ENT: No discharge. Pharynx clear. Neck: Trachea midline. Normal thyroid. No jvd, no hjr. Resp: No wheezing. No accessory muscle use. No rales. No rhonchi. CV: Regular rate. Regular rhythm. No murmur No rub. Abd: Non-tender. Non-distended. No masses. No organmegaly. Normal bowel sounds. Skin: Warm and dry. No nodule on exposed extremities. No rash on exposed extremities. Lymph: No cervical LAD. No supraclavicular LAD. Ext: No joint deformity. No clubbing. No cyanosis. +++ edema  Neuro: Awake. Follows commands. Positive pupils/gag/corneals. Normal pain response.      Lab Results:  CBC:   Recent Labs     11/15/20  2025 11/16/20  0203 11/17/20  0652   WBC 9.7 10.1 10.1   HGB 12.6 12.6 11.8*   HCT 41.6 40.6 37.9   MCV 86.7 84.9 85.7    248 228       BMP:  Recent Labs     11/15/20  2025 11/16/20  0203 11/17/20  0909   * 128* 131*   K 4.4 4.4 4.2   CL 89* 93* 94*   CO2 25 20* 27   BUN 43* 45* 50*   CREATININE 2.1* 2.1* 2.4*    ALB:3,BILIDIR:3,BILITOT:3,ALKPHOS:3)@    PT/INR:   Recent Labs     11/15/20  2025 11/16/20  0203 11/17/20  0652   PROTIME 70.7* 59.5* 49.2*   INR 5.9* 5.0 4.2       Cultures:  Sputum: not available  Blood: not available    ABG:   No results for input(s): PH, PO2, PCO2, HCO3, BE, O2SAT, METHB, O2HB, COHB, O2CON, HHB, THB in the last 72 hours. Films:     US ABDOMEN LIMITED   Final Result   1. Cholelithiasis with gallbladder wall thickening. In light of liver   cirrhosis and ascites, the significance of the gallbladder wall thickening is   unclear. It may be due to liver disease. If there is clinical concern for   acute cholecystitis, consider nuclear medicine hepatobiliary scan. 2. Irregularity of the liver contour indicative of cirrhosis was better   visualized on the prior CT. 3. Ascites. CT ABDOMEN PELVIS WO CONTRAST Additional Contrast? Oral   Final Result   Cardiomegaly with the pleural effusions/atelectasis lung bases likely CHF. Pneumonia is less likely. Masslike densities in the right lung base probably rounded atelectasis. Please consider surveillance to exclude underlying malignancy. Cirrhotic liver with ascites. Dilated small bowel loops likely ileus. Diverticulosis of colon with mild thickening of the sigmoid colon likely  due   to suboptimal distention. Uncomplicated diverticulitis is less likely. 6 mm   stone in the distal left ureter/UVJ without significant hydronephrosis   in the left kidney. XR CHEST PORTABLE   Final Result   Congestive failure with extensive perihilar interstitial pulmonary edema and   large right pleural effusion. IR US GUIDED PARACENTESIS    (Results Pending)   US ABDOMEN LIMITED    (Results Pending)   . Assessment:  1. Pleural effusion with associated ascites. INR 4 precludes instrumentation, Coumadin is on hold. Plan:  1. Agree with diuresis. Eventual instrumentation once the INR is in a near normal range. Thanks for letting us see this patient in consultation.   Total time in reviewing the previous admissions and records, reviewing the current x-rays, labs, and discussing with clinical staff including nursing and physicians, exceeded 50 minutes. Please contact us with any questions. Office [phone] or after hours through [employer], x [phone]. Please note that voice recognition technology was used (while wearing a Covid mandated mask) in the preparation of this note and make therefore it may contain inadvertent transcription errors. If the patient is a COVID 19 isolation patient, the above physical exam reflects that of the examining physician for the day. Karla Solitario M.D., F.C.C.P.     [facility], [address] [place] [address], [address] [place] [address]

## 2020-11-17 NOTE — PROGRESS NOTES
Occupational Therapy  OCCUPATIONAL THERAPY INITIAL EVALUATION      Date:2020  Patient Name: Claudetta Polite  MRN: [id number]  : 1945  Room: [address] [place] [address]    Referring Provider: TALYA Carreno    Evaluating OT: Haleigh Jesus OTR/L [id number]    AM-PAC Daily Activity Raw Score:     Recommended Adaptive Equipment:  TBD     Diagnosis: CHF    Pertinent Medical History: OA, DM, anxiety   Precautions:  Falls, alarm, O2     Home Living: Pt lives alone, 2 story with bed/bath on 2nd.   3 steps/no rail to enter   Bathroom setup: walk in shower      Prior Level of Function: Independent with ADLs , daughters assist as needed  with IADLs; ambulated no device   Driving: yes     Pain Level: no pain this session ;   Cognition: alert, oriented and conversing    Memory:  Fair    Sequencing:  Fair    Problem solving:  Fair    Judgement/safety:  Fair      Functional Assessment:   Initial Eval Status  Date:  Treatment Status  Date: STGs = LTGs  Time frame: 5-7 days   Feeding Independent      Grooming Set-up   Standing at sink washing hands   Mod I    UB Dressing Set-up   Mod I    LB Dressing Min A  Assist donning socks   Mod I    Bathing      Toileting Supervision   Standing at commode   Independent    Bed Mobility  Independent  Supine <> sit      Functional Transfers SBA  Sit-stand from bed   Mod I    Functional Mobility CGA/SBA ,no device   Ambulated to/from bathroom   Mod I with good tolerance    Balance Sitting:     Static:  Independent     Dynamic:SBA   Standing: SBA   Independent   with good tolerance    Activity Tolerance Fair with light activity   No SOB noted   O2 on  Good with ADL activity    Visual/  Perceptual Glasses: reading                 Hand Dominance right    Strength ROM Additional Info:    RUE  4-/5  WFL good  and wfl FMC/dexterity noted during ADL tasks       LUE 4-/5  WFL good  and wfl FMC/dexterity noted during ADL tasks       Hearing: WFL   Sensation:  No c/o numbness or tingling   Tone: WFL   Edema:     Comments: Upon arrival patient lying in bed . At end of session, patient returned to bed  with call light and phone within reach, all lines and tubes intact. ALARM  ON       Overall patient demonstrated  decreased independence and safety during completion of ADL/functional transfer/mobility tasks. Pt would benefit from continued skilled OT to increase safety and independence with completion of ADL/IADL tasks for functional independence and quality of life. Assessment of current deficits   Functional mobility [x]  ADLs [x] Strength [x]  Cognition []  Functional transfers  [x] IADLs [x] Safety Awareness [x]  Endurance [x]  Fine Motor Coordination [] Balance [x] Vision/perception [] Sensation []   Gross Motor Coordination [] ROM [] Delirium []                  Motor Control []       Plan of Care: 1-3 days/week for 1-2 weeks PRN   [x]ADL retraining/adapted techniques and AE recommendations to increase functional independence within precautions                    [x]Energy conservation techniques to improve tolerance for selfcare routine   [x]Functional transfer/mobility training/DME recommendations for increased independence, safety and fall prevention         [x]Patient/family education to increase safety and functional independence             [x]Environmental modifications for safe mobility and completion of ADLs                             []Cognitive retraining ex's to improve problem solving skills & safe participation in ADLs/IADLs     []Sensory re-education techniques to improve extremity awareness, maintain skin integrity and improve hand function                             []Visual/Perceptual retraining  to improve body awareness and safety during transfers and ADLs  []Splinting/positioning needs to maintain joint/skin integrity and prevent contractures  [x]Therapeutic activity to improve functional performance during ADLs.                                          [x]Therapeutic exercise to improve tolerance and functional strength for ADLs   [x]Balance retraining/tolerance tasks for facilitation of postural control with dynamic challenges during ADLs . []Neuromuscular re-education: facilitation of righting/equilibrium reactions, midline orientation, scapular stability/mobility, Normalization muscle     tone and facilitation active functional movement/Attention                         []Delirium prevention/treatment    [x]Positioning to improve functional independence  []Other:       Rehab Potential:  Good for established goals     Patient / Family Goal: return home       Patient and/or family were instructed on functional diagnosis, prognosis/goals and OT plan of care. Demonstrated fair  understanding. Eval Complexity: Low      Time In:1030  Time Out: 1040        Min Units   OT Eval Low 97165  x 1   OT Eval Medium 29115      OT Eval High 71409       OT Re-Eval [id number]       Therapeutic Ex 94826       Therapeutic Activities 60165       ADL/Self Care 43013       Orthotic Management 08850       Neuro Re-Ed 68170       Non-Billable Time          Evaluation Time includes thorough review of current medical information, gathering information on past medical history/social history and prior level of function, completion of standardized testing/informal observation of tasks, assessment of data and education on plan of care and goals.             Bebe Tucker OTR/L [id number]

## 2020-11-17 NOTE — PROGRESS NOTES
Physical Therapy    Facility/Department: [address] MED SURG/TELE  Initial Assessment    NAME: Mirna Stack  : [birth year]  MRN: [id number]    Date of Service: 2020    Attending Provider:  Martinez Marshall MD    Evaluating PT:  Kelley Humphrey. Hoa Heller P.T. Room #:  7596/9304-K  Diagnosis:  CHF  Pertinent PMHx/PSHx:  Cardioversion (2020)  Precautions:  Falls, bed/chair alarms    SUBJECTIVE:    Pt lives alone in a 2 story home with 3 stairs and 1 rail to enter. There are 15 steps and 1 rail to 2nd floor bed and bath. Pt ambulated with no AD PTA and reported to not use home O2. OBJECTIVE:   Initial Evaluation  Date: 20 Treatment Short Term/ Long Term   Goals   Was pt agreeable to Eval/treatment? yes     Does pt have pain? No c/o pain     Bed Mobility  Rolling: SBA  Supine to sit: SBA  Sit to supine: SBA  Scooting: SBA  Independent   Transfers Sit to stand: SBA  Stand to sit: SBA  Stand pivot: SBA without AD  Independent    Ambulation   30 feet without AD SBA  150+ feet with AAD Independent    Stair negotiation: ascended and descended NA, due to fatigue  10 steps with 1 rail Independent    AM-PAC 6 Clicks 66/38       BLE ROM is WFL. BLE strength is 4/5. Sensation:  Pt denies numbness and tingling to extremities  Edema:  None noted  Balance: sitting is supervision and standing without AD is SBA  Endurance: fair    Vitals: At rest on 2L O2 via NC, pt SPO2 was 97% and HR 82 bpm. Immediately following ambulation on 2L of O2, pt SPO2 was 96% and HR 91 bpm.    ASSESSMENT:    Comments:  Pt was found lying supine in bed and was agreeable to PT evaluation. Pt stood and ambulated in the room with steady gait pattern and speed and displayed an increase in work of breathing and reported feeling SOB. Pt sat and above vitals after ambulation were measured. Pt denied any feelings of lightheadedness or dizziness with activities, but did display SOB with exertion and reported feeling fatigued after ambulation. Pt was left lying in bed with call light left by patient. Chair/bed alarm: bed alarm re-activated    Pt's/ family goals   1. To get stronger and go home    Patient and or family understand(s) diagnosis, prognosis, and plan of care. PLAN OF CARE:    Current Treatment Recommendations     [x] Strengthening     [] ROM   [x] Balance Training   [x] Endurance Training   [x] Transfer Training   [x] Gait Training   [x] Stair Training   [] Positioning   [x] Safety and Education Training   [] Patient/Caregiver Education   [] HEP  [] Other     PT care will be provided in accordance with the objectives noted above. Exercises and functional mobility practice will be used as well as appropriate assistive devices or modalities to obtain goals. Patient and family education will also be administered as needed. Frequency of treatments: 2-5x/week x 1-2 weeks. Time in  14:00  Time out  14:20    Evaluation Time includes thorough review of current medical information, gathering information on past medical history/social history and prior level of function, completion of standardized testing/informal observation of tasks, assessment of data and education on plan of care and goals. CPT codes:  [x] Low Complexity PT evaluation 55313  [] Moderate Complexity PT evaluation 33900  [] High Complexity PT evaluation 86195  [] PT Re-evaluation 55393  [] Gait training 06439 ** minutes  [] Manual therapy 57260 ** minutes  [] Therapeutic activities 20968 ** minutes  [] Therapeutic exercises 30461 ** minutes  [] Neuromuscular reeducation 78011 ** minutes     Cierra Sheppard, SPT    You Luna, P.T.   License Number: [id number]

## 2020-11-18 NOTE — PROGRESS NOTES
Occupational Therapy  Patient treatment attempted twice today. Patient getting blood drawn initially and declined second session due to fatigue despite encouragement. Will attempt at a later time.                                                  Jorge Offer LANDY/YULIA [id number]

## 2020-11-18 NOTE — HOME CARE
[facility] received referral. Will follow after discharge. Spoke with patient and verified demographics.  Dain Kayser LPN, [address] [address]

## 2020-11-18 NOTE — CONSULTS
Nephrology Consult Note  Patient's Name: Juan Walker  3:06 PM  11/18/2020    Nephrologist: Marissa Cervantes    Reason for Consult:  JULIANO on CKD  Requesting Physician:  Anshul Matthews MD    Chief Complaint:  Abd Pain and SOB    History Obtained From:  patient and past medical records    History of Present Ilness:    Juan Walker is a 70 y.o. male with prior history CKD G3B with a baseline serum cr 1.5-2.2mg/dl with an e-GFR=30-46ml/min in the setting of CAD/HTN/DM2/Tob Abuse with presumed microvascular disease  4/28/20 R ptwhcj60.4cm and L Kidney 9.5cm, no PVR, no hydro or masses or perinephric fluid  10/6/20 Hep B&C non reactive  Pt admitted via the ED 11/15/20 when he presented with abd pain and SOB. The abd pain was progressive and he had a decreased appetite and presented to the ED. No emesis or fever. Workup showed ascites and cirrhosis on Ct Scan. He was placed on IV bumetanide and Entresto.  Bumetanide held this AM. Received SPA for Hypotension      Past Medical History:   Diagnosis Date    Acid reflux     Acute MI ([facility] [address] 01/25/97,01/04    Anxiety     CAD (coronary artery disease)     follows w Dr. Maribel Aguiar Diabetes mellitus ([facility] [address]     Fluid retention     history of    Hyperlipidemia     Hypertension     Ischemic cardiomyopathy     Osteoarthritis     Post PTCA 28/38/26    Systolic dysfunction, left ventricle     follows with Dr. Maribel Aguiar Thyroid disease        Past Surgical History:   Procedure Laterality Date    CARDIAC DEFIBRILLATOR PLACEMENT Left 2013    with pacemaker ([employer])    CARDIOVERSION  09/04/2020    Successful     Dr. Vega Villatoro CATH LAB PROCEDURE  01/27/97,11/00    Critical lesion mid left circumflex,significant lesion mid-LAD and first diagonal    ECHO COMPL W DOP COLOR FLOW  4/11/2013         ECHO COMPL W DOP COLOR FLOW  4/21/2013         MITRAL VALVE SURGERY      TRANSESOPHAGEAL ECHOCARDIOGRAM  4/12/2013    Dr. Brendan Trevizo TRANSESOPHAGEAL ECHOCARDIOGRAM  08/02/2017       Family History   Problem Relation Age of Onset    Hypertension Mother     Hypertension Father     Heart Surgery Father     High Cholesterol Father         reports that he has been smoking cigarettes. He has a 12.50 pack-year smoking history. He has never used smokeless tobacco. He reports current alcohol use. He reports that he does not use drugs. Allergies:  Patient has no known allergies.     Current Medications:    albumin human 25 % IV solution 25 g, Once  albuterol (PROVENTIL) nebulizer solution 2.5 mg, Q6H PRN  allopurinol (ZYLOPRIM) tablet 100 mg, Daily  amiodarone (CORDARONE) tablet 200 mg, Daily  ascorbic acid (VITAMIN C) tablet 2,000 mg, Daily  aspirin EC tablet 81 mg, Daily  atorvastatin (LIPITOR) tablet 80 mg, Daily  gabapentin (NEURONTIN) capsule 100 mg, Daily  insulin lispro (HUMALOG) injection vial 0-6 Units, TID WC  insulin lispro (HUMALOG) injection vial 0-3 Units, Nightly  glucose (GLUTOSE) 40 % oral gel 15 g, PRN  dextrose 50 % IV solution, PRN  glucagon (rDNA) injection 1 mg, PRN  dextrose 5 % solution, PRN  levothyroxine (SYNTHROID) tablet 75 mcg, Daily  magnesium oxide (MAG-OX) tablet 400 mg, Daily  metoprolol succinate (TOPROL XL) extended release tablet 25 mg, Daily  therapeutic multivitamin-minerals 1 tablet, Daily  pantoprazole (PROTONIX) tablet 40 mg, QAM AC  sodium chloride flush 0.9 % injection 10 mL, 2 times per day  sodium chloride flush 0.9 % injection 10 mL, PRN  acetaminophen (TYLENOL) tablet 650 mg, Q6H PRN    Or  acetaminophen (TYLENOL) suppository 650 mg, Q6H PRN  polyethylene glycol (GLYCOLAX) packet 17 g, Daily PRN  potassium chloride (KLOR-CON M) extended release tablet 20 mEq, BID  sacubitril-valsartan (ENTRESTO) 49-51 MG per tablet 1 tablet, BID  albumin human 25 % IV solution 25 g, See Admin Instructions  LORazepam (ATIVAN) tablet 0.5 mg, Nightly PRN  albuterol (PROVENTIL) nebulizer solution 2.5 mg, Q12H  ipratropium (ATROVENT) 0.02 % nebulizer solution 0.5 mg, Q12H  bumetanide (BUMEX) injection 2 mg, BID  warfarin (COUMADIN) daily dosing (placeholder), RX Placeholder  metronidazole (FLAGYL) 500 mg in NaCl 100 mL IVPB premix, Q8H  cefTRIAXone (ROCEPHIN) 1 g in sterile water 10 mL IV syringe, Q24H        Review of Systems:   Pertinent items are noted in HPI. Remainder of a complete review off systems sis (-) other than stated in the HPI    Physical exam:   Constitutional:  Elderly male in NAD  Vitals:   VITALS:  BP (!) 78/52 Comment: manual  Pulse 62   Temp 97.4 °F (36.3 °C) (Oral)   Resp 18   Ht 6' 2\" (1.88 m)   Wt 205 lb (93 kg)   SpO2 98%   BMI 26.32 kg/m²   24HR INTAKE/OUTPUT:    Intake/Output Summary (Last 24 hours) at 11/18/2020 1507  Last data filed at 11/17/2020 2007  Gross per 24 hour   Intake --   Output 400 ml   Net -400 ml     URINARY CATHETER OUTPUT (Narvaez):     DRAIN/TUBE OUTPUT:     VENT SETTINGS:  Vent Information  SpO2: 98 %  Additional Respiratory  Assessments  Pulse: 62  Resp: 18  SpO2: 98 %    Skin: no rash, turgor wnl  Heent:  eomi, mmm  Neck: no bruits or jvd noted  Cardiovascular:PMI lat displaced   S1, S2 without S3 or a rub  Respiratory: Few bilat crackles with equal excursion  Abdomen:  +bs, soft, nt, nd  Ext: (-) bilat lower extremity edema  Psychiatric: mood and affect appropriate, cr nr 2-12 grossly intact  Musculoskeletal:  Rom, muscular strength intact    Data:   Labs:  CBC:   Lab Results   Component Value Date    WBC 10.3 11/18/2020    RBC 4.49 11/18/2020    HGB 11.7 11/18/2020    HCT 38.6 11/18/2020    MCV 86.0 11/18/2020    MCH 26.1 11/18/2020    MCHC 30.3 11/18/2020    RDW 25.2 11/18/2020     11/18/2020    MPV 11.0 11/18/2020     CBC with Differential:    Lab Results   Component Value Date    WBC 10.3 11/18/2020    RBC 4.49 11/18/2020    HGB 11.7 11/18/2020    HCT 38.6 11/18/2020     11/18/2020    MCV 86.0 11/18/2020    MCH 26.1 11/18/2020    MCHC 30.3 11/18/2020    RDW 25.2 11/18/2020    SEGSPCT 54 05/15/2013    LYMPHOPCT 8.6 11/18/2020    MONOPCT 9.4 11/18/2020    BASOPCT 0.3 11/18/2020    MONOSABS 0.96 11/18/2020    LYMPHSABS 0.88 11/18/2020    EOSABS 0.14 11/18/2020    BASOSABS 0.03 11/18/2020     Hemoglobin/Hematocrit:    Lab Results   Component Value Date    HGB 11.7 11/18/2020    HCT 38.6 11/18/2020     CMP:    Lab Results   Component Value Date     11/18/2020    K 4.9 11/18/2020    K 4.4 11/16/2020    CL 94 11/18/2020    CO2 26 11/18/2020    BUN 54 11/18/2020    CREATININE 2.8 11/18/2020    GFRAA 27 11/18/2020    LABGLOM 22 11/18/2020    GLUCOSE 106 11/18/2020    PROT 6.9 11/18/2020    LABALBU 3.7 11/18/2020    CALCIUM 8.6 11/18/2020    BILITOT 0.7 11/18/2020    ALKPHOS 130 11/18/2020    AST 51 11/18/2020    ALT 33 11/18/2020     BMP:    Lab Results   Component Value Date     11/18/2020    K 4.9 11/18/2020    K 4.4 11/16/2020    CL 94 11/18/2020    CO2 26 11/18/2020    BUN 54 11/18/2020    LABALBU 3.7 11/18/2020    CREATININE 2.8 11/18/2020    CALCIUM 8.6 11/18/2020    GFRAA 27 11/18/2020    LABGLOM 22 11/18/2020    GLUCOSE 106 11/18/2020     BUN/Creatinine:    Lab Results   Component Value Date    BUN 54 11/18/2020    CREATININE 2.8 11/18/2020     Hepatic Function Panel:    Lab Results   Component Value Date    ALKPHOS 130 11/18/2020    ALT 33 11/18/2020    AST 51 11/18/2020    PROT 6.9 11/18/2020    BILITOT 0.7 11/18/2020    BILIDIR 0.5 11/17/2020    IBILI 0.4 11/17/2020    LABALBU 3.7 11/18/2020     Albumin:    Lab Results   Component Value Date    LABALBU 3.7 11/18/2020     Calcium:    Lab Results   Component Value Date    CALCIUM 8.6 11/18/2020     Ionized Calcium:  No results found for: IONCA  Magnesium:    Lab Results   Component Value Date    MG 2.1 10/06/2020     Phosphorus:    Lab Results   Component Value Date    PHOS 2.5 10/06/2020     LDH:  No results found for: LDH  Uric Acid: Lab Results   Component Value Date    LABURIC 8.1 10/06/2020     PT/INR:    Lab Results   Component Value Date    PROTIME 40.0 11/18/2020    INR 3.3 11/18/2020     PTT:    Lab Results   Component Value Date    APTT 35.9 11/16/2020   [APTT}  Troponin:    Lab Results   Component Value Date    TROPONINI 0.07 11/16/2020     U/A:    Lab Results   Component Value Date    COLORU Yellow 11/15/2020    PROTEINU TRACE 11/15/2020    PHUR 5.0 11/15/2020    WBCUA NONE 11/15/2020    RBCUA NONE 11/15/2020    RBCUA NONE 05/14/2013    BACTERIA NONE SEEN 11/15/2020    CLARITYU Clear 11/15/2020    SPECGRAV >=1.030 11/15/2020    LEUKOCYTESUR Negative 11/15/2020    UROBILINOGEN 0.2 11/15/2020    BILIRUBINUR Negative 11/15/2020    BLOODU Negative 11/15/2020    GLUCOSEU Negative 11/15/2020     ABG:    Lab Results   Component Value Date    PH 7.419 01/17/2019    PCO2 42.6 04/15/2013    PO2 79.6 04/15/2013    HCO3 24.2 04/15/2013    BE 4.2 01/17/2019    O2SAT 44.3 01/17/2019     HgBA1c:    Lab Results   Component Value Date    LABA1C 5.8 11/16/2020     Microalbumen/Creatinine ratio:  No components found for: RUCREAT  FLP:    Lab Results   Component Value Date    TRIG 60 10/06/2020    HDL 35 10/06/2020    LDLCALC 38 10/06/2020    LABVLDL 12 10/06/2020     TSH:    Lab Results   Component Value Date    TSH 7.620 11/16/2020     VITAMIN B12: No components found for: B12  FOLATE:    Lab Results   Component Value Date    FOLATE >20.0 10/06/2020     Iron Saturation:  No components found for: PERCENTFE  FERRITIN:    Lab Results   Component Value Date    FERRITIN 625 11/17/2020     AMYLASE:  No results found for: AMYLASE  LIPASE:    Lab Results   Component Value Date    LIPASE 12 11/15/2020     Fibrinogen Level:  No components found for: FIB  24 Hour Urine for Protein:  No components found for: RAWUPRO, UHRS3, UZPC56AD, UTV3  24 Hour Urine for Creatinine Clearance:  No components found for: CREAT4, UHRS10, UTV10  PSA: No results found for: PSA cirrhosis and ascites, the significance of the gallbladder wall thickening is    unclear.  It may be due to liver disease.  If there is clinical concern for    acute cholecystitis, consider nuclear medicine hepatobiliary scan. 2. Irregularity of the liver contour indicative of cirrhosis was better    visualized on the prior CT. 3. Ascites. EXAMINATION:    CT OF THE ABDOMEN AND PELVIS WITHOUT CONTRAST 11/15/2020 9:42 pm         TECHNIQUE:    CT of the abdomen and pelvis was performed without the administration of    intravenous contrast. Multiplanar reformatted images are provided for review. Dose modulation, iterative reconstruction, and/or weight based adjustment of    the mA/kV was utilized to reduce the radiation dose to as low as reasonably    achievable.         COMPARISON:    None.         HISTORY:    ORDERING SYSTEM PROVIDED HISTORY: pain    TECHNOLOGIST PROVIDED HISTORY:    Reason for exam:->pain    Additional Contrast?->Oral         FINDINGS:    The lung bases demonstrate cardiomegaly with coronary artery calcification. There is moderate pleural effusions and atelectasis in lung bases.  Masslike    densities are identified in the right lung base largest 1 measuring up to 5    by 2.7 cm likely  rounded atelectasis.  Malignant lesion is less likely. Liver is heterogeneous in appearance with nodular border concerning for    cirrhosis.  There is moderate amount of ascites.  Gallbladder is partially    contracted with small amount of sludge.  Spleen, pancreas, and adrenals are    normal.  There is perinephric stranding bilaterally.  Moderately enlarged    lymph nodes are identified in the upper abdomen with lymph nodes measuring up    to 1.7 cm in the glenn of the liver.  There is calcification and mild ectasia    of the abdominal aorta measuring 2.3 cm.  There is edema in the mesentery.     Slightly dilated small bowel loops measuring up to 2.5 cm is noted.         Pelvis.  The bladder is

## 2020-11-18 NOTE — PROGRESS NOTES
Pharmacy Consultation Note  (Anticoagulant Dosing and Monitoring)    Initial consult date: 11/16/2020  Consulting physician: YOGESH Bailey    Allergies:  Patient has no known allergies. Ht Readings from Last 1 Encounters:   11/15/20 6' 2\" (1.88 m)     Wt Readings from Last 1 Encounters:   11/15/20 205 lb (93 kg)       Warfarin Indication Target   INR [place] Dose  (if applicable) Diet/Feeding Tube   Atrial fibrillation 2-3 Warfarin 5 mg Mon/Wed/Fri/Sun  ----------------------------  Warfarin 2.5 mg Tues/Thurs/Sat Renal/carb control diet       Vitamin K or Blood product  Administration Date                 Warfarin drug-drug interactions  Start  Stop Home Med?  Comments    Metronidazole 11/16   May increase INR                         TSH:    Lab Results   Component Value Date    TSH 7.620 11/16/2020        Hepatic Function Panel:                          Lab Results   Component Value Date    ALKPHOS 130 11/18/2020    ALT 33 11/18/2020    AST 51 11/18/2020    PROT 6.9 11/18/2020    BILITOT 0.7 11/18/2020    BILIDIR 0.5 11/17/2020    IBILI 0.4 11/17/2020    LABALBU 3.7 11/18/2020       Date Warfarin Dose INR Heparin or LMWH HBG/  HCT PLT Comment   11/15 No dose 5.9 ------------- 12.6/41.6 250    11/16 No dose 5 ------------- 12.6/40.6 248    11/17 No dose 4.2 ------------- 11.8/37.9 228    11/18 No dose 3.3 ------------- 11.7/38.6 238               Assessment:  · Patient is a 70year old male on warfarin for atrial fibrillation  · Per documentation, patient takes warfarin 5 mg on Monday, Wednesday, Friday, and Sunday and warfarin 2.5 mg on Tuesday, Thursday, and Saturday  · INR goal = 2-3; INR today = 3.3 (supratherapeutic, decreased from 4.2 yesterday)    Plan:  · Will hold warfarin again tonight  · Daily PT/INR until the INR is stable within the therapeutic range  · Pharmacist will follow and monitor/adjust dosing as necessary    Tahmina Rojas PharmD, BCPS 11/18/2020 12:03 PM   Ext: [phone]

## 2020-11-18 NOTE — PROGRESS NOTES
ANALIA Johnson notified about patients HR and BP and cardiac meds ordered this AM. Will await call back.   Electronically signed by Bart Zayas RN on 11/18/2020 at 8:37 AM

## 2020-11-18 NOTE — PROGRESS NOTES
Laterality Date    CARDIAC DEFIBRILLATOR PLACEMENT Left 2013    with pacemaker ([employer])    CARDIOVERSION  09/04/2020    Successful     Dr. Eveline Nazario CATH LAB PROCEDURE  01/27/97,11/00    Critical lesion mid left circumflex,significant lesion mid-LAD and first diagonal    ECHO COMPL W DOP COLOR FLOW  4/11/2013         ECHO COMPL W DOP COLOR FLOW  4/21/2013         MITRAL VALVE SURGERY      TRANSESOPHAGEAL ECHOCARDIOGRAM  4/12/2013    Dr. Eder Steinberg TRANSESOPHAGEAL ECHOCARDIOGRAM  08/02/2017       Family History:  Family History   Problem Relation Age of Onset    Hypertension Mother     Hypertension Father     Heart Surgery Father     High Cholesterol Father        Social History:  Social History     Socioeconomic History    Marital status:      Spouse name: Not on file    Number of children: Not on file    Years of education: Not on file    Highest education level: Not on file   Occupational History    Not on file   Social Needs    Financial resource strain: Not on file    Food insecurity     Worry: Not on file     Inability: Not on file    Transportation needs     Medical: Not on file     Non-medical: Not on file   Tobacco Use    Smoking status: Current Every Day Smoker     Packs/day: 0.25     Years: 50.00     Pack years: 12.50     Types: Cigarettes    Smokeless tobacco: Never Used    Tobacco comment: currently 3-4 cigarettes a day (11/8/19); used to be 2 packs a day   Substance and Sexual Activity    Alcohol use: Yes     Frequency: Monthly or less     Drinks per session: 1 or 2     Binge frequency: Never     Comment: rare tequilla/beer    Drug use: Never    Sexual activity: Not on file   Lifestyle    Physical activity     Days per week: Not on file     Minutes per session: Not on file    Stress: Not on file   Relationships    Social connections     Talks on phone: Not on file     Gets together: Not on file     Attends Alevism service: Not on file     Active member of club or organization: Not on file     Attends meetings of clubs or organizations: Not on file     Relationship status: Not on file    Intimate partner violence     Fear of current or ex partner: Not on file     Emotionally abused: Not on file     Physically abused: Not on file     Forced sexual activity: Not on file   Other Topics Concern    Not on file   Social History Narrative    . Two kids who he is close with. Foodie. Loves cooking. Drinks 1 cup of coffee daily. Compliant with meds and sodium restrictions for the most part. No smoking. Occasional wine with his food. Allergies:  No Known Allergies    Home Medications:  Prior to Admission medications    Medication Sig Start Date End Date Taking? Authorizing Provider   amiodarone (CORDARONE) 200 MG tablet Take 1 tablet by mouth daily 400mg twice daily for one week then 200mg daily 10/14/20  Yes Joana Aceves MD   aspirin 81 MG EC tablet Take 81 mg by mouth daily Prescribed per Dr Joaquin Dow Provider, MD   pantoprazole (PROTONIX) 20 MG tablet Take 20 mg by mouth daily    Historical Provider, MD   zolpidem (AMBIEN CR) 12.5 MG extended release tablet Take 12.5 mg by mouth nightly as needed. 9/10/19   Historical Provider, MD   gabapentin (NEURONTIN) 100 MG capsule Take 100 mg by mouth daily. Historical Provider, MD   sacubitril-valsartan (ENTRESTO) 49-51 MG per tablet Entresto 49 mg-51 mg tablet   take one pill by mouth twice daily    Historical Provider, MD   warfarin (COUMADIN) 5 MG tablet Take by mouth Currently 5mg on Mon, Wed, Fri & Sun and  2.5mg on Tu, Thurs & Sat.     Historical Provider, MD   metoprolol succinate (TOPROL XL) 50 MG extended release tablet Take 1 tablet by mouth daily  Patient taking differently: Take 25 mg by mouth daily  3/15/19   Saad Mendez MD   albuterol sulfate HFA (PROAIR HFA) 108 (90 (PROVENTIL) nebulizer solution 2.5 mg  2.5 mg Nebulization Q6H PRN Mandy Rahul Masters, APRN - CNP   2.5 mg at 11/18/20 1242    allopurinol (ZYLOPRIM) tablet 100 mg  100 mg Oral Daily Mandy Rahul Masters, APRN - CNP   100 mg at 11/18/20 8889    amiodarone (CORDARONE) tablet 200 mg  200 mg Oral Daily Mandy Rahul Masters, APRN - CNP   200 mg at 11/17/20 0840    ascorbic acid (VITAMIN C) tablet 2,000 mg  2,000 mg Oral Daily Mandy Rahul Masters, APRN - CNP   2,000 mg at 11/18/20 0380    aspirin EC tablet 81 mg  81 mg Oral Daily Mandy Rahul Masters, APRN - CNP   81 mg at 11/18/20 4382    atorvastatin (LIPITOR) tablet 80 mg  80 mg Oral Daily Mandy Rahul Masters, APRN - CNP   80 mg at 11/18/20 4794    gabapentin (NEURONTIN) capsule 100 mg  100 mg Oral Daily Mandy Rahul Masters, APRN - CNP   100 mg at 11/18/20 2409    insulin lispro (HUMALOG) injection vial 0-6 Units  0-6 Units Subcutaneous TID WC Mandy Rahul Masters, APRN - CNP   1 Units at 11/18/20 1139    insulin lispro (HUMALOG) injection vial 0-3 Units  0-3 Units Subcutaneous Nightly Mandy Rahul Masters, APRN - CNP   1 Units at 11/17/20 2053    glucose (GLUTOSE) 40 % oral gel 15 g  15 g Oral PRN Mandy Rahul Masters, APRN - CNP        dextrose 50 % IV solution  12.5 g Intravenous PRN Mandy Rahul Masters, APRN - CNP        glucagon (rDNA) injection 1 mg  1 mg Intramuscular PRN Mandy Rahul Masters, APRN - CNP        dextrose 5 % solution  100 mL/hr Intravenous PRN Mandy Rahul Masters, APRN - CNP        levothyroxine (SYNTHROID) tablet 75 mcg  75 mcg Oral Daily Mandy Rahul Masters, APRN - CNP   75 mcg at 11/18/20 0700    magnesium oxide (MAG-OX) tablet 400 mg  400 mg Oral Daily Mandy Rahul Masters, APRN - CNP   400 mg at 11/18/20 1880    metoprolol succinate (TOPROL XL) extended release tablet 25 mg  25 mg Oral Daily ANTONY Larson CNP   25 mg at 11/16/20 1231    therapeutic multivitamin-minerals 1 tablet  1 tablet Oral Daily Mandy Kay Lane, APRN - CNP   1 tablet at 11/18/20 2874    pantoprazole (PROTONIX) tablet 40 mg  40 mg Oral QAM AC Vicenta Sherman, APRN - CNP   40 mg at 11/18/20 3203    sodium chloride flush 0.9 % injection 10 mL  10 mL Intravenous 2 times per day Beth Sarmiento, APRN - CNP   10 mL at 11/18/20 0666    sodium chloride flush 0.9 % injection 10 mL  10 mL Intravenous PRN Vicenta Sherman, APRN - CNP        acetaminophen (TYLENOL) tablet 650 mg  650 mg Oral Q6H PRN Vicenta Sherman, APRN - CNP        Or    acetaminophen (TYLENOL) suppository 650 mg  650 mg Rectal Q6H PRN Vicenta Sherman, APRN - CNP        polyethylene glycol (GLYCOLAX) packet 17 g  17 g Oral Daily PRN Vicenta Sherman, APRN - CNP        potassium chloride (KLOR-CON M) extended release tablet 20 mEq  20 mEq Oral BID Vicenta Sherman, APRN - CNP   20 mEq at 11/18/20 5919    sacubitril-valsartan (ENTRESTO) 49-51 MG per tablet 1 tablet  1 tablet Oral BID Vicenta Sherman, APRN - CNP   1 tablet at 11/17/20 2052    albumin human 25 % IV solution 25 g  25 g Intravenous See Admin Instructions Vicenta Sherman, APRN - CNP   Stopped at 11/16/20 1033    LORazepam (ATIVAN) tablet 0.5 mg  0.5 mg Oral Nightly PRN Vicenta Sherman, APRN - CNP   0.5 mg at 11/17/20 2052    albuterol (PROVENTIL) nebulizer solution 2.5 mg  2.5 mg Nebulization Q12H Vicenta Sherman, APRN - CNP   2.5 mg at 11/18/20 0814    ipratropium (ATROVENT) 0.02 % nebulizer solution 0.5 mg  0.5 mg Nebulization Q12H Vicenta Sherman, APRN - CNP   0.5 mg at 11/18/20 7440    bumetanide (BUMEX) injection 2 mg  2 mg Intravenous BID Haritha Orona MD   2 mg at 11/17/20 1714    warfarin (COUMADIN) daily dosing (placeholder)   Other RX Placeholder Vicenta Hinton Masters, APRN - CNP        metronidazole (FLAGYL) 500 mg in NaCl 100 mL IVPB premix  500 mg Intravenous Q8H Abner Patel MD   Stopped at 11/18/20 0321    cefTRIAXone (ROCEPHIN) 1 g in sterile water 10 mL IV syringe  1 g Intravenous Q24H Jerod Reilly MD   1 g at 11/17/20 1714      dextrose         Physical Exam:  BP (!) 78/50 Comment: MANUAL  Pulse 62   Temp 97.4 °F (36.3 °C) (Oral)   Resp 18   Ht 6' 2\" (1.88 m)   Wt 205 lb (93 kg)   SpO2 98%   BMI 26.32 kg/m²   Wt Readings from Last 3 Encounters:   11/15/20 205 lb (93 kg)   11/10/20 202 lb (91.6 kg)   11/06/20 198 lb (89.8 kg)     Appearance: Awake, alert, no acute respiratory distress   Skin: Intact, no rash   Head: Normocephalic, atraumatic   Eyes: EOMI, no conjunctival erythema   ENMT: No pharyngeal erythema, MMM, no rhinorrhea   Neck: Supple, no carotid bruits   Lungs: Decreased BS B/L, no wheezing  Cardiac: Regular rate and rhythm, 1/6 systolic murmur  Abdomen: Soft, nontender, +bowel sounds   Extremities: Moves all extremities x 4, no lower extremity edema   Neurologic: No focal motor deficits apparent, normal mood and affect     Intake/Output:    Intake/Output Summary (Last 24 hours) at 11/18/2020 1319  Last data filed at 11/17/2020 2007  Gross per 24 hour   Intake --   Output 400 ml   Net -400 ml     No intake/output data recorded.     Laboratory Tests:  Recent Labs     11/16/20 0203 11/17/20  0909 11/18/20 0316   * 131* 130*   K 4.4 4.2 4.9   CL 93* 94* 94*   CO2 20* 27 26   BUN 45* 50* 54*   CREATININE 2.1* 2.4* 2.8*   GLUCOSE 74 94 106*   CALCIUM 8.7 8.7 8.6     Lab Results   Component Value Date    MG 2.1 10/06/2020     Recent Labs     11/16/20 0203 11/17/20  0652 11/18/20  0316   ALKPHOS 133* 122 130*   ALT 31 36 33   AST 55* 61* 51*   PROT 7.4 7.0 6.9   BILITOT 1.1 0.9 0.7   BILIDIR 0.6* 0.5*  --    LABALBU 3.6 3.4* 3.7     Recent Labs     11/16/20 0203 11/17/20  0652 11/18/20  0316   WBC 10.1 10.1 10.3   RBC 4.78 4.42 4.49   HGB 12.6 11.8* 11.7*   HCT 40.6 37.9 38.6   MCV 84.9 85.7 86.0   MCH 26.4 26.7 26.1   MCHC 31.0* 31.1* 30.3*   RDW 25.0* 25.0* 25.2*    228 238   MPV 11.1 10.8 11.0     Lab Results Component Value Date    CKTOTAL 25 (L) 05/14/2013    CKMB 0.5 05/14/2013    TROPONINI 0.07 (H) 11/16/2020    TROPONINI 0.05 (H) 11/16/2020    TROPONINI 0.07 (H) 11/15/2020     Lab Results   Component Value Date    INR 3.3 11/18/2020    INR 4.2 11/17/2020    INR 5.0 11/16/2020    PROTIME 40.0 (H) 11/18/2020    PROTIME 49.2 (H) 11/17/2020    PROTIME 59.5 (H) 11/16/2020     Lab Results   Component Value Date    TSH 7.620 (H) 11/16/2020     Lab Results   Component Value Date    LABA1C 5.8 (H) 11/16/2020     No results found for: EAG  Lab Results   Component Value Date    CHOL 85 10/06/2020    CHOL 85 12/02/2017    CHOL 215 (H) 04/09/2013     Lab Results   Component Value Date    TRIG 60 10/06/2020    TRIG 87 12/02/2017    TRIG 202 (H) 04/09/2013     Lab Results   Component Value Date    HDL 35 10/06/2020    HDL 20 12/02/2017    HDL 32.4 (A) 04/09/2013     Lab Results   Component Value Date    LDLCALC 38 10/06/2020    LDLCALC 48 12/02/2017    LDLCALC 142 (H) 04/09/2013     Lab Results   Component Value Date    LABVLDL 12 10/06/2020    LABVLDL 17 12/02/2017     No results found for: CHOLHDLRATIO  Recent Labs     11/15/20  2025   PROBNP 5,849*       Cardiac Tests:  EKG: AP/VS    Telemetry reviewed (date: 11/18/2020): AP/VS    Echocardiogram: 7/14/15  Ejection fraction is visually estimated at 35%. The inferior and inferolateral walls are severely hypokinetic/akinetic. Mildly dilated right ventricle with normal right ventricular function. Indeterminate diastolic function. Moderately dilated left atrium. Pulmonary vein doppler suggestive of elevated left atrial pressure. Status post mitral annular ring. Mean mitral valve gradient 5.9 mmHg. Moderate mitral regurgitation. Moderate aortic regurgitation. Mild tricuspid regurgitation. PASP is estimated at 37 mmHg.   Mild pulmonic regurgitation.      NNEKA: 8/2/17 (Dr. Jayden Tsai)   Ejection fraction is visually estimated at 35%.   Inferolateral/inferior hypokinesis.   Rakel right ventricle size with low normal right ventricular function.   Moderately dilated left atrium.   History of oversewing of ROBER. There is color flow from the LA into the   ROBER. No evidence of a left atrial appendage thrombus.   No evidence of interatrial shunting on bubble study.   History of mitral annular ring (30mm Future Ring)   MV mean gradient 3.4 mmHg.   Poor coaptation of MV leaflets.   Severe mitral regurgitation.   Moderate aortic regurgitation.   Moderate tricuspid regurgitation.  RV-RA gradient is estimated at 35 mmHg.   Mild pulmonic regurgitation.     Echocardiogram: 1/9/18 ([facility])  - Exam indication: Initial postoperative evaluation of prosthetic valve (baseline)  - The left ventricle is moderately dilated. Left ventricular systolic function is   severely decreased. EF = 22 ± 5% (2D biplane) Left ventricular diastolic function   was not evaluated due to prosthetic valve. - The right ventricle is dilated. Right ventricular systolic function is normal.  - The left atrial cavity is severely dilated. - The right atrial cavity is dilated. - Post mitral valve replacement. Biocor prosthetic mitral valve (size #29). There   is trivial mitral valve regurgitation. The peak gradient is 23 mmHg and the mean   gradient is 8 mmHg. MV gradients obtained at a HR of 80 bpm. Prior peak/mean   gradients of 22/8 mmHg. - Post tricuspid valve repair. tricuspid valve annuloplasty ring (size #30). There   is mild (1+ - 2+) tricuspid valve regurgitation. The peak gradient is 4 mmHg and   the mean gradient is 2 mmHg. TV gradients obtained at a HR of 80 bpm. Prior mean   gradient of 3 mmHg. - Trifecta prosthetic aortic valve (size #23). The peak gradient is 10 mmHg, the   mean gradient is 5 mmHg and the dimensionless valve index is 0.57. Prior peak/mean   gradients of 9/5 mmHg. - Exam was compared with the prior  echocardiographic exam performed on   12/18/2017.  There is no significant change.     Echocardiogram: 9/18/19 (Dr. Sterling Rowe)  [facility] eccentric left ventricular hypertrophy.   Severe left ventricular systolic dysfunction.   Visually estimated LVEF is 35 %.    Normal right ventricular size.   Mild right ventricular systolic function.   TAPSE is 1.5 cm.   TDI s' is 7 cm/s.  ICD or pacer lead visualized in the right sided chambers.   Status post mitral valve # 29 mm.   The valve appears well seated with no rocking motion.   Status post aortic valve # 23 mm.   This appears well seated and without any rocking motion.   Mean gradient is 10.4 mm Hg.   Moderate tricuspid regurgitation.     NNEKA: 9/4/2020 (Dr. John Santiago)   Mildly dilated left ventricle. Severely reduced left ventricular systolic function. Ejection fraction is visually estimated at 15-20%. Severely dilated left atrium. There was evidence of severe spontaneous echo contrast in the left atrium. Mildly enlarged right ventricle cavity. Right ventricle global systolic function is severely reduced . Moderately enlarged right atrium size. Normally functioning bioprosthetic valve in mitral position. Mild mitral regurgitation is present. Mean transmitral gradient (Doppler) 5 mm Hg . Normally functioning bioprosthetic valve in aortic position. Planimetered aortic valve area 1.4 cm2 . The tricuspid valve appears structurally normal with evidence of tricuspid   ring. Physiologic and/or trace tricuspid regurgitation. CXR: 11/15/2020  Congestive failure with extensive perihilar interstitial pulmonary edema and    large right pleural effusion. ASSESSMENT / PLAN:  1. Acute on chronic HFrEF  2. CAD/prior NSTEMI s/p CABG on 4/15/13 (LIMA-LAD, SVG-OM, SVG-PDA) and repeat CABG on 11/7/2017 (SVG-PDA)  3.  Ischemic cardiomyopathy with history of severely reduced EF -- s/p upgrade to dual chamber ICD on 9/12/13 --> up-titration of GDMT has been limited by intermittent hypotension historically, including this

## 2020-11-18 NOTE — PROGRESS NOTES
Subjective:    Patient seen and examined at bedside, hypotensive this morning. Annoyed after being woken from sleep. Improvement in abdominal distention and shortness of breath. Objective:    BP (!) 82/54 Comment: manual  Pulse 62   Temp 97.4 °F (36.3 °C) (Oral)   Resp 18   Ht 6' 2\" (1.88 m)   Wt 205 lb (93 kg)   SpO2 98%   BMI 26.32 kg/m²     Current medications that patient is taking have been reviewed. Heart:  RRR, no murmurs, gallops, or rubs. Lungs:   Inspiratory crackles, no wheeze, rales or rhonchi  Abd: bowel sounds present, soft, nontender, nondistended, no masses  Extrem:  No cyanosis or edema    CBC:   Lab Results   Component Value Date    WBC 10.3 11/18/2020    RBC 4.49 11/18/2020    HGB 11.7 11/18/2020    HCT 38.6 11/18/2020    MCV 86.0 11/18/2020    MCH 26.1 11/18/2020    MCHC 30.3 11/18/2020    RDW 25.2 11/18/2020     11/18/2020    MPV 11.0 11/18/2020     BMP:    Lab Results   Component Value Date     11/18/2020    K 4.9 11/18/2020    K 4.4 11/16/2020    CL 94 11/18/2020    CO2 26 11/18/2020    BUN 54 11/18/2020    LABALBU 3.7 11/18/2020    CREATININE 2.8 11/18/2020    CALCIUM 8.6 11/18/2020    GFRAA 27 11/18/2020    LABGLOM 22 11/18/2020    GLUCOSE 106 11/18/2020        Assessment:    Patient Active Problem List   Diagnosis    Hyperlipidemia    [facility] mellitus[facility] [address]    Coronary atherosclerosis of native coronary artery    GERD (gastroesophageal reflux disease)    Depression    Hypothyroid    Ischemic cardiomyopathy    Mitral regurgitation    AI (aortic insufficiency)    [facility]ox[facility] fibrillation[facility])    Ulnar nerve neuropathy    ICD (implantable cardioverter-defibrillator) in place    Tricuspid regurgitation    CKD (chronic kidney disease) stage 4, GFR 15-29 ml/min ([facility])    Chronic systolic CHF (congestive heart failure) ([facility])    Anticoagulated on Coumadin    Right ventricular dysfunction    Congestive heart failure ([facility])    Persistent atrial fibrillation (HCC)    Anemia of chronic renal failure, stage 3 (moderate)    History of coronary artery bypass graft    HFrEF (heart failure with reduced ejection fraction) (HCC)    CKD (chronic kidney disease) stage 3, GFR 30-59 ml/min    Hypertension    Cirrhosis (HCC)    Ascites    Lesion of lung    Chronic anticoagulation    Supratherapeutic INR       Plan:    1. Acute on chronic systolic HF              BNP 5849, CXR shows pulmonary edema with right pleural effusion              Continue diuresis with IV bumex cardiology consulted              Recent TTE and NNEKA show EF 35% and 15-20%              Continue entresto + beta blocker              No accurate I/O    Bumex held this morning due to hypotension, given albumin 25g x 1  2. Cirrhosis with ascites - on CT abd                No paracentesis. Denies history of cirrhosis. GI consulted              LFTs mildly elevated alk phos, AST, Tbil              Lipase WNL              Possible congestive hepatopathy              INR elevated on coumadin  3. Supratherapeutic INR              Hold Coumadin, no signs of bleeding. Hb stable              Trending down  4. CKD 3 - trending up while diuresed, continue to monitor  5. DVT px - supratherapeutic INR  6.   Hypothyroidism-continue Synthroid      Woodroe Glow    12:21 PM  11/18/2020

## 2020-11-18 NOTE — CARE COORDINATION
Social Work / Discharge Planning : SW followed up with patient and explained role as discharge planner/ transition  of care. Patient verified plan at discharge is HOME where he resides by himself. Patient ambulated independently to and from bathroom but did state little dizzy. [facility] discussed and he was in agreement  No agency preference and referral made to [facility] via [place]. CM noted [facility] orders. . Patient PCP is DR Dennise Carbajal and he uses [facility]. Patient states he does NOT use a device. Patient on 02 and new and does NOT have nebulizer. If needed aT DISCHARGE, DID not state DME provider choice and referral can be made to [address] [place] [address] if needed. SW to follow.  Electronically signed by SURY Chapin on 11/18/20 at 1:07 PM EST

## 2020-11-18 NOTE — PROGRESS NOTES
PROGRESS NOTE  By Katiana Peterson M.D. [facility]  Dr. Deni Ramires M.D.,  Dr. Jacquelin Beach M.D.,   Dr. Fariha Garcia D.O.,  Dr. Jyoti Hodge M.D.,  Dr. Lou Birmingham D.O.,  ANALIA CallesO. Oh Ybarra  70 y.o.  male    SUBJECTIVE:  Currently denies abdominal pain. Denies nausea vomiting    OBJECTIVE:    /62   Pulse 64   Temp 97.4 °F (36.3 °C) (Oral)   Resp 20   Ht 6' 2\" (1.88 m)   Wt 205 lb (93 kg)   SpO2 98%   BMI 26.32 kg/m²     General: NAD/ male  HEENT: Anicteric sclera/moist oral mucosa  Neck: Supple with trachea midline  Chest: Symmetric excursion/nonlabored respirations  Cor: Regular  Abd.: Soft/NT/ND  Extr.:  No peripheral edema  Skin: Warm and dry      DATA:  .        Lab Results   Component Value Date    WBC 10.3 11/18/2020    RBC 4.49 11/18/2020    HGB 11.7 11/18/2020    HCT 38.6 11/18/2020    MCV 86.0 11/18/2020    MCH 26.1 11/18/2020    MCHC 30.3 11/18/2020    RDW 25.2 11/18/2020     11/18/2020    MPV 11.0 11/18/2020     Lab Results   Component Value Date     11/18/2020    K 4.9 11/18/2020    K 4.4 11/16/2020    CL 94 11/18/2020    CO2 26 11/18/2020    BUN 54 11/18/2020    CREATININE 2.8 11/18/2020    CALCIUM 8.6 11/18/2020    PROT 6.9 11/18/2020    LABALBU 3.7 11/18/2020    BILITOT 0.7 11/18/2020    ALKPHOS 130 11/18/2020    AST 51 11/18/2020    ALT 33 11/18/2020     Lab Results   Component Value Date    LIPASE 12 11/15/2020     No results found for: AMYLASE      ASSESSMENT/PLAN:  1.  Cirrhosis/abdominal pain/ascites  -Cannot calculate meld reliably secondary to iatrogenic coagulopathy  -Nonelevated AFP  -Further evaluation with upper endoscopy once cleared from cardiac standpoint and improved coagulopathy  -Negative autoimmune or viral hepatitis serology     2.  Abdominal pain/abnormal CT  -Possible diverticulitis -diffuse abdominal pain however no leukocytosis  -Continue antibiotics  -Consider postponing colonoscopy for 4 to 6 weeks to minimize risk of perforation associated with acute diverticulitis     3.  Shortness of breath  -Ongoing tobacco abuse  -Per admitting/pulmonology     4.  CHF  -Acute on chronic  -ICD  -Per admitting/cardiology    Given significant improvement in symptoms, consider outpatient endoscopies -okay to be discharged from GI POV with follow-up as scheduled. Vasile Rice MD  11/18/2020  5:19 PM    NOTE:  This report was transcribed using voice recognition software. Every effort was made to ensure accuracy; however, inadvertent computerized transcription errors may be present.

## 2020-11-18 NOTE — PROGRESS NOTES
Dr. Thomas Yin consulted for acute on CKD.    Electronically signed by Zuleika Duggan RN on 11/18/2020 at 2:12 PM

## 2020-11-18 NOTE — PROGRESS NOTES
Pulmonary Progress Note    Admit Date: 11/15/2020  Hospital day                               PCP: Berto Walker MD    Chief Complaint (s):  Patient Active Problem List   Diagnosis    Hyperlipidemia    [facility] mellitus) [facility]    Coronary atherosclerosis of native coronary artery    GERD (gastroesophageal reflux disease)    Depression    Hypothyroid    Ischemic cardiomyopathy    Mitral regurgitation    AI (aortic insufficiency)    PAF (paroxysmal atrial fibrillation) ([facility])    Ulnar nerve neuropathy    ICD (implantable cardioverter-defibrillator) in place    Tricuspid regurgitation    CKD (chronic kidney disease) stage 4, GFR 15-29 ml/min ([facility])    Chronic systolic CHF (congestive heart failure) ([facility]    Anticoagulated on Coumadin    Right ventricular dysfunction    Congestive heart failure (HCC)    Persistent atrial fibrillation ([facility])    Anemia of chronic renal failure, stage 3 (moderate)    History of coronary artery bypass graft    HFrEF (heart failure with reduced ejection fraction) ([facility])    CKD (chronic kidney disease) stage 3, GFR 30-59 ml/min    Hypertension    Cirrhosis ([facility]    Ascites    Lesion of lung    Chronic anticoagulation    Supratherapeutic INR       Subjective:  · Resting this p.m.       Vitals:  VITALS:  BP (!) 78/50 Comment: MANUAL  Pulse 62   Temp 97.4 °F (36.3 °C) (Oral)   Resp 18   Ht 6' 2\" (1.88 m)   Wt 205 lb (93 kg)   SpO2 98%   BMI 26.32 kg/m²     24HR INTAKE/OUTPUT:      Intake/Output Summary (Last 24 hours) at 2020 1418  Last data filed at 2020  Gross per 24 hour   Intake --   Output 400 ml   Net -400 ml       24HR PULSE OXIMETRY RANGE:    SpO2  Av %  Min: 94 %  Max: 98 %    Medications:  IV:   dextrose         Scheduled Meds:   albumin human  25 g Intravenous Once    allopurinol  100 mg Oral Daily    amiodarone  200 mg Oral Daily    vitamin C  2,000 mg Oral Daily    aspirin  81 mg Oral Daily    atorvastatin  80 mg Oral Daily    gabapentin  100 mg Oral Daily    insulin lispro  0-6 Units Subcutaneous TID WC    insulin lispro  0-3 Units Subcutaneous Nightly    levothyroxine  75 mcg Oral Daily    magnesium oxide  400 mg Oral Daily    metoprolol succinate  25 mg Oral Daily    therapeutic multivitamin-minerals  1 tablet Oral Daily    pantoprazole  40 mg Oral QAM AC    sodium chloride flush  10 mL Intravenous 2 times per day    potassium chloride  20 mEq Oral BID    sacubitril-valsartan  1 tablet Oral BID    albumin human  25 g Intravenous See Admin Instructions    albuterol  2.5 mg Nebulization Q12H    ipratropium  0.5 mg Nebulization Q12H    bumetanide  2 mg Intravenous BID    warfarin (COUMADIN) daily dosing (placeholder)   Other RX Placeholder    metroNIDAZOLE  500 mg Intravenous Q8H    cefTRIAXone (ROCEPHIN) IV  1 g Intravenous Q24H       Diet:   DIET RENAL; Carb Control: 4 carb choices (60 gms)/meal     EXAM:  General: No distress. Eyes: PERRL. No sclera icterus. No conjunctival injection. ENT: No discharge. Pharynx clear. Neck: Trachea midline. Normal thyroid. Resp: No accessory muscle use. No rales. No wheezing. No rhonchi. CV: Regular rate. Regular rhythm. No murmur or rub. Abd: Non-tender. Non-distended. No masses. No organomegaly. Normal bowel sounds. Skin: Warm and dry. No nodule on exposed extremities. No rash on exposed extremities. Ext: No cyanosis, clubbing, edema  Lymph: No cervical LAD. No supraclavicular LAD. M/S: No cyanosis. No joint deformity. No clubbing. Neuro: Positive pupils/gag/corneals. Normal pain response.        Results:  CBC:   Recent Labs     11/16/20  0203 11/17/20  0652 11/18/20  0316   WBC 10.1 10.1 10.3   HGB 12.6 11.8* 11.7*   HCT 40.6 37.9 38.6   MCV 84.9 85.7 86.0    228 238     BMP:   Recent Labs     11/16/20  0203 11/17/20  0909 11/18/20  0316   * 131* 130*   K 4.4 4.2 4.9   CL 93* 94* 94*   CO2 20* 27 26   BUN 45* 50* 54* CREATININE 2.1* 2.4* 2.8*     LIVER PROFILE:   Recent Labs     11/15/20  2025 11/16/20  0203 11/17/20  0652 11/18/20 0316   AST 57* 55* 61* 51*   ALT 29 31 36 33   LIPASE 12*  --   --   --    BILIDIR  --  0.6* 0.5*  --    BILITOT 1.5* 1.1 0.9 0.7   ALKPHOS 136* 133* 122 130*     PT/INR:   Recent Labs     11/16/20 0203 11/17/20 0652 11/18/20 0316   PROTIME 59.5* 49.2* 40.0*   INR 5.0 4.2 3.3     APTT:   Recent Labs     11/16/20 0203   APTT 35.9*         Pathology:  1. N/A      Microbiology:  1. None    Recent ABG:   No results for input(s): PH, PO2, PCO2, HCO3, BE, O2SAT, METHB, O2HB, COHB, O2CON, HHB, THB in the last 72 hours. Recent Films:  US ABDOMEN LIMITED   Final Result   1. Cholelithiasis with gallbladder wall thickening. In light of liver   cirrhosis and ascites, the significance of the gallbladder wall thickening is   unclear. It may be due to liver disease. If there is clinical concern for   acute cholecystitis, consider nuclear medicine hepatobiliary scan. 2. Irregularity of the liver contour indicative of cirrhosis was better   visualized on the prior CT. 3. Ascites. CT ABDOMEN PELVIS WO CONTRAST Additional Contrast? Oral   Final Result   Cardiomegaly with the pleural effusions/atelectasis lung bases likely CHF. Pneumonia is less likely. Masslike densities in the right lung base probably rounded atelectasis. Please consider surveillance to exclude underlying malignancy. Cirrhotic liver with ascites. Dilated small bowel loops likely ileus. Diverticulosis of colon with mild thickening of the sigmoid colon likely  due   to suboptimal distention. Uncomplicated diverticulitis is less likely. 6 mm   stone in the distal left ureter/UVJ without significant hydronephrosis   in the left kidney. XR CHEST PORTABLE   Final Result   Congestive failure with extensive perihilar interstitial pulmonary edema and   large right pleural effusion.          IR US GUIDED PARACENTESIS    (Results Pending)   US ABDOMEN LIMITED    (Results Pending)   XR CHEST PORTABLE    (Results Pending)                Assessment:  1. Pleural effusion with associated ascites. INR is lower but still precludes instrumentation, Coumadin is on hold.        Plan:  1. Agree with diuresis. Eventual thoracentesis once the INR is in a near normal range.         Time at the bedside, reviewing labs and radiographs, reviewing updated notes and consultations, discussing with staff and family was more than 25 minutes. Please note that voice recognition technology was used in the preparation of this note and make therefore it may contain inadvertent transcription errors. If the patient is a COVID 19 isolation patient, the above physical exam reflects that of the examining physician for the day. Chris Thomas M.D., F.C.C.P.     [facility], [address] [place] [address], [address], [facility]

## 2020-11-19 NOTE — PROGRESS NOTES
PROGRESS NOTE  By Joshua Howe M.D. [facility]  Dr. Ruthy Obando M.D.,  Dr. Haydee Sneed M.D.,   Dr. Elsy Walker D.O.,  Dr. Madie Espinosa M.D.,  Dr. Karma Patino D.O.,  Libby Adams D.O. Denisse Ybarra  70 y.o.  male    SUBJECTIVE:  Denies abdominal pain but reports some feeling of increased abdominal distention which improves upon walking or sitting    OBJECTIVE:    BP (!) 105/57   Pulse 62   Temp 97.7 °F (36.5 °C) (Oral)   Resp 18   Ht 6' 2\" (1.88 m)   Wt 205 lb 6.4 oz (93.2 kg)   SpO2 92%   BMI 26.37 kg/m²     General: NAD/ male. AAO x3  HEENT: Moist oral mucosa/no discharge no seizures/anicteric sclera  Neck: Supple with trachea midline  Chest: CTA B/symmetrical excursions  Cor: Irregular  Abd.: Soft and obese. Moderately distended. Nontender  Extr.:  BLE without significant peripheral edema  Skin: Warm and dry/anicteric        DATA:    Monitor data reviewed -atrial fibrillation noted.        Lab Results   Component Value Date    WBC 9.8 11/19/2020    RBC 4.27 11/19/2020    HGB 11.3 11/19/2020    HCT 36.6 11/19/2020    MCV 85.7 11/19/2020    MCH 26.5 11/19/2020    MCHC 30.9 11/19/2020    RDW 25.9 11/19/2020     11/19/2020    MPV 11.3 11/19/2020     Lab Results   Component Value Date     11/19/2020    K 4.4 11/19/2020    K 4.4 11/16/2020    CL 97 11/19/2020    CO2 25 11/19/2020    BUN 51 11/19/2020    CREATININE 2.5 11/19/2020    CALCIUM 9.0 11/19/2020    PROT 7.2 11/19/2020    LABALBU 4.0 11/19/2020    BILITOT 0.7 11/19/2020    ALKPHOS 122 11/19/2020    AST 43 11/19/2020    ALT 26 11/19/2020     Lab Results   Component Value Date    LIPASE 12 11/15/2020     No results found for: AMYLASE      ASSESSMENT/PLAN:    1.  Cirrhosis/abdominal pain/ascites  -Cannot calculate meld reliably secondary to iatrogenic coagulopathy  -Nonelevated AFP  -Further evaluation with upper endoscopy once cleared from cardiac standpoint and improved coagulopathy -consider outpatient procedure given stable H&H and no significant acute complaints  -Negative autoimmune or viral hepatitis serology     2.  Abdominal pain/abnormal CT  -Possible diverticulitis -diffuse abdominal pain however no leukocytosis  -Continue antibiotics  -Consider postponing colonoscopy for 4 to 6 weeks to minimize risk of perforation associated with acute diverticulitis     3.  Shortness of breath  -Ongoing tobacco abuse  -Per admitting/pulmonology     4.  CHF  -Acute on chronic  -ICD  -Per admitting/cardiology      Plan for possible paracentesis tomorrow. Above discussed with the patient all questions answered to satisfaction. I asked patient if he wants me to communicate above to his daughters when he says that he will let me know when he talks to them. Please, see orders    Olivia Rodarte MD  11/19/2020  5:29 PM    NOTE:  This report was transcribed using voice recognition software. Every effort was made to ensure accuracy; however, inadvertent computerized transcription errors may be present.

## 2020-11-19 NOTE — CARE COORDINATION
11/19/2020  Social Work Discharge Planning:[address]24. [address] is following. Pt is now on room air.  Electronically signed by SURY Paul on 11/19/2020 at 10:24 AM

## 2020-11-19 NOTE — PROGRESS NOTES
INPATIENT CARDIOLOGY FOLLOW-UP    Name: Chemo Leal    Age: 70 y.o. Date of Service: 11/19/2020    Chief Complaint: Follow-up for acute on chronic HFrEF, CAD s/p CABG, ischemic CM, valvular heart disease, atrial fibrillation    Referring Physician: Lizz Goldstein MD    History of Present Illness:  Chemo Leal is a 70 y.o. male (known to me and he also follows with EP) who presented to [facility] on 11/15/2020 for further evaluation of progressive SOB (at rest/with exertion) and orthopnea for > 2 weeks. He was also followed previously by Dr. Loc Mooney. His extensive PMH is outlined in detail below (see A/P below). He denies recent exertional chest pain, palpitations, or syncope. He is compliant with CHF clinic follow-up. +SOB at rest on 11/16/2020 --> respiratory status and abdominal pain improved since admission per patient (still with SOB at rest). No new overnight cardiac complaints per patient. +hypotension (and worsening renal function) on 11/18/2020 and GDMT medications and diuretic held. Overnight, SBP . AP/VS on EKG and telemetry.     Review of Systems:   Cardiac: As per HPI  General: No fever, chills  Pulmonary: As per HPI  HEENT: No visual disturbances, difficult swallowing  GI: No nausea, vomiting  : No dysuria, hematuria  Endocrine: +hypothyrodism, +diabetes  Musculoskeletal: FUNEZ x 4, no focal motor deficits  Skin: Intact, no rashes  Neuro: No headache, seizures  Psych: Currently with no depression, anxiety    Past Medical History:  Past Medical History:   Diagnosis Date    Acid reflux     Acute MI ([facility] [address] 01/25/97,01/04    Anxiety     CAD (coronary artery disease)     follows w Dr. Shira Lynch     Diabetes mellitus ([facility] [address]     Fluid retention     history of    Hyperlipidemia     Hypertension     Ischemic cardiomyopathy     Osteoarthritis     Post PTCA 83/58/51    Systolic dysfunction, left ventricle     follows with Dr. Dylon Caballero Thyroid disease        Past Surgical History:  Past Surgical History:   Procedure Laterality Date    CARDIAC DEFIBRILLATOR PLACEMENT Left 2013    with pacemaker ([employer])    CARDIOVERSION  09/04/2020    Successful     Dr. Gloria Richmond CATH LAB PROCEDURE  01/27/97,11/00    Critical lesion mid left circumflex,significant lesion mid-LAD and first diagonal    ECHO COMPL W DOP COLOR FLOW  4/11/2013         ECHO COMPL W DOP COLOR FLOW  4/21/2013         MITRAL VALVE SURGERY      TRANSESOPHAGEAL ECHOCARDIOGRAM  4/12/2013    Dr. Janyce Peabody TRANSESOPHAGEAL ECHOCARDIOGRAM  08/02/2017       Family History:  Family History   Problem Relation Age of Onset    Hypertension Mother     Hypertension Father     Heart Surgery Father     High Cholesterol Father        Social History:  Social History     Socioeconomic History    Marital status:      Spouse name: Not on file    Number of children: Not on file    Years of education: Not on file    Highest education level: Not on file   Occupational History    Not on file   Social Needs    Financial resource strain: Not on file    Food insecurity     Worry: Not on file     Inability: Not on file    Transportation needs     Medical: Not on file     Non-medical: Not on file   Tobacco Use    Smoking status: Current Every Day Smoker     Packs/day: 0.25     Years: 50.00     Pack years: 12.50     Types: Cigarettes    Smokeless tobacco: Never Used    Tobacco comment: currently 3-4 cigarettes a day (11/8/19); used to be 2 packs a day   Substance and Sexual Activity    Alcohol use: Yes     Frequency: Monthly or less     Drinks per session: 1 or 2     Binge frequency: Never     Comment: rare tequilla/beer    Drug use: Never    Sexual activity: Not on file   Lifestyle    Physical activity     Days per week: Not on file     Minutes per session: Not on file    Stress: Not on file   Relationships    Social connections albuterol sulfate HFA (PROAIR HFA) 108 (90 Base) MCG/ACT inhaler Inhale 2 puffs into the lungs every 4 hours as needed for Wheezing or Shortness of Breath 3/12/19 11/8/20  Isela Whitley MD   bumetanide (BUMEX) 2 MG tablet Take 1 tablet by mouth See Admin Instructions 2mg in am and 1mg in afternoon. Dr Ana Luisa Dale reduced dose 2/13/19. Patient taking differently: Take by mouth 2mg in am and 1mg in afternoon. Dr Ana Luisa Dale reduced dose 2/13/19. 2/28/19   Anika Posadas MD   potassium chloride (KLOR-CON M) 20 MEQ extended release tablet Take 1 tablet by mouth 2 times daily 1/17/19   Anika Posadas MD   Magnesium 400 MG TABS Take 400 mg by mouth daily    Historical Provider, MD   Ascorbic Acid (VITAMIN C) 1000 MG tablet Take 2,000 mg by mouth daily    Historical Provider, MD   Multiple Vitamins-Minerals (THERAPEUTIC MULTIVITAMIN-MINERALS) tablet Take 1 tablet by mouth daily    Historical Provider, MD   colchicine (COLCRYS) 0.6 MG tablet Take 0.6 mg by mouth 2 times daily as needed     Historical Provider, MD   allopurinol (ZYLOPRIM) 100 MG tablet Take 1 tablet by mouth daily 12/3/17   Lilia Rodriguez MD   levothyroxine (SYNTHROID) 75 MCG tablet Take 1 tablet by mouth Daily 12/4/17   Lilia Rodriguez MD   insulin glargine (LANTUS) 100 UNIT/ML injection vial Inject 20 Units into the skin every morning    Historical Provider, MD   atorvastatin (LIPITOR) 80 MG tablet Take 80 mg by mouth daily    Historical Provider, MD   linagliptin (TRADJENTA) 5 MG tablet Take 5 mg by mouth daily    Historical Provider, MD   albuterol-ipratropium (COMBIVENT)  MCG/ACT inhaler Inhale 2 puffs into the lungs every 12 hours.  4/23/13   Leticia Eagle MD   omeprazole (PRILOSEC) 20 MG capsule Take 20 mg by mouth as needed     Historical Provider, MD       Current Medications:  Current Facility-Administered Medications   Medication Dose Route Frequency Provider Last Rate Last Dose    metoprolol succinate (TOPROL XL) extended release tablet 25 mg  25 mg Oral Once Willian Randall MD        midodrine (PROAMATINE) tablet 5 mg  5 mg Oral BID  Willian Randall MD        albuterol (PROVENTIL) nebulizer solution 2.5 mg  2.5 mg Nebulization Q6H PRN [place] Harvey, APRN - CNP   2.5 mg at 11/19/20 0531    allopurinol (ZYLOPRIM) tablet 100 mg  100 mg Oral Daily Audra Banco Masters, APRN - CNP   100 mg at 11/19/20 9327    amiodarone (CORDARONE) tablet 200 mg  200 mg Oral Daily Audra Banco Masters, APRN - CNP   200 mg at 11/17/20 0840    ascorbic acid (VITAMIN C) tablet 2,000 mg  2,000 mg Oral Daily Audra Banco Masters, APRN - CNP   2,000 mg at 11/19/20 0914    aspirin EC tablet 81 mg  81 mg Oral Daily Audra Banco Masters, APRN - CNP   81 mg at 11/19/20 0914    atorvastatin (LIPITOR) tablet 80 mg  80 mg Oral Daily Audra Banco Masters, APRN - CNP   80 mg at 11/19/20 1148    gabapentin (NEURONTIN) capsule 100 mg  100 mg Oral Daily Audra Banco Masters, APRN - CNP   100 mg at 11/19/20 0915    insulin lispro (HUMALOG) injection vial 0-6 Units  0-6 Units Subcutaneous TID  Audra Banco Masters, APRN - CNP   1 Units at 11/18/20 1139    insulin lispro (HUMALOG) injection vial 0-3 Units  0-3 Units Subcutaneous Nightly Audra Banco Masters, APRN - CNP   1 Units at 11/18/20 2030    glucose (GLUTOSE) 40 % oral gel 15 g  15 g Oral PRN Audra Banco Masters, APRN - CNP        dextrose 50 % IV solution  12.5 g Intravenous PRN Aurda Lee Masters, APRN - CNP        glucagon (rDNA) injection 1 mg  1 mg Intramuscular PRN Audra Banco Masters, APRN - CNP        dextrose 5 % solution  100 mL/hr Intravenous PRN Audra Banco Masters, APRN - CNP        levothyroxine (SYNTHROID) tablet 75 mcg  75 mcg Oral Daily Audra Banco Masters, APRN - CNP   75 mcg at 11/19/20 9342    magnesium oxide (MAG-OX) tablet 400 mg  400 mg Oral Daily ANTONY Peterson CNP   400 mg at 11/19/20 0914    metoprolol succinate (TOPROL XL) extended release tablet 25 mg  25 mg ANTONY Sherman - CNP        metronidazole (FLAGYL) 500 mg in NaCl 100 mL IVPB premix  500 mg Intravenous Q8H Harry Ruiz  mL/hr at 11/19/20 0918 500 mg at 11/19/20 0918    cefTRIAXone (ROCEPHIN) 1 g in sterile water 10 mL IV syringe  1 g Intravenous Q24H Harry Ruiz MD   1 g at 11/18/20 1649      dextrose         Physical Exam:  BP 94/64   Pulse 75   Temp 97.6 °F (36.4 °C) (Oral)   Resp 18   Ht 6' 2\" (1.88 m)   Wt 205 lb 6.4 oz (93.2 kg)   SpO2 91%   BMI 26.37 kg/m²   Wt Readings from Last 3 Encounters:   11/19/20 205 lb 6.4 oz (93.2 kg)   11/10/20 202 lb (91.6 kg)   11/06/20 198 lb (89.8 kg)     Appearance: Awake, alert, no acute respiratory distress   Skin: Intact, no rash   Head: Normocephalic, atraumatic   Eyes: EOMI, no conjunctival erythema   ENMT: No pharyngeal erythema, MMM, no rhinorrhea   Neck: Supple, no carotid bruits   Lungs: Decreased BS B/L, no wheezing  Cardiac: Regular rate and rhythm, 1/6 systolic murmur  Abdomen: Soft, nontender, +bowel sounds   Extremities: Moves all extremities x 4, no lower extremity edema   Neurologic: No focal motor deficits apparent, normal mood and affect     Intake/Output:    Intake/Output Summary (Last 24 hours) at 11/19/2020 1000  Last data filed at 11/19/2020 0921  Gross per 24 hour   Intake 120 ml   Output 500 ml   Net -380 ml     I/O this shift:  In: -   Out: 300 [Urine:300]    Laboratory Tests:  Recent Labs     11/17/20  0909 11/18/20  0316 11/19/20  0345   * 130* 135   K 4.2 4.9 4.4   CL 94* 94* 97*   CO2 27 26 25   BUN 50* 54* 51*   CREATININE 2.4* 2.8* 2.5*   GLUCOSE 94 106* 106*   CALCIUM 8.7 8.6 9.0     Lab Results   Component Value Date    MG 2.2 11/19/2020     Recent Labs     11/17/20  0652 11/18/20 0316 11/19/20  0345   ALKPHOS 122 130* 122   ALT 36 33 26   AST 61* 51* 43*   PROT 7.0 6.9 7.2   BILITOT 0.9 0.7 0.7   BILIDIR 0.5*  --   --    LABALBU 3.4* 3.7 4.0     Recent Labs     11/17/20  0652 11/18/20  0316 11/19/20  0345   WBC 10.1 10.3 9.8   RBC 4.42 4.49 4.27   HGB 11.8* 11.7* 11.3*   HCT 37.9 38.6 36.6*   MCV 85.7 86.0 85.7   MCH 26.7 26.1 26.5   MCHC 31.1* 30.3* 30.9*   RDW 25.0* 25.2* 25.9*    238 231   MPV 10.8 11.0 11.3     Lab Results   Component Value Date    CKTOTAL 25 (L) 05/14/2013    CKMB 0.5 05/14/2013    TROPONINI 0.07 (H) 11/16/2020    TROPONINI 0.05 (H) 11/16/2020    TROPONINI 0.07 (H) 11/15/2020     Lab Results   Component Value Date    INR 2.6 11/19/2020    INR 3.3 11/18/2020    INR 4.2 11/17/2020    PROTIME 31.4 (H) 11/19/2020    PROTIME 40.0 (H) 11/18/2020    PROTIME 49.2 (H) 11/17/2020     Lab Results   Component Value Date    TSH 7.620 (H) 11/16/2020     Lab Results   Component Value Date    LABA1C 5.8 (H) 11/16/2020     No results found for: EAG  Lab Results   Component Value Date    CHOL 85 10/06/2020    CHOL 85 12/02/2017    CHOL 215 (H) 04/09/2013     Lab Results   Component Value Date    TRIG 60 10/06/2020    TRIG 87 12/02/2017    TRIG 202 (H) 04/09/2013     Lab Results   Component Value Date    HDL 35 10/06/2020    HDL 20 12/02/2017    HDL 32.4 (A) 04/09/2013     Lab Results   Component Value Date    LDLCALC 38 10/06/2020    LDLCALC 48 12/02/2017    LDLCALC 142 (H) 04/09/2013     Lab Results   Component Value Date    LABVLDL 12 10/06/2020    LABVLDL 17 12/02/2017     No results found for: CHOLHDLRATIO  No results for input(s): PROBNP in the last 72 hours. Cardiac Tests:  EKG: AP/VS    Telemetry reviewed (date: 11/19/2020): AP/VS    Echocardiogram: 7/14/15  Ejection fraction is visually estimated at 35%. The inferior and inferolateral walls are severely hypokinetic/akinetic. Mildly dilated right ventricle with normal right ventricular function. Indeterminate diastolic function. Moderately dilated left atrium. Pulmonary vein doppler suggestive of elevated left atrial pressure. Status post mitral annular ring. Mean mitral valve gradient 5.9 mmHg.   Moderate mitral regurgitation. Moderate aortic regurgitation. Mild tricuspid regurgitation. PASP is estimated at 37 mmHg. Mild pulmonic regurgitation.      NNEKA: 8/2/17 (Dr. Miller Ro)   Ejection fraction is visually estimated at 35%.   Inferolateral/inferior hypokinesis.   Rakel right ventricle size with low normal right ventricular function.   Moderately dilated left atrium.   History of oversewing of ROBER. There is color flow from the LA into the   ROBER. No evidence of a left atrial appendage thrombus.   No evidence of interatrial shunting on bubble study.   History of mitral annular ring (30mm Future Ring)   MV mean gradient 3.4 mmHg.   Poor coaptation of MV leaflets.   Severe mitral regurgitation.   Moderate aortic regurgitation.   Moderate tricuspid regurgitation.  RV-RA gradient is estimated at 35 mmHg.   Mild pulmonic regurgitation.     Echocardiogram: 1/9/18 ([facility])  - Exam indication: Initial postoperative evaluation of prosthetic valve (baseline)  - The left ventricle is moderately dilated. Left ventricular systolic function is   severely decreased. EF = 22 ± 5% (2D biplane) Left ventricular diastolic function   was not evaluated due to prosthetic valve. - The right ventricle is dilated. Right ventricular systolic function is normal.  - The left atrial cavity is severely dilated. - The right atrial cavity is dilated. - Post mitral valve replacement. Biocor prosthetic mitral valve (size #29). There   is trivial mitral valve regurgitation. The peak gradient is 23 mmHg and the mean   gradient is 8 mmHg. MV gradients obtained at a HR of 80 bpm. Prior peak/mean   gradients of 22/8 mmHg. - Post tricuspid valve repair. tricuspid valve annuloplasty ring (size #30). There   is mild (1+ - 2+) tricuspid valve regurgitation. The peak gradient is 4 mmHg and   the mean gradient is 2 mmHg. TV gradients obtained at a HR of 80 bpm. Prior mean   gradient of 3 mmHg. - Trifecta prosthetic aortic valve (size #23).  The peak gradient is 10 11/7/2017 ([facility])  3. Ischemic cardiomyopathy with history of severely reduced EF -- s/p upgrade to dual chamber ICD on 9/12/13 --> up-titration of GDMT has been limited by intermittent hypotension historically, including this admission  4. Persistent atrial fibrillation (anticoagulated with coumadin, s/p over-sewing of LA appendage at the time of 4/2013 CABG) -- follows with EP, s/p CVN on 9/4/2020, on amiodarone  5. Sick sinus syndrome s/p ICD -- follows with EP  6. MR s/p MV repair on 4/15/13 (30 mm Future Ring)  7. MR, TR, AR s/p MVR (#29 Biocor), AVR (#23 Trifecta), TV repair (#30 CE ring) on 11/7/2017  8. HTN -- controlled / historically with intermittent hypotension (+hypotension this AM)  9. HLD   10. DM -- Hgb A1c 6.3 --> 5.8  11. Tobacco abuse -- currently 1 pack/week  12. Possible PANTERA  13. Hypothyroidism - on synthroid  14.  Acute on CKD -- most recent Cr 2.1 --> 2.4 --> 2.8 --> 2.5, he follows with Dr. Erin almendarez 49-51 mg BID  - Resume Toprol XL 25 mg daily  - Continue amiodarone (follows with EP; monitor AF burden on device interrogations)  - Pulmonary planning on thoracentesis once INR acceptable  - Coumadin on hold -- INR 5.9 --> 5.0 --> 4.2 --> 3.3 --> 2.6  - Monitor renal function and electrolytes closely -- care per nephrology  - Monitor telemetry  - Serial echocardiograms  - Sleep study ordered at prior office visit (patient cancelled study/deferred work-up)  - Continue with CHF clinic follow-up on d/c  - Treatment options for severe LV dysfunction reviewed this admission and discussed again re: re-establishing with advanced heart failure specialist (he elects to establish at [facility])  - I discussed the case with the patient's daughter Starr Norris) on the telephone    Mazin Loja MD  [facility]

## 2020-11-19 NOTE — PROGRESS NOTES
Pharmacy Consultation Note  (Anticoagulant Dosing and Monitoring)    Initial consult date: 11/16/2020  Consulting physician: YOGESH Villaseñor    Allergies:  Patient has no known allergies. Ht Readings from Last 1 Encounters:   11/15/20 6' 2\" (1.88 m)     Wt Readings from Last 1 Encounters:   11/19/20 205 lb 6.4 oz (93.2 kg)       Warfarin Indication Target   INR [place] Dose  (if applicable) Diet/Feeding Tube   Atrial fibrillation 2-3 Warfarin 5 mg Mon/Wed/Fri/Sun  ----------------------------  Warfarin 2.5 mg Tues/Thurs/Sat Renal/carb control diet       Vitamin K or Blood product  Administration Date                 Warfarin drug-drug interactions  Start  Stop Home Med?  Comments    Metronidazole 11/16   May increase INR   Amiodarone   Y                   TSH:    Lab Results   Component Value Date    TSH 7.620 11/16/2020        Hepatic Function Panel:                          Lab Results   Component Value Date    ALKPHOS 122 11/19/2020    ALT 26 11/19/2020    AST 43 11/19/2020    PROT 7.2 11/19/2020    BILITOT 0.7 11/19/2020    BILIDIR 0.5 11/17/2020    IBILI 0.4 11/17/2020    LABALBU 4.0 11/19/2020       Date Warfarin Dose INR Heparin or LMWH HBG/  HCT PLT Comment   11/15 No dose 5.9 ------------- 12.6/41.6 250    11/16 No dose 5 ------------- 12.6/40.6 248    11/17 No dose 4.2 ------------- 11.8/37.9 228    11/18 No dose 3.3 ------------- 11.7/38.6 238    11/19 2.5mg 2.6 ------------- 11.3/36.6 231      Assessment:  · Patient is a 70year old male on warfarin for atrial fibrillation  · Per documentation, patient takes warfarin 5 mg on Monday, Wednesday, Friday, and Sunday and warfarin 2.5 mg on Tuesday, Thursday, and Saturday  · INR goal = 2-3; INR today = 2.6 (therapeutic, decreased from 3.3 yesterday) -- likely will further decrease tomorrow    Plan:  · Will give warfarin 2.5mg tonight with very close monitoring in the setting of metronidazole which poses a significant drug-drug interaction with warfarin  · Daily PT/INR until the INR is stable within the therapeutic range  · Pharmacist will follow and monitor/adjust dosing as necessary    Lulú Kohli PharmD, BCPS 11/19/2020 10:27 AM   Ext: [id number]

## 2020-11-19 NOTE — PROGRESS NOTES
Nephrology Progress Note  Patient's Name: Darwyn Brittle  1:35 PM  11/19/2020    Nephrologist: Darline Elder    Reason for Consult:  JULIANO on CKD  Requesting Physician:  Alvaro Beasley MD    Chief Complaint:  Abd Pain and SOB    History Obtained From:  patient and past medical records    History of Present Ilness from the 11/18/20 note:    Darwyn Brittle is a 70 y.o. male with prior history CKD G3B with a baseline serum cr 1.5-2.2mg/dl with an e-GFR=30-46ml/min in the setting of CAD/HTN/DM2/Tob Abuse with presumed microvascular disease  4/28/20 R hrplik87.4cm and L Kidney 9.5cm, no PVR, no hydro or masses or perinephric fluid  10/6/20 Hep B&C non reactive  Pt admitted via the ED 11/15/20 when he presented with abd pain and SOB. The abd pain was progressive and he had a decreased appetite and presented to the ED. No emesis or fever. Workup showed ascites and cirrhosis on Ct Scan. He was placed on IV bumetanide and Entresto.  Bumetanide held this AM. Received SPA for Hypotension    11/19/20: Pt states he feels better this AM, he received 25mg metoprolol earlier this AM and BP 92/68    Past Medical History:   Diagnosis Date    Acid reflux     Acute MI ([facility] [address] 01/25/97,01/04    Anxiety     CAD (coronary artery disease)     follows w Dr. Donovan Longoria Diabetes mellitus ([facility] [address]     Fluid retention     history of    Hyperlipidemia     Hypertension     Ischemic cardiomyopathy     Osteoarthritis     Post PTCA 56/73/33    Systolic dysfunction, left ventricle     follows with Dr. Donovan Longoria Thyroid disease        Past Surgical History:   Procedure Laterality Date    CARDIAC DEFIBRILLATOR PLACEMENT Left 2013    with pacemaker ([employer])    CARDIOVERSION  09/04/2020    Successful     Dr. Tory Coombs CATH LAB PROCEDURE  01/27/97,11/00    Critical lesion mid left circumflex,significant lesion mid-LAD and first diagonal    MPV 11.3 11/19/2020     CBC with Differential:    Lab Results   Component Value Date    WBC 9.8 11/19/2020    RBC 4.27 11/19/2020    HGB 11.3 11/19/2020    HCT 36.6 11/19/2020     11/19/2020    MCV 85.7 11/19/2020    MCH 26.5 11/19/2020    MCHC 30.9 11/19/2020    RDW 25.9 11/19/2020    SEGSPCT 54 05/15/2013    LYMPHOPCT 8.1 11/19/2020    MONOPCT 8.8 11/19/2020    BASOPCT 0.3 11/19/2020    MONOSABS 0.86 11/19/2020    LYMPHSABS 0.79 11/19/2020    EOSABS 0.16 11/19/2020    BASOSABS 0.03 11/19/2020     Hemoglobin/Hematocrit:    Lab Results   Component Value Date    HGB 11.3 11/19/2020    HCT 36.6 11/19/2020     CMP:    Lab Results   Component Value Date     11/19/2020    K 4.4 11/19/2020    K 4.4 11/16/2020    CL 97 11/19/2020    CO2 25 11/19/2020    BUN 51 11/19/2020    CREATININE 2.5 11/19/2020    GFRAA 31 11/19/2020    LABGLOM 25 11/19/2020    GLUCOSE 106 11/19/2020    PROT 7.2 11/19/2020    LABALBU 4.0 11/19/2020    CALCIUM 9.0 11/19/2020    BILITOT 0.7 11/19/2020    ALKPHOS 122 11/19/2020    AST 43 11/19/2020    ALT 26 11/19/2020     BMP:    Lab Results   Component Value Date     11/19/2020    K 4.4 11/19/2020    K 4.4 11/16/2020    CL 97 11/19/2020    CO2 25 11/19/2020    BUN 51 11/19/2020    LABALBU 4.0 11/19/2020    CREATININE 2.5 11/19/2020    CALCIUM 9.0 11/19/2020    GFRAA 31 11/19/2020    LABGLOM 25 11/19/2020    GLUCOSE 106 11/19/2020     BUN/Creatinine:    Lab Results   Component Value Date    BUN 51 11/19/2020    CREATININE 2.5 11/19/2020     Hepatic Function Panel:    Lab Results   Component Value Date    ALKPHOS 122 11/19/2020    ALT 26 11/19/2020    AST 43 11/19/2020    PROT 7.2 11/19/2020    BILITOT 0.7 11/19/2020    BILIDIR 0.5 11/17/2020    IBILI 0.4 11/17/2020    LABALBU 4.0 11/19/2020     Albumin:    Lab Results   Component Value Date    LABALBU 4.0 11/19/2020     Calcium:    Lab Results   Component Value Date    CALCIUM 9.0 11/19/2020     Ionized Calcium:  No results found for: IONCA  Magnesium:    Lab Results   Component Value Date    MG 2.2 11/19/2020     Phosphorus:    Lab Results   Component Value Date    PHOS 3.1 11/19/2020     LDH:  No results found for: LDH  Uric Acid:    Lab Results   Component Value Date    LABURIC 8.1 10/06/2020     PT/INR:    Lab Results   Component Value Date    PROTIME 31.4 11/19/2020    INR 2.6 11/19/2020     PTT:    Lab Results   Component Value Date    APTT 35.9 11/16/2020   [APTT}  Troponin:    Lab Results   Component Value Date    TROPONINI 0.07 11/16/2020     U/A:    Lab Results   Component Value Date    COLORU Yellow 11/15/2020    PROTEINU TRACE 11/15/2020    PHUR 5.0 11/15/2020    WBCUA NONE 11/15/2020    RBCUA NONE 11/15/2020    RBCUA NONE 05/14/2013    BACTERIA NONE SEEN 11/15/2020    CLARITYU Clear 11/15/2020    SPECGRAV >=1.030 11/15/2020    LEUKOCYTESUR Negative 11/15/2020    UROBILINOGEN 0.2 11/15/2020    BILIRUBINUR Negative 11/15/2020    BLOODU Negative 11/15/2020    GLUCOSEU Negative 11/15/2020     ABG:    Lab Results   Component Value Date    PH 7.419 01/17/2019    PCO2 42.6 04/15/2013    PO2 79.6 04/15/2013    HCO3 24.2 04/15/2013    BE 4.2 01/17/2019    O2SAT 44.3 01/17/2019     HgBA1c:    Lab Results   Component Value Date    LABA1C 5.8 11/16/2020     Microalbumen/Creatinine ratio:  No components found for: RUCREAT  FLP:    Lab Results   Component Value Date    TRIG 60 10/06/2020    HDL 35 10/06/2020    LDLCALC 38 10/06/2020    LABVLDL 12 10/06/2020     TSH:    Lab Results   Component Value Date    TSH 7.620 11/16/2020     VITAMIN B12: No components found for: B12  FOLATE:    Lab Results   Component Value Date    FOLATE >20.0 10/06/2020     Iron Saturation:  No components found for: PERCENTFE  FERRITIN:    Lab Results   Component Value Date    FERRITIN 625 11/17/2020     AMYLASE:  No results found for: AMYLASE  LIPASE:    Lab Results   Component Value Date    LIPASE 12 11/15/2020     Fibrinogen Level:  No components found for: FIB  24 Hour Urine for Protein:  No components found for: RAWUPRO, UHRS3, PHAC52TG, UTV3  24 Hour Urine for Creatinine Clearance:  No components found for: CREAT4, UHRS10, UTV10  PSA: No results found for: PSA     Imaging:  CXR results:  EXAMINATION:    ONE XRAY VIEW OF THE CHEST         11/15/2020 8:32 pm         COMPARISON:    March 11, 2019         HISTORY:    ORDERING SYSTEM PROVIDED HISTORY: shortness of breath    TECHNOLOGIST PROVIDED HISTORY:    Reason for exam:->shortness of breath         FINDINGS:    Cardiac silhouette is enlarged.         Dual-chamber pacemaker-AICD in place.  Status post median sternotomy.         Prominent vascular congestion with  interstitial and hazy airspace disease in    the perihilar distribution consistent with congestive failure and perihilar    pulmonary edema.         Large right pleural effusion, significantly increased in the interval since    the prior examination.              Impression    Congestive failure with extensive perihilar interstitial pulmonary edema and    large right pleural effusion. EXAMINATION:    RIGHT UPPER QUADRANT ULTRASOUND         11/16/2020 10:29 am         COMPARISON:    CT abdomen and pelvis, 11/15/2020         HISTORY:    ORDERING SYSTEM PROVIDED HISTORY: Cirrhosis    TECHNOLOGIST PROVIDED HISTORY:    Reason for exam:->Cirrhosis    Reason for exam:->ascites survey/ liver US    What reading provider will be dictating this exam?->CRC         FINDINGS:    LIVER:  The subtle irregularity of the liver contour indicative of cirrhosis    was better visualized on the earlier CT.  No focal hepatic lesion or ductal    dilatation is identified.         BILIARY SYSTEM:  Cholelithiasis is noted.  The gallbladder is nonspecifically    thickened to 5.2 mm.  The sonographic Naranjo sign is negative.         Common bile duct is within normal limits measuring 5 mm. .         RIGHT KIDNEY: The right kidney is grossly unremarkable without evidence of    hydronephrosis.       and mild ectasia    of the abdominal aorta measuring 2.3 cm.  There is edema in the mesentery. Slightly dilated small bowel loops measuring up to 2.5 cm is noted.         Pelvis.  The bladder is contracted with wall thickening.  6 mm obstructing    stone is identified in the left UVJ without significant hydronephrosis in the    left kidney.  There is diverticulosis of the colon with mild thickening of    the sigmoid colon which is collapsed.  Presacral edema is present.  The    appendix is normal.              Impression    Cardiomegaly with the pleural effusions/atelectasis lung bases likely CHF. Pneumonia is less likely.         Masslike densities in the right lung base probably rounded atelectasis. Please consider surveillance to exclude underlying malignancy.         Cirrhotic liver with ascites.         Dilated small bowel loops likely ileus.         Diverticulosis of colon with mild thickening of the sigmoid colon likely  due    to suboptimal distention.  Uncomplicated diverticulitis is less likely.         6 mm   stone in the distal left ureter/UVJ without significant hydronephrosis    in the left kidney. Assessment  1-Stage I JULIANO-most probably due to decreased effective renal perfusion with the HFrEF and the Hypotension in the setting of the loop diuretic and sacubitril/valsartan  UA 11/15 (-) blood or protein  FeUrea 29.46% consistent with decreased effective renal perfusion  Cr down from 2.8-->2.5mg/dl  PLAN:1.  Follow cr with the resumption of the Entresto and bumex    2-CKD G3B with a baseline serum cr 1.5-2.2mg/dl with an e-GFR=30-46ml/min in the setting of CAD/HTN/DM2/Tob Abuse with presumed microvascular disease      3-HFrEF with hypotension  Cortisol low at 8.60  PLAN:1.Resume the Entresto at 1/2 the previous dose and the bumetanide 1mg po bid  2. Trial of midodrine  3.  Check Cosyntropin Stim test    4- Anemia in CKD  HgB above goal 10  PLAN:1. Start ARTIS if HgB <10    5- Sec HPTH of Renal Origin  , Vit D 46, Ca++ ionized WNL, PO4 3.1  PLAN:1. Follow      Thank you Dr. Genny Chavez MD for allowing us to participate in care of Pacheco Keller Parris  1:35 PM  11/19/2020

## 2020-11-19 NOTE — PROGRESS NOTES
Pulmonary Progress Note    Admit Date: 11/15/2020  Hospital day                               PCP: Bala Terry MD    Chief Complaint (s):  Patient Active Problem List   Diagnosis    Hyperlipidemia    [facility]) [facility] [address]    Coronary atherosclerosis of native coronary artery    GERD (gastroesophageal reflux disease)    Depression    Hypothyroid    Ischemic cardiomyopathy    Mitral regurgitation    AI (aortic insufficiency)    [facility]ox[facility] fibrillation[facility]    Ulnar nerve neuropathy    ICD (implantable cardioverter-defibrillator) in place    Tricuspid regurgitation    CKD (chronic kidney disease) stage 4, GFR 15-29 ml/min ([facility])    Chronic systolic CHF (congestive heart failure) ([facility] [address]    Anticoagulated on Coumadin    Right ventricular dysfunction    Congestive heart failure (HCC)    Persistent atrial fibrillation (HCC)    Anemia of chronic renal failure, stage 3 (moderate)    History of coronary artery bypass graft    HFrEF (heart failure with reduced ejection fraction) ([facility])    CKD (chronic kidney disease) stage 3, GFR 30-59 ml/min    Hypertension    Cirrhosis ([facility]    Ascites    Lesion of lung    Chronic anticoagulation    Supratherapeutic INR       Subjective:  · Awake, alert and pleasant today. INR remains elevated precluding thoracentesis.     Vitals:  VITALS:  BP (!) 105/57   Pulse 62   Temp 97.7 °F (36.5 °C) (Oral)   Resp 18   Ht 6' 2\" (1.88 m)   Wt 205 lb 6.4 oz (93.2 kg)   SpO2 92%   BMI 26.37 kg/m²     24HR INTAKE/OUTPUT:      Intake/Output Summary (Last 24 hours) at 2020 1848  Last data filed at 2020 1230  Gross per 24 hour   Intake 520 ml   Output 600 ml   Net -80 ml       24HR PULSE OXIMETRY RANGE:    SpO2  Av %  Min: 91 %  Max: 100 %    Medications:  IV:   dextrose         Scheduled Meds:   warfarin  2.5 mg Oral Once    bumetanide  1 mg Oral BID    sacubitril-valsartan  1 tablet Oral BID    [START ON 11/20/2020] cosyntropin  0.25 mg Intravenous Once    [START ON 11/20/2020] albumin human  25 g Intravenous On Call    midodrine  5 mg Oral BID WC    allopurinol  100 mg Oral Daily    amiodarone  200 mg Oral Daily    vitamin C  2,000 mg Oral Daily    aspirin  81 mg Oral Daily    atorvastatin  80 mg Oral Daily    gabapentin  100 mg Oral Daily    insulin lispro  0-6 Units Subcutaneous TID     insulin lispro  0-3 Units Subcutaneous Nightly    levothyroxine  75 mcg Oral Daily    magnesium oxide  400 mg Oral Daily    metoprolol succinate  25 mg Oral Daily    therapeutic multivitamin-minerals  1 tablet Oral Daily    pantoprazole  40 mg Oral QAM AC    sodium chloride flush  10 mL Intravenous 2 times per day    [Held by provider] potassium chloride  20 mEq Oral BID    albuterol  2.5 mg Nebulization Q12H    ipratropium  0.5 mg Nebulization Q12H    warfarin (COUMADIN) daily dosing (placeholder)   Other RX Placeholder    metroNIDAZOLE  500 mg Intravenous Q8H    cefTRIAXone (ROCEPHIN) IV  1 g Intravenous Q24H       Diet:   DIET RENAL; Carb Control: 4 carb choices (60 gms)/meal     EXAM:  General: No distress. Eyes: PERRL. No sclera icterus. No conjunctival injection. ENT: No discharge. Pharynx clear. Neck: Trachea midline. Normal thyroid. Resp: No accessory muscle use. No rales. No wheezing. No rhonchi. CV: Regular rate. Regular rhythm. No murmur or rub. Abd: Non-tender. Non-distended. No masses. No organomegaly. Normal bowel sounds. Skin: Warm and dry. No nodule on exposed extremities. No rash on exposed extremities. Ext: No cyanosis, clubbing, edema  Lymph: No cervical LAD. No supraclavicular LAD. M/S: No cyanosis. No joint deformity. No clubbing. Neuro: Positive pupils/gag/corneals. Normal pain response.        Results:  CBC:   Recent Labs     11/17/20  0652 11/18/20  0316 11/19/20  0345   WBC 10.1 10.3 9.8   HGB 11.8* 11.7* 11.3*   HCT 37.9 38.6 36.6*   MCV 85.7 86.0 85.7    238 231     BMP:   Recent Labs     11/17/20  0909 11/18/20  0316 11/19/20  0345   * 130* 135   K 4.2 4.9 4.4   CL 94* 94* 97*   CO2 27 26 25   PHOS  --   --  3.1   BUN 50* 54* 51*   CREATININE 2.4* 2.8* 2.5*     LIVER PROFILE:   Recent Labs     11/17/20  0652 11/18/20  0316 11/19/20  0345   AST 61* 51* 43*   ALT 36 33 26   BILIDIR 0.5*  --   --    BILITOT 0.9 0.7 0.7   ALKPHOS 122 130* 122     PT/INR:   Recent Labs     11/17/20  0652 11/18/20 0316 11/19/20  0345   PROTIME 49.2* 40.0* 31.4*   INR 4.2 3.3 2.6     APTT:   No results for input(s): APTT in the last 72 hours. Pathology:  1. N/A      Microbiology:  1. None    Recent ABG:   No results for input(s): PH, PO2, PCO2, HCO3, BE, O2SAT, METHB, O2HB, COHB, O2CON, HHB, THB in the last 72 hours. Recent Films:  XR CHEST PORTABLE   Final Result   CHF, slight improvement since prior study. US ABDOMEN LIMITED   Final Result   1. Cholelithiasis with gallbladder wall thickening. In light of liver   cirrhosis and ascites, the significance of the gallbladder wall thickening is   unclear. It may be due to liver disease. If there is clinical concern for   acute cholecystitis, consider nuclear medicine hepatobiliary scan. 2. Irregularity of the liver contour indicative of cirrhosis was better   visualized on the prior CT. 3. Ascites. CT ABDOMEN PELVIS WO CONTRAST Additional Contrast? Oral   Final Result   Cardiomegaly with the pleural effusions/atelectasis lung bases likely CHF. Pneumonia is less likely. Masslike densities in the right lung base probably rounded atelectasis. Please consider surveillance to exclude underlying malignancy. Cirrhotic liver with ascites. Dilated small bowel loops likely ileus. Diverticulosis of colon with mild thickening of the sigmoid colon likely  due   to suboptimal distention. Uncomplicated diverticulitis is less likely.       6 mm   stone in the distal left ureter/UVJ without significant hydronephrosis   in the left kidney. XR CHEST PORTABLE   Final Result   Congestive failure with extensive perihilar interstitial pulmonary edema and   large right pleural effusion. IR US GUIDED PARACENTESIS    (Results Pending)   IR Interventional Radiology Procedure Request    (Results Pending)                Assessment:  1. Pleural effusion with associated ascites. INR is lower but still precludes instrumentation, Coumadin is on hold.        Plan:  1. Agree with diuresis. Eventual thoracentesis once the INR is in a near normal range, perhaps tomorrow.         Time at the bedside, reviewing labs and radiographs, reviewing updated notes and consultations, discussing with staff and family was more than 25 minutes. Please note that voice recognition technology was used in the preparation of this note and make therefore it may contain inadvertent transcription errors. If the patient is a COVID 19 isolation patient, the above physical exam reflects that of the examining physician for the day. Micaela Doss M.D., F.C.C.P.     [facility], [address] [place] [address], [address], [facility]

## 2020-11-19 NOTE — PROGRESS NOTES
Occupational Therapy  Patient treatment attempted this PM.  Patient laying in the bed and declining to participate. Educated with benefits of movement/participation in therapy. Pt reports he understands however continued to decline any activity including sitting up into a chair for a short time. Nursing notified. Will attempt at a later time.   Esdras Tamora DORCAS/L [id number]

## 2020-11-19 NOTE — PROGRESS NOTES
room, estuardo very slow and consistent, Pt instructed to continue with deep breathing throughout, 02 saturation then 91% and above during activity. Pt was left in a bedside chair with call light in reach, waffle cushion placed    Chair/bed alarm: chair alarm active    Time in 1432   Time out 1457   Total Treatment Time 25 minutes   CPT codes:     Therapeutic activities 47922 25 minutes   Therapeutic exercises 97362 0 minutes       Pt is making fair progress toward established Physical Therapy goals as per increased functional mobility performed. Continue with physical therapy current plan of care.     Iona Peoples PTA   License Number: [id number]

## 2020-11-19 NOTE — PLAN OF CARE
Problem: Infection:  Goal: Will remain free from infection  Description: Will remain free from infection  Outcome: Met This Shift     Problem: Safety:  Goal: Free from accidental physical injury  Description: Free from accidental physical injury  Outcome: Met This Shift  Goal: Free from intentional harm  Description: Free from intentional harm  Outcome: Met This Shift     Problem: Falls - Risk of:  Goal: Will remain free from falls  Description: Will remain free from falls  11/19/2020 0327 by Vickie Elena RN  Outcome: Ongoing

## 2020-11-20 NOTE — PROGRESS NOTES
Pt to IR for ordered paracentesis. Dr. Dana Soria in to scan pt using ultrasound. Dr. Dana Soria determined there was not enough fluid to do paracentesis. Call to floor nurse, spoke with Odalis, to update.

## 2020-11-20 NOTE — PROGRESS NOTES
INPATIENT CARDIOLOGY FOLLOW-UP    Name: Brad Lindsey    Age: 70 y.o. Date of Service: 11/20/2020    Chief Complaint: Follow-up for acute on chronic HFrEF, CAD s/p CABG, ischemic CM, valvular heart disease, atrial fibrillation    Referring Physician: Jessica Zhong MD    History of Present Illness:  Brad Lindsey is a 70 y.o. male (known to me and he also follows with EP) who presented to [facility] on 11/15/2020 for further evaluation of progressive SOB (at rest/with exertion) and orthopnea for > 2 weeks. He was also followed previously by Dr. Mary Barrios. His extensive PMH is outlined in detail below (see A/P below). He denies recent exertional chest pain, palpitations, or syncope. He is compliant with CHF clinic follow-up. +SOB at rest on 11/16/2020 --> respiratory status and abdominal pain improved since admission per patient (still with SOB at rest). No new overnight cardiac complaints per patient. +hypotension (and worsening renal function) on 11/18/2020 and GDMT medications and diuretic held. Overnight, SBP  and Cr improved (bumex and entresto resumed at lower doses and Toprol XL 25 mg resumed on 11/19/2020). AP/VS on EKG and telemetry.     Review of Systems:   Cardiac: As per HPI  General: No fever, chills  Pulmonary: As per HPI  HEENT: No visual disturbances, difficult swallowing  GI: No nausea, vomiting  : No dysuria, hematuria  Endocrine: +hypothyrodism, +diabetes  Musculoskeletal: FUNEZ x 4, no focal motor deficits  Skin: Intact, no rashes  Neuro: No headache, seizures  Psych: Currently with no depression, anxiety    Past Medical History:  Past Medical History:   Diagnosis Date    Acid reflux     Acute MI ([facility] [address] 01/25/97,01/04    Anxiety     CAD (coronary artery disease)     follows w Dr. Milvia Machado     Diabetes mellitus ([facility] [address]     Fluid retention     history of    Hyperlipidemia     Hypertension     Ischemic cardiomyopathy     Osteoarthritis     Post PTCA 38/27/69    Systolic dysfunction, left ventricle     follows with Dr. Mary Huynh Thyroid disease        Past Surgical History:  Past Surgical History:   Procedure Laterality Date    CARDIAC DEFIBRILLATOR PLACEMENT Left 2013    with pacemaker ([employer])    CARDIOVERSION  09/04/2020    Successful     Dr. Feliciano Bolton CATH LAB PROCEDURE  01/27/97,11/00    Critical lesion mid left circumflex,significant lesion mid-LAD and first diagonal    ECHO COMPL W DOP COLOR FLOW  4/11/2013         ECHO COMPL W DOP COLOR FLOW  4/21/2013         MITRAL VALVE SURGERY      TRANSESOPHAGEAL ECHOCARDIOGRAM  4/12/2013    Dr. Sameer Hampton TRANSESOPHAGEAL ECHOCARDIOGRAM  08/02/2017       Family History:  Family History   Problem Relation Age of Onset    Hypertension Mother     Hypertension Father     Heart Surgery Father     High Cholesterol Father        Social History:  Social History     Socioeconomic History    Marital status:      Spouse name: Not on file    Number of children: Not on file    Years of education: Not on file    Highest education level: Not on file   Occupational History    Not on file   Social Needs    Financial resource strain: Not on file    Food insecurity     Worry: Not on file     Inability: Not on file    Transportation needs     Medical: Not on file     Non-medical: Not on file   Tobacco Use    Smoking status: Current Every Day Smoker     Packs/day: 0.25     Years: 50.00     Pack years: 12.50     Types: Cigarettes    Smokeless tobacco: Never Used    Tobacco comment: currently 3-4 cigarettes a day (11/8/19); used to be 2 packs a day   Substance and Sexual Activity    Alcohol use: Yes     Frequency: Monthly or less     Drinks per session: 1 or 2     Binge frequency: Never     Comment: rare tequilla/beer    Drug use: Never    Sexual activity: Not on file   Lifestyle    Physical activity     Days per week: Not on file     Minutes per session: Not on file    Stress: Not on file   Relationships    Social connections     Talks on phone: Not on file     Gets together: Not on file     Attends Sikhism service: Not on file     Active member of club or organization: Not on file     Attends meetings of clubs or organizations: Not on file     Relationship status: Not on file    Intimate partner violence     Fear of current or ex partner: Not on file     Emotionally abused: Not on file     Physically abused: Not on file     Forced sexual activity: Not on file   Other Topics Concern    Not on file   Social History Narrative    . Two kids who he is close with. Foodie. Loves cooking. Drinks 1 cup of coffee daily. Compliant with meds and sodium restrictions for the most part. No smoking. Occasional wine with his food. Allergies:  No Known Allergies    Home Medications:  Prior to Admission medications    Medication Sig Start Date End Date Taking? Authorizing Provider   amiodarone (CORDARONE) 200 MG tablet Take 1 tablet by mouth daily 400mg twice daily for one week then 200mg daily 10/14/20  Yes Ure Larisa Villanueva MD   aspirin 81 MG EC tablet Take 81 mg by mouth daily Prescribed per Dr Golden Cobb Provider, MD   pantoprazole (PROTONIX) 20 MG tablet Take 20 mg by mouth daily    Historical Provider, MD   zolpidem (AMBIEN CR) 12.5 MG extended release tablet Take 12.5 mg by mouth nightly as needed. 9/10/19   Historical Provider, MD   gabapentin (NEURONTIN) 100 MG capsule Take 100 mg by mouth daily. Historical Provider, MD   sacubitril-valsartan (ENTRESTO) 49-51 MG per tablet Entresto 49 mg-51 mg tablet   take one pill by mouth twice daily    Historical Provider, MD   warfarin (COUMADIN) 5 MG tablet Take by mouth Currently 5mg on Mon, Wed, Fri & Sun and  2.5mg on Tu, Thurs & Sat.     Historical Provider, MD   metoprolol succinate (TOPROL XL) 50 MG extended release tablet Take 1 tablet by mouth daily  Patient taking differently: Take 25 mg by mouth daily  3/15/19   Vickey Goldberg, MD   albuterol sulfate HFA (PROAIR HFA) 108 (90 Base) MCG/ACT inhaler Inhale 2 puffs into the lungs every 4 hours as needed for Wheezing or Shortness of Breath 3/12/19 11/8/20  Muneer Elvie Collet, MD   bumetanide (BUMEX) 2 MG tablet Take 1 tablet by mouth See Admin Instructions 2mg in am and 1mg in afternoon. Dr Merlinda Singh reduced dose 2/13/19. Patient taking differently: Take by mouth 2mg in am and 1mg in afternoon. Dr Merlinda Singh reduced dose 2/13/19. 2/28/19   Vickey Goldberg, MD   potassium chloride (KLOR-CON M) 20 MEQ extended release tablet Take 1 tablet by mouth 2 times daily 1/17/19   Vickey Goldberg, MD   Magnesium 400 MG TABS Take 400 mg by mouth daily    Historical Provider, MD   Ascorbic Acid (VITAMIN C) 1000 MG tablet Take 2,000 mg by mouth daily    Historical Provider, MD   Multiple Vitamins-Minerals (THERAPEUTIC MULTIVITAMIN-MINERALS) tablet Take 1 tablet by mouth daily    Historical Provider, MD   colchicine (COLCRYS) 0.6 MG tablet Take 0.6 mg by mouth 2 times daily as needed     Historical Provider, MD   allopurinol (ZYLOPRIM) 100 MG tablet Take 1 tablet by mouth daily 12/3/17   Raciel Larson MD   levothyroxine (SYNTHROID) 75 MCG tablet Take 1 tablet by mouth Daily 12/4/17   Raciel Larson MD   insulin glargine (LANTUS) 100 UNIT/ML injection vial Inject 20 Units into the skin every morning    Historical Provider, MD   atorvastatin (LIPITOR) 80 MG tablet Take 80 mg by mouth daily    Historical Provider, MD   linagliptin (TRADJENTA) 5 MG tablet Take 5 mg by mouth daily    Historical Provider, MD   albuterol-ipratropium (COMBIVENT)  MCG/ACT inhaler Inhale 2 puffs into the lungs every 12 hours.  4/23/13   Nikita Colin MD   omeprazole (PRILOSEC) 20 MG capsule Take 20 mg by mouth as needed     Historical Provider, MD       Current Medications:  Current Facility-Administered Medications   Medication CNP        levothyroxine (SYNTHROID) tablet 75 mcg  75 mcg Oral Daily ANTONY Stratton - CNP   75 mcg at 11/19/20 3707    magnesium oxide (MAG-OX) tablet 400 mg  400 mg Oral Daily Brianna Sherman, APRN - CNP   400 mg at 11/20/20 5596    metoprolol succinate (TOPROL XL) extended release tablet 25 mg  25 mg Oral Daily Brianna Sherman, APRN - CNP   25 mg at 11/20/20 8762    therapeutic multivitamin-minerals 1 tablet  1 tablet Oral Daily Brianna Sherman, APRN - CNP   1 tablet at 11/20/20 3879    pantoprazole (PROTONIX) tablet 40 mg  40 mg Oral QAM AC Brianna Sherman, APRN - CNP   40 mg at 11/19/20 2751    sodium chloride flush 0.9 % injection 10 mL  10 mL Intravenous 2 times per day ANTONY Stratton - CNP   10 mL at 11/20/20 0830    sodium chloride flush 0.9 % injection 10 mL  10 mL Intravenous PRN Brianna Sherman, APRN - CNP        acetaminophen (TYLENOL) tablet 650 mg  650 mg Oral Q6H PRN Brianna Sherman, APRN - GUERA        Or   [facility] acetaminophen (TYLENOL) suppository 650 mg  650 mg Rectal Q6H PRN Brianna Sherman, APRN - GUERA        polyethylene glycol (GLYCOLAX) packet 17 g  17 g Oral Daily PRN Brianna Sherman, APRN - CNP        [Held by provider] potassium chloride (KLOR-CON M) extended release tablet 20 mEq  20 mEq Oral BID Brianna Sherman, APRN - CNP   20 mEq at 11/18/20 8120    LORazepam (ATIVAN) tablet 0.5 mg  0.5 mg Oral Nightly PRN Brianna Sherman, APRN - CNP   0.5 mg at 11/19/20 2337    albuterol (PROVENTIL) nebulizer solution 2.5 mg  2.5 mg Nebulization Q12H Brianna Sherman, APRN - CNP   2.5 mg at 11/20/20 0805    ipratropium (ATROVENT) 0.02 % nebulizer solution 0.5 mg  0.5 mg Nebulization Q12H Brianna Sherman, APRN - CNP   0.5 mg at 11/20/20 4322    warfarin (COUMADIN) daily dosing (placeholder)   Other RX Placeholder Brianna Resendiz Masters, APRN - CNP        metronidazole (FLAGYL) 500 mg in NaCl 100 mL IVPB premix  500 mg Intravenous Margaret Ramirez  mL/hr at 11/20/20 0853 500 mg at 11/20/20 0853    cefTRIAXone (ROCEPHIN) 1 g in sterile water 10 mL IV syringe  1 g Intravenous Q24H Adeline Macias MD   1 g at 11/19/20 1744      dextrose         Physical Exam:  /71   Pulse 70   Temp 97.8 °F (36.6 °C) (Oral)   Resp 16   Ht 6' 2\" (1.88 m)   Wt 206 lb (93.4 kg)   SpO2 93%   BMI 26.45 kg/m²   Wt Readings from Last 3 Encounters:   11/20/20 206 lb (93.4 kg)   11/10/20 202 lb (91.6 kg)   11/06/20 198 lb (89.8 kg)     Appearance: Awake, alert, no acute respiratory distress   Skin: Intact, no rash   Head: Normocephalic, atraumatic   Eyes: EOMI, no conjunctival erythema   ENMT: No pharyngeal erythema, MMM, no rhinorrhea   Neck: Supple, no carotid bruits   Lungs: Decreased BS B/L, no wheezing  Cardiac: Regular rate and rhythm, 1/6 systolic murmur  Abdomen: Soft, nontender, +bowel sounds   Extremities: Moves all extremities x 4, no lower extremity edema   Neurologic: No focal motor deficits apparent, normal mood and affect     Intake/Output:    Intake/Output Summary (Last 24 hours) at 11/20/2020 1046  Last data filed at 11/20/2020 0513  Gross per 24 hour   Intake 180 ml   Output 800 ml   Net -620 ml     No intake/output data recorded.     Laboratory Tests:  Recent Labs     11/18/20 0316 11/19/20  0345 11/20/20  0230   * 135 139   K 4.9 4.4 4.2   CL 94* 97* 100   CO2 26 25 26   BUN 54* 51* 45*   CREATININE 2.8* 2.5* 2.3*   GLUCOSE 106* 106* 111*   CALCIUM 8.6 9.0 9.4     Lab Results   Component Value Date    MG 2.3 11/20/2020     Recent Labs     11/18/20 0316 11/19/20  0345 11/20/20  0230   ALKPHOS 130* 122 128   ALT 33 26 28   AST 51* 43* 46*   PROT 6.9 7.2 7.4   BILITOT 0.7 0.7 0.8   LABALBU 3.7 4.0 4.0     Recent Labs     11/18/20  0316 11/19/20  0345 11/20/20  0230   WBC 10.3 9.8 9.4   RBC 4.49 4.27 4.40   HGB 11.7* 11.3* 11.5*   HCT 38.6 36.6* 38.6   MCV 86.0 85.7 87.7   MCH 26.1 26.5 26.1   MCHC 30.3* 30.9* 29.8*   RDW 25.2* 25.9* 26.4*    231 206   MPV 11.0 11.3 10.9     Lab Results   Component Value Date    CKTOTAL 25 (L) 05/14/2013    CKMB 0.5 05/14/2013    TROPONINI 0.07 (H) 11/16/2020    TROPONINI 0.05 (H) 11/16/2020    TROPONINI 0.07 (H) 11/15/2020     Lab Results   Component Value Date    INR 2.1 11/20/2020    INR 2.6 11/19/2020    INR 3.3 11/18/2020    PROTIME 24.4 (H) 11/20/2020    PROTIME 31.4 (H) 11/19/2020    PROTIME 40.0 (H) 11/18/2020     Lab Results   Component Value Date    TSH 7.620 (H) 11/16/2020     Lab Results   Component Value Date    LABA1C 5.8 (H) 11/16/2020     No results found for: EAG  Lab Results   Component Value Date    CHOL 85 10/06/2020    CHOL 85 12/02/2017    CHOL 215 (H) 04/09/2013     Lab Results   Component Value Date    TRIG 60 10/06/2020    TRIG 87 12/02/2017    TRIG 202 (H) 04/09/2013     Lab Results   Component Value Date    HDL 35 10/06/2020    HDL 20 12/02/2017    HDL 32.4 (A) 04/09/2013     Lab Results   Component Value Date    LDLCALC 38 10/06/2020    LDLCALC 48 12/02/2017    LDLCALC 142 (H) 04/09/2013     Lab Results   Component Value Date    LABVLDL 12 10/06/2020    LABVLDL 17 12/02/2017     No results found for: CHOLHDLRATIO  No results for input(s): PROBNP in the last 72 hours. Cardiac Tests:  EKG: AP/VS    Telemetry reviewed (date: 11/20/2020): AP/VS    Echocardiogram: 7/14/15  Ejection fraction is visually estimated at 35%. The inferior and inferolateral walls are severely hypokinetic/akinetic. Mildly dilated right ventricle with normal right ventricular function. Indeterminate diastolic function. Moderately dilated left atrium. Pulmonary vein doppler suggestive of elevated left atrial pressure. Status post mitral annular ring. Mean mitral valve gradient 5.9 mmHg. Moderate mitral regurgitation. Moderate aortic regurgitation. Mild tricuspid regurgitation. PASP is estimated at 37 mmHg.   Mild pulmonic regurgitation.      NNEKA: 8/2/17 ( [place])   Ejection fraction is visually estimated at 35%.   Inferolateral/inferior hypokinesis.   Raekl right ventricle size with low normal right ventricular function.   Moderately dilated left atrium.   History of oversewing of ROBER. There is color flow from the LA into the   ROBER. No evidence of a left atrial appendage thrombus.   No evidence of interatrial shunting on bubble study.   History of mitral annular ring (30mm Future Ring)   MV mean gradient 3.4 mmHg.   Poor coaptation of MV leaflets.   Severe mitral regurgitation.   Moderate aortic regurgitation.   Moderate tricuspid regurgitation.  RV-RA gradient is estimated at 35 mmHg.   Mild pulmonic regurgitation.     Echocardiogram: 1/9/18 ([facility])  - Exam indication: Initial postoperative evaluation of prosthetic valve (baseline)  - The left ventricle is moderately dilated. Left ventricular systolic function is   severely decreased. EF = 22 ± 5% (2D biplane) Left ventricular diastolic function   was not evaluated due to prosthetic valve. - The right ventricle is dilated. Right ventricular systolic function is normal.  - The left atrial cavity is severely dilated. - The right atrial cavity is dilated. - Post mitral valve replacement. Biocor prosthetic mitral valve (size #29). There   is trivial mitral valve regurgitation. The peak gradient is 23 mmHg and the mean   gradient is 8 mmHg. MV gradients obtained at a HR of 80 bpm. Prior peak/mean   gradients of 22/8 mmHg. - Post tricuspid valve repair. tricuspid valve annuloplasty ring (size #30). There   is mild (1+ - 2+) tricuspid valve regurgitation. The peak gradient is 4 mmHg and   the mean gradient is 2 mmHg. TV gradients obtained at a HR of 80 bpm. Prior mean   gradient of 3 mmHg. - Trifecta prosthetic aortic valve (size #23). The peak gradient is 10 mmHg, the   mean gradient is 5 mmHg and the dimensionless valve index is 0.57. Prior peak/mean   gradients of 9/5 mmHg.   - Exam was compared with the prior  echocardiographic exam performed on   12/18/2017. There is no significant change.     Echocardiogram: 9/18/19 (Dr. Bianca Alfred)  [facility] eccentric left ventricular hypertrophy.   Severe left ventricular systolic dysfunction.   Visually estimated LVEF is 35 %.    Normal right ventricular size.   Mild right ventricular systolic function.   TAPSE is 1.5 cm.   TDI s' is 7 cm/s.  ICD or pacer lead visualized in the right sided chambers.   Status post mitral valve # 29 mm.   The valve appears well seated with no rocking motion.   Status post aortic valve # 23 mm.   This appears well seated and without any rocking motion.   Mean gradient is 10.4 mm Hg.   Moderate tricuspid regurgitation.     NNEKA: 9/4/2020 (Dr. Jaime Vila)   Mildly dilated left ventricle. Severely reduced left ventricular systolic function. Ejection fraction is visually estimated at 15-20%. Severely dilated left atrium. There was evidence of severe spontaneous echo contrast in the left atrium. Mildly enlarged right ventricle cavity. Right ventricle global systolic function is severely reduced . Moderately enlarged right atrium size. Normally functioning bioprosthetic valve in mitral position. Mild mitral regurgitation is present. Mean transmitral gradient (Doppler) 5 mm Hg . Normally functioning bioprosthetic valve in aortic position. Planimetered aortic valve area 1.4 cm2 . The tricuspid valve appears structurally normal with evidence of tricuspid   ring. Physiologic and/or trace tricuspid regurgitation. CXR: 11/15/2020  Congestive failure with extensive perihilar interstitial pulmonary edema and    large right pleural effusion. ASSESSMENT / PLAN:  1. Acute on chronic HFrEF  2. CAD/prior NSTEMI s/p CABG on 4/15/13 (LIMA-LAD, SVG-OM, SVG-PDA) and repeat CABG on 11/7/2017 (SVG-PDA)  3.  Ischemic cardiomyopathy with history of severely reduced EF -- s/p upgrade to dual chamber ICD on 9/12/13 --> up-titration of GDMT has been limited by intermittent hypotension historically, including this admission  4. Persistent atrial fibrillation (anticoagulated with coumadin, s/p over-sewing of LA appendage at the time of 4/2013 CABG) -- follows with EP, s/p CVN on 9/4/2020, on amiodarone  5. Sick sinus syndrome s/p ICD -- follows with EP  6. MR s/p MV repair on 4/15/13 (30 mm Future Ring)  7. MR, TR, AR s/p MVR (#29 Biocor), AVR (#23 Trifecta), TV repair (#30 CE ring) on 11/7/2017  8. HTN -- controlled / historically with intermittent hypotension (+hypotension this AM)  9. HLD   10. DM -- Hgb A1c 6.3 --> 5.8  11. Tobacco abuse -- currently 1 pack/week  12. Possible PANTERA  13. Hypothyroidism - on synthroid  14.  Acute on CKD -- most recent Cr 2.1 --> 2.4 --> 2.8 --> 2.5 --> 2.3, he follows with Dr. Curt Rosas     - SBP  overnight  - Cr improved  - Bumex and entresto resumed at lower doses and Toprol XL 25 mg resumed on 11/19/2020 -- continue  - Continue amiodarone (follows with EP; monitor AF burden on device interrogations)  - Pulmonary planning on thoracentesis once INR acceptable  - Coumadin on hold -- INR 5.9 --> 5.0 --> 4.2 --> 3.3 --> 2.6 --> 2.1  - Monitor renal function and electrolytes closely -- care per nephrology  - Monitor telemetry  - Serial echocardiograms  - Sleep study ordered at prior office visit (patient cancelled study/deferred work-up)  - Continue with CHF clinic follow-up on d/c  - Treatment options for severe LV dysfunction reviewed this admission and discussed again re: re-establishing with advanced heart failure specialist (he elects to establish at [facility])    Allison Lorenzo MD  [facility]) Cardiology

## 2020-11-20 NOTE — CARE COORDINATION
11/20/2020  Social Work Discharge Planning:[facility] is following. Pt is now on room air. Pt lives alone and is independent. Electronically signed by SURY Frazier on 11/20/2020 at 9:56 AM

## 2020-11-20 NOTE — PROGRESS NOTES
Nutrition Assessment     Type and Reason for Visit: Patient Education    Nutrition Assessment:  Pt for edu per CHF LOS protocol. Chart reviewed. Provided pt w/ written edu on the Cardiac Diet; handout and contact info placed in d/c instructions. Will follow-up per policy.     Electronically signed by Hector Duarte RD, WILLI on 11/20/20 at 3:24 PM EST    Contact: ext [phone]

## 2020-11-20 NOTE — DISCHARGE INSTR - DIET
 Good nutrition is important when healing from an illness, injury, or surgery. Follow any nutrition recommendations given to you during your hospital stay.  If you were given an oral nutrition supplement while in the hospital, continue to take this supplement at home. You can take it with meals, in-between meals, and/or before bedtime. These supplements can be purchased at most local grocery stores, pharmacies, and chain super-stores.  If you have any questions about your diet or nutrition, call the hospital and ask for the dietitian. Heart Failure Nutrition Therapy    This nutrition therapy will help you feel better and support your heart. This plan focuses on:  Limiting sodium in your diet. Salt (sodium) makes your body hold water. When your body holds too much water, you can feel shortness of breath and swelling. You can prevent these symptoms by eating less salt. Limiting fluid in your diet. For some patients, drinking too much fluid can make heart failure worse. It can cause symptoms such as shortness of breath and swelling. Limiting fluids can help relieve some of your symptoms. Managing your weight. Your registered dietitian nutritionist (RDN) can help you choose a healthy weight for your body type. You can achieve these goals by:  Reading food labels to keep track of how much sodium is in the foods you eat. Limiting foods that are high in sodium. Checking your weight to make sure youre not retaining too much fluid. Reading the Food Label: How Much Sodium Is Too Much? The nutrition plan for heart failure usually limits the sodium you get from food and drinks to 2,000 milligrams per day. Salt is the main source of sodium. Read the nutrition label to find out how much sodium is in 1 serving of a food. Select foods with 140 milligrams of sodium or less per serving. Foods with more than 300 milligrams of sodium per serving may not fit into a reduced-sodium meal plan.    Check serving sizes. If you eat more than 1 serving, you will get more sodium than the amount listed. Cutting Back on Sodium  Avoid processed foods. Eat more fresh foods. Fresh and frozen fruits and vegetables without added juices or sauces are naturally low in sodium. Fresh meats are lower in sodium than processed meats, such as conner, sausage, and hot dogs. Read the nutrition label or ask your  to help you find a fresh meat that is low in sodium. Eat less salt, at the table and when cooking. Just 1 teaspoon of table salt has 2,300 milligrams of sodium. Leave the salt out of recipes for pasta, casseroles, and soups. Ask your RDN how to cook your favorite recipes without sodium. Be a smart . Look for food packages that say salt-free or sodium-free.  These items contain less than 5 milligrams of sodium per serving. Very-low-sodium products contain less than 35 milligrams of sodium per serving. Low-sodium products contain less than 140 milligrams of sodium per serving. Unsalted or no added salt products may still be high in sodium. Check the nutrition label. Add flavors to your food without adding sodium. Try lemon juice, lime juice, fruit juice, or vinegar. Dry or fresh herbs add flavor. Try basil, bay leaf, dill, rosemary, parsley, sandra, dry mustard, nutmeg, thyme, and paprika. Pepper, red pepper flakes, and cayenne pepper can add spice to your meals without adding sodium. Hot sauce contains sodium, but if you use just a drop or two, it will not add up to much. Buy a sodium-free seasoning blend or make your own at home. Use caution when you eat outside your home. Restaurant foods can be very high in sodium. Ask for nutrition information. Many restaurants provide nutrition facts on their menus or websites. Let your  know that you want your food to be cooked without salt. Ask for your salad dressing and sauces to come on the side.   Fluid Restriction  Your cheese   Protein Foods (Meat, Poultry, Fish, Beans) Fresh meats and fish  Cymraes  [place] ([facility]) conner (except if packaged in a sodium solution)  Canned or packed tuna (no more than 4 ounces at 1 serving)  Dried beans and peas; edamame (fresh soybeans)  Eggs or egg beaters (if less than 200 mg per serving)  Unsalted nuts or peanut butter   Desserts and Snacks Fresh fruit or applesauce  Brayan food cake  Granola bars  Unsalted pretzels, popcorn, or nuts  Pudding or Jell-O with Cool-Whip topping  Homemade rice-crispy treats  Vanilla wafers  Frozen fruit bars   Fats Tub or liquid margarine  Unsaturated fat oils (canola, olive, corn, sunflower, safflower, peanut)   Condiments Fresh or dried herbs; low-sodium ketchup; vinegar; lemon or lime juice; pepper; salt-free seasoning mixes and marinades (Mrs. Dilcia Hernandez or Courtney salt-free blend); simple salad dressings (vinegar and oil); salt-free sauces     Foods Not Recommended  Food Group Foods Not Recommended   Grains Breads or crackers topped with salt  Cereals (hot/cold) with more than 300 milligrams sodium per serving  Biscuits, cornbread, and other quick breads prepared with baking soda  Prepackaged bread crumbs  Self-rising flours   Vegetables Canned vegetables (unless they are salt free or low sodium)  Frozen vegetables with seasoning and sauces  Sauerkraut and pickled vegetables  Canned or dried soups (unless they are salt free or low sodium)  [place] fries and onion rings   Fruits Dried fruits preserved with sodium-containing additives   Dairy (Milk and Milk Products) Buttermilk  Processed cheeses such as Cheese Whiz, Velveeta, and [employer] (unless a low-sodium variety)  Feta cheese; shredded cheese (has more sodium than block cheese); singles slices and string cheese   Protein Foods (Meat, Poultry, Fish, Beans) Cured meats: conner, ham, sausage, pepperoni, and hot dogs  Canned meats: chili, [place] sausage, sardines, and Spam  Smoked fish and meats  Frozen meals that have more than 600 milligrams sodium   Fats Salted butter or margarine   Condiments Salt, sea salt, kosher salt, onion salt, and garlic salt  Seasoning mixes containing salt (Lemon Pepper or Bouillon cubes)  Catsup or ketchup, BBQ sauce, Worcestershire and soy sauce  Salsa, pickles, olives, relish  Salad dressings: ranch, blue cheese, [place], and [place]    Alcohol Check with your doctor.      Heart Failure Sample 1-Day Menu View Nutrient Info   Breakfast 1 cup regular oatmeal made with water or milk   1 cup reduced-fat (2%) milk   1 medium banana   1 slice whole wheat bread   1 tablespoon salt-free peanut butter   Morning Snack 1/2 cup dried cranberries   Lunch 3 ounces grilled chicken breast   1 cup salad greens   Olive oil and vinegar dressing (for greens)   5 unsalted or low-sodium crackers   Fruit plate with 1/4 cup strawberries   1/2 sliced orange (for fruit plate)   1 peach half (for fruit plate)   Afternoon Snack 1 ounce low-sodium turkey   1 piece whole wheat bread   Evening Meal 3 ounces herb-baked fish   1 baked potato   2 teaspoons soft margarine (trans fat-free) (for potato)   Sliced tomatoes   1/2 cup steamed spinach drizzled with lemon juice   3-inch square of farhat food cake   Fresh strawberries (2) (for cake)   Evening Snack 2 tablespoons salt-free peanut butter   5 low-sodium crackers   Daily Sum   Nutrient Unit Value   Macronutrients   Energy kcal 1890   Energy kJ 7906   Protein g 95   Total lipid (fat) g 56   Carbohydrate, by difference g 270   Fiber, total dietary g 31   Sugars, total g 99   Minerals   Calcium, Ca mg 949   Iron, Fe mg 27   Sodium, Na mg 1538   Vitamins   Vitamin C, total ascorbic acid mg 118   Vitamin A, IU IU 91949   Vitamin D    Lipids   Fatty acids, total saturated g 13   Fatty acids, total monounsaturated g 22   Fatty acids, total polyunsaturated g 16   Cholesterol mg 126     Heart Failure Vegan Sample 1-Day Menu View Nutrient Info   Breakfast 1 cup oatmeal   ¼ cup walnuts   1 banana   1 cup soymilk fortified with calcium, vitamin B12, and vitamin D   Lunch 1 large whole wheat salvador   Salad made with: 1 cup chickpeas   1 cup lettuce   ½ cup cherry tomatoes   1 cup strawberries   1 tablespoon olive oil   1 tablespoon balsamic vinegar    Evening Meal ¾ cup tofu   2 teaspoons olive oil   Pinch garlic powder   1 baked potato   1 tablespoon margarine, soft, tub   ½ cup cooked spinach with:   Squeeze of lemon   1 cup soymilk fortified with calcium, vitamin B12, and vitamin D   Evening Snack 1 tablespoon peanut butter, without salt   ¾ ounce pretzels, without salt   Daily Sum   Nutrient Unit Value   Macronutrients   Energy kcal 1848   Energy kJ 7735   Protein g 74   Total lipid (fat) g 83   Carbohydrate, by difference g 223   Fiber, total dietary g 39   Sugars, total g 44   Minerals   Calcium, Ca mg 1325   Iron, Fe mg 20   Sodium, Na mg 1098   Vitamins   Vitamin C, total ascorbic acid mg 140   Vitamin A, IU IU 06466   Vitamin D    Lipids   Fatty acids, total saturated g 13   Fatty acids, total monounsaturated g 33   Fatty acids, total polyunsaturated g 32   Cholesterol mg 0     Heart Failure Vegetarian (Lacto-Ovo) Sample 1-Day Menu View Nutrient Info   Breakfast 1 cup oatmeal   ¼ cup walnuts   1 banana   1 cup fat-free milk   Lunch 1 large whole wheat salv[place]   Salad made with: ½ cup chickpeas   1 ounce mozzarella cheese   1 cup lettuce   ½ cup cherry tomatoes   1 cup strawberries   1 tablespoon olive oil   1 tablespoon balsamic vinegar    Evening Meal ¾ cup tofu   2 teaspoons olive oil   Pinch garlic powder   1 baked potato   1 tablespoon margarine, soft, tub   ½ cup cooked spinach with:   Squeeze of lemon   1 cup fat-free milk   Evening Snack 1 tablespoon peanut butter, without salt   1 apple   Daily Sum   Nutrient Unit Value   Macronutrients   Energy kcal 1847   Energy kJ 7734   Protein g 76   Total lipid (fat) g 79   Carbohydrate, by difference g 231   Fiber, total dietary g 35 Sugars, total g 83   Minerals   Calcium, Ca mg 1488   Iron, Fe mg 16   Sodium, Na mg 1100   Vitamins   Vitamin C, total ascorbic acid mg 149   Vitamin A, IU IU 18601   Vitamin D    Lipids   Fatty acids, total saturated g 15   Fatty acids, total monounsaturated g 32   Fatty acids, total polyunsaturated g 26   Cholesterol mg 28     If any questions/concerns please don't hesitate to reach out to either myself or primarily our out-patient dietitian Amos Xiong RD, WILLI) @ [phone] (Desk).     Marta Alvarado RD, WILLI  Clinical In-Patient Dietitian  [phone] (Office)  [place]

## 2020-11-20 NOTE — PROGRESS NOTES
Subjective:    Patient seen and examined at bedside, improvement in SOB. Hypotension resolved. Complaining of moderate abd distention    Objective:    /71   Pulse 70   Temp 97.8 °F (36.6 °C) (Oral)   Resp 16   Ht 6' 2\" (1.88 m)   Wt 206 lb (93.4 kg)   SpO2 93%   BMI 26.45 kg/m²     Current medications that patient is taking have been reviewed. Heart:  RRR, no murmurs, gallops, or rubs.   Lungs:  CTA bilaterally, no wheeze, rales or rhonchi  Abd: bowel sounds present, soft, nontender, nondistended, no masses  Extrem:  No cyanosis or edema    CBC:   Lab Results   Component Value Date    WBC 9.4 11/20/2020    RBC 4.40 11/20/2020    HGB 11.5 11/20/2020    HCT 38.6 11/20/2020    MCV 87.7 11/20/2020    MCH 26.1 11/20/2020    MCHC 29.8 11/20/2020    RDW 26.4 11/20/2020     11/20/2020    MPV 10.9 11/20/2020     BMP:    Lab Results   Component Value Date     11/20/2020    K 4.2 11/20/2020    K 4.4 11/16/2020     11/20/2020    CO2 26 11/20/2020    BUN 45 11/20/2020    LABALBU 4.0 11/20/2020    CREATININE 2.3 11/20/2020    CALCIUM 9.4 11/20/2020    GFRAA 34 11/20/2020    LABGLOM 28 11/20/2020    GLUCOSE 111 11/20/2020        Assessment:    Patient Active Problem List   Diagnosis    Hyperlipidemia    DM (diabetes mellitus) ([facility] [address]    Coronary atherosclerosis of native coronary artery    GERD (gastroesophageal reflux disease)    Depression    Hypothyroid    Ischemic cardiomyopathy    Mitral regurgitation    AI (aortic insufficiency)    PAF [facility]oxysmal atrial fibrillation) ([facility]    Ulnar nerve neuropathy    ICD (implantable cardioverter-defibrillator) in place    Tricuspid regurgitation    CKD (chronic kidney disease) stage 4, GFR 15-29 ml/min ([facility])    Chronic systolic CHF (congestive heart failure) ([facility])    Anticoagulated on Coumadin    Right ventricular dysfunction    Congestive heart failure (HCC)    Persistent atrial fibrillation ([facility])    Anemia of chronic renal failure, stage 3 (moderate)    History of coronary artery bypass graft    HFrEF (heart failure with reduced ejection fraction) (HCC)    CKD (chronic kidney disease) stage 3, GFR 30-59 ml/min    Hypertension    Cirrhosis (HCC)    Ascites    Lesion of lung    Chronic anticoagulation    Supratherapeutic INR       Plan:    1. Acute on chronic systolic HF              BNP 5849, CXR shows pulmonary edema with right pleural effusion             Recent TTE and NNEKA show EF 35% and 15-20%             Continue with Bumex, entresto and beta blocker  2. Cirrhosis with ascites - on CT abd                No paracentesis, not enough ascited. Denies history of cirrhosis. GI consulted              LFTs mildly elevated alk phos, AST, Tbil              Lipase WNL              Possible congestive hepatopathy              INR elevated on coumadin  3. Therapeutic INR. Hold coumadin for thoracentesis   Given 1 dose on 11/19 by pharmacy  4. Acute on CKD 3 - nephrology consulted   Improving  5.  DVT px - coumadin  6.  Hypothyroidism-continue Synthroid      Km Xiong    10:43 AM  11/20/2020

## 2020-11-20 NOTE — PROGRESS NOTES
Nephrology Progress Note  Patient's Name: Colby Fairchild  2:14 PM  11/20/2020    Nephrologist: Jermaine Simmons    Reason for Consult:  JULIANO on CKD  Requesting Physician:  Oleg Maldonado MD    Chief Complaint:  Abd Pain and SOB    History Obtained From:  patient and past medical records    History of Present Ilness from the 11/18/20 note:    Colby Fairchild is a 70 y.o. male with prior history CKD G3B with a baseline serum cr 1.5-2.2mg/dl with an e-GFR=30-46ml/min in the setting of CAD/HTN/DM2/Tob Abuse with presumed microvascular disease  4/28/20 R [id number] and L Kidney 9.5cm, no PVR, no hydro or masses or perinephric fluid  10/6/20 Hep B&C non reactive  Pt admitted via the ED 11/15/20 when he presented with abd pain and SOB. The abd pain was progressive and he had a decreased appetite and presented to the ED. No emesis or fever. Workup showed ascites and cirrhosis on Ct Scan. He was placed on IV bumetanide and Entresto.  Bumetanide held this AM. Received SPA for Hypotension    11/20/20: Pt states he went down to IR but there was not enough fluid for paracentesis, pt still feels SOB    Past Medical History:   Diagnosis Date    Acid reflux     Acute MI ([facility] 01/25/97,01/04    Anxiety     CAD (coronary artery disease)     follows w Dr. Wandy Guan Diabetes mellitus ([facility]     Fluid retention     history of    Hyperlipidemia     Hypertension     Ischemic cardiomyopathy     Osteoarthritis     Post PTCA 20/89/10    Systolic dysfunction, left ventricle     follows with Dr. Wandy Guan Thyroid disease        Past Surgical History:   Procedure Laterality Date    CARDIAC DEFIBRILLATOR PLACEMENT Left 2013    with pacemaker ([employer])    CARDIOVERSION  09/04/2020    Successful     Dr. Feliciano Bolton CATH LAB PROCEDURE  01/27/97,11/00    Critical lesion mid left circumflex,significant lesion mid-LAD and first diagonal (GLYCOLAX) packet 17 g, Daily PRN  [Held by provider] potassium chloride (KLOR-CON M) extended release tablet 20 mEq, BID  LORazepam (ATIVAN) tablet 0.5 mg, Nightly PRN  albuterol (PROVENTIL) nebulizer solution 2.5 mg, Q12H  ipratropium (ATROVENT) 0.02 % nebulizer solution 0.5 mg, Q12H  warfarin (COUMADIN) daily dosing (placeholder), RX Placeholder  metronidazole (FLAGYL) 500 mg in NaCl 100 mL IVPB premix, Q8H  cefTRIAXone (ROCEPHIN) 1 g in sterile water 10 mL IV syringe, Q24H        Review of Systems:   Pertinent items are noted in HPI. Remainder of a complete review off systems sis (-) other than stated in the HPI    Physical exam:   Constitutional:  Elderly male in NAD  Vitals:   VITALS:  /71   Pulse 70   Temp 97.8 °F (36.6 °C) (Oral)   Resp 16   Ht 6' 2\" (1.88 m)   Wt 206 lb (93.4 kg)   SpO2 93%   BMI 26.45 kg/m²   24HR INTAKE/OUTPUT:      Intake/Output Summary (Last 24 hours) at 11/20/2020 1414  Last data filed at 11/20/2020 0513  Gross per 24 hour   Intake 120 ml   Output 700 ml   Net -580 ml     URINARY CATHETER OUTPUT (Narvaez):     DRAIN/TUBE OUTPUT:     VENT SETTINGS:  Vent Information  SpO2: 93 %  Additional Respiratory  Assessments  Pulse: 70  Resp: 16  SpO2: 93 %    Skin: no rash, turgor wnl  Heent:  eomi, mmm  Neck: no bruits or jvd noted  Cardiovascular:PMI lat displaced   S1, S2 without S3 or a rub  Respiratory: Few bilat crackles with equal excursion  Abdomen:  +bs, soft, nt, nd  Ext: (-) bilat lower extremity edema  Psychiatric: mood and affect appropriate, cr nr 2-12 grossly intact  Musculoskeletal:  Rom, muscular strength intact    Data:   Labs:  CBC:   Lab Results   Component Value Date    WBC 9.4 11/20/2020    RBC 4.40 11/20/2020    HGB 11.5 11/20/2020    HCT 38.6 11/20/2020    MCV 87.7 11/20/2020    MCH 26.1 11/20/2020    MCHC 29.8 11/20/2020    RDW 26.4 11/20/2020     11/20/2020    MPV 10.9 11/20/2020     CBC with Differential:    Lab Results   Component Value Date    WBC 9.4 11/20/2020    RBC 4.40 11/20/2020    HGB 11.5 11/20/2020    HCT 38.6 11/20/2020     11/20/2020    MCV 87.7 11/20/2020    MCH 26.1 11/20/2020    MCHC 29.8 11/20/2020    RDW 26.4 11/20/2020    SEGSPCT 54 05/15/2013    LYMPHOPCT 7.6 11/20/2020    MONOPCT 8.9 11/20/2020    BASOPCT 0.5 11/20/2020    MONOSABS 0.83 11/20/2020    LYMPHSABS 0.71 11/20/2020    EOSABS 0.16 11/20/2020    BASOSABS 0.05 11/20/2020     Hemoglobin/Hematocrit:    Lab Results   Component Value Date    HGB 11.5 11/20/2020    HCT 38.6 11/20/2020     CMP:    Lab Results   Component Value Date     11/20/2020    K 4.2 11/20/2020    K 4.4 11/16/2020     11/20/2020    CO2 26 11/20/2020    BUN 45 11/20/2020    CREATININE 2.3 11/20/2020    GFRAA 34 11/20/2020    LABGLOM 28 11/20/2020    GLUCOSE 111 11/20/2020    PROT 7.4 11/20/2020    LABALBU 4.0 11/20/2020    CALCIUM 9.4 11/20/2020    BILITOT 0.8 11/20/2020    ALKPHOS 128 11/20/2020    AST 46 11/20/2020    ALT 28 11/20/2020     BMP:    Lab Results   Component Value Date     11/20/2020    K 4.2 11/20/2020    K 4.4 11/16/2020     11/20/2020    CO2 26 11/20/2020    BUN 45 11/20/2020    LABALBU 4.0 11/20/2020    CREATININE 2.3 11/20/2020    CALCIUM 9.4 11/20/2020    GFRAA 34 11/20/2020    LABGLOM 28 11/20/2020    GLUCOSE 111 11/20/2020     BUN/Creatinine:    Lab Results   Component Value Date    BUN 45 11/20/2020    CREATININE 2.3 11/20/2020     Hepatic Function Panel:    Lab Results   Component Value Date    ALKPHOS 128 11/20/2020    ALT 28 11/20/2020    AST 46 11/20/2020    PROT 7.4 11/20/2020    BILITOT 0.8 11/20/2020    BILIDIR 0.5 11/17/2020    IBILI 0.4 11/17/2020    LABALBU 4.0 11/20/2020     Albumin:    Lab Results   Component Value Date    LABALBU 4.0 11/20/2020     Calcium:    Lab Results   Component Value Date    CALCIUM 9.4 11/20/2020     Ionized Calcium:  No results found for: IONCA  Magnesium:    Lab Results   Component Value Date    MG 2.3 11/20/2020     Phosphorus: Lab Results   Component Value Date    PHOS 2.6 11/20/2020     LDH:  No results found for: LDH  Uric Acid:    Lab Results   Component Value Date    LABURIC 8.1 10/06/2020     PT/INR:    Lab Results   Component Value Date    PROTIME 24.4 11/20/2020    INR 2.1 11/20/2020     PTT:    Lab Results   Component Value Date    APTT 35.9 11/16/2020   [APTT}  Troponin:    Lab Results   Component Value Date    TROPONINI 0.07 11/16/2020     U/A:    Lab Results   Component Value Date    COLORU Yellow 11/15/2020    PROTEINU TRACE 11/15/2020    PHUR 5.0 11/15/2020    WBCUA NONE 11/15/2020    RBCUA NONE 11/15/2020    RBCUA NONE 05/14/2013    BACTERIA NONE SEEN 11/15/2020    CLARITYU Clear 11/15/2020    SPECGRAV >=1.030 11/15/2020    LEUKOCYTESUR Negative 11/15/2020    UROBILINOGEN 0.2 11/15/2020    BILIRUBINUR Negative 11/15/2020    BLOODU Negative 11/15/2020    GLUCOSEU Negative 11/15/2020     ABG:    Lab Results   Component Value Date    PH 7.419 01/17/2019    PCO2 42.6 04/15/2013    PO2 79.6 04/15/2013    HCO3 24.2 04/15/2013    BE 4.2 01/17/2019    O2SAT 44.3 01/17/2019     HgBA1c:    Lab Results   Component Value Date    LABA1C 5.8 11/16/2020     Microalbumen/Creatinine ratio:  No components found for: RUCREAT  FLP:    Lab Results   Component Value Date    TRIG 60 10/06/2020    HDL 35 10/06/2020    LDLCALC 38 10/06/2020    LABVLDL 12 10/06/2020     TSH:    Lab Results   Component Value Date    TSH 7.620 11/16/2020     VITAMIN B12: No components found for: B12  FOLATE:    Lab Results   Component Value Date    FOLATE >20.0 10/06/2020     Iron Saturation:  No components found for: PERCENTFE  FERRITIN:    Lab Results   Component Value Date    FERRITIN 625 11/17/2020     AMYLASE:  No results found for: AMYLASE  LIPASE:    Lab Results   Component Value Date    LIPASE 12 11/15/2020     Fibrinogen Level:  No components found for: FIB  24 Hour Urine for Protein:  No components found for: Loreli Boaliceon, VOXT89VL, UTV3  24 Hour Urine for Creatinine Clearance:  No components found for: CREAT4, UHRS10, UTV10  PSA: No results found for: PSA     Imaging:  CXR results:  EXAMINATION:    ONE XRAY VIEW OF THE CHEST         11/15/2020 8:32 pm         COMPARISON:    March 11, 2019         HISTORY:    ORDERING SYSTEM PROVIDED HISTORY: shortness of breath    TECHNOLOGIST PROVIDED HISTORY:    Reason for exam:->shortness of breath         FINDINGS:    Cardiac silhouette is enlarged.         Dual-chamber pacemaker-AICD in place.  Status post median sternotomy.         Prominent vascular congestion with  interstitial and hazy airspace disease in    the perihilar distribution consistent with congestive failure and perihilar    pulmonary edema.         Large right pleural effusion, significantly increased in the interval since    the prior examination.              Impression    Congestive failure with extensive perihilar interstitial pulmonary edema and    large right pleural effusion. EXAMINATION:    RIGHT UPPER QUADRANT ULTRASOUND         11/16/2020 10:29 am         COMPARISON:    CT abdomen and pelvis, 11/15/2020         HISTORY:    ORDERING SYSTEM PROVIDED HISTORY: Cirrhosis    TECHNOLOGIST PROVIDED HISTORY:    Reason for exam:->Cirrhosis    Reason for exam:->ascites survey/ liver US    What reading provider will be dictating this exam?->CRC         FINDINGS:    LIVER:  The subtle irregularity of the liver contour indicative of cirrhosis    was better visualized on the earlier CT.  No focal hepatic lesion or ductal    dilatation is identified.         BILIARY SYSTEM:  Cholelithiasis is noted.  The gallbladder is nonspecifically    thickened to 5.2 mm.  The sonographic Naranjo sign is negative.         Common bile duct is within normal limits measuring 5 mm. .         RIGHT KIDNEY: The right kidney is grossly unremarkable without evidence of    hydronephrosis.         PANCREAS:  Visualized portions of the pancreas are unremarkable.         OTHER: Ascites is noted.              Impression    1. Cholelithiasis with gallbladder wall thickening.  In light of liver    cirrhosis and ascites, the significance of the gallbladder wall thickening is    unclear.  It may be due to liver disease.  If there is clinical concern for    acute cholecystitis, consider nuclear medicine hepatobiliary scan. 2. Irregularity of the liver contour indicative of cirrhosis was better    visualized on the prior CT. 3. Ascites. EXAMINATION:    CT OF THE ABDOMEN AND PELVIS WITHOUT CONTRAST 11/15/2020 9:42 pm         TECHNIQUE:    CT of the abdomen and pelvis was performed without the administration of    intravenous contrast. Multiplanar reformatted images are provided for review. Dose modulation, iterative reconstruction, and/or weight based adjustment of    the mA/kV was utilized to reduce the radiation dose to as low as reasonably    achievable.         COMPARISON:    None.         HISTORY:    ORDERING SYSTEM PROVIDED HISTORY: pain    TECHNOLOGIST PROVIDED HISTORY:    Reason for exam:->pain    Additional Contrast?->Oral         FINDINGS:    The lung bases demonstrate cardiomegaly with coronary artery calcification. There is moderate pleural effusions and atelectasis in lung bases.  Masslike    densities are identified in the right lung base largest 1 measuring up to 5    by 2.7 cm likely  rounded atelectasis.  Malignant lesion is less likely.     Liver is heterogeneous in appearance with nodular border concerning for    cirrhosis.  There is moderate amount of ascites.  Gallbladder is partially    contracted with small amount of sludge.  Spleen, pancreas, and adrenals are    normal.  There is perinephric stranding bilaterally.  Moderately enlarged    lymph nodes are identified in the upper abdomen with lymph nodes measuring up    to 1.7 cm in the glenn of the liver.  There is calcification and mild ectasia    of the abdominal aorta measuring 2.3 cm.  There is edema in the mesentery. Slightly dilated small bowel loops measuring up to 2.5 cm is noted.         Pelvis.  The bladder is contracted with wall thickening.  6 mm obstructing    stone is identified in the left UVJ without significant hydronephrosis in the    left kidney.  There is diverticulosis of the colon with mild thickening of    the sigmoid colon which is collapsed.  Presacral edema is present.  The    appendix is normal.              Impression    Cardiomegaly with the pleural effusions/atelectasis lung bases likely CHF. Pneumonia is less likely.         Masslike densities in the right lung base probably rounded atelectasis. Please consider surveillance to exclude underlying malignancy.         Cirrhotic liver with ascites.         Dilated small bowel loops likely ileus.         Diverticulosis of colon with mild thickening of the sigmoid colon likely  due    to suboptimal distention.  Uncomplicated diverticulitis is less likely.         6 mm   stone in the distal left ureter/UVJ without significant hydronephrosis    in the left kidney. Assessment  1-Stage I JULIANO-most probably due to decreased effective renal perfusion with the HFrEF and the Hypotension in the setting of the loop diuretic and sacubitril/valsartan  UA 11/15 (-) blood or protein  FeUrea 29.46% consistent with decreased effective renal perfusion  Cr down from 2.8-->2.5-->2.3mg/dl  PLAN:1.  Follow cr with the resumption of the Entresto and bumex    2-CKD G3B with a baseline serum cr 1.5-2.2mg/dl with an e-GFR=30-46ml/min in the setting of CAD/HTN/DM2/Tob Abuse with presumed microvascular disease      3-HFrEF with hypotension  Cortisol low at 8.60-repeat cortisol post cosyntropin up to 18.23   PLAN:1.Continue the Entresto at 1/2 the previous dose and the bumetanide 1mg po bid  2.  Trial of midodrine    4- Anemia in CKD  HgB above goal 10  PLAN:1. Start ARTIS if HgB <10    5- Sec HPTH of Renal Origin  , Vit D 46, Ca++ ionized WNL, PO4 3. 1  PLAN:1. Follow      OK for D/C when OK with Pulm    Thank you Dr. Brooke Carranza MD for allowing us to participate in care of Srinivas Edwards Barriemicky  2:14 PM  11/20/2020

## 2020-11-20 NOTE — PROGRESS NOTES
Pulmonary Progress Note    Admit Date: 11/15/2020  Hospital day                               PCP: Christine Hinds MD    Chief Complaint (s):  Patient Active Problem List   Diagnosis    Hyperlipidemia    [facility]    Coronary atherosclerosis of native coronary artery    GERD (gastroesophageal reflux disease)    Depression    Hypothyroid    Ischemic cardiomyopathy    Mitral regurgitation    AI (aortic insufficiency)    [facility]ox[facility] fibrillation[facility]    Ulnar nerve neuropathy    ICD (implantable cardioverter-defibrillator) in place    Tricuspid regurgitation    CKD (chronic kidney disease) stage 4, GFR 15-29 ml/min ([facility])    Chronic systolic CHF (congestive heart failure) ([facility]    Anticoagulated on Coumadin    Right ventricular dysfunction    Congestive heart failure (HCC)    Persistent atrial fibrillation (HCC)    Anemia of chronic renal failure, stage 3 (moderate)    History of coronary artery bypass graft    HFrEF (heart failure with reduced ejection fraction) ([facility])    CKD (chronic kidney disease) stage 3, GFR 30-59 ml/min    Hypertension    [facility]    Ascites    Lesion of lung    Chronic anticoagulation    Supratherapeutic INR       Subjective:  · Awake and alert, lots of questions and very conversant. INR is 2.1 still is too high for thoracentesis.     Vitals:  VITALS:  BP 99/65   Pulse 72   Temp 97.5 °F (36.4 °C) (Oral)   Resp 16   Ht 6' 2\" (1.88 m)   Wt 206 lb (93.4 kg)   SpO2 90%   BMI 26.45 kg/m²     24HR INTAKE/OUTPUT:      Intake/Output Summary (Last 24 hours) at 2020 1705  Last data filed at 2020 0513  Gross per 24 hour   Intake --   Output 700 ml   Net -700 ml       24HR PULSE OXIMETRY RANGE:    SpO2  Av.4 %  Min: 90 %  Max: 94 %    Medications:  IV:   dextrose         Scheduled Meds:   bumetanide  1 mg Oral BID    sacubitril-valsartan  1 tablet Oral BID    albumin human  25 g Intravenous On Call    midodrine  5 mg Oral BID WC    allopurinol  100 mg Oral Daily    amiodarone  200 mg Oral Daily    vitamin C  2,000 mg Oral Daily    aspirin  81 mg Oral Daily    atorvastatin  80 mg Oral Daily    gabapentin  100 mg Oral Daily    insulin lispro  0-6 Units Subcutaneous TID     insulin lispro  0-3 Units Subcutaneous Nightly    levothyroxine  75 mcg Oral Daily    magnesium oxide  400 mg Oral Daily    metoprolol succinate  25 mg Oral Daily    therapeutic multivitamin-minerals  1 tablet Oral Daily    pantoprazole  40 mg Oral QAM AC    sodium chloride flush  10 mL Intravenous 2 times per day    [Held by provider] potassium chloride  20 mEq Oral BID    albuterol  2.5 mg Nebulization Q12H    ipratropium  0.5 mg Nebulization Q12H    warfarin (COUMADIN) daily dosing (placeholder)   Other RX Placeholder    metroNIDAZOLE  500 mg Intravenous Q8H    cefTRIAXone (ROCEPHIN) IV  1 g Intravenous Q24H       Diet:   DIET RENAL; Carb Control: 4 carb choices (60 gms)/meal     EXAM:  General: No distress. Eyes: PERRL. No sclera icterus. No conjunctival injection. ENT: No discharge. Pharynx clear. Neck: Trachea midline. Normal thyroid. Resp: No accessory muscle use. No rales. No wheezing. No rhonchi. CV: Regular rate. Regular rhythm. No murmur or rub. Abd: Non-tender. Non-distended. No masses. No organomegaly. Normal bowel sounds. Skin: Warm and dry. No nodule on exposed extremities. No rash on exposed extremities. Ext: No cyanosis, clubbing, edema  Lymph: No cervical LAD. No supraclavicular LAD. M/S: No cyanosis. No joint deformity. No clubbing. Neuro: Positive pupils/gag/corneals. Normal pain response.        Results:  CBC:   Recent Labs     11/18/20  0316 11/19/20  0345 11/20/20  0230   WBC 10.3 9.8 9.4   HGB 11.7* 11.3* 11.5*   HCT 38.6 36.6* 38.6   MCV 86.0 85.7 87.7    231 206     BMP:   Recent Labs     11/18/20  0316 11/19/20  0345 11/20/20  0230   * 135 139   K 4.9 4.4 4.2 PORTABLE   Final Result   Congestive failure with extensive perihilar interstitial pulmonary edema and   large right pleural effusion. IR US GUIDED PARACENTESIS    (Results Pending)   IR Interventional Radiology Procedure Request    (Results Pending)   US THORACENTESIS    (Results Pending)                Assessment:  1. Pleural effusion with associated ascites. INR is lower but still precludes instrumentation, Coumadin is on hold.        Plan:  1. Thoracentesis tomorrow.         Time at the bedside, reviewing labs and radiographs, reviewing updated notes and consultations, discussing with staff and family was more than 25 minutes. Please note that voice recognition technology was used in the preparation of this note and make therefore it may contain inadvertent transcription errors. If the patient is a COVID 19 isolation patient, the above physical exam reflects that of the examining physician for the day. Savannah Mueller M.D., F.C.C.P.     [facility], [address] [place] [address], [address], [facility]

## 2020-11-20 NOTE — PROGRESS NOTES
Subjective:    Patient seen and examined at bedside, taken off oxygen. Blood pressure remains low. Complaining of abd distention    Objective:    BP (!) 105/57   Pulse 62   Temp 97.7 °F (36.5 °C) (Oral)   Resp 18   Ht 6' 2\" (1.88 m)   Wt 205 lb 6.4 oz (93.2 kg)   SpO2 92%   BMI 26.37 kg/m²     Current medications that patient is taking have been reviewed. Heart:  RRR, no murmurs, gallops, or rubs.   Lungs:  CTA bilaterally, no wheeze, rales or rhonchi  Abd: bowel sounds present, soft, nontender, distended, no masses  Extrem:  No cyanosis or edema    CBC:   Lab Results   Component Value Date    WBC 9.8 11/19/2020    RBC 4.27 11/19/2020    HGB 11.3 11/19/2020    HCT 36.6 11/19/2020    MCV 85.7 11/19/2020    MCH 26.5 11/19/2020    MCHC 30.9 11/19/2020    RDW 25.9 11/19/2020     11/19/2020    MPV 11.3 11/19/2020     BMP:    Lab Results   Component Value Date     11/19/2020    K 4.4 11/19/2020    K 4.4 11/16/2020    CL 97 11/19/2020    CO2 25 11/19/2020    BUN 51 11/19/2020    LABALBU 4.0 11/19/2020    CREATININE 2.5 11/19/2020    CALCIUM 9.0 11/19/2020    GFRAA 31 11/19/2020    LABGLOM 25 11/19/2020    GLUCOSE 106 11/19/2020        Assessment:    Patient Active Problem List   Diagnosis    Hyperlipidemia    DM [facility] mellitus) [facility]    Coronary atherosclerosis of native coronary artery    GERD (gastroesophageal reflux disease)    Depression    Hypothyroid    Ischemic cardiomyopathy    Mitral regurgitation    AI (aortic insufficiency)    [facility]    Ulnar nerve neuropathy    ICD (implantable cardioverter-defibrillator) in place    Tricuspid regurgitation    CKD (chronic kidney disease) stage 4, GFR 15-29 ml/min ([facility])    Chronic systolic CHF (congestive heart failure) ([facility])    Anticoagulated on Coumadin    Right ventricular dysfunction    Congestive heart failure ([facility])    Persistent atrial fibrillation ([facility])    Anemia of chronic renal failure, stage 3 (moderate)    History of coronary artery bypass graft    HFrEF (heart failure with reduced ejection fraction) (HCC)    CKD (chronic kidney disease) stage 3, GFR 30-59 ml/min    Hypertension    Cirrhosis (HCC)    Ascites    Lesion of lung    Chronic anticoagulation    Supratherapeutic INR       Plan:  1. Acute on chronic systolic HF              BNP 5849, CXR shows pulmonary edema with right pleural effusion             Recent TTE and NNEKA show EF 35% and 15-20%             Bumex, entresto and beta blocker held due to hypotension              No accurate I/O   2. Cirrhosis with ascites - on CT abd                No paracentesis. Denies history of cirrhosis. GI consulted              LFTs mildly elevated alk phos, AST, Tbil              Lipase WNL              Possible congestive hepatopathy              INR elevated on coumadin  3. Therapeutic INR. Hold coumadin for thoracentesis  4. Acute on CKD 3 - nephrology consulted  5.  DVT px - coumadin  6.  Hypothyroidism-continue Synthroid        Lizz Goldstein    7:03 PM  11/19/2020

## 2020-11-20 NOTE — PROGRESS NOTES
Occupational Therapy  Patient treatment attempted this PM.  Patient declined session requesting to rest.  Pt stated he was going to sit in chair later today. Explained benefits of OT but pt continued to decline. Will attempt at a later time.                                                  Darryl BILL/YULIA [id number]

## 2020-11-20 NOTE — PROGRESS NOTES
PROGRESS NOTE  By Timothy Patricio M.D. [facility]  Dr. Brenton Holt M.D.,  Dr. Dillon Mcgrath M.D.,   Dr. Jennifer Ramirez D.O.,  Dr. Ronaldo Wheeler M.D.,  Dr. Moody Shepherd D.O.,  Len Coulter D.O. Rush Ybarra  70 y.o.  male    SUBJECTIVE:  Denies abdominal pain. Denies nausea vomiting    OBJECTIVE:    BP 99/65   Pulse 72   Temp 97.5 °F (36.4 °C) (Oral)   Resp 16   Ht 6' 2\" (1.88 m)   Wt 206 lb (93.4 kg)   SpO2 90%   BMI 26.45 kg/m²     General: NAD/ male. AAO x3  HEENT: Anicteric sclera/moist oral mucosa  Neck: Supple/trachea midline  Chest: Symmetrical excursion/nonlabored respirations  Cor: Regular  Abd.: Soft/NT/ND  Extr.:  No peripheral edema  Skin: Warm and dry      DATA:    Monitor data reviewed -pacemaker noted.        Lab Results   Component Value Date    WBC 9.4 11/20/2020    RBC 4.40 11/20/2020    HGB 11.5 11/20/2020    HCT 38.6 11/20/2020    MCV 87.7 11/20/2020    MCH 26.1 11/20/2020    MCHC 29.8 11/20/2020    RDW 26.4 11/20/2020     11/20/2020    MPV 10.9 11/20/2020     Lab Results   Component Value Date     11/20/2020    K 4.2 11/20/2020    K 4.4 11/16/2020     11/20/2020    CO2 26 11/20/2020    BUN 45 11/20/2020    CREATININE 2.3 11/20/2020    CALCIUM 9.4 11/20/2020    PROT 7.4 11/20/2020    LABALBU 4.0 11/20/2020    BILITOT 0.8 11/20/2020    ALKPHOS 128 11/20/2020    AST 46 11/20/2020    ALT 28 11/20/2020     Lab Results   Component Value Date    LIPASE 12 11/15/2020     No results found for: AMYLASE      ASSESSMENT/PLAN:  1.  Cirrhosis/abdominal pain/ascites  -Cannot calculate meld reliably secondary to iatrogenic coagulopathy  -Nonelevated AFP  -Further evaluation with upper endoscopy once cleared from cardiac standpoint and improved coagulopathy -consider outpatient procedure given stable H&H and no significant acute complaints  -Negative autoimmune or viral hepatitis serology     2.  Abdominal pain/abnormal CT  -Possible diverticulitis -diffuse abdominal pain however no leukocytosis  -Continue antibiotics -prescription for 5 more days placed on chart  -Consider postponing colonoscopy for 4 to 6 weeks to minimize risk of perforation associated with acute diverticulitis  -No sufficient fluid for paracentesis     3.  Shortness of breath  -Ongoing tobacco abuse  -Pleural effusion  -Per admitting/pulmonology     4.  CHF  -Acute on chronic  -ICD  -Per admitting/cardiology    No immediate plans for intervention from GI POV. Okay to be discharged from GI POV with follow-up in the office in 2 to 3 weeks after discharge or as scheduled. Patient to call to confirm appointment and with questions/concerns at [phone]. Above has been discussed with the patient and all questions answered to his satisfaction. He is agreeable with the plan as delineated. Thank you for the opportunity to participate in the care of Mr. Yesy Purcell MD  11/20/2020  3:58 PM    NOTE:  This report was transcribed using voice recognition software. Every effort was made to ensure accuracy; however, inadvertent computerized transcription errors may be present.

## 2020-11-20 NOTE — CARE COORDINATION
11/20/2020  Social Work Discharge Planning:KARLA notified Daisha Willard with [facility] discharge.  Electronically signed by SURY Boswell on 11/20/2020 at 11:34 AM

## 2020-11-21 NOTE — PROGRESS NOTES
(therapeutic) -- likely will further decrease tomorrow    Plan:  · Will give warfarin 2.5mg tonight with very close monitoring in the setting of metronidazole which poses a significant drug-drug interaction with warfarin  · Daily PT/INR until the INR is stable within the therapeutic range  · Pharmacist will follow and monitor/adjust dosing as necessary    Berto Lloyd PharmD, [facility], [address] [facility]  11/21/2020  5:08 PM  Pager: [phone]

## 2020-11-21 NOTE — PROGRESS NOTES
Nephrology Progress Note  Patient's Name: Khari Morales  9:04 AM  11/21/2020    Nephrologist: Ignacio Favorite    Reason for Consult:  JULIANO on CKD  Requesting Physician:  Kash Velazquez MD    Chief Complaint:  Abd Pain and SOB    History Obtained From:  patient and past medical records    History of Present Ilness from the 11/18/20 note:    Khari Morales is a 70 y.o. male with prior history CKD G3B with a baseline serum cr 1.5-2.2mg/dl with an e-GFR=30-46ml/min in the setting of CAD/HTN/DM2/Tob Abuse with presumed microvascular disease  4/28/20 R awkwim89.4cm and L Kidney 9.5cm, no PVR, no hydro or masses or perinephric fluid  10/6/20 Hep B&C non reactive  Pt admitted via the ED 11/15/20 when he presented with abd pain and SOB. The abd pain was progressive and he had a decreased appetite and presented to the ED. No emesis or fever. Workup showed ascites and cirrhosis on Ct Scan. He was placed on IV bumetanide and Entresto. Bumetanide held this AM. Received SPA for Hypotension    11/21/20: awake and alert. He is very anxious for discharge.        Past Medical History:   Diagnosis Date    Acid reflux     Acute MI ([facility] [address] 01/25/97,01/04    Anxiety     CAD (coronary artery disease)     follows w Dr. Sukhi Reid Diabetes mellitus ([facility] [address]     Fluid retention     history of    Hyperlipidemia     Hypertension     Ischemic cardiomyopathy     Osteoarthritis     Post PTCA 41/51/06    Systolic dysfunction, left ventricle     follows with Dr. Sukhi Reid Thyroid disease        Past Surgical History:   Procedure Laterality Date    CARDIAC DEFIBRILLATOR PLACEMENT Left 2013    with pacemaker ([employer])    CARDIOVERSION  09/04/2020    Successful     Dr. Xochitl Ma CATH LAB PROCEDURE  01/27/97,11/00    Critical lesion mid left circumflex,significant lesion mid-LAD and first diagonal    ECHO COMPL W DOP COLOR FLOW  4/11/2013  ECHO COMPL W DOP COLOR FLOW  4/21/2013         MITRAL VALVE SURGERY      TRANSESOPHAGEAL ECHOCARDIOGRAM  4/12/2013    Dr. Prasanna Art TRANSESOPHAGEAL ECHOCARDIOGRAM  08/02/2017       Family History   Problem Relation Age of Onset    Hypertension Mother     Hypertension Father     Heart Surgery Father     High Cholesterol Father         reports that he has been smoking cigarettes. He has a 12.50 pack-year smoking history. He has never used smokeless tobacco. He reports current alcohol use. He reports that he does not use drugs. Allergies:  Patient has no known allergies.     Current Medications:    bumetanide (BUMEX) tablet 1 mg, BID  sacubitril-valsartan (ENTRESTO) 24-26 MG per tablet 1 tablet, BID  midodrine (PROAMATINE) tablet 5 mg, BID WC  albuterol (PROVENTIL) nebulizer solution 2.5 mg, Q6H PRN  allopurinol (ZYLOPRIM) tablet 100 mg, Daily  amiodarone (CORDARONE) tablet 200 mg, Daily  ascorbic acid (VITAMIN C) tablet 2,000 mg, Daily  aspirin EC tablet 81 mg, Daily  atorvastatin (LIPITOR) tablet 80 mg, Daily  gabapentin (NEURONTIN) capsule 100 mg, Daily  insulin lispro (HUMALOG) injection vial 0-6 Units, TID WC  insulin lispro (HUMALOG) injection vial 0-3 Units, Nightly  glucose (GLUTOSE) 40 % oral gel 15 g, PRN  dextrose 50 % IV solution, PRN  glucagon (rDNA) injection 1 mg, PRN  dextrose 5 % solution, PRN  levothyroxine (SYNTHROID) tablet 75 mcg, Daily  magnesium oxide (MAG-OX) tablet 400 mg, Daily  metoprolol succinate (TOPROL XL) extended release tablet 25 mg, Daily  therapeutic multivitamin-minerals 1 tablet, Daily  pantoprazole (PROTONIX) tablet 40 mg, QAM AC  sodium chloride flush 0.9 % injection 10 mL, 2 times per day  sodium chloride flush 0.9 % injection 10 mL, PRN  acetaminophen (TYLENOL) tablet 650 mg, Q6H PRN    Or  acetaminophen (TYLENOL) suppository 650 mg, Q6H PRN  polyethylene glycol (GLYCOLAX) packet 17 g, Daily PRN  [Held by provider] potassium chloride (KLOR-CON M) extended 220 11/21/2020    MCV 87.0 11/21/2020    MCH 26.5 11/21/2020    MCHC 30.5 11/21/2020    RDW 26.9 11/21/2020    SEGSPCT 54 05/15/2013    LYMPHOPCT 6.1 11/21/2020    MONOPCT 8.1 11/21/2020    BASOPCT 0.3 11/21/2020    MONOSABS 0.79 11/21/2020    LYMPHSABS 0.59 11/21/2020    EOSABS 0.09 11/21/2020    BASOSABS 0.03 11/21/2020     Hemoglobin/Hematocrit:    Lab Results   Component Value Date    HGB 11.4 11/21/2020    HCT 37.4 11/21/2020     CMP:    Lab Results   Component Value Date     11/21/2020    K 4.3 11/21/2020    K 4.4 11/16/2020     11/21/2020    CO2 26 11/21/2020    BUN 45 11/21/2020    CREATININE 2.2 11/21/2020    GFRAA 36 11/21/2020    LABGLOM 29 11/21/2020    GLUCOSE 150 11/21/2020    PROT 7.3 11/21/2020    LABALBU 3.9 11/21/2020    CALCIUM 9.2 11/21/2020    BILITOT 0.8 11/21/2020    ALKPHOS 123 11/21/2020    AST 47 11/21/2020    ALT 26 11/21/2020     BMP:    Lab Results   Component Value Date     11/21/2020    K 4.3 11/21/2020    K 4.4 11/16/2020     11/21/2020    CO2 26 11/21/2020    BUN 45 11/21/2020    LABALBU 3.9 11/21/2020    CREATININE 2.2 11/21/2020    CALCIUM 9.2 11/21/2020    GFRAA 36 11/21/2020    LABGLOM 29 11/21/2020    GLUCOSE 150 11/21/2020     BUN/Creatinine:    Lab Results   Component Value Date    BUN 45 11/21/2020    CREATININE 2.2 11/21/2020     Hepatic Function Panel:    Lab Results   Component Value Date    ALKPHOS 123 11/21/2020    ALT 26 11/21/2020    AST 47 11/21/2020    PROT 7.3 11/21/2020    BILITOT 0.8 11/21/2020    BILIDIR 0.5 11/17/2020    IBILI 0.4 11/17/2020    LABALBU 3.9 11/21/2020     Albumin:    Lab Results   Component Value Date    LABALBU 3.9 11/21/2020     Calcium:    Lab Results   Component Value Date    CALCIUM 9.2 11/21/2020     Ionized Calcium:  No results found for: IONCA  Magnesium:    Lab Results   Component Value Date    MG 2.1 11/21/2020     Phosphorus:    Lab Results   Component Value Date    PHOS 2.5 11/21/2020     LDH:  No results found for: LDH  Uric Acid:    Lab Results   Component Value Date    LABURIC 8.1 10/06/2020     PT/INR:    Lab Results   Component Value Date    PROTIME 25.6 11/21/2020    INR 2.1 11/21/2020     PTT:    Lab Results   Component Value Date    APTT 35.9 11/16/2020   [APTT}  Troponin:    Lab Results   Component Value Date    TROPONINI 0.07 11/16/2020     U/A:    Lab Results   Component Value Date    COLORU Yellow 11/15/2020    PROTEINU TRACE 11/15/2020    PHUR 5.0 11/15/2020    WBCUA NONE 11/15/2020    RBCUA NONE 11/15/2020    RBCUA NONE 05/14/2013    BACTERIA NONE SEEN 11/15/2020    CLARITYU Clear 11/15/2020    SPECGRAV >=1.030 11/15/2020    LEUKOCYTESUR Negative 11/15/2020    UROBILINOGEN 0.2 11/15/2020    BILIRUBINUR Negative 11/15/2020    BLOODU Negative 11/15/2020    GLUCOSEU Negative 11/15/2020     ABG:    Lab Results   Component Value Date    PH 7.419 01/17/2019    PCO2 42.6 04/15/2013    PO2 79.6 04/15/2013    HCO3 24.2 04/15/2013    BE 4.2 01/17/2019    O2SAT 44.3 01/17/2019     HgBA1c:    Lab Results   Component Value Date    LABA1C 5.8 11/16/2020     Microalbumen/Creatinine ratio:  No components found for: RUCREAT  FLP:    Lab Results   Component Value Date    TRIG 60 10/06/2020    HDL 35 10/06/2020    LDLCALC 38 10/06/2020    LABVLDL 12 10/06/2020     TSH:    Lab Results   Component Value Date    TSH 7.620 11/16/2020     VITAMIN B12: No components found for: B12  FOLATE:    Lab Results   Component Value Date    FOLATE >20.0 10/06/2020     Iron Saturation:  No components found for: PERCENTFE  FERRITIN:    Lab Results   Component Value Date    FERRITIN 625 11/17/2020     AMYLASE:  No results found for: AMYLASE  LIPASE:    Lab Results   Component Value Date    LIPASE 12 11/15/2020     Fibrinogen Level:  No components found for: FIB  24 Hour Urine for Protein:  No components found for: RAWUPRO, UHRS3, FBKV20SY, UTV3  24 Hour Urine for Creatinine Clearance:  No components found for: CREAT4, UHRS10, UTV10  PSA: No results found for: PSA     Imaging:  CXR results:  EXAMINATION:    ONE XRAY VIEW OF THE CHEST         11/15/2020 8:32 pm         COMPARISON:    March 11, 2019         HISTORY:    ORDERING SYSTEM PROVIDED HISTORY: shortness of breath    TECHNOLOGIST PROVIDED HISTORY:    Reason for exam:->shortness of breath         FINDINGS:    Cardiac silhouette is enlarged.         Dual-chamber pacemaker-AICD in place.  Status post median sternotomy.         Prominent vascular congestion with  interstitial and hazy airspace disease in    the perihilar distribution consistent with congestive failure and perihilar    pulmonary edema.         Large right pleural effusion, significantly increased in the interval since    the prior examination.              Impression    Congestive failure with extensive perihilar interstitial pulmonary edema and    large right pleural effusion. EXAMINATION:    RIGHT UPPER QUADRANT ULTRASOUND         11/16/2020 10:29 am         COMPARISON:    CT abdomen and pelvis, 11/15/2020         HISTORY:    ORDERING SYSTEM PROVIDED HISTORY: Cirrhosis    TECHNOLOGIST PROVIDED HISTORY:    Reason for exam:->Cirrhosis    Reason for exam:->ascites survey/ liver US    What reading provider will be dictating this exam?->CRC         FINDINGS:    LIVER:  The subtle irregularity of the liver contour indicative of cirrhosis    was better visualized on the earlier CT.  No focal hepatic lesion or ductal    dilatation is identified.         BILIARY SYSTEM:  Cholelithiasis is noted.  The gallbladder is nonspecifically    thickened to 5.2 mm.  The sonographic Naranjo sign is negative.         Common bile duct is within normal limits measuring 5 mm. .         RIGHT KIDNEY: The right kidney is grossly unremarkable without evidence of    hydronephrosis.         PANCREAS:  Visualized portions of the pancreas are unremarkable.         OTHER: Ascites is noted.              Impression    1.  Cholelithiasis with gallbladder wall thickening.  In light of liver    cirrhosis and ascites, the significance of the gallbladder wall thickening is    unclear.  It may be due to liver disease.  If there is clinical concern for    acute cholecystitis, consider nuclear medicine hepatobiliary scan. 2. Irregularity of the liver contour indicative of cirrhosis was better    visualized on the prior CT. 3. Ascites. EXAMINATION:    CT OF THE ABDOMEN AND PELVIS WITHOUT CONTRAST 11/15/2020 9:42 pm         TECHNIQUE:    CT of the abdomen and pelvis was performed without the administration of    intravenous contrast. Multiplanar reformatted images are provided for review. Dose modulation, iterative reconstruction, and/or weight based adjustment of    the mA/kV was utilized to reduce the radiation dose to as low as reasonably    achievable.         COMPARISON:    None.         HISTORY:    ORDERING SYSTEM PROVIDED HISTORY: pain    TECHNOLOGIST PROVIDED HISTORY:    Reason for exam:->pain    Additional Contrast?->Oral         FINDINGS:    The lung bases demonstrate cardiomegaly with coronary artery calcification. There is moderate pleural effusions and atelectasis in lung bases.  Masslike    densities are identified in the right lung base largest 1 measuring up to 5    by 2.7 cm likely  rounded atelectasis.  Malignant lesion is less likely. Liver is heterogeneous in appearance with nodular border concerning for    cirrhosis.  There is moderate amount of ascites.  Gallbladder is partially    contracted with small amount of sludge.  Spleen, pancreas, and adrenals are    normal.  There is perinephric stranding bilaterally.  Moderately enlarged    lymph nodes are identified in the upper abdomen with lymph nodes measuring up    to 1.7 cm in the glenn of the liver.  There is calcification and mild ectasia    of the abdominal aorta measuring 2.3 cm.  There is edema in the mesentery. Slightly dilated small bowel loops measuring up to 2.5 cm is noted.      Pelvis.  The bladder is contracted with wall thickening.  6 mm obstructing    stone is identified in the left UVJ without significant hydronephrosis in the    left kidney.  There is diverticulosis of the colon with mild thickening of    the sigmoid colon which is collapsed.  Presacral edema is present.  The    appendix is normal.              Impression    Cardiomegaly with the pleural effusions/atelectasis lung bases likely CHF. Pneumonia is less likely.         Masslike densities in the right lung base probably rounded atelectasis. Please consider surveillance to exclude underlying malignancy.         Cirrhotic liver with ascites.         Dilated small bowel loops likely ileus.         Diverticulosis of colon with mild thickening of the sigmoid colon likely  due    to suboptimal distention.  Uncomplicated diverticulitis is less likely.         6 mm   stone in the distal left ureter/UVJ without significant hydronephrosis    in the left kidney. Assessment  1-Stage I JULIANO-most probably due to decreased effective renal perfusion with the HFrEF and the Hypotension in the setting of the loop diuretic and sacubitril/valsartan  UA 11/15 (-) blood or protein  FeUrea 29.46% consistent with decreased effective renal perfusion  Cr down from 2.8-->2.5-->2.3--> 2.2 mg/dl  PLAN:1.  Follow cr with the resumption of the Entresto and bumex    2-CKD G3B with a baseline serum cr 1.5-2.2mg/dl with an e-GFR=30-46ml/min in the setting of CAD/HTN/DM2/Tob Abuse with presumed microvascular disease      3-HFrEF with hypotension  Cortisol low at 8.60-repeat cortisol post cosyntropin up to 18.23   PLAN:1.Continue the Entresto at 1/2 the previous dose and the bumetanide 1mg po bid  2.  Improved BP with midodrine    4- Anemia in CKD  HgB above goal 10  PLAN:1. Start ARTIS if HgB <10    5- Sec HPTH of Renal Origin  , Vit D 46, Ca++ ionized WNL, PO4 2.5  PLAN:1. Follow      OK for D/C when OK with Pulm    Thank you

## 2020-11-21 NOTE — PROGRESS NOTES
INPATIENT CARDIOLOGY FOLLOW-UP    Name: Colby Fairchild    Age: 70 y.o. Date of Service: 11/21/2020    Chief Complaint: Follow-up for acute on chronic HFrEF, CAD s/p CABG, ischemic CM, valvular heart disease, atrial fibrillation    Referring Physician: Jesse Cunningham MD    History of Present Illness:  Colby Fairchild is a 70 y.o. male (known to me and he also follows with EP) who presented to [facility] on 11/15/2020 for further evaluation of progressive SOB (at rest/with exertion) and orthopnea for > 2 weeks. He was also followed previously by Dr. Bianca Alfred. His extensive PMH is outlined in detail below (see A/P below). He denies recent exertional chest pain, palpitations, or syncope. He is compliant with CHF clinic follow-up. +SOB at rest on 11/16/2020 --> respiratory status and abdominal pain improved since admission per patient (still with SOB at rest). No new overnight cardiac complaints per patient. +hypotension (and worsening renal function) on 11/18/2020 and GDMT medications and diuretic held. Overnight, SBP  and Cr improved (bumex and entresto resumed at lower doses and Toprol XL 25 mg resumed on 11/19/2020). AP/VS on EKG and telemetry. Thoracentesis pending.     Review of Systems:   Cardiac: As per HPI  General: No fever, chills  Pulmonary: As per HPI  HEENT: No visual disturbances, difficult swallowing  GI: No nausea, vomiting  : No dysuria, hematuria  Endocrine: +hypothyrodism, +diabetes  Musculoskeletal: FUNEZ x 4, no focal motor deficits  Skin: Intact, no rashes  Neuro: No headache, seizures  Psych: Currently with no depression, anxiety    Past Medical History:  Past Medical History:   Diagnosis Date    Acid reflux     Acute MI ([facility] [address] 01/25/97,01/04    Anxiety     CAD (coronary artery disease)     follows w Dr. Gwen Brownlee     Diabetes mellitus ([facility] [address]     Fluid retention     history of    Hyperlipidemia     Hypertension     Ischemic cardiomyopathy     Osteoarthritis     Post PTCA 00/32/56    Systolic dysfunction, left ventricle     follows with Dr. Macrina Dumont Thyroid disease        Past Surgical History:  Past Surgical History:   Procedure Laterality Date    CARDIAC DEFIBRILLATOR PLACEMENT Left 2013    with pacemaker ([employer]tr[employer])    CARDIOVERSION  09/04/2020    Successful     Dr. Brent Richmond CATH LAB PROCEDURE  01/27/97,11/00    Critical lesion mid left circumflex,significant lesion mid-LAD and first diagonal    ECHO COMPL W DOP COLOR FLOW  4/11/2013         ECHO COMPL W DOP COLOR FLOW  4/21/2013         MITRAL VALVE SURGERY      TRANSESOPHAGEAL ECHOCARDIOGRAM  4/12/2013    Dr. Rodrigo Schilling TRANSESOPHAGEAL ECHOCARDIOGRAM  08/02/2017       Family History:  Family History   Problem Relation Age of Onset    Hypertension Mother     Hypertension Father     Heart Surgery Father     High Cholesterol Father        Social History:  Social History     Socioeconomic History    Marital status:      Spouse name: Not on file    Number of children: Not on file    Years of education: Not on file    Highest education level: Not on file   Occupational History    Not on file   Social Needs    Financial resource strain: Not on file    Food insecurity     Worry: Not on file     Inability: Not on file    Transportation needs     Medical: Not on file     Non-medical: Not on file   Tobacco Use    Smoking status: Current Every Day Smoker     Packs/day: 0.25     Years: 50.00     Pack years: 12.50     Types: Cigarettes    Smokeless tobacco: Never Used    Tobacco comment: currently 3-4 cigarettes a day (11/8/19); used to be 2 packs a day   Substance and Sexual Activity    Alcohol use: Yes     Frequency: Monthly or less     Drinks per session: 1 or 2     Binge frequency: Never     Comment: rare tequilla/beer    Drug use: Never    Sexual activity: Not on file   Lifestyle    Physical activity Days per week: Not on file     Minutes per session: Not on file    Stress: Not on file   Relationships    Social connections     Talks on phone: Not on file     Gets together: Not on file     Attends Buddhism service: Not on file     Active member of club or organization: Not on file     Attends meetings of clubs or organizations: Not on file     Relationship status: Not on file    Intimate partner violence     Fear of current or ex partner: Not on file     Emotionally abused: Not on file     Physically abused: Not on file     Forced sexual activity: Not on file   Other Topics Concern    Not on file   Social History Narrative    . Two kids who he is close with. Foodie. Loves cooking. Drinks 1 cup of coffee daily. Compliant with meds and sodium restrictions for the most part. No smoking. Occasional wine with his food. Allergies:  No Known Allergies    Home Medications:  Prior to Admission medications    Medication Sig Start Date End Date Taking? Authorizing Provider   polyethylene glycol (GLYCOLAX) 17 g packet Take 17 g by mouth daily as needed for Constipation 11/20/20 12/20/20 Yes Jerod Reilly MD   amoxicillin-clavulanate (AUGMENTIN) 875-125 MG per tablet Take 1 tablet by mouth 2 times daily for 5 days 11/20/20 11/25/20 Yes Jerod Reilly MD   amiodarone (CORDARONE) 200 MG tablet Take 1 tablet by mouth daily 400mg twice daily for one week then 200mg daily 10/14/20  Yes Joana Alberts MD   aspirin 81 MG EC tablet Take 81 mg by mouth daily Prescribed per Dr Gerard Zaysa Provider, MD   pantoprazole (PROTONIX) 20 MG tablet Take 20 mg by mouth daily    Historical Provider, MD   zolpidem (AMBIEN CR) 12.5 MG extended release tablet Take 12.5 mg by mouth nightly as needed. 9/10/19   Historical Provider, MD   gabapentin (NEURONTIN) 100 MG capsule Take 100 mg by mouth daily.     Historical Provider, MD   sacubitril-valsartan (ENTRESTO) 49-51 MG per tablet Entresto 49 mg-51 mg tablet   take one pill by mouth twice daily    Historical Provider, MD   warfarin (COUMADIN) 5 MG tablet Take by mouth Currently 5mg on Mon, Wed, Fri & Sun and  2.5mg on Tu, Thurs & Sat. Historical Provider, MD   metoprolol succinate (TOPROL XL) 50 MG extended release tablet Take 1 tablet by mouth daily  Patient taking differently: Take 25 mg by mouth daily  3/15/19   Abbie Feng MD   albuterol sulfate HFA (PROAIR HFA) 108 (90 Base) MCG/ACT inhaler Inhale 2 puffs into the lungs every 4 hours as needed for Wheezing or Shortness of Breath 3/12/19 11/8/20  Isela Prado MD   bumetanide (BUMEX) 2 MG tablet Take 1 tablet by mouth See Admin Instructions 2mg in am and 1mg in afternoon. Dr Destiny Valles reduced dose 2/13/19. Patient taking differently: Take by mouth 2mg in am and 1mg in afternoon.  Dr Destiny Valles reduced dose 2/13/19. 2/28/19   Abbie Feng MD   potassium chloride (KLOR-CON M) 20 MEQ extended release tablet Take 1 tablet by mouth 2 times daily 1/17/19   Abbie Feng MD   Magnesium 400 MG TABS Take 400 mg by mouth daily    Historical Provider, MD   Ascorbic Acid (VITAMIN C) 1000 MG tablet Take 2,000 mg by mouth daily    Historical Provider, MD   Multiple Vitamins-Minerals (THERAPEUTIC MULTIVITAMIN-MINERALS) tablet Take 1 tablet by mouth daily    Historical Provider, MD   colchicine (COLCRYS) 0.6 MG tablet Take 0.6 mg by mouth 2 times daily as needed     Historical Provider, MD   allopurinol (ZYLOPRIM) 100 MG tablet Take 1 tablet by mouth daily 12/3/17   Pro Villarreal MD   levothyroxine (SYNTHROID) 75 MCG tablet Take 1 tablet by mouth Daily 12/4/17   Pro Villarreal MD   insulin glargine (LANTUS) 100 UNIT/ML injection vial Inject 20 Units into the skin every morning    Historical Provider, MD   atorvastatin (LIPITOR) 80 MG tablet Take 80 mg by mouth daily    Historical Provider, MD   linagliptin (TRADJENTA) 5 MG tablet Take 5 mg by mouth daily    Historical Provider, MD   albuterol-ipratropium (COMBIVENT)  MCG/ACT inhaler Inhale 2 puffs into the lungs every 12 hours.  4/23/13   Kenny Talamantes MD       Current Medications:  Current Facility-Administered Medications   Medication Dose Route Frequency Provider Last Rate Last Dose    bumetanide (BUMEX) tablet 1 mg  1 mg Oral BID Sherolyn Olszewski, MD   1 mg at 11/21/20 0859    sacubitril-valsartan (ENTRESTO) 24-26 MG per tablet 1 tablet  1 tablet Oral BID Sherolyn Olszewski, MD   1 tablet at 11/21/20 0900    midodrine (PROAMATINE) tablet 5 mg  5 mg Oral BID  Sherolyn Olszewski, MD   5 mg at 11/21/20 0859    albuterol (PROVENTIL) nebulizer solution 2.5 mg  2.5 mg Nebulization Q6H PRN Joan Hocdawson Finnegans, APRN - CNP   2.5 mg at 11/19/20 0531    allopurinol (ZYLOPRIM) tablet 100 mg  100 mg Oral Daily Joan Hock Masters, APRN - CNP   100 mg at 11/21/20 0900    amiodarone (CORDARONE) tablet 200 mg  200 mg Oral Daily Joan Hock Masters, APRN - CNP   200 mg at 11/21/20 5697    ascorbic acid (VITAMIN C) tablet 2,000 mg  2,000 mg Oral Daily Joan Hock Masters, APRN - CNP   2,000 mg at 11/21/20 0900    aspirin EC tablet 81 mg  81 mg Oral Daily Joan Hock Masters, APRN - CNP   Stopped at 11/21/20 0900    atorvastatin (LIPITOR) tablet 80 mg  80 mg Oral Daily Joan Hock Masters, APRN - CNP   80 mg at 11/21/20 5267    gabapentin (NEURONTIN) capsule 100 mg  100 mg Oral Daily Joan Hock Masters, APRN - CNP   100 mg at 11/21/20 0900    insulin lispro (HUMALOG) injection vial 0-6 Units  0-6 Units Subcutaneous TID  Joan Sherman, APRN - CNP   2 Units at 11/20/20 1629    insulin lispro (HUMALOG) injection vial 0-3 Units  0-3 Units Subcutaneous Nightly Joan Frances Sherman, APRN - CNP   1 Units at 11/20/20 2054    glucose (GLUTOSE) 40 % oral gel 15 g  15 g Oral PRN Joan Sherman, APRN - CNP        dextrose 50 % IV solution  12.5 g Intravenous PRN Joan Sherman, ANTONY - CNP        glucagon (rDNA) injection 1 mg  1 mg Intramuscular PRN Montana Sherman, APRN - CNP        dextrose 5 % solution  100 mL/hr Intravenous PRN Montana Sherman, APRN - CNP        levothyroxine (SYNTHROID) tablet 75 mcg  75 mcg Oral Daily Renetta Gonzalez APRN - CNP   75 mcg at 11/21/20 7943    magnesium oxide (MAG-OX) tablet 400 mg  400 mg Oral Daily Montana Sherman, APRN - CNP   400 mg at 11/21/20 4632    metoprolol succinate (TOPROL XL) extended release tablet 25 mg  25 mg Oral Daily Montana Sherman, APRN - CNP   25 mg at 11/21/20 0900    therapeutic multivitamin-minerals 1 tablet  1 tablet Oral Daily Montana Sherman, APRN - CNP   1 tablet at 11/21/20 0901    pantoprazole (PROTONIX) tablet 40 mg  40 mg Oral QAM AC Montana Sherman, APRN - CNP   40 mg at 11/21/20 8553    sodium chloride flush 0.9 % injection 10 mL  10 mL Intravenous 2 times per day Renetta Gonzalez APRN - CNP   10 mL at 11/21/20 0859    sodium chloride flush 0.9 % injection 10 mL  10 mL Intravenous PRN Montana Sherman APRN - CNP   10 mL at 11/20/20 1723    acetaminophen (TYLENOL) tablet 650 mg  650 mg Oral Q6H PRN Montana Sherman, APRN - CNP        Or   Bernestine Joaquin acetaminophen (TYLENOL) suppository 650 mg  650 mg Rectal Q6H PRN Montana Sherman, APRN - CNP        polyethylene glycol (GLYCOLAX) packet 17 g  17 g Oral Daily PRN Montana Sherman, APRN - CNP        [Held by provider] potassium chloride (KLOR-CON M) extended release tablet 20 mEq  20 mEq Oral BID Montana Sherman, APRN - CNP   20 mEq at 11/18/20 7456    LORazepam (ATIVAN) tablet 0.5 mg  0.5 mg Oral Nightly PRN Montana Sherman, APRN - CNP   0.5 mg at 11/21/20 0125    albuterol (PROVENTIL) nebulizer solution 2.5 mg  2.5 mg Nebulization Q12H Montana Sherman, APRN - CNP   2.5 mg at 11/20/20 1950    ipratropium (ATROVENT) 0.02 % nebulizer solution 0.5 mg  0.5 mg Nebulization Q12H ANTONY Soriano CNP   0.5 mg at 11/20/20 [facility]    warfarin (COUMADIN) daily dosing (placeholder)   Other RX Placeholder Bhupendra Delacruz Masters, APRN - CNP        metronidazole (FLAGYL) 500 mg in NaCl 100 mL IVPB premix  500 mg Intravenous Q8H Cassy Masterson  mL/hr at 11/21/20 0859 500 mg at 11/21/20 0859    cefTRIAXone (ROCEPHIN) 1 g in sterile water 10 mL IV syringe  1 g Intravenous Q24H Cassy Masterson MD   1 g at 11/20/20 1722      dextrose         Physical Exam:  /68   Pulse 62   Temp 97.6 °F (36.4 °C) (Oral)   Resp 14   Ht 6' 2\" (1.88 m)   Wt 203 lb 4.8 oz (92.2 kg)   SpO2 95%   BMI 26.10 kg/m²   Wt Readings from Last 3 Encounters:   11/21/20 203 lb 4.8 oz (92.2 kg)   11/10/20 202 lb (91.6 kg)   11/06/20 198 lb (89.8 kg)     Appearance: Awake, alert, no acute respiratory distress   Skin: Intact, no rash   Head: Normocephalic, atraumatic   Eyes: EOMI, no conjunctival erythema   ENMT: No pharyngeal erythema, MMM, no rhinorrhea   Neck: Supple, no carotid bruits   Lungs: Decreased BS B/L, no wheezing  Cardiac: Regular rate and rhythm, 1/6 systolic murmur  Abdomen: Soft, nontender, +bowel sounds   Extremities: Moves all extremities x 4, no lower extremity edema   Neurologic: No focal motor deficits apparent, normal mood and affect     Intake/Output:    Intake/Output Summary (Last 24 hours) at 11/21/2020 0904  Last data filed at 11/21/2020 0626  Gross per 24 hour   Intake 520 ml   Output 175 ml   Net 345 ml     No intake/output data recorded.     Laboratory Tests:  Recent Labs     11/19/20  0345 11/20/20  0230 11/21/20  0122    139 139   K 4.4 4.2 4.3   CL 97* 100 100   CO2 25 26 26   BUN 51* 45* 45*   CREATININE 2.5* 2.3* 2.2*   GLUCOSE 106* 111* 150*   CALCIUM 9.0 9.4 9.2     Lab Results   Component Value Date    MG 2.1 11/21/2020     Recent Labs     11/19/20  0345 11/20/20  0230 11/21/20  0122   ALKPHOS 122 128 123   ALT 26 28 26   AST 43* 46* 47*   PROT 7.2 7.4 7.3   BILITOT 0.7 0.8 0.8   LABALBU 4.0 4.0 3.9     Recent Labs 11/19/20  0345 11/20/20  0230 11/21/20  0122   WBC 9.8 9.4 9.7   RBC 4.27 4.40 4.30   HGB 11.3* 11.5* 11.4*   HCT 36.6* 38.6 37.4   MCV 85.7 87.7 87.0   MCH 26.5 26.1 26.5   MCHC 30.9* 29.8* 30.5*   RDW 25.9* 26.4* 26.9*    206 220   MPV 11.3 10.9 11.7     Lab Results   Component Value Date    CKTOTAL 25 (L) 05/14/2013    CKMB 0.5 05/14/2013    TROPONINI 0.07 (H) 11/16/2020    TROPONINI 0.05 (H) 11/16/2020    TROPONINI 0.07 (H) 11/15/2020     Lab Results   Component Value Date    INR 2.1 11/21/2020    INR 2.1 11/20/2020    INR 2.6 11/19/2020    PROTIME 25.6 (H) 11/21/2020    PROTIME 24.4 (H) 11/20/2020    PROTIME 31.4 (H) 11/19/2020     Lab Results   Component Value Date    TSH 7.620 (H) 11/16/2020     Lab Results   Component Value Date    LABA1C 5.8 (H) 11/16/2020     No results found for: EAG  Lab Results   Component Value Date    CHOL 85 10/06/2020    CHOL 85 12/02/2017    CHOL 215 (H) 04/09/2013     Lab Results   Component Value Date    TRIG 60 10/06/2020    TRIG 87 12/02/2017    TRIG 202 (H) 04/09/2013     Lab Results   Component Value Date    HDL 35 10/06/2020    HDL 20 12/02/2017    HDL 32.4 (A) 04/09/2013     Lab Results   Component Value Date    LDLCALC 38 10/06/2020    LDLCALC 48 12/02/2017    LDLCALC 142 (H) 04/09/2013     Lab Results   Component Value Date    LABVLDL 12 10/06/2020    LABVLDL 17 12/02/2017     No results found for: CHOLHDLRATIO  No results for input(s): PROBNP in the last 72 hours. Cardiac Tests:  EKG: AP/VS    Telemetry reviewed (date: 11/21/2020): AP/VS    Echocardiogram: 7/14/15  Ejection fraction is visually estimated at 35%. The inferior and inferolateral walls are severely hypokinetic/akinetic. Mildly dilated right ventricle with normal right ventricular function. Indeterminate diastolic function. Moderately dilated left atrium. Pulmonary vein doppler suggestive of elevated left atrial pressure. Status post mitral annular ring.   Mean mitral valve gradient 5.9 mmHg.  Moderate mitral regurgitation. Moderate aortic regurgitation. Mild tricuspid regurgitation. PASP is estimated at 37 mmHg. Mild pulmonic regurgitation.      NNEKA: 8/2/17 (Dr. Allison Rivers)   Ejection fraction is visually estimated at 35%.   Inferolateral/inferior hypokinesis.   Rakel right ventricle size with low normal right ventricular function.   Moderately dilated left atrium.   History of oversewing of ROBER. There is color flow from the LA into the   ROBER. No evidence of a left atrial appendage thrombus.   No evidence of interatrial shunting on bubble study.   History of mitral annular ring (30mm Future Ring)   MV mean gradient 3.4 mmHg.   Poor coaptation of MV leaflets.   Severe mitral regurgitation.   Moderate aortic regurgitation.   Moderate tricuspid regurgitation.  RV-RA gradient is estimated at 35 mmHg.   Mild pulmonic regurgitation.     Echocardiogram: 1/9/18 ([facility])  - Exam indication: Initial postoperative evaluation of prosthetic valve (baseline)  - The left ventricle is moderately dilated. Left ventricular systolic function is   severely decreased. EF = 22 ± 5% (2D biplane) Left ventricular diastolic function   was not evaluated due to prosthetic valve. - The right ventricle is dilated. Right ventricular systolic function is normal.  - The left atrial cavity is severely dilated. - The right atrial cavity is dilated. - Post mitral valve replacement. Biocor prosthetic mitral valve (size #29). There   is trivial mitral valve regurgitation. The peak gradient is 23 mmHg and the mean   gradient is 8 mmHg. MV gradients obtained at a HR of 80 bpm. Prior peak/mean   gradients of 22/8 mmHg. - Post tricuspid valve repair. tricuspid valve annuloplasty ring (size #30). There   is mild (1+ - 2+) tricuspid valve regurgitation. The peak gradient is 4 mmHg and   the mean gradient is 2 mmHg. TV gradients obtained at a HR of 80 bpm. Prior mean   gradient of 3 mmHg. - Trifecta prosthetic aortic valve (size #23). The peak gradient is 10 mmHg, the   mean gradient is 5 mmHg and the dimensionless valve index is 0.57. Prior peak/mean   gradients of 9/5 mmHg. - Exam was compared with the prior  echocardiographic exam performed on   12/18/2017. There is no significant change.     Echocardiogram: 9/18/19 (Dr. Charissa Ca)  [facility] eccentric left ventricular hypertrophy.   Severe left ventricular systolic dysfunction.   Visually estimated LVEF is 35 %.    Normal right ventricular size.   Mild right ventricular systolic function.   TAPSE is 1.5 cm.   TDI s' is 7 cm/s.  ICD or pacer lead visualized in the right sided chambers.   Status post mitral valve # 29 mm.   The valve appears well seated with no rocking motion.   Status post aortic valve # 23 mm.   This appears well seated and without any rocking motion.   Mean gradient is 10.4 mm Hg.   Moderate tricuspid regurgitation.     NNEKA: 9/4/2020 (Dr. Agueda Healy)   Mildly dilated left ventricle. Severely reduced left ventricular systolic function. Ejection fraction is visually estimated at 15-20%. Severely dilated left atrium. There was evidence of severe spontaneous echo contrast in the left atrium. Mildly enlarged right ventricle cavity. Right ventricle global systolic function is severely reduced . Moderately enlarged right atrium size. Normally functioning bioprosthetic valve in mitral position. Mild mitral regurgitation is present. Mean transmitral gradient (Doppler) 5 mm Hg . Normally functioning bioprosthetic valve in aortic position. Planimetered aortic valve area 1.4 cm2 . The tricuspid valve appears structurally normal with evidence of tricuspid   ring. Physiologic and/or trace tricuspid regurgitation. CXR: 11/15/2020  Congestive failure with extensive perihilar interstitial pulmonary edema and    large right pleural effusion. ASSESSMENT / PLAN:  1. Acute on chronic HFrEF  2.  CAD/prior NSTEMI s/p CABG on 4/15/13 (LIMA-LAD, SVG-OM, SVG-PDA) and repeat CABG on 11/7/2017 (SVG-PDA)  3. Ischemic cardiomyopathy with history of severely reduced EF -- s/p upgrade to dual chamber ICD on 9/12/13 --> up-titration of GDMT has been limited by intermittent hypotension historically, including this admission  4. Persistent atrial fibrillation (anticoagulated with coumadin, s/p over-sewing of LA appendage at the time of 4/2013 CABG) -- follows with EP, s/p CVN on 9/4/2020, on amiodarone  5. Sick sinus syndrome s/p ICD -- follows with EP  6. MR s/p MV repair on 4/15/13 (30 mm Future Ring)  7. MR, TR, AR s/p MVR (#29 Biocor), AVR (#23 Trifecta), TV repair (#30 CE ring) on 11/7/2017  8. HTN -- controlled / historically with intermittent hypotension  9. HLD   10. DM -- Hgb A1c 6.3 --> 5.8  11. Tobacco abuse -- currently 1 pack/week  12. Possible PANTERA  13. Hypothyroidism - on synthroid  14.  Acute on CKD -- most recent Cr 2.1 --> 2.4 --> 2.8 --> 2.5 --> 2.3 --> 2.2, he follows with Dr. Anyi Crawford     - SBP  overnight  - Cr improved  - Bumex and entresto resumed at lower doses and Toprol XL 25 mg resumed on 11/19/2020 -- continue  - Continue amiodarone (follows with EP; monitor AF burden on device interrogations)  - Pulmonary planning on thoracentesis once INR acceptable  - Coumadin on hold -- INR 5.9 --> 5.0 --> 4.2 --> 3.3 --> 2.6 --> 2.1 --> 2.1  - Monitor renal function and electrolytes closely -- care per nephrology  - Monitor telemetry  - Serial echocardiograms  - Sleep study ordered at prior office visit (patient cancelled study/deferred work-up)  - Continue with CHF clinic follow-up on d/c  - Treatment options for severe LV dysfunction reviewed this admission and discussed again re: re-establishing with advanced heart failure specialist (he elects to establish at [facility])    Hernandez Banks MD  [facility] ([facility]) Cardiology

## 2020-11-21 NOTE — PLAN OF CARE
Problem: Infection:  Goal: Will remain free from infection  Description: Will remain free from infection  Outcome: Met This Shift     Problem: Safety:  Goal: Free from accidental physical injury  Description: Free from accidental physical injury  Outcome: Met This Shift  Goal: Free from intentional harm  Description: Free from intentional harm  Outcome: Met This Shift     Problem: Daily Care:  Goal: Daily care needs are met  Description: Daily care needs are met  Outcome: Met This Shift     Problem: Pain:  Goal: Patient's pain/discomfort is manageable  Description: Patient's pain/discomfort is manageable  Outcome: Met This Shift     Problem: Skin Integrity:  Goal: Skin integrity will stabilize  Description: Skin integrity will stabilize  Outcome: Met This Shift     Problem: Discharge Planning:  Goal: Patients continuum of care needs are met  Description: Patients continuum of care needs are met  Outcome: Met This Shift     Problem: OXYGENATION/RESPIRATORY FUNCTION  Goal: Patient will maintain patent airway  Outcome: Met This Shift  Goal: Patient will achieve/maintain normal respiratory rate/effort  Description: Respiratory rate and effort will be within normal limits for the patient  Outcome: Met This Shift     Problem: HEMODYNAMIC STATUS  Goal: Patient has stable vital signs and fluid balance  Outcome: Met This Shift     Problem: FLUID AND ELECTROLYTE IMBALANCE  Goal: Fluid and electrolyte balance are achieved/maintained  Outcome: Met This Shift     Problem: ACTIVITY INTOLERANCE/IMPAIRED MOBILITY  Goal: Mobility/activity is maintained at optimum level for patient  Outcome: Met This Shift     Problem: Falls - Risk of:  Goal: Will remain free from falls  Description: Will remain free from falls  Outcome: Met This Shift  Goal: Absence of physical injury  Description: Absence of physical injury  Outcome: Met This Shift

## 2020-11-21 NOTE — PROGRESS NOTES
Hospital Medicine    Subjective:  Pt alert conversive wants to go home waiting for thoracentesis      Current Facility-Administered Medications:     bumetanide (BUMEX) tablet 1 mg, 1 mg, Oral, BID, Javier Pineda MD, 1 mg at 11/21/20 0859    sacubitril-valsartan (ENTRESTO) 24-26 MG per tablet 1 tablet, 1 tablet, Oral, BID, Javier Pineda MD, 1 tablet at 11/21/20 0900    midodrine (PROAMATINE) tablet 5 mg, 5 mg, Oral, BID WC, Javier Pineda MD, 5 mg at 11/21/20 0859    albuterol (PROVENTIL) nebulizer solution 2.5 mg, 2.5 mg, Nebulization, Q6H PRN, ANTONY Ramos - CNP, 2.5 mg at 11/19/20 0531    allopurinol (ZYLOPRIM) tablet 100 mg, 100 mg, Oral, Daily, ANTONY Ramos - CNP, 100 mg at 11/21/20 0900    amiodarone (CORDARONE) tablet 200 mg, 200 mg, Oral, Daily, Davina Sherman APRN - CNP, 200 mg at 11/21/20 0360    ascorbic acid (VITAMIN C) tablet 2,000 mg, 2,000 mg, Oral, Daily, ANTONY Ramos - CNP, 2,000 mg at 11/21/20 0900    aspirin EC tablet 81 mg, 81 mg, Oral, Daily, ANTONY Ramos - CNP, Stopped at 11/21/20 0900    atorvastatin (LIPITOR) tablet 80 mg, 80 mg, Oral, Daily, Davina Sherman APRN - CNP, 80 mg at 11/21/20 0859    gabapentin (NEURONTIN) capsule 100 mg, 100 mg, Oral, Daily, ANTONY Ramos - CNP, 100 mg at 11/21/20 0900    insulin lispro (HUMALOG) injection vial 0-6 Units, 0-6 Units, Subcutaneous, TID WC, Davina Sherman APRN - CNP, 2 Units at 11/20/20 1629    insulin lispro (HUMALOG) injection vial 0-3 Units, 0-3 Units, Subcutaneous, Nightly, Davina Sherman, ANTONY - CNP, 1 Units at 11/20/20 2054    glucose (GLUTOSE) 40 % oral gel 15 g, 15 g, Oral, PRN, ANTONY Ramos CNP    dextrose 50 % IV solution, 12.5 g, Intravenous, PRN, Davina Sherman, APRN - CNP    glucagon (rDNA) injection 1 mg, 1 mg, Intramuscular, PRN, ANTONY Ramos CNP    dextrose 5 % solution, 100 mL/hr, Intravenous, PRN, Laisha Sherman, APRN - CNP    levothyroxine (SYNTHROID) tablet 75 mcg, 75 mcg, Oral, Daily, Laisha Sherman APRN - CNP, 75 mcg at 11/21/20 1991    magnesium oxide (MAG-OX) tablet 400 mg, 400 mg, Oral, Daily, Laisha Sherman APRN - CNP, 400 mg at 11/21/20 7931    metoprolol succinate (TOPROL XL) extended release tablet 25 mg, 25 mg, Oral, Daily, Laisha Sherman, APRN - CNP, 25 mg at 11/21/20 0900    therapeutic multivitamin-minerals 1 tablet, 1 tablet, Oral, Daily, ANTONY Gavin - CNP, 1 tablet at 11/21/20 0901    pantoprazole (PROTONIX) tablet 40 mg, 40 mg, Oral, QAM AC, Laisha Sherman, APRN - CNP, 40 mg at 11/21/20 9309    sodium chloride flush 0.9 % injection 10 mL, 10 mL, Intravenous, 2 times per day, Laisha Sherman, APRN - CNP, 10 mL at 11/21/20 0859    sodium chloride flush 0.9 % injection 10 mL, 10 mL, Intravenous, PRN, Laisha Sherman, APRN - CNP, 10 mL at 11/20/20 1723    acetaminophen (TYLENOL) tablet 650 mg, 650 mg, Oral, Q6H PRN **OR** acetaminophen (TYLENOL) suppository 650 mg, 650 mg, Rectal, Q6H PRN, Laisha Sherman, APRN - CNP    polyethylene glycol (GLYCOLAX) packet 17 g, 17 g, Oral, Daily PRN, Laisha Sherman, APRN - CNP    [Held by provider] potassium chloride (KLOR-CON M) extended release tablet 20 mEq, 20 mEq, Oral, BID, Laisha Sherman, APRN - CNP, 20 mEq at 11/18/20 8025    LORazepam (ATIVAN) tablet 0.5 mg, 0.5 mg, Oral, Nightly PRN, Laisha Sherman APRN - CNP, 0.5 mg at 11/21/20 0125    albuterol (PROVENTIL) nebulizer solution 2.5 mg, 2.5 mg, Nebulization, Q12H, ANTONY Gavin CNP, 2.5 mg at 11/20/20 1950    ipratropium (ATROVENT) 0.02 % nebulizer solution 0.5 mg, 0.5 mg, Nebulization, Q12H, ANTONY Gavin - CNP, 0.5 mg at 11/20/20 1950    warfarin (COUMADIN) daily dosing (placeholder), , Other, RX Placeholder, Laisha Sherman APRN - CNP    metronidazole (FLAGYL) 500 mg in NaCl 100 mL IVPB premix, 500 mg, Intravenous, Q8H, Jerod Reilly MD, Stopped at 11/21/20 1032    cefTRIAXone (ROCEPHIN) 1 g in sterile water 10 mL IV syringe, 1 g, Intravenous, Q24H, Jerod Reilly MD, 1 g at 11/20/20 1722    Objective:    /68   Pulse 62   Temp 97.6 °F (36.4 °C) (Oral)   Resp 14   Ht 6' 2\" (1.88 m)   Wt 203 lb 4.8 oz (92.2 kg)   SpO2 95%   BMI 26.10 kg/m²     Heart:  reg  Lungs:  Decreased breath sounds bases  Abd: + bs soft nontender  Extrem:  Edema legs    CBC with Differential:    Lab Results   Component Value Date    WBC 9.7 11/21/2020    RBC 4.30 11/21/2020    HGB 11.4 11/21/2020    HCT 37.4 11/21/2020     11/21/2020    MCV 87.0 11/21/2020    MCH 26.5 11/21/2020    MCHC 30.5 11/21/2020    RDW 26.9 11/21/2020    SEGSPCT 54 05/15/2013    LYMPHOPCT 6.1 11/21/2020    MONOPCT 8.1 11/21/2020    BASOPCT 0.3 11/21/2020    MONOSABS 0.79 11/21/2020    LYMPHSABS 0.59 11/21/2020    EOSABS 0.09 11/21/2020    BASOSABS 0.03 11/21/2020     CMP:    Lab Results   Component Value Date     11/21/2020    K 4.3 11/21/2020    K 4.4 11/16/2020     11/21/2020    CO2 26 11/21/2020    BUN 45 11/21/2020    CREATININE 2.2 11/21/2020    GFRAA 36 11/21/2020    LABGLOM 29 11/21/2020    GLUCOSE 150 11/21/2020    PROT 7.3 11/21/2020    LABALBU 3.9 11/21/2020    CALCIUM 9.2 11/21/2020    BILITOT 0.8 11/21/2020    ALKPHOS 123 11/21/2020    AST 47 11/21/2020    ALT 26 11/21/2020     Warfarin PT/INR:    Lab Results   Component Value Date    INR 2.1 11/21/2020    INR 2.1 11/20/2020    INR 2.6 11/19/2020    PROTIME 25.6 (H) 11/21/2020    PROTIME 24.4 (H) 11/20/2020    PROTIME 31.4 (H) 11/19/2020       Assessment:    Active Problems:    Ischemic cardiomyopathy    Hyperlipidemia    DM (diabetes mellitus) ([facility]    Coronary atherosclerosis of native coronary artery    GERD (gastroesophageal reflux disease)    Hypothyroid    PAF (paroxysmal atrial fibrillation) ([facility]    ICD (implantable cardioverter-defibrillator) in place    Congestive heart failure ([facility]    History of coronary artery bypass graft    HFrEF (heart failure with reduced ejection fraction) (HCC)    CKD (chronic kidney disease) stage 3, GFR 30-59 ml/min    Hypertension    Cirrhosis ([address]    Ascites    Lesion of lung    Chronic anticoagulation    Supratherapeutic INR  Resolved Problems:    * No resolved hospital problems.  *      Plan:  Cont as per pulm for thoracentesis        Chaya Estrada  12:33 PM  11/21/2020

## 2020-11-21 NOTE — PLAN OF CARE
Patient seen and examined. Pertinent notes/labs reviewed. Stable H&H/persistent coagulopathy. S/p thoracentesis. No immediate plans for intervention from GI POV. Okay to be discharged from GI POV with follow-up in the office in 2 to 3 weeks after discharge or as scheduled. Patient to call to confirm appointment and with questions/concerns at [phone]. Above has been discussed with the patient and all questions answered to his satisfaction. He is agreeable with the plan as delineated.    Thank you for the opportunity to participate in the care of . Larry aVzquez MD  11/21/2020  5:00 PM

## 2020-11-30 PROBLEM — R09.02 HYPOXIA: Status: ACTIVE | Noted: 2020-01-01

## 2020-11-30 PROBLEM — R06.03 RESPIRATORY DISTRESS: Status: ACTIVE | Noted: 2020-01-01

## 2020-11-30 PROBLEM — R57.0 CARDIOGENIC SHOCK (HCC): Status: ACTIVE | Noted: 2020-01-01

## 2020-11-30 PROBLEM — J96.01 ACUTE RESPIRATORY FAILURE WITH HYPOXIA (HCC): Status: ACTIVE | Noted: 2020-01-01

## 2020-11-30 NOTE — PROGRESS NOTES
@[id number] access center notified of transfer to [facility]  hospitalist per Dr. Arline Rios request  Decompensated Heart Failure  @[id number] access center notified to check with [facility] on bed situation/ if beds not available Dr. Arline Rios to admit ICU here

## 2020-11-30 NOTE — H&P
[address] [facility]  Attending History and Physical      CHIEF COMPLAINT:  SOB      HISTORY OF PRESENT ILLNESS:      The patient is a 70 y.o. male patient of Dr. Azucena Lawler who presents with complaints of shortness of breath. Patient recently discharged after admission for CHF exacerbation and acute on chronic kidney disease. Patient also had a right-sided thoracentesis 11/21. Patient put on dobutamine in ED by cardiology. Has history of systolic heart failure, ejection fraction 15 to 20%. Patient used to follow with advanced heart failure. Patient has evidence of pulmonary edema on chest x-ray.        Past Medical History:    Past Medical History:   Diagnosis Date    Acid reflux     Acute MI ([facility] 01/25/97,01/04    Anxiety     CAD (coronary artery disease)     follows w Dr. Jeovany Myles Diabetes mellitus ([facility]     Fluid retention     history of    Hyperlipidemia     Hypertension     Ischemic cardiomyopathy     Osteoarthritis     Post PTCA 41/27/96    Systolic dysfunction, left ventricle     follows with Dr. Jeovany Myles Thyroid disease        Past Surgical History:    Past Surgical History:   Procedure Laterality Date    CARDIAC DEFIBRILLATOR PLACEMENT Left 2013    with pacemaker (Medtronic)    CARDIOVERSION  09/04/2020    Successful     Dr. Ralf Brown CATH LAB PROCEDURE  01/27/97,11/00    Critical lesion mid left circumflex,significant lesion mid-LAD and first diagonal    ECHO COMPL W DOP COLOR FLOW  4/11/2013         ECHO COMPL W DOP COLOR FLOW  4/21/2013         MITRAL VALVE SURGERY      TRANSESOPHAGEAL ECHOCARDIOGRAM  4/12/2013    Dr. Zachery Brooks    TRANSESOPHAGEAL ECHOCARDIOGRAM  08/02/2017       Medications Prior to Admission:    Medications Prior to Admission: amiodarone (CORDARONE) 200 MG tablet, Take 1 tablet by mouth daily  warfarin (COUMADIN) 5 MG tablet, Take 0.5 tablets by mouth daily  metoprolol succinate (TOPROL XL) 50 MG extended release tablet, Take 0.5 tablets by mouth daily  sacubitril-valsartan (ENTRESTO) 24-26 MG per tablet, Take 1 tablet by mouth 2 times daily  bumetanide (BUMEX) 2 MG tablet, Take 0.5 tablets by mouth 2 times daily  midodrine (PROAMATINE) 5 MG tablet, Take 1 tablet by mouth 2 times daily (with meals)  polyethylene glycol (GLYCOLAX) 17 g packet, Take 17 g by mouth daily as needed for Constipation  pantoprazole (PROTONIX) 20 MG tablet, Take 20 mg by mouth daily  zolpidem (AMBIEN CR) 12.5 MG extended release tablet, Take 12.5 mg by mouth nightly as needed. gabapentin (NEURONTIN) 100 MG capsule, Take 100 mg by mouth daily. albuterol sulfate HFA (PROAIR HFA) 108 (90 Base) MCG/ACT inhaler, Inhale 2 puffs into the lungs every 4 hours as needed for Wheezing or Shortness of Breath  Magnesium 400 MG TABS, Take 400 mg by mouth daily  Ascorbic Acid (VITAMIN C) 1000 MG tablet, Take 2,000 mg by mouth daily  Multiple Vitamins-Minerals (THERAPEUTIC MULTIVITAMIN-MINERALS) tablet, Take 1 tablet by mouth daily  colchicine (COLCRYS) 0.6 MG tablet, Take 0.6 mg by mouth 2 times daily as needed   allopurinol (ZYLOPRIM) 100 MG tablet, Take 1 tablet by mouth daily  levothyroxine (SYNTHROID) 75 MCG tablet, Take 1 tablet by mouth Daily  atorvastatin (LIPITOR) 80 MG tablet, Take 80 mg by mouth daily  albuterol-ipratropium (COMBIVENT)  MCG/ACT inhaler, Inhale 2 puffs into the lungs every 12 hours. aspirin 81 MG EC tablet, Take 81 mg by mouth daily Prescribed per Dr Zackary Guaman:    Patient has no known allergies. Social History:    reports that he has quit smoking. His smoking use included cigarettes. He has a 12.50 pack-year smoking history. He has never used smokeless tobacco. He reports current alcohol use. He reports that he does not use drugs.     Family History:   family history includes Heart Surgery in his father; High Cholesterol in his father; Hypertension in his father and mother. REVIEW OF SYSTEMS:  As above in the HPI, otherwise negative    PHYSICAL EXAM:    Vitals:  BP 91/65   Pulse 100   Temp 97 °F (36.1 °C) (Infrared)   Resp 23   Ht 6' 2\" (1.88 m)   Wt 195 lb (88.5 kg)   SpO2 94%   BMI 25.04 kg/m²     General:  Awake, alert, oriented X 3. Well developed, well nourished, well groomed. No apparent distress. HEENT:  Normocephalic, atraumatic. Pupils equal, round, reactive to light. No scleral icterus. No conjunctival injection. Normal lips, teeth, and gums. No nasal discharge. Neck:  Supple  Heart:  RRR, no murmurs, gallops, rubs  Lungs:  CTA bilaterally, bilat symmetrical expansion, no wheeze, rales, or rhonchi  Abdomen:   Bowel sounds present, soft, nontender, no masses, no organomegaly, no peritoneal signs  Extremities:  No clubbing, cyanosis, or edema  Skin:  Warm and dry, no open lesions or rash  Neuro:  Cranial nerves 2-12 intact, no focal deficits  Breast: deferred  Rectal: deferred  Genitalia:  deferred    LABS:    CBC:   Lab Results   Component Value Date    WBC 9.6 11/30/2020    RBC 4.73 11/30/2020    HGB 12.9 11/30/2020    HCT 41.6 11/30/2020    MCV 87.9 11/30/2020    MCH 27.3 11/30/2020    MCHC 31.0 11/30/2020    RDW 27.0 11/30/2020     11/30/2020    MPV 11.3 11/30/2020     BMP:    Lab Results   Component Value Date     11/30/2020    K 3.9 11/30/2020    CL 99 11/30/2020    CO2 27 11/30/2020    BUN 49 11/30/2020    LABALBU 3.6 11/30/2020    CREATININE 1.8 11/30/2020    CALCIUM 8.9 11/30/2020    GFRAA 45 11/30/2020    LABGLOM 37 11/30/2020    GLUCOSE 133 11/30/2020       ASSESSMENT:      Patient Active Problem List   Diagnosis    Hyperlipidemia    Uncontrolled diabetes mellitus ([facility]    Coronary atherosclerosis of native coronary artery    GERD (gastroesophageal reflux disease)    Depression    Hypothyroid    Ischemic cardiomyopathy    Mitral regurgitation    AI (aortic insufficiency)    PA[facility]oxysmal [facility] fibrillation) [facility]    Ulnar nerve neuropathy    ICD (implantable cardioverter-defibrillator) in place    Tricuspid regurgitation    CKD (chronic kidney disease) stage 4, GFR 15-29 ml/min (HCC)    Chronic systolic CHF (congestive heart failure) (HCC)    Anticoagulated on Coumadin    Right ventricular dysfunction    Congestive heart failure (HCC)    Persistent atrial fibrillation (HCC)    Anemia of chronic renal failure, stage 3 (moderate)    History of coronary artery bypass graft    Heart failure with reduced ejection fraction (HCC)    CKD (chronic kidney disease) stage 3, GFR 30-59 ml/min    Hypertension    Cirrhosis (HCC)    Ascites    Lesion of lung    Chronic anticoagulation    Supratherapeutic INR    [facility] with [facility]    Hypoxia    Respiratory distress    Cardiogenic shock ([facility]         PLAN:    1. Acute on chronic systolic heart failure   BNP elevated, borderline hypotension-cardiogenic shock   Started on dobutamine, and Bumex drip   Beta-blocker and Entresto held   May benefit from Levophed in addition to dobutamine if MAP<65 (  2.  A. fib-on Coumadin, INR 1.7. Subtherapeutic   Continue with amiodarone  3. Hyperlipidemia-statin  4. GERD-Pepcid  5.   Hypothyroidism-Synthroid    Ghassan Dolan MD  3:50 PM  11/30/2020

## 2020-11-30 NOTE — CONSULTS
follows with Dr. Macrina Dumont Thyroid disease         Past Surgical History           Procedure Laterality Date    CARDIAC DEFIBRILLATOR PLACEMENT Left 2013    with pacemaker ([employer]tronic)    CARDIOVERSION  09/04/2020    Successful     Dr. Brent Richmond CATH LAB PROCEDURE  01/27/97,11/00    Critical lesion mid left circumflex,significant lesion mid-LAD and first diagonal    ECHO COMPL W DOP COLOR FLOW  4/11/2013         ECHO COMPL W DOP COLOR FLOW  4/21/2013         MITRAL VALVE SURGERY      TRANSESOPHAGEAL ECHOCARDIOGRAM  4/12/2013    Dr. Rodrigo Schilling TRANSESOPHAGEAL ECHOCARDIOGRAM  08/02/2017       SOCIAL AND OCCUPATIONAL HEALTH:  The patient is a past smoker of up to 1ppdx ~60 yrs. Pack year equal 60 pack-years. There  is not history of TB or TB exposure. There no known asbestos or silica dust exposure. Worked in a [address] [place] [address] [employer] for a few years. No recent travel. There no  history of recreational or IV drug use.   Has no pets          Current Medications   Current Medications    ipratropium-albuterol  1 ampule Inhalation 4x daily    amiodarone  200 mg Oral Daily    vitamin C  2,000 mg Oral Daily    atorvastatin  80 mg Oral Daily    levothyroxine  75 mcg Oral Daily    therapeutic multivitamin-minerals  1 tablet Oral Daily    sodium chloride flush  10 mL Intravenous 2 times per day    [START ON 12/1/2020] enoxaparin  40 mg Subcutaneous Daily    famotidine  20 mg Oral Daily    bumetanide  1 mg Intravenous Q8H     sodium chloride flush, acetaminophen **OR** acetaminophen, polyethylene glycol, promethazine **OR** ondansetron, nitroGLYCERIN  IV Drips/Infusions   DOBUTamine 2.5 mcg/kg/min (11/30/20 0805)     Home Medications  Medications Prior to Admission: amiodarone (CORDARONE) 200 MG tablet, Take 1 tablet by mouth daily  warfarin (COUMADIN) 5 MG tablet, Take 0.5 tablets by mouth daily  metoprolol succinate (TOPROL XL) 50 MG extended release tablet, Take 0.5 tablets by mouth daily  sacubitril-valsartan (ENTRESTO) 24-26 MG per tablet, Take 1 tablet by mouth 2 times daily  bumetanide (BUMEX) 2 MG tablet, Take 0.5 tablets by mouth 2 times daily  midodrine (PROAMATINE) 5 MG tablet, Take 1 tablet by mouth 2 times daily (with meals)  polyethylene glycol (GLYCOLAX) 17 g packet, Take 17 g by mouth daily as needed for Constipation  pantoprazole (PROTONIX) 20 MG tablet, Take 20 mg by mouth daily  zolpidem (AMBIEN CR) 12.5 MG extended release tablet, Take 12.5 mg by mouth nightly as needed. gabapentin (NEURONTIN) 100 MG capsule, Take 100 mg by mouth daily. albuterol sulfate HFA (PROAIR HFA) 108 (90 Base) MCG/ACT inhaler, Inhale 2 puffs into the lungs every 4 hours as needed for Wheezing or Shortness of Breath  Magnesium 400 MG TABS, Take 400 mg by mouth daily  Ascorbic Acid (VITAMIN C) 1000 MG tablet, Take 2,000 mg by mouth daily  Multiple Vitamins-Minerals (THERAPEUTIC MULTIVITAMIN-MINERALS) tablet, Take 1 tablet by mouth daily  colchicine (COLCRYS) 0.6 MG tablet, Take 0.6 mg by mouth 2 times daily as needed   allopurinol (ZYLOPRIM) 100 MG tablet, Take 1 tablet by mouth daily  levothyroxine (SYNTHROID) 75 MCG tablet, Take 1 tablet by mouth Daily  atorvastatin (LIPITOR) 80 MG tablet, Take 80 mg by mouth daily  albuterol-ipratropium (COMBIVENT)  MCG/ACT inhaler, Inhale 2 puffs into the lungs every 12 hours. aspirin 81 MG EC tablet, Take 81 mg by mouth daily Prescribed per Dr Isaías Rodgers    Diet/Nutrition   DIET LOW SODIUM 2 GM; Allergies   Patient has no known allergies.     Social History   Tobacco  Smoked from teenager till July 14,2020 up to 1 ppd    Alcohol     reports current alcohol use. states prior heavy use, quit ~ 6-10 mos ago    Occupational history :    Family History         Problem Relation Age of Onset    Hypertension Mother     Hypertension Father     Heart Surgery Father     High Cholesterol Father Sleep History   Was to have sleep study, canceled as outpt    ROS     REVIEW OF SYSTEMS:  CONSTITUTIONAL:  negative for  fevers and chills. + generalized weakness  EYES:  negative  HEENT:  negative  RESPIRATORY:  positive for  dry cough and dyspnea, orthopnea  CARDIOVASCULAR:  negative  GASTROINTESTINAL:  negative  GENITOURINARY:  negative  INTEGUMENT/BREAST:  negative  HEMATOLOGIC/LYMPHATIC:  Chronic anticoagulation, bruises  ALLERGIC/IMMUNOLOGIC:  negative  ENDOCRINE:  negative  MUSCULOSKELETAL:  negative  NEUROLOGICAL:  negative  BEHAVIOR/PSYCH:  negative    Lines and Devices    right femoral TLC placed     Mechanical Ventilation Data   VENT SETTINGS (Comprehensive)  Vent Information  SpO2: 96 %  Additional Respiratory  Assessments  Pulse: 76  Resp: 19  SpO2: 96 %    ABG  Lab Results   Component Value Date    PH 7.437 2020    PCO2 45.7 2020    PO2 96.1 2020    HCO3 30.1 2020    O2SAT 97.4 2020     Lab Results   Component Value Date    MODE NC- 2 L 2020           Vitals    height is 6' 2\" (1.88 m) and weight is 195 lb (88.5 kg). His oral temperature is 97.5 °F (36.4 °C). His blood pressure is 92/58 (abnormal) and his pulse is 76. His respiration is 19 and oxygen saturation is 96%.        Temperature Range: Temp: 97.5 °F (36.4 °C) Temp  Av.5 °F (36.4 °C)  Min: 97.4 °F (36.3 °C)  Max: 97.5 °F (36.4 °C)  BP Range:  Systolic (42SHG), VYS:10 , Min:80 , GUC:827     Diastolic (70MJZ), XNF:28, Min:58, Max:77    Pulse Range: Pulse  Av.2  Min: 76  Max: 98  Respiration Range: Resp  Av.1  Min: 18  Max: 25  Current Pulse Ox[de-identified]  SpO2: 96 %  24HR Pulse Ox Range:  SpO2  Av %  Min: 95 %  Max: 98 %  Oxygen Amount and Delivery: O2 Flow Rate (L/min): 3 L/min      I/O (24 Hours)    Patient Vitals for the past 8 hrs:   BP Temp Temp src Pulse Resp SpO2   20 0903 (!) 92/58 -- -- 76 -- --   20 -- -- 95 19 96 %   20 08 -- -- 98 -- -- 11/30/20 0822 93/66 -- -- 84 18 98 %   11/30/20 0641 91/68 97.5 °F (36.4 °C) Oral 93 18 98 %   11/30/20 0442 80/64 -- -- -- 18 98 %   11/30/20 0424 83/66 -- -- 87 23 97 %   11/30/20 0415 87/74 -- -- 90 20 97 %     No intake or output data in the 24 hours ending 11/30/20 1209  No intake/output data recorded. Patient Vitals for the past 96 hrs (Last 3 readings):   Weight   11/30/20 0205 195 lb (88.5 kg)         Drains/Tubes Outputs  N/a  Exam         PHYSICAL EXAM:  CONSTITUTIONAL:  awake, alert, cooperative, no apparent distress, and appears stated age  EYES:  Lids and lashes normal, pupils equal, round and reactive to light, extra ocular muscles intact, sclera clear, conjunctiva normal  ENT:  normocepalic, without obvious abnormality  NECK:  supple, symmetrical, trachea midline  LUNGS:  Diminished b/l right>left. Respirations even and unlabored on O2 via nc  CARDIOVASCULAR:  Irregular rhythm, no murmur  ABDOMEN:  No scars, normal bowel sounds, soft, non-distended, non-tender, no masses palpated, no hepatosplenomegally  MUSCULOSKELETAL:  there is no redness, warmth, or swelling of the joints  NEUROLOGIC:  Awake, alert, oriented to name, place and time. Cranial nerves II-XII are grossly intact. Motor is 5 out of 5 bilaterally. Cerebellar finger to nose, heel to shin intact. Sensory is intact. Babinski down going, Romberg negative, and gait is normal.  SKIN:  Pallor. Poor turgor.  Ecchymoses UEs    Data   Old records and images have been reviewed    Lab Results   CBC     Lab Results   Component Value Date    WBC 9.6 11/30/2020    RBC 4.73 11/30/2020    HGB 12.9 11/30/2020    HCT 41.6 11/30/2020     11/30/2020    MCV 87.9 11/30/2020    MCH 27.3 11/30/2020    MCHC 31.0 11/30/2020    RDW 27.0 11/30/2020    SEGSPCT 54 05/15/2013    LYMPHOPCT 9.3 11/30/2020    MONOPCT 9.2 11/30/2020    BASOPCT 0.5 11/30/2020    MONOSABS 0.89 11/30/2020    LYMPHSABS 0.90 11/30/2020    EOSABS 0.24 11/30/2020    BASOSABS 0.05 pleural effusions   COPD unknown severity(combivent, albuterol MDI at home)  Possible RLL lung mass vs rounded atelectais- seen on chest CT and 11/15/20 ABD CT  · Diuresis - currently on bumetanide 1 mg IV q8h  · Wean oxygen as tolerated. Keep O2 sat 90-92%    Cardiovascular     HFrEF 9/4/20 echo with EF 15-20%  CAD s/p CABG 2013,2017. ICD in place, sick sinus syndrome  TV   Persistent A fib  Chronic hypotension  · BNP 90169  · Cardiology following -recommending transfer to [facility] for further management of advanced heart failure and evaluation for LVAD  · Dobutamine per Cardiology recommendations  · amiodarone   · entresto and metoprolol on hold  · Place f/c for accurate I/O      Gastrointestinal   H/o Cirrhosis    Renal   CKD Stage III, baseline creat ~2.1     Infectious Disease    no acute process at this time    Hematology/Oncology   Per pt, has been off warfarin at home. Endocrine   Hypothyroidism  · Levothyroxine   Dx of DM on chart - 11/16/20 HbA1c 5.8    Social/Spiritual/DNR/Other   Lives alone    Prophylaxis:  Pepcid 20 mg po qd prophylaxis  Lovenox 40 mg SQ    MECRED    April Bradley, OMS-3      I personally saw, examined and provided care for the patient. Radiographs, labs and medication list were reviewed by me independently. I spoke with bedside nursing, therapists and consultants. Critical care services and times documented are independent of procedures and multidisciplinary rounds with Residents. Additionally comprehensive, multidisciplinary rounds were conducted with the MICU team. The case was discussed in detail and plans for care were established. Review of medical studentdocumentation was conducted and revisions were made as appropriate. I agree with the above documented exam, problem list and plan of care. Meg Orellana MD   CCT excluding procedures:35'  .

## 2020-11-30 NOTE — FLOWSHEET NOTE
CI HR MAP TPRI SVI TFC   Baseline 4.0 91 77 1527 44 51.0   Test 3.7 90 80 1740 41 51.5   % Change -8.8 -1.5 3.9 14.0 -7.4 1.0   noninvasive -  start

## 2020-11-30 NOTE — CARE COORDINATION
Social Work/Discharge Planning:  Patient sister Arturo Mcdonald returned call. She states her brother is waiting for a bed at [facility]. Informed her of this workers conversation with her brother. Arturo Mcdonald provided this worker with snf choice of [facility] [place] and [facility] in case if needed. She states patient has [address] forms and she will try to provide a copy to hospital.  Ambulance form in soft chart. Will continue to follow.   Electronically signed by SURY Neely on 11/30/2020 at 1:02 PM

## 2020-11-30 NOTE — ED PROVIDER NOTES
Pt Name: Eliseo Singh  MRN: [id number]  [place] 1945  Date of evaluation: 11/30/2020      CHIEF COMPLAINT       Chief Complaint   Patient presents with    Shortness of Breath        Eliseo Singh is a 70 y.o. male presenting to the ED with chief complaint of shortness of breath, beginning today. Patient states he has had shortness of breath for months but states it worsened today. He also admits to a cough that is nonproductive. His cough is been present for as long as he remembers. Shortness of breath is described as exertional short of breath and made better by rest.  He states is also made worse when he lies flat. He has significant past medical history of CHF. He was recently admitted and discharged for acute on chronic CHF exacerbation. His last echo on 9/4/2020 showed EF of around 15%.     HPI       Patient has a medical history as listed below:   Past Medical History:   Diagnosis Date    Acid reflux     Acute MI ([address] 01/25/97,01/04    Anxiety     CAD (coronary artery disease)     follows w Dr. Anjum Lozano Diabetes mellitus ([facility]     Fluid retention     history of    Hyperlipidemia     Hypertension     Ischemic cardiomyopathy     Osteoarthritis     Post PTCA 16/64/05    Systolic dysfunction, left ventricle     follows with Dr. Anjum Lozano Thyroid disease      Patient Active Problem List    Diagnosis Date Noted    Ischemic cardiomyopathy 04/11/2013     Priority: High    Acute respiratory failure with hypoxia ([address] 11/30/2020    Hypoxia 11/30/2020    Respiratory distress 11/30/2020    History of coronary artery bypass graft 11/16/2020    Heart failure with reduced ejection fraction ([address] 11/16/2020    CKD (chronic kidney disease) stage 3, GFR 30-59 ml/min 11/16/2020    Hypertension 11/16/2020    Cirrhosis ([address] 11/16/2020    Ascites 11/16/2020    Lesion of lung 11/16/2020    Chronic anticoagulation 11/16/2020    Supratherapeutic INR 11/16/2020    Anemia of chronic renal failure, stage 3 (moderate) 10/12/2020    Persistent atrial fibrillation (HCC)     Congestive heart failure ([facility]     Right ventricular dysfunction 05/19/2019    Anticoagulated on Coumadin     Tricuspid regurgitation 03/09/2018    CKD (chronic kidney disease) stage 4, GFR 15-29 ml/min (HCC) 03/09/2018    Chronic systolic CHF (congestive heart failure) ([facility] 03/09/2018    ICD (implantable cardioverter-defibrillator) in place 09/24/2013    Ulnar nerve neuropathy 06/27/2013    PAF (paroxysmal atrial fibrillation) ([facility] 05/13/2013    Mitral regurgitation 04/12/2013    AI (aortic insufficiency) 04/12/2013    Hyperlipidemia 04/09/2013    Uncontrolled diabetes mellitus ([facility] 04/09/2013    Coronary atherosclerosis of native coronary artery 04/09/2013    GERD (gastroesophageal reflux disease) 04/09/2013    Depression 04/09/2013    Hypothyroid 04/09/2013       Review of Systems   Constitutional: Negative for chills and fever. HENT: Negative for congestion and rhinorrhea. Eyes: Negative for pain and visual disturbance. Respiratory: Positive for cough and shortness of breath. Negative for wheezing. Cardiovascular: Negative for chest pain, palpitations and leg swelling. Gastrointestinal: Negative for abdominal pain, diarrhea, nausea and vomiting. Genitourinary: Negative for dysuria, frequency and hematuria. Musculoskeletal: Negative for arthralgias and neck pain. Neurological: Negative for numbness and headaches. Physical Exam  Vitals signs and nursing note reviewed. Constitutional:       General: He is not in acute distress. Appearance: He is well-developed. HENT:      Head: Normocephalic and atraumatic. Nose: Nose normal.      Mouth/Throat:      Pharynx: Oropharynx is clear. Eyes:      Conjunctiva/sclera: Conjunctivae normal.      Pupils: Pupils are equal, round, and reactive to light. Neck:      Musculoskeletal: Normal range of motion.  No neck rigidity. Thyroid: No thyromegaly. Vascular: No JVD. Cardiovascular:      Rate and Rhythm: Normal rate and regular rhythm. Heart sounds: Normal heart sounds. Pulmonary:      Effort: Tachypnea present. No respiratory distress. Breath sounds: Examination of the right-lower field reveals rales. Examination of the left-lower field reveals rales. Rales present. No wheezing or rhonchi. Comments: Speaking in 3 word sentences  Chest:      Chest wall: No tenderness. Abdominal:      General: Abdomen is flat. There is no distension. Palpations: Abdomen is soft. There is no mass. Tenderness: There is no abdominal tenderness. Skin:     General: Skin is warm and dry. Findings: No rash. Neurological:      General: No focal deficit present. Mental Status: He is alert. Procedures     MDM     ED Course as of Nov 30 0547 Mon Nov 30, 2020 0227 EKG shows sinus rhythm with marked sinus arrhythmia. Shortened NV interval.  NV interval 56 ms. Nonspecific ST wave abnormalities. No STEMI. When compared to prior EKG    [WL]   0356 1. Congestive heart failure with increasing right greater than left  bilateral pleural effusions. 2.  Right greater than left bibasilar opacities which could be due to  combination of atelectasis versus infiltrate and pleural effusions. XR CHEST PORTABLE [WL]      ED Course User Index  [WL] Maura Aly DO        Patient presents to the ED for evaluation. This patient was seen and evaluated with the attending. Work-up was performed with concerns for but not limited to ACS, pneumonia, Pneumonia, PE and Decompensated heart failure  Patient's chest x-ray alone showed he had congestive heart failure that showed worsening pleural effusions. Patient also became hypotensive here in the ED and was given midodrine as he is on that at home. BNP was found to be elevated at 13,000. He was given Lasix of 40 mg to help diurese.   Patient required -------------------------------------------------    LABS:  Results for orders placed or performed during the hospital encounter of 11/30/20   CBC Auto Differential   Result Value Ref Range    WBC 9.6 4.5 - 11.5 E9/L    RBC 4.73 3.80 - 5.80 E12/L    Hemoglobin 12.9 12.5 - 16.5 g/dL    Hematocrit 41.6 37.0 - 54.0 %    MCV 87.9 80.0 - 99.9 fL    MCH 27.3 26.0 - 35.0 pg    MCHC 31.0 (L) 32.0 - 34.5 %    RDW 27.0 (H) 11.5 - 15.0 fL    Platelets 628 159 - 973 E9/L    MPV 11.3 7.0 - 12.0 fL    Neutrophils % 78.1 43.0 - 80.0 %    Immature Granulocytes % 0.4 0.0 - 5.0 %    Lymphocytes % 9.3 (L) 20.0 - 42.0 %    Monocytes % 9.2 2.0 - 12.0 %    Eosinophils % 2.5 0.0 - 6.0 %    Basophils % 0.5 0.0 - 2.0 %    Neutrophils Absolute 7.52 (H) 1.80 - 7.30 E9/L    Immature Granulocytes # 0.04 E9/L    Lymphocytes Absolute 0.90 (L) 1.50 - 4.00 E9/L    Monocytes Absolute 0.89 0.10 - 0.95 E9/L    Eosinophils Absolute 0.24 0.05 - 0.50 E9/L    Basophils Absolute 0.05 0.00 - 0.20 E9/L    Anisocytosis 2+     Polychromasia 1+     Hypochromia 1+     Poikilocytes 2+     [place] Cells 1+     Ovalocytes 2+     Target Cells 1+     Basophilic Stippling 1+    Troponin   Result Value Ref Range    Troponin 0.07 (H) 0.00 - 0.03 ng/mL   Comprehensive Metabolic Panel w/ Reflex to MG   Result Value Ref Range    Sodium 137 132 - 146 mmol/L    Potassium reflex Magnesium 3.9 3.5 - 5.0 mmol/L    Chloride 99 98 - 107 mmol/L    CO2 27 22 - 29 mmol/L    Anion Gap 11 7 - 16 mmol/L    Glucose 133 (H) 74 - 99 mg/dL    BUN 49 (H) 8 - 23 mg/dL    CREATININE 1.8 (H) 0.7 - 1.2 mg/dL    GFR Non-African American 37 >=60 mL/min/1.73    GFR African American 45     Calcium 8.9 8.6 - 10.2 mg/dL    Total Protein 7.2 6.4 - 8.3 g/dL    Alb 3.6 3.5 - 5.2 g/dL    Total Bilirubin 1.0 0.0 - 1.2 mg/dL    Alkaline Phosphatase 168 (H) 40 - 129 U/L    ALT 51 (H) 0 - 40 U/L    AST 79 (H) 0 - 39 U/L   Brain Natriuretic Peptide   Result Value Ref Range    Pro-BNP 13,692 (H) 0 - 125 pg/mL D-Dimer, Quantitative   Result Value Ref Range    D-Dimer, Quant 1984 ng/mL DDU   Protime-INR   Result Value Ref Range    Protime 20.8 (H) 9.3 - 12.4 sec    INR 1.7    Blood Gas, Arterial   Result Value Ref Range    Date Analyzed 20201130     Time Analyzed 0256     Source: Blood Arterial     pH, Blood Gas 7.437 7.350 - 7.450    PCO2 45.7 (H) 35.0 - 45.0 mmHg    PO2 96.1 75.0 - 100.0 mmHg    HCO3 30.1 (H) 22.0 - 26.0 mmol/L    B.E. 5.1 (H) -3.0 - 3.0 mmol/L    O2 Sat 97.4 92.0 - 98.5 %    O2Hb 95.5 94.0 - 97.0 %    COHb 1.7 (H) 0.0 - 1.5 %    MetHb 0.3 0.0 - 1.5 %    O2 Content 17.8 mL/dL    HHb 2.5 0.0 - 5.0 %    tHb (est) 13.2 11.5 - 16.5 g/dL    Mode NC- 2 L     Date Of Collection      Time Collected      Pt Temp 37.0 C     ID [id number]     Lab [id number]     Critical(s) Notified . No Critical Values        RADIOLOGY:  XR CHEST PORTABLE   Final Result   1. Congestive heart failure with increasing right greater than left   bilateral pleural effusions. 2.  Right greater than left bibasilar opacities which could be due to   combination of atelectasis versus infiltrate and pleural effusions. CT CHEST WO CONTRAST    (Results Pending)             ------------------------- NURSING NOTES AND VITALS REVIEWED ---------------------------  Date / Time Roomed:  11/30/2020  1:58 AM  ED Bed Assignment:  17/17    The nursing notes within the ED encounter and vital signs as below have been reviewed.      Patient Vitals for the past 24 hrs:   BP Temp Temp src Pulse Resp SpO2 Height Weight   11/30/20 0442 80/64 -- -- -- 18 98 % -- --   11/30/20 0424 83/66 -- -- 87 23 97 % -- --   11/30/20 0415 87/74 -- -- 90 20 97 % -- --   11/30/20 0315 86/68 -- -- 93 25 97 % -- --   11/30/20 0209 102/77 97.4 °F (36.3 °C) Oral 96 20 95 % -- --   11/30/20 0205 -- -- -- -- -- -- 6' 2\" (1.88 m) 195 lb (88.5 kg)           Counseling:  I have spoken with the patient/family members and discussed todays results, in addition to providing specific details for the plan of care and counseling regarding the diagnosis and prognosis. Their questions are answered at this time and they are agreeable with the plan of admission. This patient has remained hemodynamically stable during their ED course. Diagnosis:  1. Acute systolic congestive heart failure (HCC) Not Improved   2. Hypoxia    3. Chronic kidney disease, unspecified CKD stage    4. Elevated d-dimer    5. Bilateral pleural effusion    6. Hypotension, unspecified hypotension type        Disposition:  Patient's disposition: Admit  Patient's condition is stable. NOTE:  This report was transcribed using voice recognition software. Efforts were made to ensure accuracy; however, inadvertent computerized transcription errors may be present.              Nahomy Harrington DO  Resident  11/30/20 1715

## 2020-11-30 NOTE — ED NOTES
Bed: 17  Expected date:   Expected time:   Means of arrival:   Comments:  EMS     Audra Rubio RN  11/30/20 9059
Received patient from Dr. Jayden Sen as a signout with patient she had admitted for decompensated congestive heart failure. I received a call during the morning from Dr. Mike Walls the admitting physician who was concerned that this patient was in congestive heart failure that was too decompensated for a telemetry bed and had had a conversation with Dr. Edward Palmer of the ICU about admission to the ICU. He also had started a dobutamine drip. In the emergency department a central line was placed. A [facility] was [facility] the [facility] and I did discuss the case with Dr. Jessy Moreira of the [facility] ICU J31 he accepted the patient for admission to the ICU under admission of Dr. Romulo Shelton. Line was placed and patient was admitted to intensive care unit at [facility] until bed could be arranged at [facility]. A rapid Covid test was requested by [facility]. PROCEDURE  11/30/20       Time: 7:45AM    CENTRAL LINE INSERTION  Risks, benefits and alternatives (for applicable procedures below) described. Performed By: Yolanda Haji DO, Senora Galla MD    Indication: central venous monitoring and centrally administered medications. Informed consent: The patient was counseled regarding the procedure, it's indications, risks, potential complications and alternatives and any questions were answered. Consent was obtained. .  Procedure: After routine sterile preparation, a right 3-Lumen 7F Central Venous Catheter was placed by femoral vein approach and secured by standard fashion. Ultrasound Guidance:   used. Number of Attempts: 1  Post-procedure Findings: A post procedural chest x-ray  was not indicated. Patient tolerated the procedure well.     --------------------------------- ADDITIONAL PROVIDER NOTES ---------------------------------  Consultations:  Spoke with Dr. Doreen Norton,  They will admit this patient.   Spoke with Dr. Jessy Moreira at [facility] who will accept patient to ICU at [facility] but no beds currently available with
No

## 2020-11-30 NOTE — CONSULTS
INPATIENT CARDIOLOGY CONSULT    Name: Juan Walker    Age: 70 y.o. Date of Admission: 11/30/2020  1:58 AM    Date of Service: 11/30/2020    Reason for Consultation: Acute decompensated heart failure, question of cardiogenic shock    Referring Physician: Papito Tamayo MD    Primary Cardiologist: Dr. Raimundo Rodas    History of Present Illness:   Juan Walker is a 70 y.o. male (with recent hospital admission for decompensated heart failure, looks like he was discharged on 11/21/2020) who presented on 11/30/2020 for further evaluation of shortness of breath and weakness. During recent hospitalization he was diuresed, and also underwent right-sided thoracentesis for pleural effusion, and due to low blood pressure and kidney issues his guideline directed medical therapy was adjusted. He has complex medical history including CABG in 2013 with redo in 2017, ischemic cardiomyopathy with severely reduced LV function 15%, aortic and mitral valve replacements of the tricuspid repair in 2017, paroxysmal A. fib and CKD. He had previously followed with advanced heart failure but is not been recently. He also follows in the CHF clinic. He was a bit confused on dates, thought he was only home from the hospital for 1 day and became very short of breath. Looks like he has been home for a week. He denies chest pain. Shortness of breath became severe. Denies edema or weight gain. In the ER he was started on dobutamine for concern of cardiogenic shock as his blood pressures were low to 80/64 and he is requiring 3 L nasal cannula. Diuretics were ordered but not yet been given. Review of Systems:   Complete review of systems negative except as described above.     Past Medical History:  Past Medical History:   Diagnosis Date    Acid reflux     Acute MI ([facility] [address] 01/25/97,01/04    Anxiety     CAD (coronary artery disease)     follows nafisa Aguiar Diabetes mellitus ([facility] [address]     Fluid retention history of    Hyperlipidemia     Hypertension     Ischemic cardiomyopathy     Osteoarthritis     Post PTCA 54/96/89    Systolic dysfunction, left ventricle     follows with Dr. Juventino Gonzalez Thyroid disease        Past Surgical History:  Past Surgical History:   Procedure Laterality Date    CARDIAC DEFIBRILLATOR PLACEMENT Left 2013    with pacemaker ([employer])    CARDIOVERSION  09/04/2020    Successful     Dr. Anika Greenwood CATH LAB PROCEDURE  01/27/97,11/00    Critical lesion mid left circumflex,significant lesion mid-LAD and first diagonal    ECHO COMPL W DOP COLOR FLOW  4/11/2013         ECHO COMPL W DOP COLOR FLOW  4/21/2013         MITRAL VALVE SURGERY      TRANSESOPHAGEAL ECHOCARDIOGRAM  4/12/2013    Dr. Brendan Lainez TRANSESOPHAGEAL ECHOCARDIOGRAM  08/02/2017       Family History:  Family History   Problem Relation Age of Onset    Hypertension Mother     Hypertension Father     Heart Surgery Father     High Cholesterol Father        Social History:  Social History     Tobacco Use    Smoking status: Former Smoker     Packs/day: 0.25     Years: 50.00     Pack years: 12.50     Types: Cigarettes    Smokeless tobacco: Never Used    Tobacco comment: used to be 2 packs a day   Substance Use Topics    Alcohol use: Yes     Frequency: Monthly or less     Drinks per session: 1 or 2     Binge frequency: Never     Comment: rare tequilla/beer    Drug use: Never       Allergies:  No Known Allergies    Home Medications:  Prior to Admission medications    Medication Sig Start Date End Date Taking?  Authorizing Provider   amiodarone (CORDARONE) 200 MG tablet Take 1 tablet by mouth daily 11/21/20   Na Ferguson,    warfarin (COUMADIN) 5 MG tablet Take 0.5 tablets by mouth daily 11/21/20   Dipesh Barrera,    metoprolol succinate (TOPROL XL) 50 MG extended release tablet Take 0.5 tablets by mouth daily 11/21/20 Peggy Luu DO   sacubitril-valsartan (ENTRESTO) 24-26 MG per tablet Take 1 tablet by mouth 2 times daily 11/21/20   Peggy Luu DO   bumetanide (BUMEX) 2 MG tablet Take 0.5 tablets by mouth 2 times daily 11/21/20   Dana Barrera DO   midodrine (PROAMATINE) 5 MG tablet Take 1 tablet by mouth 2 times daily (with meals) 11/21/20   Dana Barrera DO   polyethylene glycol (GLYCOLAX) 17 g packet Take 17 g by mouth daily as needed for Constipation 11/20/20 12/20/20  Yaneli Morrow MD   pantoprazole (PROTONIX) 20 MG tablet Take 20 mg by mouth daily    Historical Provider, MD   zolpidem (AMBIEN CR) 12.5 MG extended release tablet Take 12.5 mg by mouth nightly as needed. 9/10/19   Historical Provider, MD   gabapentin (NEURONTIN) 100 MG capsule Take 100 mg by mouth daily. Historical Provider, MD   albuterol sulfate HFA (PROAIR HFA) 108 (90 Base) MCG/ACT inhaler Inhale 2 puffs into the lungs every 4 hours as needed for Wheezing or Shortness of Breath 3/12/19 11/8/20  Blossom Hdz MD   Magnesium 400 MG TABS Take 400 mg by mouth daily    Historical Provider, MD   Ascorbic Acid (VITAMIN C) 1000 MG tablet Take 2,000 mg by mouth daily    Historical Provider, MD   Multiple Vitamins-Minerals (THERAPEUTIC MULTIVITAMIN-MINERALS) tablet Take 1 tablet by mouth daily    Historical Provider, MD   colchicine (COLCRYS) 0.6 MG tablet Take 0.6 mg by mouth 2 times daily as needed     Historical Provider, MD   allopurinol (ZYLOPRIM) 100 MG tablet Take 1 tablet by mouth daily 12/3/17   Raciel Larson MD   levothyroxine (SYNTHROID) 75 MCG tablet Take 1 tablet by mouth Daily 12/4/17   Raciel Larson MD   atorvastatin (LIPITOR) 80 MG tablet Take 80 mg by mouth daily    Historical Provider, MD   albuterol-ipratropium (COMBIVENT)  MCG/ACT inhaler Inhale 2 puffs into the lungs every 12 hours.  4/23/13   Nikita Colin MD   aspirin 81 MG EC tablet Take 81 mg by mouth daily Prescribed per Dr Isaías Rodgers Historical Provider, MD       Current Medications:    Current Facility-Administered Medications:     furosemide (LASIX) injection 40 mg, 40 mg, Intravenous, Once, Mike Cohn,     DOBUTamine (DOBUTREX) 1000 mg in dextrose 5 % 250 mL infusion, 2.5 mcg/kg/min, Intravenous, Continuous, Mónica Moreno MD, Last Rate: 3.3 mL/hr at 11/30/20 0805, 2.5 mcg/kg/min at 11/30/20 0805    Current Outpatient Medications:     amiodarone (CORDARONE) 200 MG tablet, Take 1 tablet by mouth daily, Disp: 30 tablet, Rfl: 0    warfarin (COUMADIN) 5 MG tablet, Take 0.5 tablets by mouth daily, Disp: 30 tablet, Rfl: 0    metoprolol succinate (TOPROL XL) 50 MG extended release tablet, Take 0.5 tablets by mouth daily, Disp: 30 tablet, Rfl: 0    sacubitril-valsartan (ENTRESTO) 24-26 MG per tablet, Take 1 tablet by mouth 2 times daily, Disp: 60 tablet, Rfl: 0    bumetanide (BUMEX) 2 MG tablet, Take 0.5 tablets by mouth 2 times daily, Disp: 30 tablet, Rfl: 5    midodrine (PROAMATINE) 5 MG tablet, Take 1 tablet by mouth 2 times daily (with meals), Disp: 60 tablet, Rfl: 0    polyethylene glycol (GLYCOLAX) 17 g packet, Take 17 g by mouth daily as needed for Constipation, Disp: 527 g, Rfl: 1    pantoprazole (PROTONIX) 20 MG tablet, Take 20 mg by mouth daily, Disp: , Rfl:     zolpidem (AMBIEN CR) 12.5 MG extended release tablet, Take 12.5 mg by mouth nightly as needed. , Disp: , Rfl:     gabapentin (NEURONTIN) 100 MG capsule, Take 100 mg by mouth daily. , Disp: , Rfl:     albuterol sulfate HFA (PROAIR HFA) 108 (90 Base) MCG/ACT inhaler, Inhale 2 puffs into the lungs every 4 hours as needed for Wheezing or Shortness of Breath, Disp: 1 Inhaler, Rfl: 1    Magnesium 400 MG TABS, Take 400 mg by mouth daily, Disp: , Rfl:     Ascorbic Acid (VITAMIN C) 1000 MG tablet, Take 2,000 mg by mouth daily, Disp: , Rfl:     Multiple Vitamins-Minerals (THERAPEUTIC MULTIVITAMIN-MINERALS) tablet, Take 1 tablet by mouth daily, Disp: , Rfl:    colchicine (COLCRYS) 0.6 MG tablet, Take 0.6 mg by mouth 2 times daily as needed , Disp: , Rfl:     allopurinol (ZYLOPRIM) 100 MG tablet, Take 1 tablet by mouth daily, Disp: 30 tablet, Rfl: 3    levothyroxine (SYNTHROID) 75 MCG tablet, Take 1 tablet by mouth Daily, Disp: 30 tablet, Rfl: 3    atorvastatin (LIPITOR) 80 MG tablet, Take 80 mg by mouth daily, Disp: , Rfl:     albuterol-ipratropium (COMBIVENT)  MCG/ACT inhaler, Inhale 2 puffs into the lungs every 12 hours. , Disp: 1 Inhaler, Rfl: 1    aspirin 81 MG EC tablet, Take 81 mg by mouth daily Prescribed per Dr Slick Childress, Disp: , Rfl:     Physical Exam:  BP 93/66   Pulse 84   Temp 97.5 °F (36.4 °C) (Oral)   Resp 18   Ht 6' 2\" (1.88 m)   Wt 195 lb (88.5 kg)   SpO2 98%   BMI 25.04 kg/m²   Wt Readings from Last 3 Encounters:   11/30/20 195 lb (88.5 kg)   11/21/20 203 lb 4.8 oz (92.2 kg)   11/10/20 202 lb (91.6 kg)     Appearance: Older gentleman, awake, alert, looks uncomfortable  Skin: Intact, no rash  Head: Normocephalic, atraumatic  Eyes: EOMI, no conjunctival erythema  ENMT: No pharyngeal erythema, MMM, no rhinorrhea  Neck: Supple, + elevated JVP to 5 cm above the clavicle, no carotid bruits  Lungs: Diminished at the bases and bibasilar rales  Cardiac: PMI nondisplaced, regular rhythm with a normal rate, normal S1 & S2, no murmurs. Left chest ICD  Abdomen: Soft, nontender, +bowel sounds  Extremities: Moves all extremities x 4, no lower extremity edema. Ankles and feet are cool to the touch  Neurologic: No focal motor deficits apparent, normal mood and affect  Peripheral Pulses: Intact posterior tibial pulses bilaterally    Intake/Output:  No intake or output data in the 24 hours ending 11/30/20 0836  No intake/output data recorded.     Laboratory Tests:  Recent Labs     11/30/20  0230      K 3.9   CL 99   CO2 27   BUN 49*   CREATININE 1.8*   GLUCOSE 133*   CALCIUM 8.9     Lab Results   Component Value Date    MG 2.1 11/21/2020     Recent Labs     11/30/20  0230   ALKPHOS 168*   ALT 51*   AST 79*   PROT 7.2   BILITOT 1.0   LABALBU 3.6     Recent Labs     11/30/20  0230   WBC 9.6   RBC 4.73   HGB 12.9   HCT 41.6   MCV 87.9   MCH 27.3   MCHC 31.0*   RDW 27.0*      MPV 11.3     Lab Results   Component Value Date    CKTOTAL 25 (L) 05/14/2013    CKMB 0.5 05/14/2013    TROPONINI 0.07 (H) 11/30/2020    TROPONINI 0.07 (H) 11/30/2020    TROPONINI 0.07 (H) 11/16/2020     Lab Results   Component Value Date    INR 1.7 11/30/2020    INR 2.1 11/21/2020    INR 2.1 11/20/2020    PROTIME 20.8 (H) 11/30/2020    PROTIME 25.6 (H) 11/21/2020    PROTIME 24.4 (H) 11/20/2020     Lab Results   Component Value Date    TSH 11.320 (H) 11/30/2020     Lab Results   Component Value Date    LABA1C 5.8 (H) 11/16/2020     No results found for: EAG  Lab Results   Component Value Date    CHOL 85 10/06/2020    CHOL 85 12/02/2017    CHOL 215 (H) 04/09/2013     Lab Results   Component Value Date    TRIG 60 10/06/2020    TRIG 87 12/02/2017    TRIG 202 (H) 04/09/2013     Lab Results   Component Value Date    HDL 35 10/06/2020    HDL 20 12/02/2017    HDL 32.4 (A) 04/09/2013     Lab Results   Component Value Date    LDLCALC 38 10/06/2020    LDLCALC 48 12/02/2017    LDLCALC 142 (H) 04/09/2013     Lab Results   Component Value Date    LABVLDL 12 10/06/2020    LABVLDL 17 12/02/2017     No results found for: CHOLHDLRATIO  Recent Labs     11/30/20  0230   PROBNP 13,692*       Cardiac Tests:  EKG: Possible atrial fibrillation, 88 bpm.  Inferior infarct, anterolateral infarct. Chest X-ray:   11/30/2020  Impression:          1.  Congestive heart failure with increasing right greater than left   bilateral pleural effusions. 2.  Right greater than left bibasilar opacities which could be due to   combination of atelectasis versus infiltrate and pleural effusions.           CT chest noncontrast 11/30/2020   Impression:         1.  Congestive heart failure with right greater than left moderate bilateral   pleural effusions and compressive atelectasis. 2.  The compressive atelectasis bilaterally contains hyperdensities   suggesting aspiration. 3.  A 4.8 cm focal density in the right lung base.  Finding may represent   separate focus of atelectasis, however underlying mass not completely   excluded.  Continued follow-up imaging recommended. 4.  Mediastinal adenopathy which could be due to infectious, inflammatory or   neoplastic. Echocardiogram: 7/14/15  Ejection fraction is visually estimated at 35%. The inferior and inferolateral walls are severely hypokinetic/akinetic. Mildly dilated right ventricle with normal right ventricular function. Indeterminate diastolic function. Moderately dilated left atrium. Pulmonary vein doppler suggestive of elevated left atrial pressure. Status post mitral annular ring. Mean mitral valve gradient 5.9 mmHg. Moderate mitral regurgitation. Moderate aortic regurgitation. Mild tricuspid regurgitation. PASP is estimated at 37 mmHg. Mild pulmonic regurgitation.      NNEKA: 8/2/17 (Dr. Cony Stewart)   Ejection fraction is visually estimated at 35%.   Inferolateral/inferior hypokinesis.   Rakel right ventricle size with low normal right ventricular function.   Moderately dilated left atrium.   History of oversewing of ROBER. There is color flow from the LA into the   ROBER. No evidence of a left atrial appendage thrombus.   No evidence of interatrial shunting on bubble study.   History of mitral annular ring (30mm Future Ring)   MV mean gradient 3.4 mmHg.   Poor coaptation of MV leaflets.   Severe mitral regurgitation.   Moderate aortic regurgitation.   Moderate tricuspid regurgitation.  RV-RA gradient is estimated at 35 mmHg.   Mild pulmonic regurgitation.     Echocardiogram: 1/9/18 ([facility])  - Exam indication: Initial postoperative evaluation of prosthetic valve (baseline)  - The left ventricle is moderately dilated.  Left ventricular systolic function is   severely decreased. EF = 22 ± 5% (2D biplane) Left ventricular diastolic function   was not evaluated due to prosthetic valve. - The right ventricle is dilated. Right ventricular systolic function is normal.  - The left atrial cavity is severely dilated. - The right atrial cavity is dilated. - Post mitral valve replacement. Biocor prosthetic mitral valve (size #29). There   is trivial mitral valve regurgitation. The peak gradient is 23 mmHg and the mean   gradient is 8 mmHg. MV gradients obtained at a HR of 80 bpm. Prior peak/mean   gradients of 22/8 mmHg. - Post tricuspid valve repair. tricuspid valve annuloplasty ring (size #30). There   is mild (1+ - 2+) tricuspid valve regurgitation. The peak gradient is 4 mmHg and   the mean gradient is 2 mmHg. TV gradients obtained at a HR of 80 bpm. Prior mean   gradient of 3 mmHg. - Trifecta prosthetic aortic valve (size #23). The peak gradient is 10 mmHg, the   mean gradient is 5 mmHg and the dimensionless valve index is 0.57. Prior peak/mean   gradients of 9/5 mmHg. - Exam was compared with the prior  echocardiographic exam performed on   12/18/2017. There is no significant change.     Echocardiogram: 9/18/19 (Dr. Bianca Alfred)  [facility] eccentric left ventricular hypertrophy.   Severe left ventricular systolic dysfunction.   Visually estimated LVEF is 35 %.    Normal right ventricular size.   Mild right ventricular systolic function.   TAPSE is 1.5 cm.   TDI s' is 7 cm/s.    ICD or pacer lead visualized in the right sided chambers.   Status post mitral valve # 29 mm.   The valve appears well seated with no rocking motion.   Status post aortic valve # 23 mm.   This appears well seated and without any rocking motion.   Mean gradient is 10.4 mm Hg.   Moderate tricuspid regurgitation.     NNEKA: 9/4/2020 (Dr. Jaime Vila)  Ali Cleaves dilated left ventricle.   Severely reduced left ventricular systolic function.   Ejection fraction is visually estimated at 15-20%.   Severely dilated left atrium.  Nela Calderón was evidence of severe spontaneous echo contrast in the left atrium.   Mildly enlarged right ventricle cavity.   Right ventricle global systolic function is severely reduced .   Moderately enlarged right atrium size.   Normally functioning bioprosthetic valve in mitral position.   Mild mitral regurgitation is present.   Mean transmitral gradient (Doppler) 5 mm Hg .   Normally functioning bioprosthetic valve in aortic position.   Planimetered aortic valve area 1.4 cm2 .   The tricuspid valve appears structurally normal with evidence of tricuspid   ring.   Physiologic and/or trace tricuspid regurgitation. Right heart catheterization 1/2019  Hemodynamic Data:   RA (a/v/m): 10/11/10   RV (a/v/m): 50/3/12   PA (a/v/m): 55/29/38   PCWP (PAOP) (a/v/m): 25/35/27   PCWP (PAOP) (a/v/m): 26/35/28   PCWP (PAOP) (a/v/m): 36/36/29     Cuff blood pressure: 110/72/87     PA O2 saturation: 44 %   SA O2 saturation: 94 %   Hemoglobin: 12.5 g/dL   BSA: 2.14 m2      JG THERMODILUTION   Stroke volume (mls) 42.32 66.56   Cardiac Output (l/min) 2.92 4.64   Cardiac Output Index (l/min/m2) 1.4 2.2     Calculations using Jg CO [facility]):   Cardiac power output () = 0.57   Pulmonary artery pulsatility index (Jennifer) = 2.6   DPG 2   TPG 11   PVR 3.66 WIGGINS   PVR Index   SVR 2053   SVR Index         Impression:   1. Low Pa sat   2. Low cardiac indices   3. Elevated biventricular filling pressures   4. Severely elevated systemic vascular resistance   5. Ambulatory cardiogenic shock, NYHA FC IV   6. Low      -------------------------------------------------------------------------------------------------------------------------------------------------------------  IMPRESSION:  1. Acute decompensated heart failure with reduced ejection fraction. proBNP 14,000. 2. Possible cardiogenic shock  3. Elevated D-dimer, ?  PE  4. Ischemic cardiomyopathy EF 15 to 20%. NYHA class IV/ACC stage D. S/p dual chamber ICD  5.  CAD status post CABG 4/2013 (LIMA-LAD, V-OM, V-PDA), redo 11/2017 (V-PDA)  6. Persistent atrial fibrillation, AC with warfarin, LA appendage ligation 2013. DC cardioversion 9/4/2020. On amiodarone for rhythm control  7. Subtherapeutic INR 1.7  8. MR, TR, AR s/p MVR (#29 Biocor), AVR (#23 Trifecta), TV repair (#30 CE ring) on 11/7/2017  9. CKD. Creatinine 1.8 baseline around 2.0. Was 2.2 on discharge 11/21/2020  10. Sick sinus syndrome s/p pacemaker, upgraded to ICD  11. Pleural effusion with recent right-sided thoracentesis of 1500 mL 11/21/2020  12. Chronic hypotension  13. Cirrhosis of the liver  14. Type 2 diabetes  15. Hyperlipidemia  16. Possible PANTERA  17. Hypothyroidism  18. Tobacco abuse, states he quit in June    RECOMMENDATIONS:  He has chronic hypotension, but seems a bit confused mentally and extremities are cool, could be hypoperfusion and due to cardiogenic shock. However his lactic acid is normal and renal function is better than recent baseline. He overall he appears clinically cool and wet.  Continue low-dose dobutamine for now   Diurese with IV bumetanide   Hold beta-blocker, sacubitril/valsartan for now   Hold warfarin as he will likely need invasive hemodynamic monitoring once transferred   Reasonable to continue anticoagulation with enoxaparin or heparin   Device interrogation pending   Continue amiodarone   Consider VQ scan to r/o pe   [facility] for advanced heart failure evaluation has already been arranged; evaluate candidacy for LVAD or other circulatory support   In the meantime he is admitted to the ICU here    Thank you for allowing me to participate in your patient's care. Please feel free to contact me if you have any questions or concerns. Due to the immediate potential for life-threatening deterioration due to decompensated heart failure cardiogenic shock, I spent 45 minutes providing critical care.   This time is excluding time spent performing procedures. Adriana Mead MD, [address]    NOTE: This report was transcribed using voice recognition software. Every effort was made to ensure accuracy; however, inadvertent computerized transcription errors may be present.

## 2020-11-30 NOTE — PATIENT CARE CONFERENCE
Intensive Care Daily Quality Rounding Checklist      ICU Team Members: Dr. Keyur Koenig, Reese Shah & Gopal (residents), charge nurse, bedside nurse, respiratory therapist, clinical pharmacist     ICU Day #: NUMBER: 1    Intubation Date: N/A    Ventilator Day #: N/A    Central Line Insertion Date: November 30        Day #: NUMBER: 1     Arterial Line Insertion Date: N/A      Day #: N/A    DVT Prophylaxis: Lovenox    GI Prophylaxis: Pepcid    Narvaez Catheter Insertion Date: N/A       Day #: N/A      Continued need (if yes, reason documented and discussed with physician): N/A    Skin Issues/ Wounds and ordered treatment discussed on rounds: no issues    Goals/ Plans for the Day: Daily labs, await bed at [facility] [place]

## 2020-12-01 NOTE — CONSULTS
12/1/2020  9:05 AM           Nutrition Note    Consulted for CHF diet guideline teaching. Pt is scheduled for transfer to [facility] [place] 12/1.     Electronically signed by Bibiana Piña RD, CNSC, LD on 12/1/20 at 9:05 AM EST    Contact: [phone]

## 2020-12-01 NOTE — PROGRESS NOTES
Critical Care Team - Daily Progress Note         Date and time: 2020 12:11 PM  Patient's name:  Ele Hidalgo  Medical Record Number: [id number]  Patient's account/billing number: [de-identified]  Patient's YOB: 1945  Age: 70 y.o.   Date of Admission: 2020  1:58 AM  Length of stay during current admission: 1      Primary Care Physician: Helder Gallardo MD  ICU Attending Physician: Dr. Lenwood Merlin    Code Status: Prior    Reason for ICU admission: acute hypoxic resp failure      SUBJECTIVE:     OVERNIGHT EVENTS:       none    CURRENT VENTILATION STATUS:     [] Ventilator  [] BIPAP  [x] Nasal Cannula [] Room Air      IF INTUBATED, ET TUBE MARKING AT LOWER LIP:       cms    SECRETIONS Amount:  [] Small [] Moderate  [] Large  [] None  Color:     [] White [] Colored  [] Bloody    SEDATION:  RAAS Score:  [] Propofol gtt  [] Versed gtt  [] Ativan gtt   [] No Sedation    PARALYZED:  [] No    [] Yes      VASOPRESSORS:  [] No    [] Yes    If yes -   [] Levophed       [] Dopamine     [] Vasopressin       [x] Dobutamine  [] Phenylephrine         [] Epinephrine    CENTRAL LINES:     [] No   [] Yes   (Date of Insertion:   )           If yes -     [] Right IJ     [] Left IJ [x] Right Femoral [] Left Femoral                   [] Right Subclavian [] Left Subclavian       PEREZ'S CATHETER:   [] No   [] Yes  (Date of Insertion:   )     URINE OUTPUT:            [x] Good   [] Low              [] Anuric      OBJECTIVE:     VITAL SIGNS:  BP 92/75   Pulse 97   Temp 97.8 °F (36.6 °C) (Axillary)   Resp 20   Ht 6' 2\" (1.88 m)   Wt 195 lb 12.3 oz (88.8 kg)   SpO2 97%   BMI 25.14 kg/m²   Tmax over 24 hours:  Temp (24hrs), Av.6 °F (36.4 °C), Min:97.4 °F (36.3 °C), Max:97.8 °F (36.6 °C)      Patient Vitals for the past 6 hrs:   BP Temp Temp src Pulse Resp SpO2   20 0823 -- -- -- -- 20 97 %   20 0755 92/75 97.8 °F (36.6 °C) Axillary 97 25 93 %   20 0633 98/74 -- -- 105 28 95 % Intake/Output Summary (Last 24 hours) at 12/1/2020 1211  Last data filed at 12/1/2020 6386  Gross per 24 hour   Intake 99.13 ml   Output 2475 ml   Net -2375.87 ml     Wt Readings from Last 2 Encounters:   12/01/20 195 lb 12.3 oz (88.8 kg)   11/21/20 203 lb 4.8 oz (92.2 kg)     Body mass index is 25.14 kg/m². PHYSICAL EXAMINATION:    General appearance - oriented to person, place, and time and chronically ill appearing  Mental status - alert, oriented to person, place, and time  Eyes - pupils equal and reactive, extraocular eye movements intact  Ears - external ear canals normal  Nose - normal and patent, no erythema, discharge  Mouth - mucous membranes moist, pharynx normal without lesions  Neck - supple, no significant adenopathy  Chest - diminished b/l. Respirations even and unlabored on 2 L/min O2 via nc  Heart - irregular rhythm, no murmur appreciated  Abdomen - soft, nontender, nondistended, no masses or organomegaly  Neurological - alert, oriented, normal speech, no focal findings or movement disorder noted}  Extremities - peripheral pulses normal, no pedal edema, no clubbing or cyanosis  Skin - normal coloration and turgor, no rashes, no suspicious skin lesions noted      Any additional physical findings:    MEDICATIONS:    Scheduled Meds:    No current facility-administered medications for this encounter.       Current Outpatient Medications   Medication Sig Dispense Refill    amiodarone (CORDARONE) 200 MG tablet Take 1 tablet by mouth daily 30 tablet 0    warfarin (COUMADIN) 5 MG tablet Take 0.5 tablets by mouth daily 30 tablet 0    metoprolol succinate (TOPROL XL) 50 MG extended release tablet Take 0.5 tablets by mouth daily 30 tablet 0    sacubitril-valsartan (ENTRESTO) 24-26 MG per tablet Take 1 tablet by mouth 2 times daily 60 tablet 0    bumetanide (BUMEX) 2 MG tablet Take 0.5 tablets by mouth 2 times daily 30 tablet 5    midodrine (PROAMATINE) 5 MG tablet Take 1 tablet by mouth 2 21.3 2020    INR 1.8 2020     PTT:    Lab Results   Component Value Date    APTT 35.9 2020       Comprehensive Metabolic Profile:   Recent Labs     20  0230 20  0450    141   K 3.9 3.7   CL 99 101   CO2 27 31*   BUN 49* 41*   CREATININE 1.8* 1.8*   GLUCOSE 133* 124*   CALCIUM 8.9 8.8   PROT 7.2 6.7   LABALBU 3.6 3.4*   BILITOT 1.0 0.9   ALKPHOS 168* 153*   AST 79* 66*   ALT 51* 43*      Magnesium:   Lab Results   Component Value Date    MG 2.1 2020     Phosphorus:   Lab Results   Component Value Date    PHOS 2.5 2020     Ionized Calcium:   Lab Results   Component Value Date    CAION 1.18 2020        Urinalysis:     Troponin:   Recent Labs     20  0230 20  0727   TROPONINI 0.07* 0.07*       Microbiology:    Cultures during this admission:     Blood cultures:                 [x] None drawn      [] Negative             []  Positive (Details:  )  Urine Culture:                   [x] None drawn      [] Negative             []  Positive (Details:  )  Sputum Culture:               [x] None drawn       [] Negative             []  Positive (Details:  )   Endotracheal aspirate:     [x] None drawn       [] Negative             []  Positive (Details:  )     Other pertinent Labs:   Resp viral panel neg    Radiology/Imagin20 CT chest   1.  Congestive heart failure with right greater than left moderate bilateral    pleural effusions and compressive atelectasis. 2.  The compressive atelectasis bilaterally contains hyperdensities    suggesting aspiration. 3.  A 4.8 cm focal density in the right lung base.  Finding may represent    separate focus of atelectasis, however underlying mass not completely    excluded.  Continued follow-up imaging recommended. 4.  Mediastinal adenopathy which could be due to infectious, inflammatory or    neoplastic.             ASSESSMENT:       Respiratory   Acute hypoxic resp failure d/t HF, effusions  B/l pleural effusions   COPD unknown severity(combivent, albuterol MDI at home)  Possible RLL lung mass vs rounded atelectasis- seen on chest CT and 11/15/20 ABD CT   · Diuresis - currently on bumetanide 1 mg IV q12h  · Wean oxygen as tolerated. Keep O2 sat 90-92%     Cardiovascular      HFrEF 9/4/20 echo with EF 15-20%  CAD s/p CABG 2013,2017. ICD in place, sick sinus syndrome  TV   Persistent A fib  Chronic hypotension  · BNP 75080  · Cardiology following -recommending transfer to [facility] for further management of advanced heart failure and evaluation for LVAD  · Dobutamine per Cardiology recommendations  · amiodarone   · entresto and metoprolol on hold  · Monitor I/O        Gastrointestinal   H/o Cirrhosis     Renal   CKD Stage III, baseline creat ~2.1     Infectious Disease    no acute process at this time     Hematology/Oncology   Per pt, has been off warfarin at home.      Endocrine   Hypothyroidism  · Levothyroxine   Dx of DM on chart - 11/16/20 HbA1c 5.8     Social/Spiritual/DNR/Other   Lives alone          PLAN:     WEAN PER PROTOCOL:  [x] No   [] Yes  [] N/A    DISCONTINUE ANY LABS:   [x] No   [] Yes    ICU PROPHYLAXIS:  Stress ulcer:  [] PPI Agent  [x] [id number] [] Sucralfate  [] Other:  VTE:   [x] Enoxaparin  [] Unfract. Heparin Subcut  [] EPC Cuffs    NUTRITION:  [] NPO [] Tube Feeding (Specify: ) [] TPN  [x] PO (Diet: low Na)    HOME MEDICATIONS RECONCILED: [x] No  [] Yes    INSULIN DRIP:   [x] No   [] Yes    CONSULTATION NEEDED:  [x] No   [] Yes    FAMILY UPDATED:    [x] No   [] Yes    TRANSFER OUT OF ICU:   [] No   [] Yes    ADDITIONAL PLAN:    For transfer to [facility] today for evaluation for possible LVAD      April MALDONADO Huerta-3                  12/1/2020, 12:11 PM      NOTE: This report, in part or full, may have been transcribed using voice recognition software. Every effort was made to ensure accuracy; however, inadvertent computerized transcription errors may be present.  Please excuse any transcriptional grammatical or spelling errors that may have escaped my editorial review. I personally saw, examined and provided care for the patient. Radiographs, labs and medication list were reviewed by me independently. I spoke with bedside nursing, therapists and consultants. Critical care services and times documented are independent of procedures and multidisciplinary rounds with Residents. Additionally comprehensive, multidisciplinary rounds were conducted with the MICU team. The case was discussed in detail and plans for care were established. Review of Residents documentation was conducted and revisions were made as appropriate. I agree with the above documented exam, problem list and plan of care.   Oren Madison MD   CCT excluding procedures:33'

## 2020-12-01 NOTE — PROGRESS NOTES
Family felt the need to drop off patient's elizabet. RN went downstairs to sign for it and gave it to the patient.

## 2020-12-01 NOTE — PATIENT CARE CONFERENCE
Intensive Care Daily Quality Rounding Checklist      ICU Team Members: Dr. Sam Dominguez, Reese Marie (residents), bedside nurse, charge nurse, clinical pharmacist, respiratory therapist    ICU Day #: 2    Intubation Date:  N/A    Ventilator Day #: N/A    Central Line Insertion Date:  11/30/20        Day #: 2     Arterial Line Insertion Date:  N/A      Day #: N/A    DVT Prophylaxis: N/A    GI Prophylaxis: Pepcid    Narvaez Catheter Insertion Date:  N/A       Day #: N/A      Continued need (if yes, reason documented and discussed with physician): N/A    Skin Issues/ Wounds and ordered treatment discussed on rounds: none    Goals/ Plans for the Day: Daily labs, continue Dobutamine drip, has bed at [facility], will be transferred today

## 2020-12-01 NOTE — CARE COORDINATION
Transferred to [facility].   left [facility] to inform of transfer Shirley Manning

## 2020-12-03 NOTE — DISCHARGE SUMMARY
Physician Discharge Summary     Patient ID:  Khari Morales  [id number]  70 y.o.  1945    Admit date: 11/30/2020    Discharge date and time:  12/1 9am    Admission Diagnoses:   Patient Active Problem List   Diagnosis    Hyperlipidemia    Uncontrolled diabetes mellitus [facility] [address]    Coronary atherosclerosis of native coronary artery    GERD (gastroesophageal reflux disease)    Depression    Hypothyroid    Ischemic cardiomyopathy    Mitral regurgitation    AI (aortic insufficiency)    [facility]oxysmal atrial fibrillation) ([facility]    Ulnar nerve neuropathy    ICD (implantable cardioverter-defibrillator) in place    Tricuspid regurgitation    CKD (chronic kidney disease) stage 4, GFR 15-29 ml/min (HCC)    Chronic systolic CHF (congestive heart failure) (HCC)    Anticoagulated on Coumadin    Right ventricular dysfunction    Congestive heart failure (HCC)    Persistent atrial fibrillation (HCC)    Anemia of chronic renal failure, stage 3 (moderate)    History of coronary artery bypass graft    Heart failure with reduced ejection fraction (HCC)    CKD (chronic kidney disease) stage 3, GFR 30-59 ml/min    Hypertension    Cirrhosis ([facility] [address]    Ascites    Lesion of lung    Chronic anticoagulation    Supratherapeutic INR    Acute respiratory failure with hypoxia ([facility] [address]    Hypoxia    Respiratory distress    Cardiogenic shock ([facility] [address]       Discharge Diagnoses: Acute on chronic systolic HF    Consults: cardiology    Procedures: None    Hospital Course:   1. Acute on chronic systolic heart failure              BNP elevated, borderline hypotension-cardiogenic shock. Started on dobutamine, and Bumex drip. Transferred to [facility] for advanced HF evaluation. Known EF 15-20%  2. A. fib-on Coumadin, INR 1.7. Subtherapeutic              Continue with amiodarone  3. Hyperlipidemia-statin  4. GERD-Pepcid  5.   Hypothyroidism-Synthroid    Discharge Exam:  See progress note from today    Condition: Stable    Disposition: Transfer [facility]    Patient Instructions:   Discharge Medication List as of 12/1/2020  9:33 AM      CONTINUE these medications which have NOT CHANGED    Details   amiodarone (CORDARONE) 200 MG tablet Take 1 tablet by mouth daily, Disp-30 tablet,R-0NO PRINT      warfarin (COUMADIN) 5 MG tablet Take 0.5 tablets by mouth daily, Disp-30 tablet,R-0NO PRINT      metoprolol succinate (TOPROL XL) 50 MG extended release tablet Take 0.5 tablets by mouth daily, Disp-30 tablet,R-0NO PRINT      sacubitril-valsartan (ENTRESTO) 24-26 MG per tablet Take 1 tablet by mouth 2 times daily, Disp-60 tablet,R-0Normal      bumetanide (BUMEX) 2 MG tablet Take 0.5 tablets by mouth 2 times daily, Disp-30 tablet,R-5NO PRINT      midodrine (PROAMATINE) 5 MG tablet Take 1 tablet by mouth 2 times daily (with meals), Disp-60 tablet,R-0Normal      polyethylene glycol (GLYCOLAX) 17 g packet Take 17 g by mouth daily as needed for Constipation, Disp-527 g,R-1Normal      pantoprazole (PROTONIX) 20 MG tablet Take 20 mg by mouth dailyHistorical Med      zolpidem (AMBIEN CR) 12.5 MG extended release tablet Take 12.5 mg by mouth nightly as needed. Historical Med      gabapentin (NEURONTIN) 100 MG capsule Take 100 mg by mouth daily. Historical Med      albuterol sulfate HFA (PROAIR HFA) 108 (90 Base) MCG/ACT inhaler Inhale 2 puffs into the lungs every 4 hours as needed for Wheezing or Shortness of Breath, Disp-1 Inhaler, R-1Normal      Magnesium 400 MG TABS Take 400 mg by mouth dailyHistorical Med      Ascorbic Acid (VITAMIN C) 1000 MG tablet Take 2,000 mg by mouth dailyHistorical Med      Multiple Vitamins-Minerals (THERAPEUTIC MULTIVITAMIN-MINERALS) tablet Take 1 tablet by mouth dailyHistorical Med      colchicine (COLCRYS) 0.6 MG tablet Take 0.6 mg by mouth 2 times daily as needed Historical Med      allopurinol (ZYLOPRIM) 100 MG tablet Take 1 tablet by mouth daily, Disp-30 tablet, R-3Normal      levothyroxine (SYNTHROID) 75 MCG tablet Take 1 tablet by mouth Daily, Disp-30 tablet, R-3Normal      atorvastatin (LIPITOR) 80 MG tablet Take 80 mg by mouth dailyHistorical Med      albuterol-ipratropium (COMBIVENT)  MCG/ACT inhaler Inhale 2 puffs into the lungs every 12 hours. , Disp-1 Inhaler, R-1      aspirin 81 MG EC tablet Take 81 mg by mouth daily Prescribed per Dr Leonardo Med         Note that over 30 minutes was spent in preparing discharge papers, discussing discharge with patient, medication review, etc.    Signed:  Tyrell Qureshi    12/2/2020  11:35 PM

## 2021-01-12 LAB
AFB CULTURE (MYCOBACTERIA): NORMAL
AFB SMEAR: NORMAL

## 2023-03-28 NOTE — H&P
[address] [facility]  Attending History and Physical      CHIEF COMPLAINT:  SOB      HISTORY OF PRESENT ILLNESS:      The patient is a 70 y.o. male patient of Dr. Ilya Guzman who presents with complaints of SOB for the last 5 days. Patient also has chronic abdominal pain for 3 weeks. Scheduled to have EGD colonoscopy on December 5. Patient has a history of systolic heart failure, has noticed increased lower extremity edema and shortness of breath. Abdominal pain is diffuse. Denies nausea vomiting diarrhea. CT abdomen in ED showed cirrhosis with ascites. Notes increased dyspnea on exertion. No home oxygen. Still smokes.        Past Medical History:    Past Medical History:   Diagnosis Date    Acid reflux     Acute MI ([facility] [address] 01/25/97,01/04    Anxiety     CAD (coronary artery disease)     follows w Dr. Kelvin Gardiner Diabetes mellitus ([facility] [address]     Fluid retention     history of    Hyperlipidemia     Hypertension     Ischemic cardiomyopathy     Osteoarthritis     Post PTCA 31/59/03    Systolic dysfunction, left ventricle     follows with Dr. Kelvin Gardiner Thyroid disease        Past Surgical History:    Past Surgical History:   Procedure Laterality Date    CARDIAC DEFIBRILLATOR PLACEMENT Left 2013    with pacemaker ([employer])    CARDIOVERSION  09/04/2020    Successful     Dr. Anika Greenwood CATH LAB PROCEDURE  01/27/97,11/00    Critical lesion mid left circumflex,significant lesion mid-LAD and first diagonal    ECHO COMPL W DOP COLOR FLOW  4/11/2013         ECHO COMPL W DOP COLOR FLOW  4/21/2013         MITRAL VALVE SURGERY      TRANSESOPHAGEAL ECHOCARDIOGRAM  4/12/2013    Dr. Radha Joshua    TRANSESOPHAGEAL ECHOCARDIOGRAM  08/02/2017       Medications Prior to Admission:    Medications Prior to Admission: amiodarone (CORDARONE) 200 MG tablet, Take 1 tablet by mouth daily 400mg twice daily for one week then Submitted Prior Authorization request to [facility] for review.   left lower extremity stab phlebectomy   Case ref: [id number]     200mg daily  aspirin 81 MG EC tablet, Take 81 mg by mouth daily Prescribed per Dr Liao Remedies  pantoprazole (PROTONIX) 20 MG tablet, Take 20 mg by mouth daily  zolpidem (AMBIEN CR) 12.5 MG extended release tablet, Take 12.5 mg by mouth nightly as needed. gabapentin (NEURONTIN) 100 MG capsule, Take 100 mg by mouth daily. sacubitril-valsartan (ENTRESTO) 49-51 MG per tablet, Entresto 49 mg-51 mg tablet  take one pill by mouth twice daily  warfarin (COUMADIN) 5 MG tablet, Take by mouth Currently 5mg on Mon, Wed, Fri & Sun and  2.5mg on Tu, Thurs & Sat.  metoprolol succinate (TOPROL XL) 50 MG extended release tablet, Take 1 tablet by mouth daily (Patient taking differently: Take 25 mg by mouth daily )  albuterol sulfate HFA (PROAIR HFA) 108 (90 Base) MCG/ACT inhaler, Inhale 2 puffs into the lungs every 4 hours as needed for Wheezing or Shortness of Breath  bumetanide (BUMEX) 2 MG tablet, Take 1 tablet by mouth See Admin Instructions 2mg in am and 1mg in afternoon. Dr Yusuf Mckeon reduced dose 2/13/19. (Patient taking differently: Take by mouth 2mg in am and 1mg in afternoon.  Dr Yusuf Mckeon reduced dose 2/13/19.)  potassium chloride (KLOR-CON M) 20 MEQ extended release tablet, Take 1 tablet by mouth 2 times daily  Magnesium 400 MG TABS, Take 400 mg by mouth daily  Ascorbic Acid (VITAMIN C) 1000 MG tablet, Take 2,000 mg by mouth daily  Multiple Vitamins-Minerals (THERAPEUTIC MULTIVITAMIN-MINERALS) tablet, Take 1 tablet by mouth daily  colchicine (COLCRYS) 0.6 MG tablet, Take 0.6 mg by mouth 2 times daily as needed   allopurinol (ZYLOPRIM) 100 MG tablet, Take 1 tablet by mouth daily  levothyroxine (SYNTHROID) 75 MCG tablet, Take 1 tablet by mouth Daily  insulin glargine (LANTUS) 100 UNIT/ML injection vial, Inject 20 Units into the skin every morning  atorvastatin (LIPITOR) 80 MG tablet, Take 80 mg by mouth daily  linagliptin (TRADJENTA) 5 MG tablet, Take 5 mg by mouth daily  albuterol-ipratropium (COMBIVENT)  MCG/ACT inhaler, Inhale 2 puffs into the lungs every 12 hours. omeprazole (PRILOSEC) 20 MG capsule, Take 20 mg by mouth as needed     Allergies:    Patient has no known allergies. Social History:    reports that he has been smoking cigarettes. He has a 12.50 pack-year smoking history. He has never used smokeless tobacco. He reports current alcohol use. He reports that he does not use drugs. Family History:   family history includes Heart Surgery in his father; High Cholesterol in his father; Hypertension in his father and mother. REVIEW OF SYSTEMS:  As above in the HPI, otherwise negative    PHYSICAL EXAM:    Vitals:  /74   Pulse 61   Temp 97.2 °F (36.2 °C) (Oral)   Resp 16   Ht 6' 2\" (1.88 m)   Wt 205 lb (93 kg)   SpO2 98%   BMI 26.32 kg/m²     General:  Awake, alert, oriented X 3. Well developed, well nourished, well groomed. No apparent distress. HEENT:  Normocephalic, atraumatic. Pupils equal, round, reactive to light. No scleral icterus. No conjunctival injection. Normal lips, teeth, and gums. No nasal discharge. Neck:  Supple  Heart:  RRR, no murmurs, gallops, rubs  Lungs:  insp crackles, LE edema 1+  Abdomen:   Bowel sounds present, soft, diffusely tender, distended  no organomegaly, no peritoneal signs  Extremities:  No clubbing, cyanosis, or edema  Skin:  Warm and dry, no open lesions or rash  Neuro:  Cranial nerves 2-12 intact, no focal deficits  Breast: deferred  Rectal: deferred  Genitalia:  deferred    LABS:    CBC:   Lab Results   Component Value Date    WBC 10.1 11/16/2020    RBC 4.78 11/16/2020    HGB 12.6 11/16/2020    HCT 40.6 11/16/2020    MCV 84.9 11/16/2020    MCH 26.4 11/16/2020    MCHC 31.0 11/16/2020    RDW 25.0 11/16/2020     11/16/2020    MPV 11.1 11/16/2020     BMP:    Lab Results   Component Value Date     11/16/2020    K 4.4 11/16/2020    CL 93 11/16/2020    CO2 20 11/16/2020    BUN 45 11/16/2020    LABALBU 3.6 11/16/2020    CREATININE 2.1 11/16/2020    CALCIUM